# Patient Record
Sex: MALE | Race: BLACK OR AFRICAN AMERICAN | HISPANIC OR LATINO | Employment: OTHER | URBAN - METROPOLITAN AREA
[De-identification: names, ages, dates, MRNs, and addresses within clinical notes are randomized per-mention and may not be internally consistent; named-entity substitution may affect disease eponyms.]

---

## 2017-01-09 RX ORDER — ALLOPURINOL 100 MG/1
100 TABLET ORAL DAILY
Qty: 90 TABLET | Refills: 1 | Status: SHIPPED | OUTPATIENT
Start: 2017-01-09 | End: 2017-01-11

## 2017-01-09 RX ORDER — AMLODIPINE BESYLATE 5 MG/1
TABLET ORAL
Qty: 90 TABLET | Refills: 1 | Status: SHIPPED | OUTPATIENT
Start: 2017-01-09 | End: 2017-01-11

## 2017-01-10 ENCOUNTER — DOCUMENTATION ONLY (OUTPATIENT)
Dept: FAMILY MEDICINE | Facility: CLINIC | Age: 82
End: 2017-01-10

## 2017-01-11 ENCOUNTER — OFFICE VISIT (OUTPATIENT)
Dept: FAMILY MEDICINE | Facility: CLINIC | Age: 82
End: 2017-01-11
Payer: MEDICARE

## 2017-01-11 ENCOUNTER — LAB VISIT (OUTPATIENT)
Dept: LAB | Facility: HOSPITAL | Age: 82
End: 2017-01-11
Attending: FAMILY MEDICINE
Payer: MEDICARE

## 2017-01-11 VITALS
HEIGHT: 66 IN | RESPIRATION RATE: 18 BRPM | DIASTOLIC BLOOD PRESSURE: 79 MMHG | HEART RATE: 88 BPM | TEMPERATURE: 99 F | SYSTOLIC BLOOD PRESSURE: 139 MMHG | WEIGHT: 139.13 LBS | BODY MASS INDEX: 22.36 KG/M2

## 2017-01-11 DIAGNOSIS — I10 ESSENTIAL HYPERTENSION, BENIGN: ICD-10-CM

## 2017-01-11 DIAGNOSIS — I10 ESSENTIAL HYPERTENSION, BENIGN: Primary | ICD-10-CM

## 2017-01-11 DIAGNOSIS — M25.531 BILATERAL WRIST PAIN: Primary | ICD-10-CM

## 2017-01-11 DIAGNOSIS — M19.90 OSTEOARTHRITIS, UNSPECIFIED OSTEOARTHRITIS TYPE, UNSPECIFIED SITE: ICD-10-CM

## 2017-01-11 DIAGNOSIS — R73.9 HYPERGLYCEMIA: ICD-10-CM

## 2017-01-11 DIAGNOSIS — E78.5 HYPERLIPIDEMIA, UNSPECIFIED HYPERLIPIDEMIA TYPE: ICD-10-CM

## 2017-01-11 DIAGNOSIS — H26.9 CATARACT OF BOTH EYES, UNSPECIFIED CATARACT TYPE: ICD-10-CM

## 2017-01-11 DIAGNOSIS — G56.00 CARPAL TUNNEL SYNDROME, UNSPECIFIED LATERALITY: ICD-10-CM

## 2017-01-11 DIAGNOSIS — H61.21 RIGHT EAR IMPACTED CERUMEN: ICD-10-CM

## 2017-01-11 DIAGNOSIS — M25.532 BILATERAL WRIST PAIN: Primary | ICD-10-CM

## 2017-01-11 LAB
25(OH)D3+25(OH)D2 SERPL-MCNC: 23 NG/ML
ALBUMIN SERPL BCP-MCNC: 3.5 G/DL
ALP SERPL-CCNC: 88 U/L
ALT SERPL W/O P-5'-P-CCNC: 17 U/L
ANION GAP SERPL CALC-SCNC: 8 MMOL/L
AST SERPL-CCNC: 17 U/L
BASOPHILS # BLD AUTO: 0.02 K/UL
BASOPHILS NFR BLD: 0.5 %
BILIRUB SERPL-MCNC: 0.4 MG/DL
BUN SERPL-MCNC: 14 MG/DL
CALCIUM SERPL-MCNC: 9.1 MG/DL
CHLORIDE SERPL-SCNC: 110 MMOL/L
CO2 SERPL-SCNC: 21 MMOL/L
CREAT SERPL-MCNC: 1.1 MG/DL
DIFFERENTIAL METHOD: ABNORMAL
EOSINOPHIL # BLD AUTO: 1 K/UL
EOSINOPHIL NFR BLD: 24.6 %
ERYTHROCYTE [DISTWIDTH] IN BLOOD BY AUTOMATED COUNT: 15 %
EST. GFR  (AFRICAN AMERICAN): >60 ML/MIN/1.73 M^2
EST. GFR  (NON AFRICAN AMERICAN): >60 ML/MIN/1.73 M^2
GLUCOSE SERPL-MCNC: 96 MG/DL
HCT VFR BLD AUTO: 39.5 %
HGB BLD-MCNC: 12.7 G/DL
LYMPHOCYTES # BLD AUTO: 1.2 K/UL
LYMPHOCYTES NFR BLD: 29.1 %
MCH RBC QN AUTO: 30 PG
MCHC RBC AUTO-ENTMCNC: 32.2 %
MCV RBC AUTO: 93 FL
MONOCYTES # BLD AUTO: 0.3 K/UL
MONOCYTES NFR BLD: 8.1 %
NEUTROPHILS # BLD AUTO: 1.6 K/UL
NEUTROPHILS NFR BLD: 37.5 %
PLATELET # BLD AUTO: 306 K/UL
PMV BLD AUTO: 9.8 FL
POTASSIUM SERPL-SCNC: 4.2 MMOL/L
PROT SERPL-MCNC: 6.9 G/DL
RBC # BLD AUTO: 4.24 M/UL
SODIUM SERPL-SCNC: 139 MMOL/L
TSH SERPL DL<=0.005 MIU/L-ACNC: 1.28 UIU/ML
WBC # BLD AUTO: 4.19 K/UL

## 2017-01-11 PROCEDURE — 1160F RVW MEDS BY RX/DR IN RCRD: CPT | Mod: S$GLB,,, | Performed by: FAMILY MEDICINE

## 2017-01-11 PROCEDURE — 1125F AMNT PAIN NOTED PAIN PRSNT: CPT | Mod: S$GLB,,, | Performed by: FAMILY MEDICINE

## 2017-01-11 PROCEDURE — 3075F SYST BP GE 130 - 139MM HG: CPT | Mod: S$GLB,,, | Performed by: FAMILY MEDICINE

## 2017-01-11 PROCEDURE — 3078F DIAST BP <80 MM HG: CPT | Mod: S$GLB,,, | Performed by: FAMILY MEDICINE

## 2017-01-11 PROCEDURE — 85025 COMPLETE CBC W/AUTO DIFF WBC: CPT

## 2017-01-11 PROCEDURE — 1159F MED LIST DOCD IN RCRD: CPT | Mod: S$GLB,,, | Performed by: FAMILY MEDICINE

## 2017-01-11 PROCEDURE — 36415 COLL VENOUS BLD VENIPUNCTURE: CPT | Mod: PO

## 2017-01-11 PROCEDURE — 99214 OFFICE O/P EST MOD 30 MIN: CPT | Mod: 25,S$GLB,, | Performed by: FAMILY MEDICINE

## 2017-01-11 PROCEDURE — 99999 PR PBB SHADOW E&M-EST. PATIENT-LVL III: CPT | Mod: PBBFAC,,, | Performed by: FAMILY MEDICINE

## 2017-01-11 PROCEDURE — 82306 VITAMIN D 25 HYDROXY: CPT

## 2017-01-11 PROCEDURE — 1157F ADVNC CARE PLAN IN RCRD: CPT | Mod: S$GLB,,, | Performed by: FAMILY MEDICINE

## 2017-01-11 PROCEDURE — 80053 COMPREHEN METABOLIC PANEL: CPT

## 2017-01-11 PROCEDURE — 84443 ASSAY THYROID STIM HORMONE: CPT

## 2017-01-11 PROCEDURE — 83036 HEMOGLOBIN GLYCOSYLATED A1C: CPT

## 2017-01-11 NOTE — MR AVS SNAPSHOT
[facility]  [address]  [place] [address]  Phone: [phone]  Fax: [phone]                  Bashir Ramos   2017 2:00 PM   Office Visit    Descripción:  Male : 1935   Personal Médico:  Samir Rouse MD   Departamento:  [place] [facility]           Razón de la micheal     Establish Care           Diagnósticos de Esta Visita        Comentarios    Essential hypertension, benign    -  Primario     Hyperlipidemia, unspecified hyperlipidemia type         Hyperglycemia         Osteoarthritis, unspecified osteoarthritis type, unspecified site         Cataract of both eyes, unspecified cataract type         Carpal tunnel syndrome, unspecified laterality         Right ear impacted cerumen                Lista de tareas           Citas próximas        Personal Médico Departamento Tfno del dpto    2017 3:15 PM [facility] [phone]    2017 4:00 PM Aric Edwards MD [place] - Orthopedics [phone]    2017 2:30 PM Jw Goss, JERI Sanchez [facility] [phone]    2017 4:20 PM Samir Rouse MD [facility] [phone]      Metas (5 Years of Data)     Ninguna      Follow-Up and Disposition     Return in about 4 weeks (around 2017).    Follow-up and Disposition History      [facility]     [place] [facility] [place] [facility] Enfermeras - Asistencia   Enfermeras registradas de [facility] pueden ayudarle a reservar arin micheal, proveer educación para la beulah, asesoría clínica, y otros servicios de asesoramiento.   Llame para ozzie servicio gratuito a [phone].             Medicamentos           DEJAR de benigno estos medicamentos     allopurinol (ZYLOPRIM) 100 MG tablet Take 1 tablet (100 mg total) by mouth once daily.    amlodipine (NORVASC) 5 MG tablet TAKE 1 TABLET ONCE DAILY FOR BLOOD PRESSURE    meloxicam (MOBIC) 15 MG tablet Take 1 tablet (15 mg total) by mouth once daily.          "  Verifique que la siguiente lista de medicamentos es arin representación exacta de los medicamentos que está tomando actualmente. Si no hay ningunos reportados, la lista puede estar en brown. Si no es correcta, por favor póngase en contacto con lebron proveedor de atención médica. Lleve esta lista con usted en radha de emergencia.           Medicamentos Actuales     benazepril (LOTENSIN) 10 MG tablet Take 20 mg by mouth once daily.     simvastatin (ZOCOR) 40 MG tablet TAKE ONE TABLET BY MOUTH EVERY EVENING FOR CHOLESTEROL    sulindac (CLINORIL) 200 MG Tab            Información de referencia clínica           Signos vitales - más recientes  Última actualización: 1/11/2017  2:15 PM por Damaris Amaya, MA    PS Pulso Temperatura Resp Loraine Peso    139/79 (BP Location: Right arm, Patient Position: Sitting, BP Method: Automatic) 88 98.6 °F (37 °C) (Oral) 18 5' 5.5" (1.664 m) 63.1 kg (139 lb 1.8 oz)    BMI (IMC)                   22.8 kg/m2           Blood Pressure          Most Recent Value    BP  139/79      Alergias     A partir del:  1/11/2017        Pcn [Penicillins]      Vacunas     Administradas en la fecha de la visita:  1/11/2017        None      Orders Placed During Today's Visit      Órdenes normales de esta visita    Ambulatory referral to Ophthalmology     Ambulatory referral to Orthopedics     Exámenes/Procedimientos futuros Se espera el Vence    CBC auto differential  1/11/2017 3/12/2018    Comprehensive metabolic panel  1/11/2017 3/12/2018    Hemoglobin A1c  1/11/2017 3/12/2018    TSH  1/11/2017 3/12/2018    Vitamin D  1/11/2017 3/12/2018      Registrarse para [facility]     La activación de lebron cuenta [facility] es tan fácil rodrigo 1-2-3!    1) Ir a [email or web address], seleccione Registrarse Ahora, meter el código de activación y lebron fecha de nacimiento, y seleccione Próximo.    [id number]  Expires: 2/25/2017  3:13 PM      2) Crear un nombre de usuario y contraseña para usar cuando se visita [facility] en el " futuro y selecciona arin pregunta de seguridad en radha de que pierda lebron contraseña y seleccione Próximo.    3) Introduzca lebron dirección de correo electrónico y kristel asim en Registrarse!    Información Adicional  Si tiene alguna pregunta, por favor, e-mail [email or web address] o llame al [phone] para hablar con nuestro personal. Recuerde, [facility] no debe ser usada para necesidades urgentes. En radha de emergencia médica, llame al 911.                 Bashir Richard   2017 2:00 PM   Office Visit    Description:  Male : 1935   Provider:  Samir Rouse MD   Department:  [place] [facility]           Reason for Visit     Establish Care           Diagnoses this Visit        Comments    Essential hypertension, benign    -  Primary     Hyperlipidemia, unspecified hyperlipidemia type         Hyperglycemia         Osteoarthritis, unspecified osteoarthritis type, unspecified site         Cataract of both eyes, unspecified cataract type         Carpal tunnel syndrome, unspecified laterality         Right ear impacted cerumen                To Do List           Future Appointments        Provider Department Dept Phone    2017 3:15 PM LAB, [facility] - Lab [phone]    2017 4:00 PM Aric Edwards MD [place] - Orthopedics [phone]    2017 2:30 PM Jw Goss, OD [place] [facility] - Optometry [phone]    2017 4:20 PM Samir Rouse MD [facility] [phone]      Goals     None      Follow-Up and Disposition     Return in about 4 weeks (around 2017).    Follow-up and Disposition History      [facility]     [facility] Care [facility] -  Assistance  Registered nurses in the [facility] provide clinical advisement, health education, appointment booking, and other advisory services.  Call for this free service at [phone].             Medications           STOP taking these medications     allopurinol (ZYLOPRIM)  "100 MG tablet Take 1 tablet (100 mg total) by mouth once daily.    amlodipine (NORVASC) 5 MG tablet TAKE 1 TABLET ONCE DAILY FOR BLOOD PRESSURE    meloxicam (MOBIC) 15 MG tablet Take 1 tablet (15 mg total) by mouth once daily.           Verify that the below list of medications is an accurate representation of the medications you are currently taking.  If none reported, the list may be blank. If incorrect, please contact your healthcare provider. Carry this list with you in case of emergency.           Current Medications     benazepril (LOTENSIN) 10 MG tablet Take 20 mg by mouth once daily.     simvastatin (ZOCOR) 40 MG tablet TAKE ONE TABLET BY MOUTH EVERY EVENING FOR CHOLESTEROL    sulindac (CLINORIL) 200 MG Tab            Clinical Reference Information           Vital Signs - Last Recorded  Most recent update: 1/11/2017  2:15 PM by Damaris Amaya MA    BP Pulse Temp Resp Ht Wt    139/79 (BP Location: Right arm, Patient Position: Sitting, BP Method: Automatic) 88 98.6 °F (37 °C) (Oral) 18 5' 5.5" (1.664 m) 63.1 kg (139 lb 1.8 oz)    BMI                   22.8 kg/m2           Blood Pressure          Most Recent Value    BP  139/79      Allergies as of 1/11/2017     Pcn [Penicillins]      Immunizations Administered on Date of Encounter - 1/11/2017     None      Orders Placed During Today's Visit      Normal Orders This Visit    Ambulatory referral to Ophthalmology     Ambulatory referral to Orthopedics     Future Labs/Procedures Expected by Expires    CBC auto differential  1/11/2017 3/12/2018    Comprehensive metabolic panel  1/11/2017 3/12/2018    Hemoglobin A1c  1/11/2017 3/12/2018    TSH  1/11/2017 3/12/2018    Vitamin D  1/11/2017 3/12/2018      [facility] Sign-Up     Activating your [facility] account is as easy as 1-2-3!     1) Visit [email or web address], select Sign Up Now, enter this activation code and your date of birth, then select Next.  [email or web address]  Expires: 2/25/2017  3:13 PM      2) Create a " username and password to use when you visit [facility] in the future and select a security question in case you lose your password and select Next.    3) Enter your e-mail address and click Sign Up!    Additional Information  If you have questions, please e-mail [email or web address] or call [phone] to talk to our [facility] staff. Remember, [facility] is NOT to be used for urgent needs. For medical emergencies, dial 911.

## 2017-01-11 NOTE — PROGRESS NOTES
"[facility]  Progress Note    Subjective:       Patient ID: Bashir Ramos is a 81 y.o. male.    Chief Complaint: Shoulder pain    HPI81 y.o.male with current medical history of hypertension, hyperlipidemia, and osteoarthritis is here today to establish care.  Patient's blood pressure and cholesterol have been well-controlled on current medications.  Patient continues to have bilateral shoulder and knee pain.  The patient has increased pain with any weightbearing activity.  Patient experiences the most pain in his left shoulder.  Patient also has a history of carpal tunnel.  Patient has not yet received any injections on for abnormal tunnel.  Otherwise patient has been doing well and does not have any further complaints at today's visit.  Review of Systems   Constitutional: Negative for chills, fatigue and fever.   HENT: Negative for congestion, ear pain, postnasal drip, sinus pressure, sneezing and sore throat.    Eyes: Negative for pain.   Respiratory: Negative for cough, shortness of breath and wheezing.    Cardiovascular: Negative for chest pain, palpitations and leg swelling.   Gastrointestinal: Negative for abdominal distention, abdominal pain, constipation, diarrhea, nausea and vomiting.   Genitourinary: Negative for difficulty urinating.   Musculoskeletal: Positive for arthralgias and myalgias. Negative for back pain.   Skin: Negative for rash.   Neurological: Negative for dizziness, seizures, syncope, weakness and numbness.   Psychiatric/Behavioral: Negative for sleep disturbance. The patient is not nervous/anxious.        Objective:      Vitals:    01/11/17 1412   BP: 139/79   BP Location: Right arm   Patient Position: Sitting   BP Method: Automatic   Pulse: 88   Resp: 18   Temp: 98.6 °F (37 °C)   TempSrc: Oral   Weight: 63.1 kg (139 lb 1.8 oz)   Height: 5' 5.5" (1.664 m)     Body mass index is 22.8 kg/(m^2).  Physical Exam   Constitutional: He is oriented to person, place, and time. He appears " well-developed and well-nourished.   HENT:   Head: Normocephalic and atraumatic.   Cataracts bilaterally   Right ear cerumen impaction   Eyes: Conjunctivae and EOM are normal. Pupils are equal, round, and reactive to light.   Neck: Normal range of motion. Neck supple. No JVD present.   Cardiovascular: Normal rate, regular rhythm, normal heart sounds and intact distal pulses.  Exam reveals no gallop and no friction rub.    No murmur heard.  Pulmonary/Chest: Effort normal and breath sounds normal. No respiratory distress. He has no wheezes.   Abdominal: Soft. Bowel sounds are normal. There is no tenderness.   Musculoskeletal: Normal range of motion.   Neurological: He is alert and oriented to person, place, and time. No cranial nerve deficit.   Skin: Skin is warm and dry.   Psychiatric: He has a normal mood and affect. His behavior is normal. Judgment and thought content normal.   Nursing note and vitals reviewed.      Assessment:       1. Essential hypertension, benign    2. Hyperlipidemia, unspecified hyperlipidemia type    3. Hyperglycemia    4. Osteoarthritis, unspecified osteoarthritis type, unspecified site    5. Cataract of both eyes, unspecified cataract type    6. Carpal tunnel syndrome, unspecified laterality    7. Right ear impacted cerumen        Plan:       Essential hypertension, benign  -     TSH; Future; Expected date: 1/11/17  -     CBC auto differential; Future; Expected date: 1/11/17    Hyperlipidemia, unspecified hyperlipidemia type        - Well controlled continue current medications    Hyperglycemia  -     Hemoglobin A1c; Future; Expected date: 1/11/17  -     Comprehensive metabolic panel; Future; Expected date: 1/11/17    Osteoarthritis, unspecified osteoarthritis type, unspecified site  -     Vitamin D; Future; Expected date: 1/11/17  Knee Injection Procedure Note    Pre-operative Diagnosis:  knee osteoarthritis    Post-operative Diagnosis: same    Indications: Symptom relief from  osteoarthritis    Procedure Details     Verbal and written consent was obtained for the procedure. A timeout was performed. Topical anesthetic spray was applied to injection site. The joint was then prepped with betadine followed by alcohol and allowed to dry. A 22 gauge needle was inserted into the posterior aspect of the joint .  6 ml 1% lidocaine and 1 ml of kenalog 30mg/ml was then injected into the joint. The needle was removed and the area cleansed and dressed.    Complications:  None; patient tolerated the procedure well.    Cataract of both eyes, unspecified cataract type  -     Ambulatory referral to Ophthalmology    Carpal tunnel syndrome, unspecified laterality  -     Ambulatory referral to Orthopedics    Right ear cerumen impaction         - Right ear irration performed; patient tolerated well         - DEBROX OTC     Patient readiness: acceptance and barriers:none    During the course of the visit the patient was educated and counseled about the following:     Hypertension:   Dietary sodium restriction.  Regular aerobic exercise.  Check blood pressures daily and record.    Goals: Hypertension: Reduce Blood Pressure    Did patient meet goals/outcomes: Yes    The following self management tools provided: blood pressure log    Patient Instructions (the written plan) was given to the patient/family.     Time spent with patient: 45 minutes    Return in about 4 weeks (around 2/8/2017).  Samir Rouse MD  [facility]  1/11/2017 2:37 PM

## 2017-01-12 LAB
ESTIMATED AVG GLUCOSE: 123 MG/DL
HBA1C MFR BLD HPLC: 5.9 %

## 2017-01-13 ENCOUNTER — TELEPHONE (OUTPATIENT)
Dept: FAMILY MEDICINE | Facility: CLINIC | Age: 82
End: 2017-01-13

## 2017-01-13 NOTE — TELEPHONE ENCOUNTER
----- Message from Samir Rouse MD sent at 1/12/2017  1:21 PM CST -----  Please call and inform patient that his recent lab work indicates he is a prediabetic, has vitamin D deficiency, and slight anemia.  Please have patient make appointment to discuss findings and appropriate treatment plan.

## 2017-01-24 ENCOUNTER — OFFICE VISIT (OUTPATIENT)
Dept: OPTOMETRY | Facility: CLINIC | Age: 82
End: 2017-01-24
Payer: MEDICARE

## 2017-01-24 DIAGNOSIS — H52.7 REFRACTIVE ERROR: ICD-10-CM

## 2017-01-24 DIAGNOSIS — H53.8 BLURRY VISION, BILATERAL: Primary | ICD-10-CM

## 2017-01-24 DIAGNOSIS — Z96.1 PSEUDOPHAKIA: ICD-10-CM

## 2017-01-24 PROCEDURE — 92014 COMPRE OPH EXAM EST PT 1/>: CPT | Mod: S$GLB,,, | Performed by: OPTOMETRIST

## 2017-01-24 PROCEDURE — 99999 PR PBB SHADOW E&M-EST. PATIENT-LVL I: CPT | Mod: PBBFAC,,, | Performed by: OPTOMETRIST

## 2017-01-24 NOTE — PROGRESS NOTES
HPI     CC: Pt here today for yearly eye exam. Pt states distance vision is good.   Pt uses read. He states after using them 10 minutes he gets headaches.    (-) pain / (-) discomfort  (+) headaches  (-) diplopia   (-) flashes / (-) floaters         Last edited by Nick Patel on 1/24/2017  2:37 PM.     ROS     Positive for: Cardiovascular (HTN ), Eyes    Negative for: Constitutional, Gastrointestinal, Neurological, Skin,   Genitourinary, Musculoskeletal, HENT, Endocrine, Respiratory, Psychiatric,   Allergic/Imm, Heme/Lymph    Last edited by Jw Goss, OD on 1/24/2017  3:12 PM. (History)        Assessment /Plan     For exam results, see Encounter Report.    Blurry vision, bilateral    Pseudophakia    Refractive error      Good ocular health ou. S/p cataract extraction ou with good results. Pt denies refraction, has vision insurance, prefers to go to in-network provider.     RTC in 1 year for comprehensive eye exam, or sooner prn.

## 2017-01-24 NOTE — LETTER
January 24, 2017      Samir Rouse MD  [address]  [place] [address]           [place] [facility] - Opt[facility]  [address] [place] [address] Drive [address]  [facility] [address]  Phone: [phone]          Patient: Bashir Ramos   MR Number: [id number]   YOB: 1935   Date of Visit: 1/24/2017       Dear Dr. Samir Rouse:    Thank you for referring Bashir Ramos to me for evaluation. Attached you will find relevant portions of my assessment and plan of care.    If you have questions, please do not hesitate to call me. I look forward to following Bashir Ramos along with you.    Sincerely,    Jw Goss, OD    Enclosure  CC:  No Recipients    If you would like to receive this communication electronically, please contact [email or web address] or [phone] to request more information on [employer] Link access.    For providers and/or their staff who would like to refer a patient to [facility], please contact us through our one-stop-shop provider referral line, Francie Puente, at [phone].    If you feel you have received this communication in error or would no longer like to receive these types of communications, please e-mail [email or web address]

## 2017-02-06 ENCOUNTER — TELEPHONE (OUTPATIENT)
Dept: PHYSICAL MEDICINE AND REHAB | Facility: CLINIC | Age: 82
End: 2017-02-06

## 2017-02-07 ENCOUNTER — OFFICE VISIT (OUTPATIENT)
Dept: PHYSICAL MEDICINE AND REHAB | Facility: CLINIC | Age: 82
End: 2017-02-07
Payer: MEDICARE

## 2017-02-07 VITALS
WEIGHT: 141.88 LBS | HEIGHT: 66 IN | HEART RATE: 87 BPM | SYSTOLIC BLOOD PRESSURE: 119 MMHG | DIASTOLIC BLOOD PRESSURE: 81 MMHG | BODY MASS INDEX: 22.8 KG/M2

## 2017-02-07 DIAGNOSIS — G56.01 CARPAL TUNNEL SYNDROME OF RIGHT WRIST: Primary | ICD-10-CM

## 2017-02-07 DIAGNOSIS — G56.03 BILATERAL CARPAL TUNNEL SYNDROME: ICD-10-CM

## 2017-02-07 PROCEDURE — 1159F MED LIST DOCD IN RCRD: CPT | Mod: S$GLB,,, | Performed by: PHYSICAL MEDICINE & REHABILITATION

## 2017-02-07 PROCEDURE — 3079F DIAST BP 80-89 MM HG: CPT | Mod: S$GLB,,, | Performed by: PHYSICAL MEDICINE & REHABILITATION

## 2017-02-07 PROCEDURE — 99203 OFFICE O/P NEW LOW 30 MIN: CPT | Mod: S$GLB,,, | Performed by: PHYSICAL MEDICINE & REHABILITATION

## 2017-02-07 PROCEDURE — 1160F RVW MEDS BY RX/DR IN RCRD: CPT | Mod: S$GLB,,, | Performed by: PHYSICAL MEDICINE & REHABILITATION

## 2017-02-07 PROCEDURE — 3074F SYST BP LT 130 MM HG: CPT | Mod: S$GLB,,, | Performed by: PHYSICAL MEDICINE & REHABILITATION

## 2017-02-07 PROCEDURE — 99999 PR PBB SHADOW E&M-EST. PATIENT-LVL II: CPT | Mod: PBBFAC,,, | Performed by: PHYSICAL MEDICINE & REHABILITATION

## 2017-02-07 PROCEDURE — 1157F ADVNC CARE PLAN IN RCRD: CPT | Mod: S$GLB,,, | Performed by: PHYSICAL MEDICINE & REHABILITATION

## 2017-02-07 RX ORDER — GABAPENTIN 100 MG/1
200 CAPSULE ORAL NIGHTLY
Qty: 60 CAPSULE | Refills: 11 | Status: SHIPPED | OUTPATIENT
Start: 2017-02-07 | End: 2017-03-08

## 2017-02-07 NOTE — LETTER
February 7, 2017      Aric Edwards MD  [address] [facility] [address]  [facility] [id number] [place], [place] [address]Physical Med/Rehab  [address] [place] [address]  Phone: [phone]  Fax: [phone]          Patient: Bashir Ramos   MR Number: [id number]   YOB: 1935   Date of Visit: 2/7/2017       Dear Dr. Aric Edwards:    Thank you for referring Bashir Ramos to me for evaluation. Attached you will find relevant portions of my assessment and plan of care.    If you have questions, please do not hesitate to call me. I look forward to following Bashir Ramos along with you.    Sincerely,    Dory Hubbard,     Enclosure  CC:  No Recipients    If you would like to receive this communication electronically, please contact [email or web address] or [phone] to request more information on [employer] Link access.    For providers and/or their staff who would like to refer a patient to [facility], please contact us through our one-stop-shop provider referral line, [facility] Cindi, at [phone].    If you feel you have received this communication in error or would no longer like to receive these types of communications, please e-mail [email or web address]

## 2017-02-07 NOTE — PROGRESS NOTES
HPI:  Patient is a 82 y.o. year old male w. Right hand numbness. He is scared of surgery. His emg showed severe right cts.  Labs  Egfr, cr. 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 grayson)    Cataract extraction  10/29/13     left eye     Family History   Problem Relation Age of Onset    Amblyopia Neg Hx     Blindness Neg Hx     Cancer Neg Hx     Cataracts Neg Hx     Diabetes Neg Hx     Glaucoma Neg Hx     Hypertension Neg Hx     Macular degeneration Neg Hx     Retinal detachment Neg Hx     Strabismus Neg Hx     Stroke Neg Hx     Thyroid disease Neg Hx      Social History     Social History    Marital status:      Spouse name: N/A    Number of children: N/A    Years of education: N/A     Social History Main Topics    Smoking status: Former Smoker     Packs/day: 1.00     Types: Cigarettes     Quit date: 8/27/1982    Smokeless tobacco: Never Used    Alcohol use No    Drug use: No    Sexual activity: Not Asked     Other Topics Concern    None     Social History Narrative       Review of patient's allergies indicates:   Allergen Reactions    Pcn [penicillins] Rash       Current Outpatient Prescriptions:     benazepril (LOTENSIN) 10 MG tablet, Take 20 mg by mouth once daily. , Disp: , Rfl:     simvastatin (ZOCOR) 40 MG tablet, TAKE ONE TABLET BY MOUTH EVERY EVENING FOR CHOLESTEROL, Disp: 90 tablet, Rfl: 0    sulindac (CLINORIL) 200 MG Tab, , Disp: , Rfl:           Review of Systems  No nausea, vomiting, fevers, Chills , contipation, diarrhea or sweats    Physical Exam:      Vitals:    02/07/17 0914   BP: 119/81   Pulse: 87     alert and oriented  ×4 follows commands answers all questions appropriately,affect wnl  Manual muscle test 5 out of 5 sensation to light touch grossly intact  +ATROPHY THENAR EMINENCE RIGHT  NL gait FOR STATED AGE  No C/C/E  +TINNELS, +MEDIAN COMPRESSION TEST RIGHT    Assessment:  SEVERE RIGHT CTS    Plan:  TRIAL OF NEURONTIN  WEAR SPLINTS  DECLINED CARPAL TUNNEL INJECTION- HOWEVER, IT MAY NOT HELP D/T SEVERITY OF CTS    Thank you for this interesting referral

## 2017-02-07 NOTE — MR AVS SNAPSHOT
[facility]  [address] [place] [address] Dwayne JOYCE [address]  Phone: [phone]  Fax: [phone]                  Bashir Ramos   2017 9:00 AM   Office Visit    Descripción:  Male : 1935   Personal Médico:  Dory Hubbard DO   Departamento:  [facility]           Razón de la micheal     Wrist Pain           Diagnósticos de Esta Visita        Comentarios    Carpal tunnel syndrome of right wrist    -  Primario     Bilateral carpal tunnel syndrome                Lista de tareas           Citas próximas        Personal Médico Departamento Tfno del dpto    2/15/2017 2:40 PM Samir Rouse MD [facility] [phone]    2017 4:20 PM Samir Rouse MD [facility] [phone]    3/7/2017 9:00 AM Dory Hubbard [facility] [phone]      Metas (5 Years of Data)     Ninguna      Follow-Up and Disposition     Return in about 4 weeks (around 3/7/2017).    Follow-up and Disposition History      Recetas para recoger        Disp Refills Start End    gabapentin (NEURONTIN) 100 MG capsule 60 capsule 11 2017    Take 2 capsules (200 mg total) by mouth every evening. - Oral    Farmacia: [facility], [facility] [place] [facility] [address] [facility] [place]INNY No. de tlfo: #: [phone]         [facility]     [facility] Enfermeras - Asistencia   Enfermeras registradas de [place] pueden ayudarle a reservar arin micheal, proveer educación para la beulah, asesoría clínica, y otros servicios de asesoramiento.   Llame para ozzie servicio gratuito a [phone].             Medicamentos           EMPEZAR a benigno estos medicamentos NUEVOS        Refills    gabapentin (NEURONTIN) 100 MG capsule 11    Sig: Take 2 capsules (200 mg total) by mouth every evening.    Categoría: Normal    Vía: Oral           Verifique que la siguiente lista de medicamentos es arin representación exacta de  "los medicamentos que está tomando actualmente. Si no hay ningunos reportados, la lista puede estar en brown. Si no es correcta, por favor póngase en contacto con lebron proveedor de atención médica. Lleve esta lista con usted en radha de emergencia.           Medicamentos Actuales     benazepril (LOTENSIN) 10 MG tablet Take 20 mg by mouth once daily.     gabapentin (NEURONTIN) 100 MG capsule Take 2 capsules (200 mg total) by mouth every evening.    simvastatin (ZOCOR) 40 MG tablet TAKE ONE TABLET BY MOUTH EVERY EVENING FOR CHOLESTEROL    sulindac (CLINORIL) 200 MG Tab            Información de referencia clínica           Eda signos vitales kurt     PS Pulso Donnelly Peso BMI (IMC)       119/81 87 5' 5.5" (1.664 m) 64.4 kg (141 lb 13.9 oz) 23.25 kg/m2       Blood Pressure          Most Recent Value    BP  119/81      Alergias     A partir del:  2017        Pcn [Penicillins]      Vacunas     Administradas en la fecha de la visita:  2017        None      Orders Placed During Today's Visit      Órdenes normales de esta visita    HME - OTHER     HME - OTHER       Language Assistance Services     ATTENTION: Language assistance services are available, free of charge. Please call [phone].      ATENCIÓN: Si habla español, tiene a lebron disposición servicios gratuitos de asistencia lingüística. Llame al [phone].     [facility] Ý: N?u b?n nói Ti?ng Vi?t, có các d?ch v? h? tr? ngôn ng? mi?n phí dành cho b?n. G?i s? [phone].         [facility] cumple con las leyes federales aplicables de derechos civiles y no discrimina por motivos de johan, color, origen nacional, edad, discapacidad, o sexo.                 Bashir Ramos   2017 9:00 AM   Office Visit    Description:  Male : 1935   Provider:  Dory Hubbard DO   Department:  [facility]           Reason for Visit     Wrist Pain           Diagnoses this Visit        Comments    Carpal tunnel syndrome of " right wrist    -  Primary     Bilateral carpal tunnel syndrome                To Do List           Future Appointments        Provider Department Dept Phone    2[facility]2017 2:40 PM [facility] [phone]    2/21/2017 4:20 PM [facility] [phone]    3/7/2017 9:00 AM Dory Hubbard DO [facility] [phone]      Goals     None      Follow-Up and Disposition     Return in about 4 weeks (around 3/7/2017).    Follow-up and Disposition History       These Medications        Disp Refills Start End    gabapentin (NEURONTIN) 100 MG capsule 60 capsule 11 2/7/2017 2/7/2018    Take 2 capsules (200 mg total) by mouth every evening. - Oral    Pharmacy: [facility] [place] [id number]  [address] GROSS Ph #: [phone]         [facility] On Call     [facility] On Call Nurse Care Line - 24/7 Assistance  Registered nurses in the [facility] On Call Center provide clinical advisement, health education, appointment booking, and other advisory services.  Call for this free service at [phone].             Medications           START taking these NEW medications        Refills    gabapentin (NEURONTIN) 100 MG capsule 11    Sig: Take 2 capsules (200 mg total) by mouth every evening.    Class: Normal    Route: Oral           Verify that the below list of medications is an accurate representation of the medications you are currently taking.  If none reported, the list may be blank. If incorrect, please contact your healthcare provider. Carry this list with you in case of emergency.           Current Medications     benazepril (LOTENSIN) 10 MG tablet Take 20 mg by mouth once daily.     gabapentin (NEURONTIN) 100 MG capsule Take 2 capsules (200 mg total) by mouth every evening.    simvastatin (ZOCOR) 40 MG tablet TAKE ONE TABLET BY MOUTH EVERY EVENING FOR CHOLESTEROL    sulindac (CLINORIL) 200 MG Tab            Clinical  "Reference Information           Your Vitals Were     BP Pulse Height Weight BMI       119/81 87 5' 5.5" (1.664 m) 64.4 kg (141 lb 13.9 oz) 23.25 kg/m2       Blood Pressure          Most Recent Value    BP  119/81      Allergies as of 2/7/2017     Pcn [Penicillins]      Immunizations Administered on Date of Encounter - 2/7/2017     None      Orders Placed During Today's Visit      Normal Orders This Visit    HME - OTHER     HME - OTHER       Language Assistance Services     ATTENTION: Language assistance services are available, free of charge. Please call [phone].      ATENCIÓN: Si habla treva, tiene a lebron disposición servicios gratuitos de asistencia lingüística. Llame al [phone].     CHÚ Ý: N?u b?n nói Ti?ng Vi?t, có các d?ch v? h? tr? ngôn ng? mi?n phí dành cho b?n. G?i s? [phone].         [facility] complies with applicable Federal civil rights laws and does not discriminate on the basis of race, color, national origin, age, disability, or sex.          "

## 2017-02-13 ENCOUNTER — DOCUMENTATION ONLY (OUTPATIENT)
Dept: FAMILY MEDICINE | Facility: CLINIC | Age: 82
End: 2017-02-13

## 2017-02-13 NOTE — PROGRESS NOTES
Pre-Visit Chart Review  For Appointment Scheduled on 2/15/17.    Health Maintenance Due   Topic Date Due    TETANUS VACCINE  01/25/1953    Zoster Vaccine  01/25/1995

## 2017-02-15 ENCOUNTER — OFFICE VISIT (OUTPATIENT)
Dept: FAMILY MEDICINE | Facility: CLINIC | Age: 82
End: 2017-02-15
Payer: MEDICARE

## 2017-02-15 VITALS
HEIGHT: 66 IN | BODY MASS INDEX: 22 KG/M2 | TEMPERATURE: 98 F | HEART RATE: 73 BPM | DIASTOLIC BLOOD PRESSURE: 67 MMHG | SYSTOLIC BLOOD PRESSURE: 109 MMHG | RESPIRATION RATE: 18 BRPM | WEIGHT: 136.88 LBS

## 2017-02-15 DIAGNOSIS — E55.9 VITAMIN D DEFICIENCY: ICD-10-CM

## 2017-02-15 DIAGNOSIS — D50.9 IRON DEFICIENCY ANEMIA, UNSPECIFIED IRON DEFICIENCY ANEMIA TYPE: ICD-10-CM

## 2017-02-15 DIAGNOSIS — G56.00 CARPAL TUNNEL SYNDROME, UNSPECIFIED LATERALITY: Primary | ICD-10-CM

## 2017-02-15 DIAGNOSIS — G56.03 CARPAL TUNNEL SYNDROME ON BOTH SIDES: ICD-10-CM

## 2017-02-15 PROCEDURE — 1159F MED LIST DOCD IN RCRD: CPT | Mod: S$GLB,,, | Performed by: FAMILY MEDICINE

## 2017-02-15 PROCEDURE — 3078F DIAST BP <80 MM HG: CPT | Mod: S$GLB,,, | Performed by: FAMILY MEDICINE

## 2017-02-15 PROCEDURE — 99214 OFFICE O/P EST MOD 30 MIN: CPT | Mod: S$GLB,,, | Performed by: FAMILY MEDICINE

## 2017-02-15 PROCEDURE — 1160F RVW MEDS BY RX/DR IN RCRD: CPT | Mod: S$GLB,,, | Performed by: FAMILY MEDICINE

## 2017-02-15 PROCEDURE — 1126F AMNT PAIN NOTED NONE PRSNT: CPT | Mod: S$GLB,,, | Performed by: FAMILY MEDICINE

## 2017-02-15 PROCEDURE — 3074F SYST BP LT 130 MM HG: CPT | Mod: S$GLB,,, | Performed by: FAMILY MEDICINE

## 2017-02-15 PROCEDURE — 1157F ADVNC CARE PLAN IN RCRD: CPT | Mod: S$GLB,,, | Performed by: FAMILY MEDICINE

## 2017-02-15 PROCEDURE — 99999 PR PBB SHADOW E&M-EST. PATIENT-LVL III: CPT | Mod: PBBFAC,,, | Performed by: FAMILY MEDICINE

## 2017-02-15 RX ORDER — FERROUS SULFATE 325(65) MG
325 TABLET, DELAYED RELEASE (ENTERIC COATED) ORAL 2 TIMES DAILY
Qty: 60 TABLET | Refills: 0 | Status: SHIPPED | OUTPATIENT
Start: 2017-02-15 | End: 2017-10-03

## 2017-02-15 RX ORDER — DOCUSATE SODIUM 100 MG/1
100 CAPSULE, LIQUID FILLED ORAL 2 TIMES DAILY PRN
Qty: 60 CAPSULE | Refills: 0 | Status: SHIPPED | OUTPATIENT
Start: 2017-02-15 | End: 2017-10-03

## 2017-02-15 RX ORDER — ERGOCALCIFEROL 1.25 MG/1
50000 CAPSULE ORAL
Qty: 8 CAPSULE | Refills: 0 | Status: SHIPPED | OUTPATIENT
Start: 2017-02-15 | End: 2017-10-03

## 2017-02-15 NOTE — PROGRESS NOTES
[facility]  Progress Note    Subjective:       Patient ID: Bashir Ramos is a 82 y.o. male.    Chief Complaint: Abnormal lab / hand hurts    HPI82 y.o.male is here today to follow-up abnormal labs.  Patient was found to have anemia, vitamin D deficiency, and prediabetes.  Patient is currently not on any iron or vitamin supplementation.  The patient continues to complain of bilateral hand pain.  Pain is described as tingling and numbness.  The patient is a history of bilateral carpal tunnel.  Patient has been seen by physical medicine and rehabilitation.  Patient has been given gabapentin which hasn't helped.  Patient has been told in the past that he may require surgery.  Patient is requesting referral to orthopedic surgeon for evaluation.  All other medical problems well-controlled current medications.  Patient does not have any further complaints at today's visit.   Review of Systems   Constitutional: Negative for chills, fatigue and fever.   HENT: Negative for congestion, ear pain, postnasal drip, sinus pressure, sneezing and sore throat.    Eyes: Negative for pain.   Respiratory: Negative for cough, shortness of breath and wheezing.    Cardiovascular: Negative for chest pain, palpitations and leg swelling.   Gastrointestinal: Negative for abdominal distention, abdominal pain, constipation, diarrhea, nausea and vomiting.   Genitourinary: Negative for difficulty urinating.   Musculoskeletal: Positive for arthralgias. Negative for back pain and myalgias.   Skin: Negative for rash.   Neurological: Positive for numbness. Negative for dizziness, seizures, syncope and weakness.   Psychiatric/Behavioral: Negative for sleep disturbance. The patient is not nervous/anxious.        Objective:      Vitals:    02/15/17 1342   BP: 109/67   BP Location: Right arm   Patient Position: Sitting   BP Method: Automatic   Pulse: 73   Resp: 18   Temp: 97.7 °F (36.5 °C)   TempSrc: Oral   Weight: 62.1 kg (136 lb 14.5 oz)  "  Height: 5' 5.5" (1.664 m)     Body mass index is 22.44 kg/(m^2).  Physical Exam   Constitutional: He is oriented to person, place, and time. He appears well-developed and well-nourished. No distress.   HENT:   Head: Normocephalic and atraumatic.   Cardiovascular: Normal rate, regular rhythm, normal heart sounds and intact distal pulses.  Exam reveals no gallop and no friction rub.    No murmur heard.  Pulmonary/Chest: Effort normal and breath sounds normal. No respiratory distress. He has no rales.   Abdominal: Soft. He exhibits no distension and no mass. There is no rebound and no guarding. No hernia.   Musculoskeletal: Normal range of motion.   Neurological: He is alert and oriented to person, place, and time.   + Phalen sign bilateral    Skin: Skin is warm.   Psychiatric: He has a normal mood and affect. His behavior is normal. Judgment and thought content normal.   Vitals reviewed.      Assessment:       1. Carpal tunnel syndrome, unspecified laterality    2. Vitamin D deficiency    3. Iron deficiency anemia, unspecified iron deficiency anemia type    4. Carpal tunnel syndrome on both sides        Plan:       Carpal tunnel syndrome,bilateral   -     Ambulatory referral to Orthopedics    Vitamin D deficiency  -     ergocalciferol (ERGOCALCIFEROL) 50,000 unit Cap; Take 1 capsule (50,000 Units total) by mouth every 7 days.  Dispense: 8 capsule; Refill: 0    Iron deficiency anemia, unspecified iron deficiency anemia type  -     ferrous sulfate 325 (65 FE) MG EC tablet; Take 1 tablet (325 mg total) by mouth 2 (two) times daily.  Dispense: 60 tablet; Refill: 0  -     docusate sodium (COLACE) 100 MG capsule; Take 1 capsule (100 mg total) by mouth 2 (two) times daily as needed for Constipation.  Dispense: 60 capsule; Refill: 0    Return in about 3 months (around 5/15/2017).  Samir Rouse MD  [facility]  2/15/2017 2:44 PM       "

## 2017-02-23 ENCOUNTER — HOSPITAL ENCOUNTER (INPATIENT)
Facility: HOSPITAL | Age: 82
LOS: 3 days | Discharge: HOME HEALTH | DRG: 871 | End: 2017-02-26
Attending: EMERGENCY MEDICINE | Admitting: HOSPITALIST
Payer: MEDICARE

## 2017-02-23 DIAGNOSIS — J18.9 PNEUMONIA DUE TO INFECTIOUS ORGANISM: ICD-10-CM

## 2017-02-23 DIAGNOSIS — R52 PAIN: ICD-10-CM

## 2017-02-23 DIAGNOSIS — J18.9 PNEUMONIA DUE TO INFECTIOUS ORGANISM, UNSPECIFIED LATERALITY, UNSPECIFIED PART OF LUNG: Primary | ICD-10-CM

## 2017-02-23 PROBLEM — E43 SEVERE PROTEIN-CALORIE MALNUTRITION: Status: ACTIVE | Noted: 2017-02-23

## 2017-02-23 PROBLEM — A41.9 SEPSIS: Status: ACTIVE | Noted: 2017-02-23

## 2017-02-23 PROBLEM — D75.839 THROMBOCYTOSIS: Status: ACTIVE | Noted: 2017-02-23

## 2017-02-23 PROBLEM — R74.01 ELEVATED TRANSAMINASE LEVEL: Status: ACTIVE | Noted: 2017-02-23

## 2017-02-23 PROBLEM — R53.81 DEBILITY: Status: ACTIVE | Noted: 2017-02-23

## 2017-02-23 LAB
ALBUMIN SERPL BCP-MCNC: 2.8 G/DL
ALP SERPL-CCNC: 100 U/L
ALT SERPL W/O P-5'-P-CCNC: 45 U/L
AMORPH CRY URNS QL MICRO: ABNORMAL
ANION GAP SERPL CALC-SCNC: 13 MMOL/L
AST SERPL-CCNC: 53 U/L
BACTERIA #/AREA URNS HPF: ABNORMAL /HPF
BASOPHILS # BLD AUTO: 0 K/UL
BASOPHILS NFR BLD: 0 %
BILIRUB SERPL-MCNC: 1 MG/DL
BILIRUB UR QL STRIP: ABNORMAL
BNP SERPL-MCNC: 61 PG/ML
BUN SERPL-MCNC: 17 MG/DL
CALCIUM SERPL-MCNC: 10.2 MG/DL
CHLORIDE SERPL-SCNC: 105 MMOL/L
CLARITY UR: CLEAR
CO2 SERPL-SCNC: 20 MMOL/L
COLOR UR: YELLOW
CREAT SERPL-MCNC: 1.2 MG/DL
DIFFERENTIAL METHOD: ABNORMAL
EOSINOPHIL # BLD AUTO: 0 K/UL
EOSINOPHIL NFR BLD: 0.1 %
ERYTHROCYTE [DISTWIDTH] IN BLOOD BY AUTOMATED COUNT: 14.6 %
EST. GFR  (AFRICAN AMERICAN): >60 ML/MIN/1.73 M^2
EST. GFR  (NON AFRICAN AMERICAN): 56 ML/MIN/1.73 M^2
FLUAV AG SPEC QL IA: NEGATIVE
FLUBV AG SPEC QL IA: NEGATIVE
GLUCOSE SERPL-MCNC: 116 MG/DL
GLUCOSE UR QL STRIP: NEGATIVE
HCT VFR BLD AUTO: 38.8 %
HGB BLD-MCNC: 12.9 G/DL
HGB UR QL STRIP: ABNORMAL
HYALINE CASTS #/AREA URNS LPF: 0 /LPF
INR PPP: 1.1
KETONES UR QL STRIP: NEGATIVE
LACTATE SERPL-SCNC: 1 MMOL/L
LACTATE SERPL-SCNC: 1.5 MMOL/L
LACTATE SERPL-SCNC: 2.5 MMOL/L
LEUKOCYTE ESTERASE UR QL STRIP: NEGATIVE
LYMPHOCYTES # BLD AUTO: 0.6 K/UL
LYMPHOCYTES NFR BLD: 6.6 %
MCH RBC QN AUTO: 30.2 PG
MCHC RBC AUTO-ENTMCNC: 33.3 %
MCV RBC AUTO: 91 FL
MICROSCOPIC COMMENT: ABNORMAL
MONOCYTES # BLD AUTO: 0.9 K/UL
MONOCYTES NFR BLD: 10 %
NEUTROPHILS # BLD AUTO: 7.4 K/UL
NEUTROPHILS NFR BLD: 83.3 %
NITRITE UR QL STRIP: NEGATIVE
PH UR STRIP: 6 [PH] (ref 5–8)
PLATELET # BLD AUTO: 408 K/UL
PMV BLD AUTO: 7.1 FL
POTASSIUM SERPL-SCNC: 3.8 MMOL/L
PROT SERPL-MCNC: 7.6 G/DL
PROT UR QL STRIP: ABNORMAL
PROTHROMBIN TIME: 12 SEC
RBC # BLD AUTO: 4.28 M/UL
RBC #/AREA URNS HPF: 3 /HPF (ref 0–4)
SODIUM SERPL-SCNC: 138 MMOL/L
SP GR UR STRIP: >=1.03 (ref 1–1.03)
SPECIMEN SOURCE: NORMAL
TROPONIN I SERPL DL<=0.01 NG/ML-MCNC: 0.02 NG/ML
TROPONIN I SERPL DL<=0.01 NG/ML-MCNC: 0.02 NG/ML
URATE SERPL-MCNC: 4.5 MG/DL
URN SPEC COLLECT METH UR: ABNORMAL
UROBILINOGEN UR STRIP-ACNC: ABNORMAL EU/DL
WBC # BLD AUTO: 8.9 K/UL
WBC #/AREA URNS HPF: 0 /HPF (ref 0–5)

## 2017-02-23 PROCEDURE — 87400 INFLUENZA A/B EACH AG IA: CPT

## 2017-02-23 PROCEDURE — 96367 TX/PROPH/DG ADDL SEQ IV INF: CPT

## 2017-02-23 PROCEDURE — 99900035 HC TECH TIME PER 15 MIN (STAT)

## 2017-02-23 PROCEDURE — 83880 ASSAY OF NATRIURETIC PEPTIDE: CPT

## 2017-02-23 PROCEDURE — 84484 ASSAY OF TROPONIN QUANT: CPT | Mod: 91

## 2017-02-23 PROCEDURE — 36415 COLL VENOUS BLD VENIPUNCTURE: CPT

## 2017-02-23 PROCEDURE — 25000003 PHARM REV CODE 250: Performed by: EMERGENCY MEDICINE

## 2017-02-23 PROCEDURE — 85610 PROTHROMBIN TIME: CPT

## 2017-02-23 PROCEDURE — 87040 BLOOD CULTURE FOR BACTERIA: CPT

## 2017-02-23 PROCEDURE — 25000003 PHARM REV CODE 250: Performed by: HOSPITALIST

## 2017-02-23 PROCEDURE — 96366 THER/PROPH/DIAG IV INF ADDON: CPT

## 2017-02-23 PROCEDURE — 93005 ELECTROCARDIOGRAM TRACING: CPT

## 2017-02-23 PROCEDURE — 83605 ASSAY OF LACTIC ACID: CPT | Mod: 91

## 2017-02-23 PROCEDURE — 84550 ASSAY OF BLOOD/URIC ACID: CPT

## 2017-02-23 PROCEDURE — 80053 COMPREHEN METABOLIC PANEL: CPT

## 2017-02-23 PROCEDURE — 25000242 PHARM REV CODE 250 ALT 637 W/ HCPCS: Performed by: NURSE PRACTITIONER

## 2017-02-23 PROCEDURE — 63600175 PHARM REV CODE 636 W HCPCS: Performed by: HOSPITALIST

## 2017-02-23 PROCEDURE — 63600175 PHARM REV CODE 636 W HCPCS: Performed by: EMERGENCY MEDICINE

## 2017-02-23 PROCEDURE — 81000 URINALYSIS NONAUTO W/SCOPE: CPT

## 2017-02-23 PROCEDURE — 84484 ASSAY OF TROPONIN QUANT: CPT

## 2017-02-23 PROCEDURE — 96365 THER/PROPH/DIAG IV INF INIT: CPT

## 2017-02-23 PROCEDURE — 94640 AIRWAY INHALATION TREATMENT: CPT

## 2017-02-23 PROCEDURE — 96361 HYDRATE IV INFUSION ADD-ON: CPT

## 2017-02-23 PROCEDURE — 94761 N-INVAS EAR/PLS OXIMETRY MLT: CPT

## 2017-02-23 PROCEDURE — 25000003 PHARM REV CODE 250: Performed by: NURSE PRACTITIONER

## 2017-02-23 PROCEDURE — 85025 COMPLETE CBC W/AUTO DIFF WBC: CPT

## 2017-02-23 PROCEDURE — 63600175 PHARM REV CODE 636 W HCPCS: Performed by: NURSE PRACTITIONER

## 2017-02-23 PROCEDURE — 12000002 HC ACUTE/MED SURGE SEMI-PRIVATE ROOM

## 2017-02-23 PROCEDURE — 99285 EMERGENCY DEPT VISIT HI MDM: CPT | Mod: 25

## 2017-02-23 RX ORDER — ONDANSETRON 2 MG/ML
4 INJECTION INTRAMUSCULAR; INTRAVENOUS EVERY 4 HOURS PRN
Status: DISCONTINUED | OUTPATIENT
Start: 2017-02-23 | End: 2017-02-26 | Stop reason: HOSPADM

## 2017-02-23 RX ORDER — IPRATROPIUM BROMIDE AND ALBUTEROL SULFATE 2.5; .5 MG/3ML; MG/3ML
3 SOLUTION RESPIRATORY (INHALATION) EVERY 4 HOURS
Status: DISCONTINUED | OUTPATIENT
Start: 2017-02-23 | End: 2017-02-26 | Stop reason: HOSPADM

## 2017-02-23 RX ORDER — COLCHICINE 0.6 MG/1
0.6 TABLET, FILM COATED ORAL 2 TIMES DAILY
Status: DISCONTINUED | OUTPATIENT
Start: 2017-02-23 | End: 2017-02-23

## 2017-02-23 RX ORDER — KETOROLAC TROMETHAMINE 30 MG/ML
15 INJECTION, SOLUTION INTRAMUSCULAR; INTRAVENOUS EVERY 6 HOURS PRN
Status: DISCONTINUED | OUTPATIENT
Start: 2017-02-24 | End: 2017-02-26 | Stop reason: HOSPADM

## 2017-02-23 RX ORDER — DOCUSATE SODIUM 100 MG/1
100 CAPSULE, LIQUID FILLED ORAL 2 TIMES DAILY PRN
Status: DISCONTINUED | OUTPATIENT
Start: 2017-02-23 | End: 2017-02-26 | Stop reason: HOSPADM

## 2017-02-23 RX ORDER — ACETAMINOPHEN 325 MG/1
650 TABLET ORAL EVERY 4 HOURS PRN
Status: DISCONTINUED | OUTPATIENT
Start: 2017-02-23 | End: 2017-02-26 | Stop reason: HOSPADM

## 2017-02-23 RX ORDER — ENOXAPARIN SODIUM 100 MG/ML
40 INJECTION SUBCUTANEOUS EVERY 24 HOURS
Status: DISCONTINUED | OUTPATIENT
Start: 2017-02-23 | End: 2017-02-26 | Stop reason: HOSPADM

## 2017-02-23 RX ORDER — SULINDAC 200 MG/1
200 TABLET ORAL NIGHTLY PRN
Status: DISCONTINUED | OUTPATIENT
Start: 2017-02-23 | End: 2017-02-23

## 2017-02-23 RX ORDER — MEROPENEM AND SODIUM CHLORIDE 500 MG/50ML
500 INJECTION, SOLUTION INTRAVENOUS
Status: DISCONTINUED | OUTPATIENT
Start: 2017-02-23 | End: 2017-02-26

## 2017-02-23 RX ORDER — ACETAMINOPHEN 500 MG
1000 TABLET ORAL
Status: COMPLETED | OUTPATIENT
Start: 2017-02-23 | End: 2017-02-23

## 2017-02-23 RX ORDER — MORPHINE SULFATE 2 MG/ML
2 INJECTION, SOLUTION INTRAMUSCULAR; INTRAVENOUS EVERY 4 HOURS PRN
Status: CANCELLED | OUTPATIENT
Start: 2017-02-23

## 2017-02-23 RX ORDER — KETOROLAC TROMETHAMINE 30 MG/ML
15 INJECTION, SOLUTION INTRAMUSCULAR; INTRAVENOUS ONCE
Status: COMPLETED | OUTPATIENT
Start: 2017-02-23 | End: 2017-02-23

## 2017-02-23 RX ORDER — GABAPENTIN 100 MG/1
200 CAPSULE ORAL NIGHTLY
Status: DISCONTINUED | OUTPATIENT
Start: 2017-02-23 | End: 2017-02-26 | Stop reason: HOSPADM

## 2017-02-23 RX ORDER — COLCHICINE 0.6 MG/1
0.6 TABLET, FILM COATED ORAL ONCE
Status: COMPLETED | OUTPATIENT
Start: 2017-02-24 | End: 2017-02-24

## 2017-02-23 RX ORDER — PANTOPRAZOLE SODIUM 40 MG/1
40 TABLET, DELAYED RELEASE ORAL DAILY
Status: DISCONTINUED | OUTPATIENT
Start: 2017-02-23 | End: 2017-02-26 | Stop reason: HOSPADM

## 2017-02-23 RX ORDER — COLCHICINE 0.6 MG/1
1.2 TABLET, FILM COATED ORAL ONCE
Status: COMPLETED | OUTPATIENT
Start: 2017-02-23 | End: 2017-02-23

## 2017-02-23 RX ORDER — SODIUM CHLORIDE 9 MG/ML
1000 INJECTION, SOLUTION INTRAVENOUS
Status: COMPLETED | OUTPATIENT
Start: 2017-02-23 | End: 2017-02-23

## 2017-02-23 RX ORDER — SODIUM CHLORIDE 9 MG/ML
INJECTION, SOLUTION INTRAVENOUS CONTINUOUS
Status: DISCONTINUED | OUTPATIENT
Start: 2017-02-23 | End: 2017-02-26 | Stop reason: HOSPADM

## 2017-02-23 RX ORDER — FERROUS SULFATE 325(65) MG
325 TABLET, DELAYED RELEASE (ENTERIC COATED) ORAL 2 TIMES DAILY
Status: DISCONTINUED | OUTPATIENT
Start: 2017-02-23 | End: 2017-02-26 | Stop reason: HOSPADM

## 2017-02-23 RX ORDER — SIMVASTATIN 40 MG/1
40 TABLET, FILM COATED ORAL NIGHTLY
Status: DISCONTINUED | OUTPATIENT
Start: 2017-02-23 | End: 2017-02-24

## 2017-02-23 RX ADMIN — MEROPENEM 500 MG: 500 INJECTION, POWDER, FOR SOLUTION INTRAVENOUS at 02:02

## 2017-02-23 RX ADMIN — ENOXAPARIN SODIUM 40 MG: 100 INJECTION SUBCUTANEOUS at 06:02

## 2017-02-23 RX ADMIN — COLCHICINE 1.2 MG: 0.6 CAPSULE ORAL at 06:02

## 2017-02-23 RX ADMIN — GABAPENTIN 200 MG: 100 CAPSULE ORAL at 09:02

## 2017-02-23 RX ADMIN — PANTOPRAZOLE SODIUM 40 MG: 40 TABLET, DELAYED RELEASE ORAL at 06:02

## 2017-02-23 RX ADMIN — VANCOMYCIN HYDROCHLORIDE 1250 MG: 1 INJECTION, POWDER, LYOPHILIZED, FOR SOLUTION INTRAVENOUS at 03:02

## 2017-02-23 RX ADMIN — SODIUM CHLORIDE: 0.9 INJECTION, SOLUTION INTRAVENOUS at 06:02

## 2017-02-23 RX ADMIN — ACETAMINOPHEN 1000 MG: 500 TABLET ORAL at 12:02

## 2017-02-23 RX ADMIN — IPRATROPIUM BROMIDE AND ALBUTEROL SULFATE 3 ML: .5; 3 SOLUTION RESPIRATORY (INHALATION) at 08:02

## 2017-02-23 RX ADMIN — MEROPENEM AND SODIUM CHLORIDE 500 MG: 500 INJECTION, SOLUTION INTRAVENOUS at 10:02

## 2017-02-23 RX ADMIN — SIMVASTATIN 40 MG: 40 TABLET, FILM COATED ORAL at 08:02

## 2017-02-23 RX ADMIN — SODIUM CHLORIDE 1000 ML: 0.9 INJECTION, SOLUTION INTRAVENOUS at 02:02

## 2017-02-23 RX ADMIN — IPRATROPIUM BROMIDE AND ALBUTEROL SULFATE 3 ML: .5; 3 SOLUTION RESPIRATORY (INHALATION) at 11:02

## 2017-02-23 RX ADMIN — FERROUS SULFATE TAB EC 325 MG (65 MG FE EQUIVALENT) 325 MG: 325 (65 FE) TABLET DELAYED RESPONSE at 08:02

## 2017-02-23 RX ADMIN — KETOROLAC TROMETHAMINE 15 MG: 30 INJECTION, SOLUTION INTRAMUSCULAR at 06:02

## 2017-02-23 RX ADMIN — SODIUM CHLORIDE 1000 ML: 0.9 INJECTION, SOLUTION INTRAVENOUS at 12:02

## 2017-02-23 RX ADMIN — METHYLPREDNISOLONE SODIUM SUCCINATE 80 MG: 40 INJECTION, POWDER, FOR SOLUTION INTRAMUSCULAR; INTRAVENOUS at 08:02

## 2017-02-23 NOTE — ED NOTES
Patient identifiers for Bashir Ramos checked and correct.  LOC: Patient is awake, alert, and aware of environment with an appropriate affect. Patient is oriented x 3 and speaking appropriately.  APPEARANCE: Patient resting comfortably and in no acute distress. Patient is clean and well groomed, patient's clothing is properly fastened.  SKIN: The skin is warm and dry. Patient has normal skin turgor and moist mucus membrances. Skin is intact; no bruising or breakdown noted.  MUSKULOSKELETAL: Patient is moving all extremities well, no obvious deformities noted. Pulses intact.   RESPIRATORY: Airway is open and patent. Respirations are spontaneous and non-labored with normal effort and rate.  CARDIAC: Patient has a normal rate and rhythm.peripheral edema to RUE noted +2. Capillary refill < 3 seconds.  ABDOMEN: No distention noted. Bowel sounds active in all 4 quadrants. Soft and non-tender upon palpation.  NEUROLOGICAL: PERRL. Facial expression is symmetrical. Hand grasps are equal bilaterally. Normal sensation in all extremities when touched with finger.  Allergies reported:   Review of patient's allergies indicates:   Allergen Reactions    Pcn [penicillins] Rash

## 2017-02-23 NOTE — ED NOTES
Pt attempted to urinate and was unsuccessful. Nurse notified and pt will try again in a little while.

## 2017-02-23 NOTE — ASSESSMENT & PLAN NOTE
Telemetry  Pt received IVF bolus in ER  Continue IVF  Lactic acid level initially 2.5 now down to 1.5  Continue IV meropenem and vancomycin  BC x 2 pending  Add urine culture

## 2017-02-23 NOTE — IP AVS SNAPSHOT
[address] [place] [address]  Phone: [phone]           Instrucciones de Jackelin de Pacientes    Nuestro objetivo es que te prepara para el éxito. Kathy paquete incluye información sobre lebron enfermedad, medicamentos y atención médica a domicilio. [employer] le ayudará a cuidar y que no se enferman más y necesita volver al hospital.     Por favor, pregunte a lebron enfermera si tiene alguna pregunta.       Hay muchos detalles a recordar cuando se prepara para salir del hospital. [employer] es lo que necesita hacer:    1. [place] lebron medicina. Si usted tiene arin receta, revise la lista de medicamentos en las siguientes páginas. Es posible que tenga nuevos medicamentos para recoger en la farmacia y otros que tendrá que dejar de benigno. Revise las instrucciones sobre cómo y cuándo benigno artem medicamentos. Consulte con el médico o el enfermeras si no está seguro de qué hacer.    2. Ir a artem citas de seguimiento. La información específica de seguimiento aparece en las siguientes páginas. Usted puede ser contactado por arin enfermera o proveedor clínico sobre las próximas citas. Estar seguro de que tiene todos los números de teléfono para comunicarse si es necesario. Por favor, póngase en contacto con la oficina de lebron profesional médico si no puede hacer arin micheal.     3. Esté atento a señales de advertencia. El médico o la enfermera le dará señales de alarma detallados que debe observar y cuándo llamar para la ayuda. Estas instrucciones también pueden incluir información educativa acerca de lebron condición. Si usted experimenta cualquiera de las señales de advertencia para lebron beulah, llame a lebron médico.           ** Verificar la lista de medicamentos es correcta y está actualizada. Llevar esto con usted en radha de emergencia. Si artem medicamentos ruelas cambiado, por favor notifique a lebron proveedor de atención médica.             Lista de medicamentos      EMPIECE a benigno estos medicamentos        Additional  Info                      acetaminophen 325 MG tablet   También conocido rodrigo:  TYLENOL   Recargas:  0   Dosis:  650 mg    Última administración:  1,000 mg on 2/23/2017 12:26 PM   Instrucciones:  Take 2 tablets (650 mg total) by mouth every 8 (eight) hours as needed for Pain.     Begin Date    AM    Noon    PM    Bedtime       albuterol 90 mcg/actuation inhaler   Cantidad:  1 Inhaler   Recargas:  0   Dosis:  2 puff    Instrucciones:  Inhale 2 puffs into the lungs every 6 (six) hours as needed for Wheezing or Shortness of Breath. Rescue     Begin Date    AM    Noon    PM    Bedtime       ascorbic acid (vitamin C) 500 MG tablet   También conocido rodrigo:  VITAMIN C   Recargas:  0   Dosis:  500 mg    Instrucciones:  Take 1 tablet (500 mg total) by mouth every evening.     Begin Date    AM    Noon    PM    Bedtime       levoFLOXacin 750 MG tablet   También conocido rodrigo:  LEVAQUIN   Cantidad:  7 tablet   Recargas:  0   Dosis:  750 mg    Instrucciones:  Take 1 tablet (750 mg total) by mouth once daily.     Begin Date    AM    Noon    PM    Bedtime       magnesium oxide 400 mg tablet   También conocido rodrigo:  MAG-OX   Recargas:  0   Dosis:  400 mg    Última administración:  400 mg on 2/26/2017  8:35 AM   Instrucciones:  Take 1 tablet (400 mg total) by mouth 2 (two) times daily.     Begin Date    AM    Noon    PM    Bedtime       multivitamin tablet   También conocido rodrigo:  THERAGRAN   Recargas:  0   Dosis:  1 tablet    Última administración:  1 tablet on 2/26/2017 12:00 PM   Instrucciones:  Take 1 tablet by mouth once daily.     Begin Date    AM    Noon    PM    Bedtime       polyethylene glycol 17 gram Pwpk   También conocido rodrigo:  GLYCOLAX   Cantidad:  30 each   Recargas:  0   Dosis:  17 g    Última administración:  17 g on 2/26/2017  8:34 AM   Instrucciones:  Take 17 g by mouth once daily.     Begin Date    AM    Noon    PM    Bedtime         SIGA tomando estos medicamentos        Additional Info                       benazepril 10 MG tablet   También conocido rodrigo:  LOTENSIN   Recargas:  0   Dosis:  20 mg    Última administración:  10 mg on 2/26/2017  8:35 AM   Instrucciones:  Take 20 mg by mouth once daily.     Begin Date    AM    Noon    PM    Bedtime       docusate sodium 100 MG capsule   También conocido rodrigo:  COLACE   Cantidad:  60 capsule   Recargas:  0   Dosis:  100 mg    Instrucciones:  Take 1 capsule (100 mg total) by mouth 2 (two) times daily as needed for Constipation.     Begin Date    AM    Noon    PM    Bedtime       ergocalciferol 50,000 unit Cap   También conocido rodrigo:  ERGOCALCIFEROL   Cantidad:  8 capsule   Recargas:  0   Dosis:  66802 Units    Instrucciones:  Take 1 capsule (50,000 Units total) by mouth every 7 days.     Begin Date    AM    Noon    PM    Bedtime       ferrous sulfate 325 (65 FE) MG EC tablet   Cantidad:  60 tablet   Recargas:  0   Dosis:  325 mg    Última administración:  325 mg on 2/26/2017  8:35 AM   Instrucciones:  Take 1 tablet (325 mg total) by mouth 2 (two) times daily.     Begin Date    AM    Noon    PM    Bedtime       gabapentin 100 MG capsule   También conocido rodrigo:  NEURONTIN   Cantidad:  60 capsule   Recargas:  11   Dosis:  200 mg    Última administración:  200 mg on 2/25/2017  8:28 PM   Instrucciones:  Take 2 capsules (200 mg total) by mouth every evening.     Begin Date    AM    Noon    PM    Bedtime       sulindac 200 MG Tab   También conocido rodrigo:  CLINORIL   Recargas:  0      Begin Date    AM    Noon    PM    Bedtime         DEJE de benigno estos medicamentos     simvastatin 40 MG tablet   También conocido rodrigo:  ZOCOR            Dónde puede recoger artem medicamentos      Puede obtener estos medicamentos en cualquier farmacia     Traiga arin receta en papel para cada munira de estos medicamentos     albuterol 90 mcg/actuation inhaler    levoFLOXacin 750 MG tablet    polyethylene glycol 17 gram Pwpk       No necesita receta para estos medicamentos     acetaminophen 325 MG tablet     ascorbic acid (vitamin C) 500 MG tablet    magnesium oxide 400 mg tablet    multivitamin tablet                  Por favor traiga a todos las citas de seguimiento:    1. Tracie copia de las instrucciones de antonia.  2. Todos los medicamentos que está tomando ozzie momento, en eda envases originales.  3. Identificación y tarjeta de seguro.    Por favor llegue 15 minutos antes de la hora de la micheal.    Por favor llame con 24 horas de antelación si tiene que cambiar lebron micheal y / o tiempo.        Eda Citas Programadas     Mar 07, 2017  9:00 AM CST   Established Patient Visit with Dory Hubbard DO   [facility] ([facility])    [address] [place] [address]   [phone]            Mar 09, 2017  9:00 AM CST   Consult with Aric Edwards MD   [facility] - Orthopedics ([facility])    [address] [place] [address] [place] [address]   [phone]            May 17, 2017  8:40 AM CDT   Established Patient Visit with MD Megha Bernardll - Family Medicine ([facility]    [address] [place] [address]   [phone]              Información de seguimiento     Realice un seguimiento con:  Samir Rouse MD    Cuándo:  3/5/2017    Especialidad:  Family Medicine    Por qué:  Follow up for pneumonia and elevated liver enzmes    Información de contacto:    [address]  [place] [address]  [phone]        Referrals     Órdenes Futuras    Referral to Home health         Instrucciones de antonia     Órdenes Futuras    Activity as tolerated     Call MD for:  difficulty breathing or increased cough     Call MD for:  increased confusion or weakness     Call MD for:  persistent dizziness, light-headedness, or visual disturbances     Call MD for:  temperature >100.4     Call MD for:     Comentarios:    Any decline in condition    Diet general     Comentarios:    Cardiac diet    Preguntas:    Total calories:      Fat restriction, if any:      Protein  restriction, if any:      Na restriction, if any:      Fluid restriction:      Additional restrictions:        Referencias/Adjuntos de antonia     ADULT, PNEUMONIA (Qatari)        Primary Diagnosis     Lebron diagnosis primaria es:  Pneumonia Due To Infectious Organism      Información de Admisión     Tiffany garcia Departamento CSN    2/23/2017 12:02 PM [facility] [id number]      Los proveedores de cuidado     No está en el archivo      Eda signos vitales kurt     PS                   129/80           Recent Lab Values        1/11/2017                           3:28 PM           A1C 5.9           Comment for A1C at  3:28 PM on 1/11/2017:  According to ADA guidelines, hemoglobin A1C <7.0% represents  optimal control in non-pregnant diabetic patients.  Different  metrics may apply to specific populations.   Standards of Medical Care in Diabetes - 2016.  For the purpose of screening for the presence of diabetes:  <5.7%     Consistent with the absence of diabetes  5.7-6.4%  Consistent with increasing risk for diabetes   (prediabetes)  >or=6.5%  Consistent with diabetes  Currently no consensus exists for use of hemoglobin A1C  for diagnosis of diabetes for children.        Pending Labs     Order Current Status    Hepatitis panel, acute In process    Blood culture #1 Preliminary result    Blood culture #2 Preliminary result      Alergias     A partir del:  2/26/2017           Reacciones    Iodine And Iodide Containing Products     Shellfish Containing Products     Pcn [Penicillins] Rash      Directiva Anticipada     Tracie directiva anticipada es un documento que, en radha de que ya no capaz de hacer decisiones por sí mismo, le dice a lebron equipo médico qué tipo de tratamiento quiere o no quiere recibir, o que le gustaría benigno esas decisiones para usted . Si actualmente no tiene tracie directiva anticipada, [facility] le anima a crear munira. Para más información llame al: [phone]), [phone]  ([phone]), o entrando en [email or web address].        Smoking Cessation     Si desea dejar de fumar:  · Usted puede ser elegible para recibir servicios gratuitos si usted es un residente de [place] y comenzó a fumar cigarrillos antes del 1 de septiembre de 1988. Llame al Smoking Cessation Trust (SCT) a [phone] o [phone].   Llame 1-800-QUIT-NOW si usted no cumple con los criterios anteriores.            Language Assistance Services     ATTENTION: Language assistance services are available, free of charge. Please call [phone].      ATENCIÓN: Si habla español, tiene a lebron disposición servicios gratuitos de asistencia lingüística. Llame al [phone].     [facility] Ý: N?u b?n nói Ti?ng Vi?t, có các d?ch v? h? tr? ngôn ng? mi?n phí dành cho b?n. G?i s? [phone].         [facility] cumple con las leyes federales aplicables de derechos civiles y no discrimina por motivos de johan, color, origen nacional, edad, discapacidad, o sexo.                        [address] [place] [address]  Phone: [phone]           Patient Discharge Instructions     Our goal is to set you up for success. This packet includes information on your condition, medications, and your home care. It will help you to care for yourself so you don't get sicker and need to go back to the hospital.     Please ask your nurse if you have any questions.        There are many details to remember when preparing to leave the hospital. Here is what you will need to do:    1. Take your medicine. If you are prescribed medications, review your Medication List in the following pages. You may have new medications to  at the pharmacy and others that you'll need to stop taking. Review the instructions for how and when to take your medications. Talk with your doctor or nurses if you are unsure of what to do.     2. Go to your follow-up appointments. Specific follow-up  information is listed in the following pages. Your may be contacted by a transition nurse or clinical provider about future appointments. Be sure we have all of the phone numbers to reach you, if needed. Please contact your provider's office if you are unable to make an appointment.     3. Watch for warning signs. Your doctor or nurse will give you detailed warning signs to watch for and when to call for assistance. These instructions may also include educational information about your condition. If you experience any of warning signs to your health, call your doctor.           ** Verificar la lista de medicamentos es correcta y está actualizada. Llevar esto con usted en radha de emergencia. Si artem medicamentos ruelas cambiado, por favor notifique a lebron proveedor de atención médica.             Medication List      START taking these medications        Additional Info                      acetaminophen 325 MG tablet   Commonly known as:  TYLENOL   Refills:  0   Dose:  650 mg    Last time this was given:  1,000 mg on 2/23/2017 12:26 PM   Instructions:  Take 2 tablets (650 mg total) by mouth every 8 (eight) hours as needed for Pain.     Begin Date    AM    Noon    PM    Bedtime       albuterol 90 mcg/actuation inhaler   Quantity:  1 Inhaler   Refills:  0   Dose:  2 puff    Instructions:  Inhale 2 puffs into the lungs every 6 (six) hours as needed for Wheezing or Shortness of Breath. Rescue     Begin Date    AM    Noon    PM    Bedtime       ascorbic acid (vitamin C) 500 MG tablet   Commonly known as:  VITAMIN C   Refills:  0   Dose:  500 mg    Instructions:  Take 1 tablet (500 mg total) by mouth every evening.     Begin Date    AM    Noon    PM    Bedtime       levoFLOXacin 750 MG tablet   Commonly known as:  LEVAQUIN   Quantity:  7 tablet   Refills:  0   Dose:  750 mg    Instructions:  Take 1 tablet (750 mg total) by mouth once daily.     Begin Date    AM    Noon    PM    Bedtime       magnesium oxide 400 mg tablet    Commonly known as:  MAG-OX   Refills:  0   Dose:  400 mg    Last time this was given:  400 mg on 2/26/2017  8:35 AM   Instructions:  Take 1 tablet (400 mg total) by mouth 2 (two) times daily.     Begin Date    AM    Noon    PM    Bedtime       multivitamin tablet   Commonly known as:  THERAGRAN   Refills:  0   Dose:  1 tablet    Last time this was given:  1 tablet on 2/26/2017 12:00 PM   Instructions:  Take 1 tablet by mouth once daily.     Begin Date    AM    Noon    PM    Bedtime       polyethylene glycol 17 gram Pwpk   Commonly known as:  GLYCOLAX   Quantity:  30 each   Refills:  0   Dose:  17 g    Last time this was given:  17 g on 2/26/2017  8:34 AM   Instructions:  Take 17 g by mouth once daily.     Begin Date    AM    Noon    PM    Bedtime         CONTINUE taking these medications        Additional Info                      benazepril 10 MG tablet   Commonly known as:  LOTENSIN   Refills:  0   Dose:  20 mg    Last time this was given:  10 mg on 2/26/2017  8:35 AM   Instructions:  Take 20 mg by mouth once daily.     Begin Date    AM    Noon    PM    Bedtime       docusate sodium 100 MG capsule   Commonly known as:  COLACE   Quantity:  60 capsule   Refills:  0   Dose:  100 mg    Instructions:  Take 1 capsule (100 mg total) by mouth 2 (two) times daily as needed for Constipation.     Begin Date    AM    Noon    PM    Bedtime       ergocalciferol 50,000 unit Cap   Commonly known as:  ERGOCALCIFEROL   Quantity:  8 capsule   Refills:  0   Dose:  02741 Units    Instructions:  Take 1 capsule (50,000 Units total) by mouth every 7 days.     Begin Date    AM    Noon    PM    Bedtime       ferrous sulfate 325 (65 FE) MG EC tablet   Quantity:  60 tablet   Refills:  0   Dose:  325 mg    Last time this was given:  325 mg on 2/26/2017  8:35 AM   Instructions:  Take 1 tablet (325 mg total) by mouth 2 (two) times daily.     Begin Date    AM    Noon    PM    Bedtime       gabapentin 100 MG capsule   Commonly known as:   NEURONTIN   Quantity:  60 capsule   Refills:  11   Dose:  200 mg    Last time this was given:  200 mg on 2/25/2017  8:28 PM   Instructions:  Take 2 capsules (200 mg total) by mouth every evening.     Begin Date    AM    Noon    PM    Bedtime       sulindac 200 MG Tab   Commonly known as:  CLINORIL   Refills:  0      Begin Date    AM    Noon    PM    Bedtime         STOP taking these medications     simvastatin 40 MG tablet   Commonly known as:  ZOCOR            Where to Get Your Medications      You can get these medications from any pharmacy     Bring a paper prescription for each of these medications     albuterol 90 mcg/actuation inhaler    levoFLOXacin 750 MG tablet    polyethylene glycol 17 gram Pwpk       You don't need a prescription for these medications     acetaminophen 325 MG tablet    ascorbic acid (vitamin C) 500 MG tablet    magnesium oxide 400 mg tablet    multivitamin tablet                  Por favor traiga a todos las citas de seguimiento:    1. Tracie copia de las instrucciones de antonia.  2. Todos los medicamentos que está tomando ozzie momento, en artem envases originales.  3. Identificación y tarjeta de seguro.    Por favor llegue 15 minutos antes de la hora de la micheal.    Por favor llame con 24 horas de antelación si tiene que cambiar lebron micheal y / o tiempo.        Your Scheduled Appointments     Mar 07, 2017  9:00 AM CST   Established Patient Visit with Dory Hubbard DO   [place]Re[facility] ([facility])    [address] [place] [address] [place] [address]   [phone]            Mar 09, 2017  9:00 AM CST   Consult with Aric Edwards MD   [place] - Orthopedics ([facility])    [address] [place] [address] [place] [address]   [phone]            May 17, 2017  8:40 AM CDT   Established Patient Visit with Samir Rouse MD   [facility] ([address]  [facility] [address] [phone]              Follow-up  Information     Follow up with Samir Rouse MD In 1 week.    Specialty:  Family Medicine    Why:  Follow up for pneumonia and elevated liver enzmes    Contact information:    [place] [address]  [phone]        Referrals     Future Orders    Referral to Home health         Discharge Instructions     Future Orders    Activity as tolerated     Call MD for:  difficulty breathing or increased cough     Call MD for:  increased confusion or weakness     Call MD for:  persistent dizziness, light-headedness, or visual disturbances     Call MD for:  temperature >100.4     Call MD for:     Comments:    Any decline in condition    Diet general     Comments:    Cardiac diet    Questions:    Total calories:      Fat restriction, if any:      Protein restriction, if any:      Na restriction, if any:      Fluid restriction:      Additional restrictions:        Discharge References/Attachments     ADULT, PNEUMONIA (Nauruan)        Primary Diagnosis     Your primary diagnosis was:  Pneumonia Due To Infectious Organism      Admission Information     Date & Time Department CSN    2/23/2017 12:02 PM [facility] [id number]      Care Providers     Not on file      Your Vitals Were     BP                   129/80           Recent Lab Values        1/11/2017                           3:28 PM           A1C 5.9           Comment for A1C at  3:28 PM on 1/11/2017:  According to ADA guidelines, hemoglobin A1C <7.0% represents  optimal control in non-pregnant diabetic patients.  Different  metrics may apply to specific populations.   Standards of Medical Care in Diabetes - 2016.  For the purpose of screening for the presence of diabetes:  <5.7%     Consistent with the absence of diabetes  5.7-6.4%  Consistent with increasing risk for diabetes   (prediabetes)  >or=6.5%  Consistent with diabetes  Currently no consensus exists for use of hemoglobin A1C  for diagnosis of diabetes for children.        Pending  Labs     Order Current Status    Hepatitis panel, acute In process    Blood culture #1 Preliminary result    Blood culture #2 Preliminary result      Allergies as of 2/26/2017        Reactions    Iodine And Iodide Containing Products     Shellfish Containing Products     Pcn [Penicillins] Rash      Advance Directives     An advance directive is a document which, in the event you are no longer able to make decisions for yourself, tells your healthcare team what kind of treatment you do or do not want to receive, or who you would like to make those decisions for you.  If you do not currently have an advance directive, [facility] encourages you to create one.  For more information call:  [phone]), [phone] ([phone]),  or log on to [email or web address].        Smoking Cessation     If you would like to quit smoking:   You may be eligible for free services if you are a [place] resident and started smoking cigarettes before September 1, 1988.  Call the Smoking Cessation Trust ([facility]) toll free at (005) 559-8436 or (689) 973-7453.   Call 2-682-QUIT-NOW if you do not meet the above criteria.            Language Assistance Services     ATTENTION: Language assistance services are available, free of charge. Please call [phone].      ATENCIÓN: Si habla español, tiene a lebron disposición servicios gratuitos de asistencia lingüística. Llame al [phone].     CHÚ Ý: N?u b?n nói Ti?ng Vi?t, có các d?ch v? h? tr? ngôn ng? mi?n phí dành cho b?n. G?i s? [phone].         [facility] complies with applicable Federal civil rights laws and does not discriminate on the basis of race, color, national origin, age, disability, or sex.

## 2017-02-23 NOTE — SUBJECTIVE & OBJECTIVE
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 grayson)    Cataract extraction  10/29/13     left eye       Review of patient's allergies indicates:   Allergen Reactions    Pcn [penicillins] Rash       No current facility-administered medications on file prior to encounter.      Current Outpatient Prescriptions on File Prior to Encounter   Medication Sig    benazepril (LOTENSIN) 10 MG tablet Take 20 mg by mouth once daily.     docusate sodium (COLACE) 100 MG capsule Take 1 capsule (100 mg total) by mouth 2 (two) times daily as needed for Constipation.    ergocalciferol (ERGOCALCIFEROL) 50,000 unit Cap Take 1 capsule (50,000 Units total) by mouth every 7 days.    ferrous sulfate 325 (65 FE) MG EC tablet Take 1 tablet (325 mg total) by mouth 2 (two) times daily.    gabapentin (NEURONTIN) 100 MG capsule Take 2 capsules (200 mg total) by mouth every evening.    simvastatin (ZOCOR) 40 MG tablet TAKE ONE TABLET BY MOUTH EVERY EVENING FOR CHOLESTEROL    sulindac (CLINORIL) 200 MG Tab      Family History     None        Social History Main Topics    Smoking status: Former Smoker     Packs/day: 1.00     Types: Cigarettes     Quit date: 8/27/1982    Smokeless tobacco: Never Used    Alcohol use No    Drug use: No    Sexual activity: Not on file     Review of Systems   Constitutional: Positive for chills and fever. Negative for activity change.        Currently with chills   HENT: Positive for hearing loss. Negative for ear pain and facial swelling.    Eyes: Negative for pain and redness.   Respiratory: Positive for cough and shortness of breath. Negative for chest tightness, wheezing and  stridor.    Cardiovascular: Negative for leg swelling.   Gastrointestinal: Negative for abdominal pain, nausea and vomiting.   Endocrine: Negative for polydipsia and polyphagia.   Genitourinary: Negative for dysuria, flank pain and hematuria.   Musculoskeletal: Positive for joint swelling. Negative for neck pain and neck stiffness.        Right wrist pain with warmth and swelling   Skin: Negative for color change.   Allergic/Immunologic: Negative for food allergies and immunocompromised state.   Neurological: Negative for seizures, facial asymmetry and weakness.   Hematological: Does not bruise/bleed easily.   Psychiatric/Behavioral: Negative for suicidal ideas. The patient is not nervous/anxious.         Forgetful     Objective:     Vital Signs (Most Recent):  Temp: 98.4 °F (36.9 °C) (02/23/17 1729)  Pulse: 83 (02/23/17 1729)  Resp: 18 (02/23/17 1729)  BP: 124/83 (02/23/17 1729)  SpO2: 98 % (02/23/17 1729) Vital Signs (24h Range):  Temp:  [98.2 °F (36.8 °C)-102 °F (38.9 °C)] 98.4 °F (36.9 °C)  Pulse:  [] 83  Resp:  [16-18] 18  SpO2:  [96 %-100 %] 98 %  BP: (124-156)/(65-84) 124/83     Weight: 76.2 kg (168 lb)  Body mass index is 26.31 kg/(m^2).    Physical Exam   Constitutional: He appears well-developed and well-nourished. No distress.   HENT:   Head: Normocephalic and atraumatic.   Mild [place]   Eyes: Pupils are equal, round, and reactive to light. Right eye exhibits no discharge. Left eye exhibits no discharge.   Neck: Normal range of motion. Neck supple. No JVD present.   Cardiovascular: Normal rate and regular rhythm.    Pulmonary/Chest: No stridor. No respiratory distress. He has wheezes.   BBS diminished   Abdominal: Soft. Bowel sounds are normal. He exhibits no distension. There is no tenderness. There is no rebound and no guarding.   Genitourinary:   Genitourinary Comments: Not examined   Musculoskeletal: Normal range of motion. He exhibits edema and tenderness.   Right wrist with warmth, swelling, and  pain   Neurological: He is alert.   Oriented to person, place, and situation   Forgetful  Responds appropriately to simple commands   Skin: Skin is warm and dry. He is not diaphoretic.   Psychiatric: He has a normal mood and affect. His behavior is normal. Judgment and thought content normal.        Significant Labs: Reviewed    Significant Imaging: Ct chest ordered

## 2017-02-23 NOTE — ED PROVIDER NOTES
"Encounter Date: 2/23/2017    SCRIBE #1 NOTE: I, Florence Giron , am scribing for, and in the presence of,  Dr. Belcher . I have scribed the entire note.       History     Chief Complaint   Patient presents with    Generalized Body Aches     x 1 week.    Wrist Pain     R wrist pain with redness, warmth     Review of patient's allergies indicates:   Allergen Reactions    Pcn [penicillins] Rash     HPI Comments:     02/23/2017  12:10 PM     Chief Complaint: Flu like sx       The patient is a 82 y.o. male with a PMHx of HTN and gout who is presenting with the gradual onset of flu-like symptoms: fevers with a Tmax of 102.0 degrees F, chills, myalgias, congestion, sore throat and cough for 7 days. Denies dyspnea or wheezing. The pt also endorses generalized weakness to the point where "he cannot get up by himself". He also endorses increased swelling and warmth to the R wrist without hx of trauma. Movement exacerbates his pain. No relieving factors. No pertinent past surgical hx. No social hx.                   The history is provided by the patient, medical records and a relative.     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 grayson)    EYE SURGERY       Family History   Problem Relation Age of Onset    No Known Problems Mother     No Known Problems Father     Amblyopia Neg Hx     Blindness Neg Hx     Cancer Neg Hx     Cataracts Neg Hx     Diabetes Neg Hx     Glaucoma Neg Hx     Hypertension Neg Hx     Macular degeneration Neg Hx     Retinal detachment Neg Hx     Strabismus Neg Hx     Stroke Neg Hx     Thyroid disease Neg Hx      Social History "   Substance Use Topics    Smoking status: Former Smoker     Packs/day: 1.00     Types: Cigarettes     Quit date: 8/27/1982    Smokeless tobacco: Never Used    Alcohol use No     Review of Systems   Constitutional: Positive for chills and fever.   HENT: Positive for sore throat.    Eyes: Negative for visual disturbance.   Respiratory: Positive for cough. Negative for shortness of breath.    Cardiovascular: Negative for chest pain.   Gastrointestinal: Negative for nausea.   Genitourinary: Negative for dysuria.   Musculoskeletal: Positive for arthralgias (R wrist pain ), joint swelling (Swelling to the R wrist) and myalgias. Negative for back pain.   Skin: Negative for rash.        Increased warmth      Neurological: Negative for weakness.   Hematological: Does not bruise/bleed easily.   Psychiatric/Behavioral: Negative for confusion.       Physical Exam   Initial Vitals   BP Pulse Resp Temp SpO2   02/23/17 1144 02/23/17 1144 02/23/17 1144 02/23/17 1144 02/23/17 1144   125/65 107 16 102 °F (38.9 °C) 96 %     Physical Exam    Nursing note and vitals reviewed.  Constitutional: He appears well-developed and well-nourished.   HENT:   Head: Normocephalic and atraumatic.   Mouth/Throat: Oropharynx is clear and moist. No oropharyngeal exudate.   Eyes: EOM are normal. Pupils are equal, round, and reactive to light. No scleral icterus.   Neck: Normal range of motion. Neck supple. No tracheal deviation present.   Cardiovascular: Normal rate, regular rhythm, normal heart sounds and intact distal pulses. Exam reveals no gallop and no friction rub.    No murmur heard.  2+ pulses x 4     Pulmonary/Chest: No respiratory distress. He exhibits no tenderness.   Coarse breath sounds bilaterally    Abdominal: Soft. Bowel sounds are normal. He exhibits no distension and no mass. There is no tenderness. There is no rebound and no guarding.   No palpable organomegaly.    Musculoskeletal: He exhibits edema and tenderness.   Pain and  swelling to the R wrist. There is palpable warmth. Decreased ROM of the R wrist secondary to pain.    Lymphadenopathy:     He has no cervical adenopathy.   Neurological: He is alert and oriented to person, place, and time. He has normal strength. No sensory deficit.   Skin: Skin is warm and dry.   Psychiatric: He has a normal mood and affect.         ED Course   Procedures  Labs Reviewed   CBC W/ AUTO DIFFERENTIAL - Abnormal; Notable for the following:        Result Value    RBC 4.28 (*)     Hemoglobin 12.9 (*)     Hematocrit 38.8 (*)     RDW 14.6 (*)     Platelets 408 (*)     MPV 7.1 (*)     Lymph # 0.6 (*)     Gran% 83.3 (*)     Lymph% 6.6 (*)     All other components within normal limits   COMPREHENSIVE METABOLIC PANEL - Abnormal; Notable for the following:     CO2 20 (*)     Glucose 116 (*)     Albumin 2.8 (*)     AST 53 (*)     ALT 45 (*)     eGFR if non  56 (*)     All other components within normal limits   LACTIC ACID, PLASMA - Abnormal; Notable for the following:     Lactate (Lactic Acid) 2.5 (*)     All other components within normal limits   URINALYSIS - Abnormal; Notable for the following:     Specific Gravity, UA >=1.030 (*)     Protein, UA 2+ (*)     Bilirubin (UA) 1+ (*)     Occult Blood UA 1+ (*)     Urobilinogen, UA 4.0-6.0 (*)     All other components within normal limits   URINALYSIS MICROSCOPIC - Abnormal; Notable for the following:     Bacteria, UA Few (*)     All other components within normal limits   CULTURE, URINE   TROPONIN I   B-TYPE NATRIURETIC PEPTIDE   PROTIME-INR   INFLUENZA A AND B ANTIGEN   LACTIC ACID, PLASMA          X-Rays:   Independently Interpreted Readings:   Chest X-Ray: Normal heart size.  No infiltrates.  No acute abnormalities.   Other Readings:  X-ray wrist: Soft tissue swelling without osseous abnormality    Medical Decision Making:   History:   Old Medical Records: I decided to obtain old medical records.  Initial Assessment:   This is an emergent  evaluation of a 82 y.o. male presenting to the ED with fever and cough.     Differential diagnosis includes PNA, electrolyte abnormality, endocrine dysfunction,  gout and fx     Independently Interpreted Test(s):   I have ordered and independently interpreted X-rays - see prior notes.  Clinical Tests:   Lab Tests: Ordered and Reviewed  Radiological Study: Ordered and Reviewed            Scribe Attestation:   Scribe #1: I performed the above scribed service and the documentation accurately describes the services I performed. I attest to the accuracy of the note.    Attending Attestation:           Physician Attestation for Scribe:  Physician Attestation Statement for Scribe #1: I, Dr. Belcher , reviewed documentation, as scribed by Florence Giron  in my presence, and it is both accurate and complete.         Attending ED Notes:   Patient has broncho-pneumonia, admitted to IM          ED Course     Clinical Impression:   The primary encounter diagnosis was Pneumonia due to infectious organism, unspecified laterality, unspecified part of lung. A diagnosis of Pain was also pertinent to this visit.          Cesar Belcher MD  02/23/17 3823

## 2017-02-23 NOTE — H&P
"[facility] Medicine  History & Physical    Patient Name: Bashir Ramos  MRN: [id number]  Admission Date: 2/23/2017  Attending Physician: Bella Valente MD   Primary Care Provider: Samir Rouse MD         Patient information was obtained from patient and ER records.     Subjective:     Principal Problem:Pneumonia due to infectious organism    Chief Complaint:   Chief Complaint   Patient presents with    Generalized Body Aches     x 1 week.    Wrist Pain     R wrist pain with redness, warmth        HPI: The patient is a 82 y.o. male with a PMHx of HTN and gout who is presenting with the gradual onset of flu-like symptoms: fevers with a Tmax of 102.0 degrees F, chills, myalgias, congestion, sore throat and cough for 7 days. Denies dyspnea or wheezing. The pt also endorses generalized weakness to the point where "he cannot get up by himself". He also endorses increased swelling and warmth to the R wrist without hx of trauma. Movement exacerbates his pain. No relieving factors. No pertinent past surgical hx. No social hx.  Pt evaluated in ER and found to have cough and fever of 102 with  sepsis and pneumonia. Pt admitted for further evaluation and treatment.    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 grayson)    Cataract extraction  10/29/13     left eye       Review of patient's allergies indicates:   Allergen Reactions    Pcn [penicillins] Rash       No current facility-administered medications on file prior to encounter.      Current Outpatient Prescriptions on File Prior to Encounter   Medication Sig    benazepril (LOTENSIN) 10 MG tablet Take 20 mg " by mouth once daily.     docusate sodium (COLACE) 100 MG capsule Take 1 capsule (100 mg total) by mouth 2 (two) times daily as needed for Constipation.    ergocalciferol (ERGOCALCIFEROL) 50,000 unit Cap Take 1 capsule (50,000 Units total) by mouth every 7 days.    ferrous sulfate 325 (65 FE) MG EC tablet Take 1 tablet (325 mg total) by mouth 2 (two) times daily.    gabapentin (NEURONTIN) 100 MG capsule Take 2 capsules (200 mg total) by mouth every evening.    simvastatin (ZOCOR) 40 MG tablet TAKE ONE TABLET BY MOUTH EVERY EVENING FOR CHOLESTEROL    sulindac (CLINORIL) 200 MG Tab      Family History     None        Social History Main Topics    Smoking status: Former Smoker     Packs/day: 1.00     Types: Cigarettes     Quit date: 8/27/1982    Smokeless tobacco: Never Used    Alcohol use No    Drug use: No    Sexual activity: Not on file     Review of Systems   Constitutional: Positive for chills and fever. Negative for activity change.        Currently with chills   HENT: Positive for hearing loss. Negative for ear pain and facial swelling.    Eyes: Negative for pain and redness.   Respiratory: Positive for cough and shortness of breath. Negative for chest tightness, wheezing and stridor.    Cardiovascular: Negative for leg swelling.   Gastrointestinal: Negative for abdominal pain, nausea and vomiting.   Endocrine: Negative for polydipsia and polyphagia.   Genitourinary: Negative for dysuria, flank pain and hematuria.   Musculoskeletal: Positive for joint swelling. Negative for neck pain and neck stiffness.        Right wrist pain with warmth and swelling   Skin: Negative for color change.   Allergic/Immunologic: Negative for food allergies and immunocompromised state.   Neurological: Negative for seizures, facial asymmetry and weakness.   Hematological: Does not bruise/bleed easily.   Psychiatric/Behavioral: Negative for suicidal ideas. The patient is not nervous/anxious.         Forgetful     Objective:      Vital Signs (Most Recent):  Temp: 98.4 °F (36.9 °C) (02/23/17 1729)  Pulse: 83 (02/23/17 1729)  Resp: 18 (02/23/17 1729)  BP: 124/83 (02/23/17 1729)  SpO2: 98 % (02/23/17 1729) Vital Signs (24h Range):  Temp:  [98.2 °F (36.8 °C)-102 °F (38.9 °C)] 98.4 °F (36.9 °C)  Pulse:  [] 83  Resp:  [16-18] 18  SpO2:  [96 %-100 %] 98 %  BP: (124-156)/(65-84) 124/83     Weight: 76.2 kg (168 lb)  Body mass index is 26.31 kg/(m^2).    Physical Exam   Constitutional: He appears well-developed and well-nourished. No distress.   HENT:   Head: Normocephalic and atraumatic.   Mild [place]   Eyes: Pupils are equal, round, and reactive to light. Right eye exhibits no discharge. Left eye exhibits no discharge.   Neck: Normal range of motion. Neck supple. No JVD present.   Cardiovascular: Normal rate and regular rhythm.    Pulmonary/Chest: No stridor. No respiratory distress. He has wheezes.   BBS diminished   Abdominal: Soft. Bowel sounds are normal. He exhibits no distension. There is no tenderness. There is no rebound and no guarding.   Genitourinary:   Genitourinary Comments: Not examined   Musculoskeletal: Normal range of motion. He exhibits edema and tenderness.   Right wrist with warmth, swelling, and pain   Neurological: He is alert.   Oriented to person, place, and situation   Forgetful  Responds appropriately to simple commands   Skin: Skin is warm and dry. He is not diaphoretic.   Psychiatric: He has a normal mood and affect. His behavior is normal. Judgment and thought content normal.        Significant Labs: Reviewed    Significant Imaging: Ct chest ordered    Assessment/Plan:     * Pneumonia due to infectious organism  Discussed with Dr. Valente-  Continue IV meropenem and vancomycin  Add duonebs q 4  Monitor O2 sats- Supplemental O2 as needed      Essential hypertension, benign  Monitor  Hold home ACEI for now      Gout, arthritis  Right wrist with pain, swelling, and warmth  Hx gout- Check uric acid level  Add bid  colchicine      Anemia  monitor      Hyperlipidemia  Continue home statin      Thrombocytosis  Monitor  Repeat CBC in am        Debility  Fall, skin, and aspiration precautions  Consult PT/OT/ST      Severe protein-calorie malnutrition  MOnitor  Add po supplement      Elevated transaminase level  MOnitor  Repeat CMP in am  Suspect related to sepsis      Sepsis  Telemetry  Pt received IVF bolus in ER  Continue IVF  Lactic acid level initially 2.5 now down to 1.5  Continue IV meropenem and vancomycin  BC x 2 pending  Add urine culture      VTE Risk Mitigation         Ordered     enoxaparin injection 40 mg  Daily     Route:  Subcutaneous        02/23/17 1727     Medium Risk of VTE  Once      02/23/17 1727     Place sequential compression device  Until discontinued      02/23/17 1727     Place LISA hose  Until discontinued      02/23/17 1727        NADEEM Marti  Department of Hospital Medicine   [facility]    Time spent seeing patient( greater than 1/2 spent in direct contact) : 78 minutes

## 2017-02-23 NOTE — ASSESSMENT & PLAN NOTE
Discussed with Dr. Valente-  Continue IV meropenem and vancomycin  Add duonebs q 4  Monitor O2 sats- Supplemental O2 as needed

## 2017-02-24 LAB
ALBUMIN SERPL BCP-MCNC: 2.4 G/DL
ALP SERPL-CCNC: 89 U/L
ALT SERPL W/O P-5'-P-CCNC: 52 U/L
ANION GAP SERPL CALC-SCNC: 12 MMOL/L
AST SERPL-CCNC: 61 U/L
BASOPHILS # BLD AUTO: 0 K/UL
BASOPHILS NFR BLD: 0 %
BILIRUB SERPL-MCNC: 0.5 MG/DL
BUN SERPL-MCNC: 14 MG/DL
CALCIUM SERPL-MCNC: 9.4 MG/DL
CHLORIDE SERPL-SCNC: 109 MMOL/L
CO2 SERPL-SCNC: 17 MMOL/L
CREAT SERPL-MCNC: 1 MG/DL
DIFFERENTIAL METHOD: ABNORMAL
EOSINOPHIL # BLD AUTO: 0 K/UL
EOSINOPHIL NFR BLD: 0.1 %
ERYTHROCYTE [DISTWIDTH] IN BLOOD BY AUTOMATED COUNT: 14.6 %
EST. GFR  (AFRICAN AMERICAN): >60 ML/MIN/1.73 M^2
EST. GFR  (NON AFRICAN AMERICAN): >60 ML/MIN/1.73 M^2
GLUCOSE SERPL-MCNC: 164 MG/DL
HCT VFR BLD AUTO: 33.6 %
HGB BLD-MCNC: 10.7 G/DL
LYMPHOCYTES # BLD AUTO: 0.7 K/UL
LYMPHOCYTES NFR BLD: 6.5 %
MAGNESIUM SERPL-MCNC: 1.8 MG/DL
MCH RBC QN AUTO: 29.6 PG
MCHC RBC AUTO-ENTMCNC: 32 %
MCV RBC AUTO: 93 FL
MONOCYTES # BLD AUTO: 0.4 K/UL
MONOCYTES NFR BLD: 3.1 %
NEUTROPHILS # BLD AUTO: 10.4 K/UL
NEUTROPHILS NFR BLD: 90.3 %
PLATELET # BLD AUTO: 330 K/UL
PMV BLD AUTO: 7.3 FL
POCT GLUCOSE: 176 MG/DL (ref 70–110)
POCT GLUCOSE: 194 MG/DL (ref 70–110)
POTASSIUM SERPL-SCNC: 4 MMOL/L
PROT SERPL-MCNC: 7.2 G/DL
RBC # BLD AUTO: 3.63 M/UL
SODIUM SERPL-SCNC: 138 MMOL/L
TROPONIN I SERPL DL<=0.01 NG/ML-MCNC: <0.006 NG/ML
WBC # BLD AUTO: 11.5 K/UL

## 2017-02-24 PROCEDURE — 97166 OT EVAL MOD COMPLEX 45 MIN: CPT

## 2017-02-24 PROCEDURE — 87086 URINE CULTURE/COLONY COUNT: CPT

## 2017-02-24 PROCEDURE — G8998 SWALLOW D/C STATUS: HCPCS | Mod: CH

## 2017-02-24 PROCEDURE — 25000242 PHARM REV CODE 250 ALT 637 W/ HCPCS: Performed by: NURSE PRACTITIONER

## 2017-02-24 PROCEDURE — 85025 COMPLETE CBC W/AUTO DIFF WBC: CPT

## 2017-02-24 PROCEDURE — 83735 ASSAY OF MAGNESIUM: CPT

## 2017-02-24 PROCEDURE — 94640 AIRWAY INHALATION TREATMENT: CPT

## 2017-02-24 PROCEDURE — 25000003 PHARM REV CODE 250: Performed by: NURSE PRACTITIONER

## 2017-02-24 PROCEDURE — 63600175 PHARM REV CODE 636 W HCPCS: Performed by: EMERGENCY MEDICINE

## 2017-02-24 PROCEDURE — 97760 ORTHOTIC MGMT&TRAING 1ST ENC: CPT

## 2017-02-24 PROCEDURE — G8997 SWALLOW GOAL STATUS: HCPCS | Mod: CH

## 2017-02-24 PROCEDURE — 25000003 PHARM REV CODE 250: Performed by: HOSPITALIST

## 2017-02-24 PROCEDURE — 25000003 PHARM REV CODE 250: Performed by: EMERGENCY MEDICINE

## 2017-02-24 PROCEDURE — 94761 N-INVAS EAR/PLS OXIMETRY MLT: CPT

## 2017-02-24 PROCEDURE — 80053 COMPREHEN METABOLIC PANEL: CPT

## 2017-02-24 PROCEDURE — 63600175 PHARM REV CODE 636 W HCPCS: Performed by: NURSE PRACTITIONER

## 2017-02-24 PROCEDURE — 97162 PT EVAL MOD COMPLEX 30 MIN: CPT

## 2017-02-24 PROCEDURE — 29125 APPL SHORT ARM SPLINT STATIC: CPT

## 2017-02-24 PROCEDURE — 12000002 HC ACUTE/MED SURGE SEMI-PRIVATE ROOM

## 2017-02-24 PROCEDURE — 92610 EVALUATE SWALLOWING FUNCTION: CPT

## 2017-02-24 PROCEDURE — G8996 SWALLOW CURRENT STATUS: HCPCS | Mod: CH

## 2017-02-24 PROCEDURE — 36415 COLL VENOUS BLD VENIPUNCTURE: CPT

## 2017-02-24 PROCEDURE — 63600175 PHARM REV CODE 636 W HCPCS: Performed by: HOSPITALIST

## 2017-02-24 RX ORDER — GLUCAGON 1 MG
1 KIT INJECTION
Status: DISCONTINUED | OUTPATIENT
Start: 2017-02-24 | End: 2017-02-26 | Stop reason: HOSPADM

## 2017-02-24 RX ORDER — IBUPROFEN 200 MG
24 TABLET ORAL
Status: DISCONTINUED | OUTPATIENT
Start: 2017-02-24 | End: 2017-02-26 | Stop reason: HOSPADM

## 2017-02-24 RX ORDER — LANOLIN ALCOHOL/MO/W.PET/CERES
400 CREAM (GRAM) TOPICAL 2 TIMES DAILY
Status: DISCONTINUED | OUTPATIENT
Start: 2017-02-24 | End: 2017-02-26 | Stop reason: HOSPADM

## 2017-02-24 RX ORDER — IBUPROFEN 200 MG
16 TABLET ORAL
Status: DISCONTINUED | OUTPATIENT
Start: 2017-02-24 | End: 2017-02-26 | Stop reason: HOSPADM

## 2017-02-24 RX ADMIN — FERROUS SULFATE TAB EC 325 MG (65 MG FE EQUIVALENT) 325 MG: 325 (65 FE) TABLET DELAYED RESPONSE at 08:02

## 2017-02-24 RX ADMIN — IPRATROPIUM BROMIDE AND ALBUTEROL SULFATE 3 ML: .5; 3 SOLUTION RESPIRATORY (INHALATION) at 11:02

## 2017-02-24 RX ADMIN — PANTOPRAZOLE SODIUM 40 MG: 40 TABLET, DELAYED RELEASE ORAL at 09:02

## 2017-02-24 RX ADMIN — FERROUS SULFATE TAB EC 325 MG (65 MG FE EQUIVALENT) 325 MG: 325 (65 FE) TABLET DELAYED RESPONSE at 09:02

## 2017-02-24 RX ADMIN — VANCOMYCIN HYDROCHLORIDE 1250 MG: 1 INJECTION, POWDER, LYOPHILIZED, FOR SOLUTION INTRAVENOUS at 02:02

## 2017-02-24 RX ADMIN — IPRATROPIUM BROMIDE AND ALBUTEROL SULFATE 3 ML: .5; 3 SOLUTION RESPIRATORY (INHALATION) at 03:02

## 2017-02-24 RX ADMIN — GABAPENTIN 200 MG: 100 CAPSULE ORAL at 08:02

## 2017-02-24 RX ADMIN — MEROPENEM AND SODIUM CHLORIDE 500 MG: 500 INJECTION, SOLUTION INTRAVENOUS at 06:02

## 2017-02-24 RX ADMIN — Medication 400 MG: at 11:02

## 2017-02-24 RX ADMIN — IPRATROPIUM BROMIDE AND ALBUTEROL SULFATE 3 ML: .5; 3 SOLUTION RESPIRATORY (INHALATION) at 07:02

## 2017-02-24 RX ADMIN — MEROPENEM AND SODIUM CHLORIDE 500 MG: 500 INJECTION, SOLUTION INTRAVENOUS at 02:02

## 2017-02-24 RX ADMIN — SODIUM CHLORIDE: 0.9 INJECTION, SOLUTION INTRAVENOUS at 11:02

## 2017-02-24 RX ADMIN — METHYLPREDNISOLONE SODIUM SUCCINATE 80 MG: 40 INJECTION, POWDER, FOR SOLUTION INTRAMUSCULAR; INTRAVENOUS at 09:02

## 2017-02-24 RX ADMIN — KETOROLAC TROMETHAMINE 15 MG: 30 INJECTION, SOLUTION INTRAMUSCULAR at 12:02

## 2017-02-24 RX ADMIN — IPRATROPIUM BROMIDE AND ALBUTEROL SULFATE 3 ML: .5; 3 SOLUTION RESPIRATORY (INHALATION) at 04:02

## 2017-02-24 RX ADMIN — Medication 400 MG: at 08:02

## 2017-02-24 RX ADMIN — METHYLPREDNISOLONE SODIUM SUCCINATE 80 MG: 40 INJECTION, POWDER, FOR SOLUTION INTRAMUSCULAR; INTRAVENOUS at 08:02

## 2017-02-24 RX ADMIN — COLCHICINE 0.6 MG: 0.6 CAPSULE ORAL at 12:02

## 2017-02-24 RX ADMIN — MEROPENEM AND SODIUM CHLORIDE 500 MG: 500 INJECTION, SOLUTION INTRAVENOUS at 10:02

## 2017-02-24 RX ADMIN — ENOXAPARIN SODIUM 40 MG: 100 INJECTION SUBCUTANEOUS at 11:02

## 2017-02-24 NOTE — PLAN OF CARE
Problem: Patient Care Overview  Goal: Plan of Care Review  Outcome: Ongoing (interventions implemented as appropriate)  POC reviewed with pt, pt verbalized understanding. Safety maintained, bed locked and in lowest position, call light in reach, Side rails up X 2. Non skid sock on when OOB. Pt remained free of fall/trauma. Pt self reports of pain treated with IV pain medication as ordered by MD. IVF infused as ordered by MD.  Right arm elevated on pillow. Will continue to monitor.

## 2017-02-24 NOTE — PT/OT/SLP EVAL
Physical Therapy  Evaluation    Bashir Ramos   MRN: [id number]   Admitting Diagnosis: Pneumonia due to infectious organism    PT Received On: 17  PT Start Time: 935     PT Stop Time: 953    PT Total Time (min): 18 min       Billable Minutes:  Evaluation 18    Diagnosis: Pneumonia due to infectious organism      Past Medical History:   Diagnosis Date    Gout, unspecified     Hyperlipidemia     Hypertension     Nuclear sclerosis - Both Eyes 2013    Unspecified hereditary and idiopathic peripheral neuropathy       Past Surgical History:   Procedure Laterality Date    BACK SURGERY      CATARACT EXTRACTION  10/29/13    left eye    CATARACT EXTRACTION W/  INTRAOCULAR LENS IMPLANT  10/15/13    OD (dr. grayson)    EYE SURGERY         Referring physician: amy  Date referred to PT: 2017    General Precautions: Standard, fall  Orthopedic Precautions: N/A   Braces:              Patient History:  Lives With: spouse  Living Arrangements: house  Equipment Currently Used at Home: none  DME owned (not currently used):     Previous Level of Function:  Ambulation Skills: independent  Transfer Skills: independent  ADL Skills: independent  Work/Leisure Activity: independent    Subjective:  Communicated with nurse Lara prior to session.  Patient alert, interactive, pleasant  Chief Complaint: R wrist and LK pain  Patient goals: get well    Pain Ratin/10   Location - Side: Left     Location: knee  Pain Addressed: Reposition       Objective:   Patient found with: SCD     Cognitive Exam:  Oriented to: Person, Place, Time and Situation    Follows Commands/attention: Follows multistep  commands  Communication: clear/fluent  Safety awareness/insight to disability: intact    Physical Exam:  Postural examination/scapula alignment: Rounded shoulder and Head forward    Skin integrity: Dry  Edema: Moderate R wrist    Sensation:   Intact    Upper Extremity Range of Motion:  Right Upper Extremity: WFL  Left  Upper Extremity: WFL    Upper Extremity Strength:  Right Upper Extremity: WFL  Left Upper Extremity: WFL    Lower Extremity Range of Motion:  Right Lower Extremity: WFL  Left Lower Extremity: WFL    Lower Extremity Strength:  Right Lower Extremity: 4-/5  Left Lower Extremity: 3+/5     Fine motor coordination:      Gross motor coordination:     Functional Mobility:  Bed Mobility:  Rolling/Turning to Left: Minimum assistance  Supine to Sit: Minimum Assistance    Transfers:  Sit <> Stand Assistance: Minimum Assistance  Sit <> Stand Assistive Device: Rolling Walker  Bed <> Chair Technique: Stand Pivot  Bed <> Chair Assistance: Minimum Assistance  Bed <> Chair Assistive Device: Rolling Walker    Gait:   Gait Distance: 240ft  Assistance 1: Minimum assistance  Gait Assistive Device: Rolling walker  Gait Pattern: 2-point gait  Gait Deviation(s): decreased rasta, decreased velocity of limb motion, decreased step length    Stairs:      Balance:   Static Sit: GOOD-: Takes MODERATE challenges from all directions but inconsistently  Dynamic Sit: GOOD-: Maintains balance through MODERATE excursions of active trunk movement,     Static Stand: FAIR+: Takes MINIMAL challenges from all directions  Dynamic stand: FAIR: Needs CONTACT GUARD during gait    Therapeutic Activities and Exercises:  Pt ambulated to hallways with Rw and OOB to reclining chair with LE elevated  Pt encouraged ankle pumping exercises    AM-PAC 6 CLICK MOBILITY  How much help from another person does this patient currently need?   1 = Unable, Total/Dependent Assistance  2 = A lot, Maximum/Moderate Assistance  3 = A little, Minimum/Contact Guard/Supervision  4 = None, Modified [place]/Independent          AM-PAC Raw Score CMS G-Code Modifier Level of Impairment Assistance   6 % Total / Unable   7 - 9 CM 80 - 100% Maximal Assist   10 - 14 CL 60 - 80% Moderate Assist   15 - 19 CK 40 - 60% Moderate Assist   20 - 22 CJ 20 - 40% Minimal Assist   23 CI  1-20% SBA / CGA   24 CH 0% Independent/ Mod I     Patient left up in chair with all lines intact and call button in reach.    Assessment:   Bashir Ramos is a 82 y.o. male with a medical diagnosis of Pneumonia due to infectious organism and presents with R wrist/LK pains- gout flare?. Previously home with spouse and active, ambulatory and does [employer] work at the [employer] at the [employer] working 12 hr shift x 7 days and off 7 . Pt currently with antalgic gait due to LK pain. Will benefit from RW at this time.    Rehab identified problem list/impairments: Rehab identified problem list/impairments: weakness, impaired endurance, impaired functional mobilty, gait instability, impaired self care skills, pain    Rehab potential is fair.    Activity tolerance: Fair    Discharge recommendations: Discharge Facility/Level Of Care Needs: home health PT     Barriers to discharge:      Equipment recommendations: Equipment Needed After Discharge: walker, rolling     GOALS:   Physical Therapy Goals        Problem: Physical Therapy Goal    Goal Priority Disciplines Outcome Goal Variances Interventions   Physical Therapy Goal    High PT/OT, PT      Description:  Goals to be met by: 2017     Patient will increase functional independence with mobility by performin. Supine to sit with Stand-by Assistance  2. Sit to stand transfer with Stand-by Assistance  3. Bed to chair transfer with Stand-by Assistance using HHA  4. Gait  x 240x2 feet with Contact Guard Assistance using Rolling Walker.   5. Lower extremity exercise program x20 reps per handout, with assistance as needed                PLAN:    Patient to be seen 6 x/week to address the above listed problems via gait training, therapeutic activities, therapeutic exercises  Plan of Care expires: 17  Plan of Care reviewed with: patient          Bella Espinoza, PT  2017

## 2017-02-24 NOTE — PLAN OF CARE
Problem: Occupational Therapy Goal  Goal: Occupational Therapy Goal  Goals to be met by: 3/4/17     Patient will increase functional independence with ADLs by performing:    Feeding with Modified [place] and Assistive Devices as needed.  UE Dressing with Set-up Assistance and Assistive Devices as needed.  Upper extremity exercise program x10 reps per handout, with assistance as needed.  Pt to tolerate right resting hand splint with no skin irritation x 8 hrs.   Pt to demonstrate proper splint application with independence.  Outcome: Ongoing (interventions implemented as appropriate)  OT evaluation completed today. Goals & care plan established.     LUIS Red  2/24/2017

## 2017-02-24 NOTE — PROGRESS NOTES
"[facility] Medicine  Progress Note    Patient Name: Bashir Ramos  MRN: [id number]  Patient Class: IP- Inpatient   Admission Date: 2/23/2017  Length of Stay: 1 days  Attending Physician: Bella Valente MD  Primary Care Provider: Samir Rouse MD        Subjective:     Principal Problem:Pneumonia due to infectious organism    HPI:  The patient is a 82 y.o. male with a PMHx of HTN and gout who is presenting with the gradual onset of flu-like symptoms: fevers with a Tmax of 102.0 degrees F, chills, myalgias, congestion, sore throat and cough for 7 days. Denies dyspnea or wheezing. The pt also endorses generalized weakness to the point where "he cannot get up by himself". He also endorses increased swelling and warmth to the R wrist without hx of trauma. Movement exacerbates his pain. No relieving factors. No pertinent past surgical hx. No social hx.  Pt evaluated in ER and found to have cough and fever of 102 with suspected sepsis and pneumonia. Pt admitted for further evaluation and treatment.    Hospital Course:  Improved but still weak. Continue current treatment.    Interval History:  Still weak but overall feels better.    Review of Systems   Constitutional: Positive for fatigue. Negative for activity change, chills and fever.        Currently with chills   HENT: Positive for hearing loss. Negative for ear pain and facial swelling.    Eyes: Negative for pain and redness.   Respiratory: Positive for cough and shortness of breath. Negative for chest tightness, wheezing and stridor.    Cardiovascular: Negative for leg swelling.   Gastrointestinal: Negative for abdominal pain, nausea and vomiting.   Endocrine: Negative for polydipsia and polyphagia.   Genitourinary: Negative for dysuria, flank pain and hematuria.   Musculoskeletal: Positive for joint swelling. Negative for neck pain and neck stiffness.        Right wrist pain with warmth and swelling   Skin: Negative for color change. "   Allergic/Immunologic: Negative for food allergies and immunocompromised state.   Neurological: Positive for weakness. Negative for seizures and facial asymmetry.   Hematological: Does not bruise/bleed easily.   Psychiatric/Behavioral: Negative for suicidal ideas. The patient is not nervous/anxious.         Forgetful   All other systems reviewed and are negative.    Objective:     Vital Signs (Most Recent):  Temp: 98.6 °F (37 °C) (02/24/17 1152)  Pulse: 79 (02/24/17 1152)  Resp: 16 (02/24/17 1152)  BP: 128/69 (02/24/17 1152)  SpO2: 97 % (02/24/17 1152) Vital Signs (24h Range):  Temp:  [97.6 °F (36.4 °C)-98.6 °F (37 °C)] 98.6 °F (37 °C)  Pulse:  [77-96] 79  Resp:  [16-18] 16  SpO2:  [93 %-100 %] 97 %  BP: (111-156)/(63-84) 128/69     Weight: 76.2 kg (168 lb)  Body mass index is 26.31 kg/(m^2).    Intake/Output Summary (Last 24 hours) at 02/24/17 1413  Last data filed at 02/24/17 0600   Gross per 24 hour   Intake          1727.92 ml   Output              500 ml   Net          1227.92 ml      Physical Exam   Constitutional: He appears well-developed and well-nourished. No distress.   HENT:   Head: Normocephalic and atraumatic.   Mild [place]   Eyes: Pupils are equal, round, and reactive to light. Right eye exhibits no discharge. Left eye exhibits no discharge.   Neck: Normal range of motion. Neck supple. No JVD present.   Cardiovascular: Normal rate and regular rhythm.    Pulmonary/Chest: No stridor. No respiratory distress. He has no wheezes.   BBS diminished with rhonchi   Abdominal: Soft. Bowel sounds are normal. He exhibits no distension. There is no tenderness. There is no rebound and no guarding.   Genitourinary:   Genitourinary Comments: Not examined   Musculoskeletal: Normal range of motion. He exhibits edema and tenderness.   Right wrist with warmth, swelling, and pain- improved from yesterday   Neurological: He is alert.   Oriented to person, place, and situation   Forgetful  Responds appropriately to simple  commands   Skin: Skin is warm and dry. He is not diaphoretic.   Psychiatric: He has a normal mood and affect. His behavior is normal. Judgment and thought content normal.       Significant Labs: Reviewed       Assessment/Plan:      * Pneumonia due to infectious organism  Discussed with Dr. Valente-  Continue IV meropenem and vancomycin  Continue duonebs q 4  Monitor O2 sats- Supplemental O2 as needed      Essential hypertension, benign  Monitor  Hold home ACEI for now      Gout, arthritis  Right wrist with pain, swelling, and warmth- improved today  Hx gout-   Uric acid level was WNL  Continue  bid colchicine      Anemia  monitor      Hyperlipidemia  Continue home statin      Hyperglycemia  Monitor  Last HGA1C was WNL- Pt received dose IV steroid yesterday  Accuchecks with SSI  Change to DM diet      Thrombocytosis  Resolved        Debility  Fall, skin, and aspiration precautions  Consult PT/OT/ST      Severe protein-calorie malnutrition  MOnitor  Continue  po supplement      Elevated transaminase level  MOnitor  Repeat CMP in am  Suspect related to sepsis      Sepsis  As above  Telemetry  Continue IVF  Lactic acid level initially 2.5 now down to 1.5  Continue IV meropenem and vancomycin  BC x 2 no growth  Urine culture no growth so far      VTE Risk Mitigation         Ordered     enoxaparin injection 40 mg  Daily     Route:  Subcutaneous        02/23/17 1727     Medium Risk of VTE  Once      02/23/17 1727     Place sequential compression device  Until discontinued      02/23/17 1727     Place LISA hose  Until discontinued      02/23/17 1727          NADEEM Marti  Department of Hospital Medicine   [facility]    Time spent seeing patient( greater than 1/2 spent in direct contact) : 38 minutes

## 2017-02-24 NOTE — SUBJECTIVE & OBJECTIVE
Interval History:  Still weak but overall feels better.    Review of Systems   Constitutional: Positive for fatigue. Negative for activity change, chills and fever.        Currently with chills   HENT: Positive for hearing loss. Negative for ear pain and facial swelling.    Eyes: Negative for pain and redness.   Respiratory: Positive for cough and shortness of breath. Negative for chest tightness, wheezing and stridor.    Cardiovascular: Negative for leg swelling.   Gastrointestinal: Negative for abdominal pain, nausea and vomiting.   Endocrine: Negative for polydipsia and polyphagia.   Genitourinary: Negative for dysuria, flank pain and hematuria.   Musculoskeletal: Positive for joint swelling. Negative for neck pain and neck stiffness.        Right wrist pain with warmth and swelling   Skin: Negative for color change.   Allergic/Immunologic: Negative for food allergies and immunocompromised state.   Neurological: Positive for weakness. Negative for seizures and facial asymmetry.   Hematological: Does not bruise/bleed easily.   Psychiatric/Behavioral: Negative for suicidal ideas. The patient is not nervous/anxious.         Forgetful   All other systems reviewed and are negative.    Objective:     Vital Signs (Most Recent):  Temp: 98.6 °F (37 °C) (02/24/17 1152)  Pulse: 79 (02/24/17 1152)  Resp: 16 (02/24/17 1152)  BP: 128/69 (02/24/17 1152)  SpO2: 97 % (02/24/17 1152) Vital Signs (24h Range):  Temp:  [97.6 °F (36.4 °C)-98.6 °F (37 °C)] 98.6 °F (37 °C)  Pulse:  [77-96] 79  Resp:  [16-18] 16  SpO2:  [93 %-100 %] 97 %  BP: (111-156)/(63-84) 128/69     Weight: 76.2 kg (168 lb)  Body mass index is 26.31 kg/(m^2).    Intake/Output Summary (Last 24 hours) at 02/24/17 1413  Last data filed at 02/24/17 0600   Gross per 24 hour   Intake          1727.92 ml   Output              500 ml   Net          1227.92 ml      Physical Exam   Constitutional: He appears well-developed and well-nourished. No distress.   HENT:   Head:  Normocephalic and atraumatic.   Mild [place]   Eyes: Pupils are equal, round, and reactive to light. Right eye exhibits no discharge. Left eye exhibits no discharge.   Neck: Normal range of motion. Neck supple. No JVD present.   Cardiovascular: Normal rate and regular rhythm.    Pulmonary/Chest: No stridor. No respiratory distress. He has no wheezes.   BBS diminished with rhonchi   Abdominal: Soft. Bowel sounds are normal. He exhibits no distension. There is no tenderness. There is no rebound and no guarding.   Genitourinary:   Genitourinary Comments: Not examined   Musculoskeletal: Normal range of motion. He exhibits edema and tenderness.   Right wrist with warmth, swelling, and pain- improved from yesterday   Neurological: He is alert.   Oriented to person, place, and situation   Forgetful  Responds appropriately to simple commands   Skin: Skin is warm and dry. He is not diaphoretic.   Psychiatric: He has a normal mood and affect. His behavior is normal. Judgment and thought content normal.       Significant Labs: Reviewed

## 2017-02-24 NOTE — PLAN OF CARE
Problem: SLP Goal  Goal: SLP Goal  Pt seen for clinical swallowing evaluation. Oropharyngeal swallow appears to be WFL. REC Dysphagia Diet Level 2 (2/2 R wrist pain and inability to chop meats), Thin liquids. No need for f/u.      Yajaira Aburto, Student Clinician     Bouchra Nava MS, CCC/SLP  I certify that I was present in the room directing the student in service delivery and guiding them using my skilled judgment. As the co-signing therapist I have reviewed the students documentation and am responsible for the treatment, assessment, and plan.

## 2017-02-24 NOTE — PT/OT/SLP EVAL
Speech Language Pathology  Evaluation    Bashir Ramos   MRN: [id number]   Admitting Diagnosis: Pneumonia due to infectious organism    Diet recommendations: Solid Diet Level: Chopped meat (Dysphagia Diet Level 2)  Liquid Diet Level: Thin     SLP Treatment Date: 02/24/17  Speech Start Time: 1038     Speech Stop Time: 1051     Speech Total (min): 13 min       TREATMENT BILLABLE MINUTES:  Evaluation of Swallowing Function 13 min Total Time 13 min    Diagnosis: Pneumonia due to infectious organism      Past Medical History:   Diagnosis Date    Gout, unspecified     Hyperlipidemia     Hypertension     Nuclear sclerosis - Both Eyes 9/16/2013    Unspecified hereditary and idiopathic peripheral neuropathy      Past Surgical History:   Procedure Laterality Date    BACK SURGERY      CATARACT EXTRACTION  10/29/13    left eye    CATARACT EXTRACTION W/  INTRAOCULAR LENS IMPLANT  10/15/13    OD (dr. grayson)    EYE SURGERY         Has the patient been evaluated by SLP for swallowing? : Yes  Keep patient NPO?: No   General Precautions: Standard, fall          Social Hx: Patient lives with wife at home, reports he is independent with ADLs.     Prior diet: Regular, Thin    Occupational/hobbies/homemaking:  at [employer]    Subjective:  Pt alert, cooperative and oriented x 4. C/o R wrist pain 10/10.   Patient goals: not assessed    Objective:   Patient found with: telemetry, peripheral IV    Oral Musculature Evaluation  Oral Musculature: WFL  Dentition: upper and lower dentures  Mucosal Quality: adequate  Mandibular Strength and Mobility: WFL  Oral Labial Strength and Mobility: WFL  Lingual Strength and Mobility: WFL  Velar Elevation: WFL  Buccal Strength and Mobility: WFL  Volitional Cough: strong  Volitional Swallow: adequate upon palpation  Voice Prior to PO Intake: dialectal variation of speech     Bedside Swallow Eval:  Consistencies Assessed: Thin liquids tsp, cup, straw, Nectar thick liquids tsp, cup, straw,  Soft solids diced peaches and Solids cookie  Oral Phase: WFL  Pharyngeal Phase: no overt clinical signs/symptoms of pharyngeal dysphagia    Assessment:  Bashir Ramos is a 82 y.o. male with a medical diagnosis of Pneumonia due to infectious organism. Oropharyngeal swallow appears to be WFL. Pt tolerated all trials with no s/s of aspiration/penetration. REC Dysphagia Diet Level 2 (2/2 R wrist pain and inability to cut up meats), Thin liquids. No need for f/u with ST ATT.    Plan: No need for f/u.   Patient to be seen     Plan of Care expires:    Plan of Care reviewed with: patient  SLP Follow-up?: No         SLP G-Codes  Functional Assessment Tool Used: FCM  Functional Limitations: Swallowing  Swallow Current Status ():   Swallow Goal Status ():   Swallow Discharge Status ():     Bouchra Nava, MICA-SLP  02/24/2017     Yajaira Aburto, Student Clinician    Bouchra Nava, MS, CCC/SLP  I certify that I was present in the room directing the student in service delivery and guiding them using my skilled judgment. As the co-signing therapist I have reviewed the students documentation and am responsible for the treatment, assessment, and plan.

## 2017-02-24 NOTE — ASSESSMENT & PLAN NOTE
Discussed with Dr. Valente-  Continue IV meropenem and vancomycin  Continue duonebs q 4  Monitor O2 sats- Supplemental O2 as needed

## 2017-02-24 NOTE — PLAN OF CARE
02/24/17 0718   Patient Assessment/Suction   Level of Consciousness (AVPU) alert   Respiratory Effort Normal;Unlabored   All Lung Fields Breath Sounds diminished   PRE-TX-O2-ETCO2   O2 Device (Oxygen Therapy) room air   SpO2 97 %   Pulse Oximetry Type Intermittent   $ Pulse Oximetry - Multiple Charge Pulse Oximetry - Multiple   Pulse 78   Resp 16   Positioning Sitting in bed   Aerosol Therapy   $ Aerosol Therapy Charges Aerosol Treatment   Respiratory Treatment Status given   SVN/Inhaler Treatment Route mask   Position During Treatment HOB at 30-45 degrees   Patient Tolerance good   Post-Treatment   Post-treatment Heart Rate (beats/min) 73   Post-treatment Resp Rate (breaths/min) 16   All Fields Breath Sounds aeration increased

## 2017-02-24 NOTE — PHYSICIAN QUERY
"PT Name: Bashir Ramos  MR #: [id number]  Physician Query Form - Hematology Clarification    Reviewer  Ext [phone] Carlos Hollins RN CDS    This form is a permanent document in the medical record.      Query Date: February 24, 2017    By submitting this query, we are merely seeking further clarification of documentation. Please utilize your independent clinical judgment when addressing the question(s) below.    (The Medical record reflects the following:)     Indicators    Supporting Clinical Findings Location in Medical Record   X "Anemia" documented Anemia--Monitor H/P 2/23   X H & H = Hgb 12.9-38.8  Hct 38.8-33.6 Lab 2/23-24   X BP = //84 Flowsheet 2/23-2/24   X HR = HR  Flowsheet 2/23-2/24    "GI bleeding" documented      Acute bleeding (Non GI site)      Transfusion(s)     X Treatment: ferrous sulfate EC tablet 325 mg  :  Dose 325 mg  :  Oral  :  2 times daily  MAR 2/23-2/24    Other:        Provider, please specify diagnosis or diagnoses associated with above clinical findings.    [  ] Acute blood loss anemia expected post-operatively     [   ] Acute blood loss anemia    [  ] Aplastic anemia    [  ] Iron deficiency anemia     [  ] Pernicious anemia     [  ] Precipitous drop in H&H    [  ] Anemia of chronic disease ( Specify chronic disease)      [  ] CKD (specify stage) ___________________________     [  ] Neoplastic disease      [  ] Due to Chemotherapy      [  ] Other (Specify) _______________________________     [  ] Clinically Undetermined     [  ] Other Hematological Diagnosis _________________________________    [x ] Clinically Undetermined       Please document in your progress notes daily for the duration of treatment, until resolved, and include in your discharge summary.                                                                                                      "

## 2017-02-24 NOTE — PT/OT/SLP EVAL
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 grayson)    EYE SURGERY         Referring physician: DON Brush  Date referred to OT: 2/23/17    General Precautions: Standard, fall, aspiration  Orthopedic Precautions: N/A  Braces:  (OT issued pt a right resting hand splint.)          Patient History:  Living Environment  Lives With: spouse  Living Arrangements: house  Home Layout: Able to live on 1st floor  Transportation Available: car, family or friend will provide  Living Environment Comment: Pt lives with wife in a [facility] with 0 BRIANNA. Their daughter lives nearby and provides pt & wife with some meals and can help pt & wife at home.  Equipment Currently Used at Home: none (Wife has a shower chair and bedside commode and there are grab bars in the walk-in shower.)    Prior level of function:   Bed Mobility/Transfers: independent  Grooming: independent  Bathing: independent  Upper Body Dressing: independent  Lower Body Dressing: independent  Toileting: independent  Driving License: Yes  Mode of Transportation: Car  Occupation: Full time employment  Type of Occupation: Housekeeping at a [employer]  IADL Comments: Pt was fully independent with ADL/IADLs, still working 12 hour shifts  and driving.     Dominant hand: right    Subjective:  Communicated with nurse Jane prior to session.  Stated patient was cleared for OT today.  Chief Complaint: R wrist pain  Patient/Family stated goals: to get better and go home    Pain Ratin10  Location - Side: Right     Location: wrist  Pain Addressed: Nurse notified  Pain Rating Post-Intervention: 6/10    Objective:  Patient found with: telemetry, peripheral IV    Cognitive Exam:  Oriented to: Person, Place, Time and Situation  Follows Commands/attention: Follows multistep  commands  Communication: clear/fluent  Memory:  No Deficits noted  Safety awareness/insight to disability: intact  Coping skills/emotional control: Appropriate to situation    Visual/perceptual:  Intact    Physical Exam:  Postural examination/scapula alignment: Rounded shoulder  Skin integrity: red R wrist and base of hand  Edema: Moderate R wrist and base of hand    Sensation:   Pt reports diffuse tingling in both hands that is constant    Upper Extremity Range of Motion:  Right Upper Extremity: WFL  Left Upper Extremity: WFL    Upper Extremity Strength:  Right Upper Extremity: WFL except wrist due to pain  Left Upper Extremity: WFL   Strength: R weak due to pain; L WFL    Fine motor coordination:   Intact    Gross motor coordination: WFL    Functional Mobility:  Transfers:  Sit <> Stand Assistance: Contact Guard Assistance  Sit <> Stand Assistive Device: No Assistive Device    Functional Ambulation: Pt took three steps forward and back in front of bedside chair with CGA and no assistive device.    Activities of Daily Living:  Feeding Level of Assistance: Set-up Assistance (Using L hand to eat. OT provided foam cylinder for built-up handle on utensils.)  Feeding adaptive equipment: build up handle     LE Dressing Level of Assistance: Contact guard (to don/doff socks with c/o R wrist pain with task)    Balance:   Static Sit: GOOD+: Takes MAXIMAL challenges from all directions.   "  Dynamic Sit: GOOD+: Maintains balance through MAXIMAL excursions of active trunk motion  Static Stand: FAIR+: Takes MINIMAL challenges from all directions  Dynamic stand: FAIR: Needs CONTACT GUARD during gait    Therapeutic Activities and Exercises:  OT ed pt on OT role & POC as well as discharge recommendations.  OT issued pt a R restinghand splint and fitted it to pt's hand. OT ed pt & nurseJane on proper splint application and wear time (up to 8 hours at a time as long as there are no S/sx of skin irritation or increased edema.) Splint to be worn at night and on & off during the day as tolerated.  OT ed pt on doing R finger AROM exercises and on keeping R hand elevated to control edema in hand and wrist. Pt provided return demo of exercises & v/u.    AM-PAC 6 CLICK ADL  How much help from another person does this patient currently need?  1 = Unable, Total/Dependent Assistance  2 = A lot, Maximum/Moderate Assistance  3 = A little, Minimum/Contact Guard/Supervision  4 = None, Modified [place]/Independent    Putting on and taking off regular lower body clothing? : 3  Bathing (including washing, rinsing, drying)?: 3  Toileting, which includes using toilet, bedpan, or urinal? : 3  Putting on and taking off regular upper body clothing?: 3  Taking care of personal grooming such as brushing teeth?: 3  Eating meals?: 3  Total Score: 18    AM-PAC Raw Score CMS "G-Code Modifier Level of Impairment Assistance   6 % Total / Unable   7 - 9 CM 80 - 100% Maximal Assist   10 - 14 CL 60 - 80% Moderate Assist   15 - 19 CK 40 - 60% Moderate Assist   20 - 22 CJ 20 - 40% Minimal Assist   23 CI 1-20% SBA / CGA   24 CH 0% Independent/ Mod I       Patient left up in chair with all lines intact, call button in reach, nurse Jane notified and present    Assessment:  Bashir Ramos is a 82 y.o. male with a medical diagnosis of Pneumonia due to infectious organism and presents with decline in functional status. " Recommend OT treatment for R hand orthotics fitting and training & to maximize endurance, safety & independence with ADLs.    Rehab identified problem list/impairments: Rehab identified problem list/impairments: weakness, impaired endurance, pain,  R hand & wrist edema, decreased ROM, decreased right upper extremity function, impaired self care skills, impaired functional mobilty, gait instability    Rehab potential is excellent.    Activity tolerance: Fair    Discharge recommendations: Discharge Facility/Level Of Care Needs: home health PT, home health OT     Barriers to discharge: Barriers to Discharge: None    Equipment recommendations: walker, rolling     GOALS:   Occupational Therapy Goals        Problem: Occupational Therapy Goal    Goal Priority Disciplines Outcome Interventions   Occupational Therapy Goal     OT, PT/OT Ongoing (interventions implemented as appropriate)    Description:  Goals to be met by: 3/4/17     Patient will increase functional independence with ADLs by performing:    Feeding with Modified [place] and Assistive Devices as needed.  UE Dressing with Set-up Assistance and Assistive Devices as needed.  Upper extremity exercise program x10 reps per handout, with assistance as needed.  Pt to tolerate right resting hand splint with no skin irritation x 8 hrs.   Pt to demonstrate proper splint application with independence.                PLAN:  Patient to be seen 3 x/week, 5 x/week to address the above listed problems via self-care/home management, therapeutic activities, therapeutic exercises, splinting  Plan of Care expires: 03/04/17  Plan of Care reviewed with: patient         LUIS Marroquin  02/24/2017

## 2017-02-24 NOTE — ASSESSMENT & PLAN NOTE
Right wrist with pain, swelling, and warmth- improved today  Hx gout-   Uric acid level was WNL  Continue  bid colchicine

## 2017-02-24 NOTE — PLAN OF CARE
Problem: Patient Care Overview  Goal: Plan of Care Review  Outcome: Ongoing (interventions implemented as appropriate)  AAOx3 pt. Speaks Yi but can understand english. Plan of care reviewed with pt. &family. U/a sent for culture. Telemetry monitor # [id number] SR noted. NS infusing @ left forearm. Resp. tx and IV abx cont. Safety maintained. Call light in reach, bed in lower position. Voids per urinal.

## 2017-02-24 NOTE — PROGRESS NOTES
Cm completed assessment without difficulties.  Pt lives with spouse.  His wife was here last week with the Flu/Pneumonia.  Pt is self care.  PCP is Dr Rouse.  Disposition: Pt will discharge to home.  No needs identified at this time.       02/24/17 0920   Discharge Assessment   Assessment Type Discharge Planning Assessment   Assessment information obtained from? Patient   Type of Healthcare Directive Received Durable power of  for health care;Living will  (Son Bashir Ramos Jr has POA)   Prior to hospitilization cognitive status: Alert/Oriented   Prior to hospitalization functional status: Independent   Current cognitive status: Alert/Oriented   Current Functional Status: Independent   Arrived From admitted as an inpatient   Lives With spouse   Able to Return to Prior Arrangements yes   Is patient able to care for self after discharge? Yes   How many people do you have in your home that can help with your care after discharge? 1   Who are your caregiver(s) and their phone number(s)? Marjorie Ramos - [phone]   Patient's perception of discharge disposition home or selfcare   Readmission Within The Last 30 Days no previous admission in last 30 days   Patient currently being followed by outpatient case management? No   Patient currently receives home health services? No   Does the patient currently use HME? No   Patient currently receives private duty nursing? N/A   Patient currently receives any other outside agency services? No   Equipment Currently Used at Home none   Do you have any problems affording any of your prescribed medications? No   Is the patient taking medications as prescribed? yes  ([facility] in [place])   Do you have any financial concerns preventing you from receiving the healthcare you need? No  (Insurance verified as PHN)   Does the patient have transportation to healthcare appointments? Yes   Transportation Available car;family or friend will provide   On Dialysis? No    Does the patient receive services at the [facility]? No   Are there any open cases? No   Discharge Plan A Home;Home with family   Discharge Plan B Home;Home with family   Patient/Family In Agreement With Plan yes

## 2017-02-24 NOTE — ASSESSMENT & PLAN NOTE
As above  Telemetry  Continue IVF  Lactic acid level initially 2.5 now down to 1.5  Continue IV meropenem and vancomycin  BC x 2 no growth  Urine culture no growth so far

## 2017-02-24 NOTE — CONSULTS
Bashir Ramos [id number] is a 82 y.o. male who has been consulted for vancomycin dosing.    The patient has the following labs:     Date Creatinine (mg/dl)    BUN WBC Count   2/23/2017 Estimated Creatinine Clearance: 44.4 mL/min (based on Cr of 1.2). Lab Results   Component Value Date    BUN 17 02/23/2017     Lab Results   Component Value Date    WBC 8.90 02/23/2017        Current weight is 76.2 kg (168 lb)      The patient received  1250 mg on 02/23 at 1418     The patient will be started on vancomycin at a dose of 1250 mg every 24 hours.  The vancomycin trough has been ordered for 02/25 at 1400.      Patient will be followed by pharmacy for changes in renal function, toxicity, and efficacy.   Thank you for allowing us to participate in this patient's care.     Yaquelin Hunt

## 2017-02-24 NOTE — ASSESSMENT & PLAN NOTE
Monitor  Last HGA1C was WNL- Pt received dose IV steroid yesterday  Accuchecks with SSI  Change to DM diet

## 2017-02-25 PROBLEM — D75.839 THROMBOCYTOSIS: Status: RESOLVED | Noted: 2017-02-23 | Resolved: 2017-02-25

## 2017-02-25 PROBLEM — K59.00 CONSTIPATION: Status: ACTIVE | Noted: 2017-02-25

## 2017-02-25 LAB
ALBUMIN SERPL BCP-MCNC: 2 G/DL
ALP SERPL-CCNC: 80 U/L
ALT SERPL W/O P-5'-P-CCNC: 84 U/L
ANION GAP SERPL CALC-SCNC: 8 MMOL/L
AST SERPL-CCNC: 91 U/L
BACTERIA UR CULT: NORMAL
BASOPHILS # BLD AUTO: 0 K/UL
BASOPHILS NFR BLD: 0 %
BILIRUB SERPL-MCNC: 0.2 MG/DL
BUN SERPL-MCNC: 12 MG/DL
CALCIUM SERPL-MCNC: 8.9 MG/DL
CHLORIDE SERPL-SCNC: 112 MMOL/L
CO2 SERPL-SCNC: 19 MMOL/L
CREAT SERPL-MCNC: 0.8 MG/DL
DIFFERENTIAL METHOD: ABNORMAL
EOSINOPHIL # BLD AUTO: 0 K/UL
EOSINOPHIL NFR BLD: 0 %
ERYTHROCYTE [DISTWIDTH] IN BLOOD BY AUTOMATED COUNT: 15 %
EST. GFR  (AFRICAN AMERICAN): >60 ML/MIN/1.73 M^2
EST. GFR  (NON AFRICAN AMERICAN): >60 ML/MIN/1.73 M^2
GLUCOSE SERPL-MCNC: 164 MG/DL
HCT VFR BLD AUTO: 30.5 %
HGB BLD-MCNC: 10 G/DL
LYMPHOCYTES # BLD AUTO: 0.1 K/UL
LYMPHOCYTES NFR BLD: 1.2 %
MCH RBC QN AUTO: 30.3 PG
MCHC RBC AUTO-ENTMCNC: 32.9 %
MCV RBC AUTO: 92 FL
MONOCYTES # BLD AUTO: 0.2 K/UL
MONOCYTES NFR BLD: 1.4 %
NEUTROPHILS # BLD AUTO: 10.6 K/UL
NEUTROPHILS NFR BLD: 97.4 %
PLATELET # BLD AUTO: 345 K/UL
PMV BLD AUTO: 7.2 FL
POCT GLUCOSE: 178 MG/DL (ref 70–110)
POCT GLUCOSE: 188 MG/DL (ref 70–110)
POTASSIUM SERPL-SCNC: 3.6 MMOL/L
PROT SERPL-MCNC: 5.8 G/DL
RBC # BLD AUTO: 3.32 M/UL
SODIUM SERPL-SCNC: 139 MMOL/L
VANCOMYCIN TROUGH SERPL-MCNC: 8 UG/ML
WBC # BLD AUTO: 10.9 K/UL

## 2017-02-25 PROCEDURE — 25000003 PHARM REV CODE 250: Performed by: NURSE PRACTITIONER

## 2017-02-25 PROCEDURE — 80202 ASSAY OF VANCOMYCIN: CPT

## 2017-02-25 PROCEDURE — 25000003 PHARM REV CODE 250: Performed by: EMERGENCY MEDICINE

## 2017-02-25 PROCEDURE — 97116 GAIT TRAINING THERAPY: CPT

## 2017-02-25 PROCEDURE — 12000002 HC ACUTE/MED SURGE SEMI-PRIVATE ROOM

## 2017-02-25 PROCEDURE — 36415 COLL VENOUS BLD VENIPUNCTURE: CPT

## 2017-02-25 PROCEDURE — 63600175 PHARM REV CODE 636 W HCPCS: Performed by: EMERGENCY MEDICINE

## 2017-02-25 PROCEDURE — 94640 AIRWAY INHALATION TREATMENT: CPT

## 2017-02-25 PROCEDURE — 25000242 PHARM REV CODE 250 ALT 637 W/ HCPCS: Performed by: NURSE PRACTITIONER

## 2017-02-25 PROCEDURE — 94761 N-INVAS EAR/PLS OXIMETRY MLT: CPT

## 2017-02-25 PROCEDURE — 85025 COMPLETE CBC W/AUTO DIFF WBC: CPT

## 2017-02-25 PROCEDURE — 63600175 PHARM REV CODE 636 W HCPCS: Performed by: NURSE PRACTITIONER

## 2017-02-25 PROCEDURE — 80053 COMPREHEN METABOLIC PANEL: CPT

## 2017-02-25 RX ORDER — ADHESIVE BANDAGE
30 BANDAGE TOPICAL ONCE
Status: COMPLETED | OUTPATIENT
Start: 2017-02-25 | End: 2017-02-25

## 2017-02-25 RX ORDER — BENAZEPRIL HYDROCHLORIDE 10 MG/1
10 TABLET ORAL DAILY
Status: DISCONTINUED | OUTPATIENT
Start: 2017-02-25 | End: 2017-02-26 | Stop reason: HOSPADM

## 2017-02-25 RX ORDER — POLYETHYLENE GLYCOL 3350 17 G/17G
17 POWDER, FOR SOLUTION ORAL DAILY
Status: DISCONTINUED | OUTPATIENT
Start: 2017-02-25 | End: 2017-02-26 | Stop reason: HOSPADM

## 2017-02-25 RX ORDER — POTASSIUM CHLORIDE 20 MEQ/1
40 TABLET, EXTENDED RELEASE ORAL ONCE
Status: COMPLETED | OUTPATIENT
Start: 2017-02-25 | End: 2017-02-25

## 2017-02-25 RX ADMIN — IPRATROPIUM BROMIDE AND ALBUTEROL SULFATE 3 ML: .5; 3 SOLUTION RESPIRATORY (INHALATION) at 07:02

## 2017-02-25 RX ADMIN — MEROPENEM AND SODIUM CHLORIDE 500 MG: 500 INJECTION, SOLUTION INTRAVENOUS at 11:02

## 2017-02-25 RX ADMIN — MEROPENEM AND SODIUM CHLORIDE 500 MG: 500 INJECTION, SOLUTION INTRAVENOUS at 06:02

## 2017-02-25 RX ADMIN — IPRATROPIUM BROMIDE AND ALBUTEROL SULFATE 3 ML: .5; 3 SOLUTION RESPIRATORY (INHALATION) at 11:02

## 2017-02-25 RX ADMIN — FERROUS SULFATE TAB EC 325 MG (65 MG FE EQUIVALENT) 325 MG: 325 (65 FE) TABLET DELAYED RESPONSE at 08:02

## 2017-02-25 RX ADMIN — Medication 400 MG: at 08:02

## 2017-02-25 RX ADMIN — POLYETHYLENE GLYCOL (3350) 17 G: 17 POWDER, FOR SOLUTION ORAL at 11:02

## 2017-02-25 RX ADMIN — BENAZEPRIL HYDROCHLORIDE 10 MG: 10 TABLET, FILM COATED ORAL at 12:02

## 2017-02-25 RX ADMIN — GABAPENTIN 200 MG: 100 CAPSULE ORAL at 08:02

## 2017-02-25 RX ADMIN — ENOXAPARIN SODIUM 40 MG: 100 INJECTION SUBCUTANEOUS at 11:02

## 2017-02-25 RX ADMIN — IPRATROPIUM BROMIDE AND ALBUTEROL SULFATE 3 ML: .5; 3 SOLUTION RESPIRATORY (INHALATION) at 03:02

## 2017-02-25 RX ADMIN — MEROPENEM AND SODIUM CHLORIDE 500 MG: 500 INJECTION, SOLUTION INTRAVENOUS at 02:02

## 2017-02-25 RX ADMIN — POTASSIUM CHLORIDE 40 MEQ: 20 TABLET, EXTENDED RELEASE ORAL at 09:02

## 2017-02-25 RX ADMIN — IPRATROPIUM BROMIDE AND ALBUTEROL SULFATE 3 ML: .5; 3 SOLUTION RESPIRATORY (INHALATION) at 04:02

## 2017-02-25 RX ADMIN — VANCOMYCIN HYDROCHLORIDE 1250 MG: 1 INJECTION, POWDER, LYOPHILIZED, FOR SOLUTION INTRAVENOUS at 03:02

## 2017-02-25 RX ADMIN — MAGNESIUM HYDROXIDE 2400 MG: 400 SUSPENSION ORAL at 11:02

## 2017-02-25 RX ADMIN — SODIUM CHLORIDE: 0.9 INJECTION, SOLUTION INTRAVENOUS at 06:02

## 2017-02-25 RX ADMIN — PANTOPRAZOLE SODIUM 40 MG: 40 TABLET, DELAYED RELEASE ORAL at 08:02

## 2017-02-25 NOTE — ASSESSMENT & PLAN NOTE
Right wrist with pain, swelling, and warmth- Resolving  Hx gout-   Uric acid level was WNL  Continue  bid colchicine

## 2017-02-25 NOTE — PT/OT/SLP PROGRESS
Physical Therapy  Treatment    Bashir Ramos   MRN: [id number]   Admitting Diagnosis: Pneumonia due to infectious organism    PT Received On: 02/25/17  PT Start Time: 1105     PT Stop Time: 1115    PT Total Time (min): 10 min       Billable Minutes:  Gait [id number]    Treatment Type: Treatment  PT/PTA: PTA     PTA Visit Number: 1       General Precautions: Standard, fall  Orthopedic Precautions: N/A   Braces:           Subjective:  Communicated with nurse Jane prior to session.  Pt agreeable to tx.                        Objective:   Patient found with: peripheral IV, telemetry    Functional Mobility:  Bed Mobility:   Rolling/Turning Right: Supervision  Scooting/Bridging: Supervision  Supine to Sit: Supervision  Sit to Supine: Supervision    Transfers:  Sit <> Stand Assistance: Stand By Assistance  Sit <> Stand Assistive Device: Rolling Walker    Gait:   Gait Distance: 240' x 2  Assistance 1: Contact Guard Assistance  Gait Assistive Device: Rolling walker  Gait Deviation(s): decreased rasta, decreased velocity of limb motion, decreased step length, decreased stride length    Balance:   Static Sit: FAIR+: Able to take MINIMAL challenges from all directions  Dynamic Sit: FAIR+: Maintains balance through MINIMAL excursions of active trunk motion  Static Stand: FAIR+: Takes MINIMAL challenges from all directions  Dynamic stand: FAIR: Needs CONTACT GUARD during gait     Therapeutic Activities and Exercises:       AM-PAC 6 CLICK MOBILITY  How much help from another person does this patient currently need?   1 = Unable, Total/Dependent Assistance  2 = A lot, Maximum/Moderate Assistance  3 = A little, Minimum/Contact Guard/Supervision  4 = None, Modified [place]/Independent         AM-PAC Raw Score CMS G-Code Modifier Level of Impairment Assistance   6 % Total / Unable   7 - 9 CM 80 - 100% Maximal Assist   10 - 14 CL 60 - 80% Moderate Assist   15 - 19 CK 40 - 60% Moderate Assist   20 - 22 CJ 20 - 40%  Minimal Assist   23 CI 1-20% SBA / CGA   24 CH 0% Independent/ Mod I     Patient left supine with all lines intact and call button in reach.    Assessment:  Bashir Ramos is a 82 y.o. male with a medical diagnosis of Pneumonia due to infectious organism and presents with weakness.  Min SOB after ambulation.  No LOB.  Rehab identified problem list/impairments: Rehab identified problem list/impairments: weakness, impaired endurance, impaired functional mobilty, gait instability, impaired balance, decreased lower extremity function    Activity tolerance: Good    Discharge recommendations:       Barriers to discharge:      Equipment recommendations:       GOALS:   Physical Therapy Goals        Problem: Physical Therapy Goal    Goal Priority Disciplines Outcome Goal Variances Interventions   Physical Therapy Goal    High PT/OT, PT Ongoing (interventions implemented as appropriate)     Description:  Goals to be met by: 2017     Patient will increase functional independence with mobility by performin. Supine to sit with Stand-by Assistance  2. Sit to stand transfer with Stand-by Assistance  3. Bed to chair transfer with Stand-by Assistance using HHA  4. Gait  x 240x2 feet with Contact Guard Assistance using Rolling Walker.   5. Lower extremity exercise program x20 reps per handout, with assistance as needed                PLAN:    Patient to be seen 6 x/week  to address the above listed problems via gait training, therapeutic activities, therapeutic exercises  Plan of Care expires: 17  Plan of Care reviewed with: patient         Valarie DANII Guzman, PTA  2017

## 2017-02-25 NOTE — PLAN OF CARE
Problem: Patient Care Overview  Goal: Plan of Care Review  Outcome: Ongoing (interventions implemented as appropriate)  Pt vital signs stable at this time. Lt FA infusing NS @ 125 cc/hr and antibiotics as ordered without difficulty. Pt denies pain so far this shift.  Pt is transfer with standby assist. Brace to Rt wrist due to gout. Call light within pt reach, pt instructed to call staff for any needed assistance, safety maintained. Pt denies needs/concerns at this time, will continue to monitor.

## 2017-02-25 NOTE — ASSESSMENT & PLAN NOTE
Secondary to Iv steroids-  Monitor  HGA1C 1/11/17 was  5.9 - Pt received dose IV steroid yesterday  Accuchecks with SSI  Continue  DM diet  Blood sugars running 176-194

## 2017-02-25 NOTE — ASSESSMENT & PLAN NOTE
Improving- Repeat CXR in am  Continue IV meropenem and vancomycin  Continue duonebs q 4  Monitor O2 sats- Supplemental O2 as needed

## 2017-02-25 NOTE — CONSULTS
Bashir Ramos [id number] is a 82 y.o. male who has been consulted for vancomycin dosing.    The patient has the following labs:     Date Creatinine (mg/dl)    WBC Count   2/25/2017 Estimated Creatinine Clearance: 66.6 mL/min (based on Cr of 0.8). Lab Results   Component Value Date    WBC 10.90 02/25/2017        Current weight is 76.2 kg (168 lb)    Pt is receiving vancomycin 1250 mg every 24 hours.  Vancomycin trough from 2/25 at 1448 was 8 mg/dL.  Target goal is 15-20 mg/dL.   Trough was drawn late and anticipate it is  Subtherapeutic. Pharmacy will increase current dose.   The patient will be changed to a vancomycin dose of 1000 mg every 12 hours. Dose of 1250mg was given today at 1551, will use that as the first dose and draw and vanco trough at 2/26 at 1530 before the third dose.    Patient will be followed by pharmacy for changes in renal function, toxicity, and efficacy.    Thank you for allowing us to participate in this patient's care.    Dhara Short, HarshaD

## 2017-02-25 NOTE — PLAN OF CARE
Problem: Patient Care Overview  Goal: Plan of Care Review  Outcome: Revised  Pt aao x 4. Pt able to verbalize needs/pain/wants. Pt poc reviewed with pt and pt stated understanding. Pt denies sob/pain at this time. Pt vss and has ivf running as ordered. Pt given iv abt as ordered. Pt has tele monitoring and pt cbg monitored. Pt safety maintained thus far during my shift. Pt in bed with call light within reach and will continue to monitor during my shift.

## 2017-02-25 NOTE — PROGRESS NOTES
02/24/17 1911   Patient Assessment/Suction   Level of Consciousness (AVPU) alert   All Lung Fields Breath Sounds wheezes, inspiratory;diminished   PRE-TX-O2-ETCO2   O2 Device (Oxygen Therapy) room air   SpO2 98 %   Pulse 80   Resp 18   Positioning Sitting in chair   Aerosol Therapy   $ Aerosol Therapy Charges Aerosol Treatment   Respiratory Treatment Status given   SVN/Inhaler Treatment Route mask   Patient Tolerance good   Post-Treatment   Post-treatment Heart Rate (beats/min) 79   Post-treatment Resp Rate (breaths/min) 18   All Fields Breath Sounds aeration increased;clear

## 2017-02-25 NOTE — PLAN OF CARE
02/25/17 0719   Patient Assessment/Suction   Level of Consciousness (AVPU) alert   Respiratory Effort Normal;Unlabored   All Lung Fields Breath Sounds clear;diminished   Cough Frequency infrequent   Cough Type good;nonproductive   PRE-TX-O2-ETCO2   O2 Device (Oxygen Therapy) room air   SpO2 98 %   Pulse Oximetry Type Intermittent   $ Pulse Oximetry - Multiple Charge Pulse Oximetry - Multiple   Pulse 74   Resp 16   Positioning HOB elevated 30 degrees   Aerosol Therapy   $ Aerosol Therapy Charges Aerosol Treatment   Respiratory Treatment Status given   SVN/Inhaler Treatment Route mask   Position During Treatment HOB at 30-45 degrees   Patient Tolerance good   Post-Treatment   Post-treatment Heart Rate (beats/min) 80   Post-treatment Resp Rate (breaths/min) 16   All Fields Breath Sounds clear;aeration increased

## 2017-02-25 NOTE — SUBJECTIVE & OBJECTIVE
Interval History: Continues to improve. Repeat CXR in am and continue to increase activity as tolerated.    Review of Systems   Constitutional: Negative for activity change, chills and fever.            HENT: Positive for hearing loss. Negative for ear pain and facial swelling.    Eyes: Negative for pain and redness.   Respiratory: Positive for cough and shortness of breath. Negative for chest tightness, wheezing and stridor.    Cardiovascular: Negative for leg swelling.   Gastrointestinal: Negative for abdominal pain, nausea and vomiting.   Endocrine: Negative for polydipsia and polyphagia.   Genitourinary: Negative for dysuria, flank pain and hematuria.   Musculoskeletal: Positive for joint swelling. Negative for neck pain and neck stiffness.        Right wrist pain only minimal swelling now   Skin: Negative for color change.   Allergic/Immunologic: Negative for food allergies and immunocompromised state.   Neurological: Positive for weakness. Negative for seizures and facial asymmetry.   Hematological: Does not bruise/bleed easily.   Psychiatric/Behavioral: Negative for suicidal ideas. The patient is not nervous/anxious.             All other systems reviewed and are negative.    Objective:     Vital Signs (Most Recent):  Temp: 98.1 °F (36.7 °C) (02/25/17 0800)  Pulse: 78 (02/25/17 1124)  Resp: 16 (02/25/17 1124)  BP: (!) 143/78 (02/25/17 0800)  SpO2: 100 % (02/25/17 1124) Vital Signs (24h Range):  Temp:  [97.6 °F (36.4 °C)-98.6 °F (37 °C)] 98.1 °F (36.7 °C)  Pulse:  [74-94] 78  Resp:  [16-18] 16  SpO2:  [97 %-100 %] 100 %  BP: (109-143)/(60-78) 143/78     Weight: 76.2 kg (168 lb)  Body mass index is 26.31 kg/(m^2).    Intake/Output Summary (Last 24 hours) at 02/25/17 1150  Last data filed at 02/25/17 0631   Gross per 24 hour   Intake             3690 ml   Output              400 ml   Net             3290 ml      Physical Exam   Constitutional: He appears well-developed and well-nourished. No distress.   HENT:    Head: Normocephalic and atraumatic.   Mild [place]'   Eyes: Pupils are equal, round, and reactive to light. Right eye exhibits no discharge. Left eye exhibits no discharge.   Neck: Normal range of motion. Neck supple. No JVD present.   Cardiovascular: Normal rate and regular rhythm.    Pulmonary/Chest: No stridor. No respiratory distress. He has no wheezes.   BBS diminished with rhonchi   Abdominal: Soft. Bowel sounds are normal. He exhibits no distension. There is no tenderness. There is no rebound and no guarding.   Genitourinary:   Genitourinary Comments: Not examined   Musculoskeletal: Normal range of motion. He exhibits edema and tenderness.   Right wrist with minimal swelling   Neurological: He is alert.   Oriented to person, place, and situation   Forgetful  Responds appropriately to simple commands   Skin: Skin is warm and dry. He is not diaphoretic.   Psychiatric: He has a normal mood and affect. His behavior is normal. Judgment and thought content normal.       Significant Labs: Reviewed

## 2017-02-25 NOTE — PROGRESS NOTES
"[facility] Medicine  Progress Note    Patient Name: Bashir Ramos  MRN: [id number]  Patient Class: IP- Inpatient   Admission Date: 2/23/2017  Length of Stay: 2 days  Attending Physician: Bella Valente MD  Primary Care Provider: Samir Rouse MD        Subjective:     Principal Problem:Pneumonia due to infectious organism    HPI:  The patient is a 82 y.o. male with a PMHx of HTN and gout who is presenting with the gradual onset of flu-like symptoms: fevers with a Tmax of 102.0 degrees F, chills, myalgias, congestion, sore throat and cough for 7 days. Denies dyspnea or wheezing. The pt also endorses generalized weakness to the point where "he cannot get up by himself". He also endorses increased swelling and warmth to the R wrist without hx of trauma. Movement exacerbates his pain. No relieving factors. No pertinent past surgical hx. No social hx.  Pt evaluated in ER and found to have cough and fever of 102 with  sepsis and pneumonia. Pt admitted for further evaluation and treatment.    Hospital Course:  2/25/17  Continues to improve. Continue current treatment. Increase activity as tolerated.       Interval History: Continues to improve. Repeat CXR in am and continue to increase activity as tolerated.    Review of Systems   Constitutional: Negative for activity change, chills and fever.            HENT: Positive for hearing loss. Negative for ear pain and facial swelling.    Eyes: Negative for pain and redness.   Respiratory: Positive for cough and shortness of breath. Negative for chest tightness, wheezing and stridor.    Cardiovascular: Negative for leg swelling.   Gastrointestinal: Negative for abdominal pain, nausea and vomiting.   Endocrine: Negative for polydipsia and polyphagia.   Genitourinary: Negative for dysuria, flank pain and hematuria.   Musculoskeletal: Positive for joint swelling. Negative for neck pain and neck stiffness.        Right wrist pain only minimal swelling " now   Skin: Negative for color change.   Allergic/Immunologic: Negative for food allergies and immunocompromised state.   Neurological: Positive for weakness. Negative for seizures and facial asymmetry.   Hematological: Does not bruise/bleed easily.   Psychiatric/Behavioral: Negative for suicidal ideas. The patient is not nervous/anxious.             All other systems reviewed and are negative.    Objective:     Vital Signs (Most Recent):  Temp: 98.1 °F (36.7 °C) (02/25/17 0800)  Pulse: 78 (02/25/17 1124)  Resp: 16 (02/25/17 1124)  BP: (!) 143/78 (02/25/17 0800)  SpO2: 100 % (02/25/17 1124) Vital Signs (24h Range):  Temp:  [97.6 °F (36.4 °C)-98.6 °F (37 °C)] 98.1 °F (36.7 °C)  Pulse:  [74-94] 78  Resp:  [16-18] 16  SpO2:  [97 %-100 %] 100 %  BP: (109-143)/(60-78) 143/78     Weight: 76.2 kg (168 lb)  Body mass index is 26.31 kg/(m^2).    Intake/Output Summary (Last 24 hours) at 02/25/17 1150  Last data filed at 02/25/17 0631   Gross per 24 hour   Intake             3690 ml   Output              400 ml   Net             3290 ml      Physical Exam   Constitutional: He appears well-developed and well-nourished. No distress.   HENT:   Head: Normocephalic and atraumatic.   Mild [place]   Eyes: Pupils are equal, round, and reactive to light. Right eye exhibits no discharge. Left eye exhibits no discharge.   Neck: Normal range of motion. Neck supple. No JVD present.   Cardiovascular: Normal rate and regular rhythm.    Pulmonary/Chest: No stridor. No respiratory distress. He has no wheezes.   BBS diminished with rhonchi   Abdominal: Soft. Bowel sounds are normal. He exhibits no distension. There is no tenderness. There is no rebound and no guarding.   Genitourinary:   Genitourinary Comments: Not examined   Musculoskeletal: Normal range of motion. He exhibits edema and tenderness.   Right wrist with minimal swelling   Neurological: He is alert.   Oriented to person, place, and situation   Forgetful  Responds appropriately to  simple commands   Skin: Skin is warm and dry. He is not diaphoretic.   Psychiatric: He has a normal mood and affect. His behavior is normal. Judgment and thought content normal.       Significant Labs: Reviewed         Assessment/Plan:      * Pneumonia due to infectious organism  Improving- Repeat CXR in am  Continue IV meropenem and vancomycin  Continue duonebs q 4  Monitor O2 sats- Supplemental O2 as needed      Essential hypertension, benign  Monitor  Resume  home ACEI at half dose      Gout, arthritis  Right wrist with pain, swelling, and warmth- Resolving  Hx gout-   Uric acid level was WNL  Continue  bid colchicine      Anemia  monitor      Hyperlipidemia  Continue home statin      Hyperglycemia  Secondary to Iv steroids-  Monitor  HGA1C 1/11/17 was  5.9 - Pt received dose IV steroid yesterday  Accuchecks with SSI  Continue  DM diet  Blood sugars running 176-194      Debility  Fall, skin, and aspiration precautions  Continue  PT/OT/ST      Severe protein-calorie malnutrition  MOnitor  Continue  po supplement  Appetite improving      Elevated transaminase level  Monitor  Repeat CMP in am  Suspect related to sepsis      Sepsis  As above  Telemetry  Continue IVF  Lactic acid level initially 2.5 now down to 1.5  Continue IV meropenem and vancomycin  BC x 2 no growth  Urine culture no growth so far      Constipation  Add MOM and miralax      VTE Risk Mitigation         Ordered     enoxaparin injection 40 mg  Daily     Route:  Subcutaneous        02/23/17 1727     Medium Risk of VTE  Once      02/23/17 1727     Place sequential compression device  Until discontinued      02/23/17 1727     Place LISA hose  Until discontinued      02/23/17 1727          NADEEM Marti  Department of Hospital Medicine   [facility]    Time spent seeing patient( greater than 1/2 spent in direct contact) : 36 minutes

## 2017-02-25 NOTE — PLAN OF CARE
Problem: Patient Care Overview  Goal: Plan of Care Review  Outcome: Ongoing (interventions implemented as appropriate)  Pt aao x4. Pt able to verbalize needs/pain/wants. Pt poc reviewed with pt and pt stated understanding. Pt c/o pain to right hand that is controlled by prn pain meds. Pt has ivf running as ordered. Pt ambulated in  today with pt. Pt denies sob at this time. Pt safety maintained thus far this shift. Pt in chair with chair locked, call light within reach, and will continue to monitor during my shift.

## 2017-02-25 NOTE — PLAN OF CARE
Problem: Physical Therapy Goal  Goal: Physical Therapy Goal  Goals to be met by: 2017     Patient will increase functional independence with mobility by performin. Supine to sit with Stand-by Assistance  2. Sit to stand transfer with Stand-by Assistance  3. Bed to chair transfer with Stand-by Assistance using HHA  4. Gait x 240x2 feet with Contact Guard Assistance using Rolling Walker.   5. Lower extremity exercise program x20 reps per handout, with assistance as needed   Outcome: Ongoing (interventions implemented as appropriate)  Pt ambulated 480' w/rw, cga.  Increased gait distance today.

## 2017-02-26 VITALS
HEART RATE: 93 BPM | WEIGHT: 168 LBS | SYSTOLIC BLOOD PRESSURE: 129 MMHG | OXYGEN SATURATION: 98 % | RESPIRATION RATE: 19 BRPM | TEMPERATURE: 99 F | BODY MASS INDEX: 26.37 KG/M2 | DIASTOLIC BLOOD PRESSURE: 80 MMHG | HEIGHT: 67 IN

## 2017-02-26 PROBLEM — A41.9 SEPSIS: Status: RESOLVED | Noted: 2017-02-23 | Resolved: 2017-02-26

## 2017-02-26 PROBLEM — K59.00 CONSTIPATION: Status: RESOLVED | Noted: 2017-02-25 | Resolved: 2017-02-26

## 2017-02-26 LAB
ALBUMIN SERPL BCP-MCNC: 2.1 G/DL
ALP SERPL-CCNC: 82 U/L
ALT SERPL W/O P-5'-P-CCNC: 145 U/L
ANION GAP SERPL CALC-SCNC: 6 MMOL/L
AST SERPL-CCNC: 126 U/L
BASOPHILS # BLD AUTO: 0 K/UL
BASOPHILS NFR BLD: 0.1 %
BILIRUB SERPL-MCNC: 0.2 MG/DL
BUN SERPL-MCNC: 14 MG/DL
CALCIUM SERPL-MCNC: 9 MG/DL
CHLORIDE SERPL-SCNC: 110 MMOL/L
CO2 SERPL-SCNC: 21 MMOL/L
CREAT SERPL-MCNC: 0.8 MG/DL
DIFFERENTIAL METHOD: ABNORMAL
EOSINOPHIL # BLD AUTO: 0 K/UL
EOSINOPHIL NFR BLD: 0 %
ERYTHROCYTE [DISTWIDTH] IN BLOOD BY AUTOMATED COUNT: 15 %
EST. GFR  (AFRICAN AMERICAN): >60 ML/MIN/1.73 M^2
EST. GFR  (NON AFRICAN AMERICAN): >60 ML/MIN/1.73 M^2
GLUCOSE SERPL-MCNC: 133 MG/DL
HCT VFR BLD AUTO: 31.6 %
HETEROPH AB SERPL QL IA: NEGATIVE
HGB BLD-MCNC: 10.4 G/DL
LIPASE SERPL-CCNC: 32 U/L
LYMPHOCYTES # BLD AUTO: 0.4 K/UL
LYMPHOCYTES NFR BLD: 3.8 %
MCH RBC QN AUTO: 30 PG
MCHC RBC AUTO-ENTMCNC: 33 %
MCV RBC AUTO: 91 FL
MONOCYTES # BLD AUTO: 0.1 K/UL
MONOCYTES NFR BLD: 1.3 %
NEUTROPHILS # BLD AUTO: 9.8 K/UL
NEUTROPHILS NFR BLD: 94.8 %
PLATELET # BLD AUTO: 385 K/UL
PMV BLD AUTO: 7.2 FL
POCT GLUCOSE: 125 MG/DL (ref 70–110)
POCT GLUCOSE: 172 MG/DL (ref 70–110)
POTASSIUM SERPL-SCNC: 4.1 MMOL/L
PROT SERPL-MCNC: 5.6 G/DL
RBC # BLD AUTO: 3.47 M/UL
SODIUM SERPL-SCNC: 137 MMOL/L
WBC # BLD AUTO: 10.3 K/UL

## 2017-02-26 PROCEDURE — 94761 N-INVAS EAR/PLS OXIMETRY MLT: CPT

## 2017-02-26 PROCEDURE — 83690 ASSAY OF LIPASE: CPT

## 2017-02-26 PROCEDURE — 25000003 PHARM REV CODE 250: Performed by: EMERGENCY MEDICINE

## 2017-02-26 PROCEDURE — 86308 HETEROPHILE ANTIBODY SCREEN: CPT

## 2017-02-26 PROCEDURE — 63600175 PHARM REV CODE 636 W HCPCS: Performed by: NURSE PRACTITIONER

## 2017-02-26 PROCEDURE — 99900035 HC TECH TIME PER 15 MIN (STAT)

## 2017-02-26 PROCEDURE — 80074 ACUTE HEPATITIS PANEL: CPT

## 2017-02-26 PROCEDURE — 25000003 PHARM REV CODE 250: Performed by: NURSE PRACTITIONER

## 2017-02-26 PROCEDURE — 36415 COLL VENOUS BLD VENIPUNCTURE: CPT

## 2017-02-26 PROCEDURE — 25000003 PHARM REV CODE 250: Performed by: HOSPITALIST

## 2017-02-26 PROCEDURE — 80053 COMPREHEN METABOLIC PANEL: CPT

## 2017-02-26 PROCEDURE — 94640 AIRWAY INHALATION TREATMENT: CPT

## 2017-02-26 PROCEDURE — 85025 COMPLETE CBC W/AUTO DIFF WBC: CPT

## 2017-02-26 PROCEDURE — 25000242 PHARM REV CODE 250 ALT 637 W/ HCPCS: Performed by: NURSE PRACTITIONER

## 2017-02-26 PROCEDURE — 63600175 PHARM REV CODE 636 W HCPCS: Performed by: HOSPITALIST

## 2017-02-26 RX ORDER — ASCORBIC ACID 500 MG
500 TABLET ORAL NIGHTLY
Status: DISCONTINUED | OUTPATIENT
Start: 2017-02-26 | End: 2017-02-26 | Stop reason: HOSPADM

## 2017-02-26 RX ORDER — ASCORBIC ACID 500 MG
500 TABLET ORAL NIGHTLY
COMMUNITY
Start: 2017-02-26 | End: 2022-10-06

## 2017-02-26 RX ORDER — POLYETHYLENE GLYCOL 3350 17 G/17G
17 POWDER, FOR SOLUTION ORAL DAILY
Qty: 30 EACH | Refills: 0 | Status: SHIPPED | OUTPATIENT
Start: 2017-02-26 | End: 2017-03-28

## 2017-02-26 RX ORDER — LANOLIN ALCOHOL/MO/W.PET/CERES
400 CREAM (GRAM) TOPICAL 2 TIMES DAILY
Refills: 0 | COMMUNITY
Start: 2017-02-26 | End: 2017-10-03

## 2017-02-26 RX ORDER — MOXIFLOXACIN HYDROCHLORIDE 400 MG/1
400 TABLET ORAL DAILY
Status: DISCONTINUED | OUTPATIENT
Start: 2017-02-26 | End: 2017-02-26 | Stop reason: HOSPADM

## 2017-02-26 RX ORDER — LEVOFLOXACIN 750 MG/1
750 TABLET ORAL DAILY
Qty: 7 TABLET | Refills: 0 | Status: SHIPPED | OUTPATIENT
Start: 2017-02-26 | End: 2017-03-05

## 2017-02-26 RX ORDER — ACETAMINOPHEN 325 MG/1
650 TABLET ORAL EVERY 8 HOURS PRN
Refills: 0 | COMMUNITY
Start: 2017-02-26

## 2017-02-26 RX ORDER — ALBUTEROL SULFATE 90 UG/1
2 AEROSOL, METERED RESPIRATORY (INHALATION) EVERY 6 HOURS PRN
Qty: 1 INHALER | Refills: 0 | Status: SHIPPED | OUTPATIENT
Start: 2017-02-26 | End: 2017-10-03

## 2017-02-26 RX ADMIN — PANTOPRAZOLE SODIUM 40 MG: 40 TABLET, DELAYED RELEASE ORAL at 08:02

## 2017-02-26 RX ADMIN — Medication 400 MG: at 08:02

## 2017-02-26 RX ADMIN — IPRATROPIUM BROMIDE AND ALBUTEROL SULFATE 3 ML: .5; 3 SOLUTION RESPIRATORY (INHALATION) at 07:02

## 2017-02-26 RX ADMIN — MOXIFLOXACIN HYDROCHLORIDE 400 MG: 400 TABLET, FILM COATED ORAL at 12:02

## 2017-02-26 RX ADMIN — IPRATROPIUM BROMIDE AND ALBUTEROL SULFATE 3 ML: .5; 3 SOLUTION RESPIRATORY (INHALATION) at 11:02

## 2017-02-26 RX ADMIN — VANCOMYCIN HYDROCHLORIDE 1000 MG: 1 INJECTION, POWDER, LYOPHILIZED, FOR SOLUTION INTRAVENOUS at 04:02

## 2017-02-26 RX ADMIN — BENAZEPRIL HYDROCHLORIDE 10 MG: 10 TABLET, FILM COATED ORAL at 08:02

## 2017-02-26 RX ADMIN — THERA TABS 1 TABLET: TAB at 12:02

## 2017-02-26 RX ADMIN — MEROPENEM AND SODIUM CHLORIDE 500 MG: 500 INJECTION, SOLUTION INTRAVENOUS at 06:02

## 2017-02-26 RX ADMIN — FERROUS SULFATE TAB EC 325 MG (65 MG FE EQUIVALENT) 325 MG: 325 (65 FE) TABLET DELAYED RESPONSE at 08:02

## 2017-02-26 RX ADMIN — POLYETHYLENE GLYCOL (3350) 17 G: 17 POWDER, FOR SOLUTION ORAL at 08:02

## 2017-02-26 RX ADMIN — ENOXAPARIN SODIUM 40 MG: 100 INJECTION SUBCUTANEOUS at 12:02

## 2017-02-26 NOTE — PLAN OF CARE
02/26/17 1631   Final Note   Assessment Type Final Discharge Note   Discharge Disposition Home-Health   Discharge planning education complete? Yes

## 2017-02-26 NOTE — SUBJECTIVE & OBJECTIVE
Interval History:  Feeling better. Stronger. Eating well. No GI symptoms.  Liver enzmes remain slightly elevated and initially thought related sepsis but continue to slowly trend upward. Check monospot, acute liver panel, lipase. Discussed with Dr. Valnete- Check ultrasound.      Review of Systems   Constitutional: Negative for activity change, chills and fever.            HENT: Positive for hearing loss. Negative for ear pain and facial swelling.    Eyes: Negative for pain and redness.   Respiratory: Positive for cough. Negative for chest tightness, shortness of breath, wheezing and stridor.    Cardiovascular: Negative for leg swelling.   Gastrointestinal: Negative for abdominal pain, nausea and vomiting.   Endocrine: Negative for polydipsia and polyphagia.   Genitourinary: Negative for dysuria, flank pain and hematuria.   Musculoskeletal: Positive for joint swelling. Negative for neck pain and neck stiffness.        Right wrist pain only minimal swelling now   Skin: Negative for color change.   Allergic/Immunologic: Negative for food allergies and immunocompromised state.   Neurological: Positive for weakness. Negative for seizures and facial asymmetry.        Improving   Hematological: Does not bruise/bleed easily.   Psychiatric/Behavioral: Negative for suicidal ideas. The patient is not nervous/anxious.             All other systems reviewed and are negative.    Objective:     Vital Signs (Most Recent):  Temp: 98.7 °F (37.1 °C) (02/26/17 0400)  Pulse: 77 (02/26/17 0736)  Resp: 16 (02/26/17 0736)  BP: 134/82 (02/26/17 0400)  SpO2: 99 % (02/26/17 0736) Vital Signs (24h Range):  Temp:  [97.8 °F (36.6 °C)-98.7 °F (37.1 °C)] 98.7 °F (37.1 °C)  Pulse:  [73-95] 77  Resp:  [16-18] 16  SpO2:  [98 %-100 %] 99 %  BP: (134-155)/(34-84) 134/82     Weight: 76.2 kg (168 lb)  Body mass index is 26.31 kg/(m^2).    Intake/Output Summary (Last 24 hours) at 02/26/17 1037  Last data filed at 02/25/17 1800   Gross per 24 hour    Intake           1542.5 ml   Output              550 ml   Net            992.5 ml      Physical Exam   Constitutional: He appears well-developed and well-nourished. No distress.   HENT:   Head: Normocephalic and atraumatic.   Mild [place]   Eyes: Pupils are equal, round, and reactive to light. Right eye exhibits no discharge. Left eye exhibits no discharge.   Neck: Normal range of motion. Neck supple. No JVD present.   Cardiovascular: Normal rate and regular rhythm.    Pulmonary/Chest: No stridor. No respiratory distress. He has no wheezes.   BBS diminished with faint crackles   Abdominal: Soft. Bowel sounds are normal. He exhibits no distension. There is no tenderness. There is no rebound and no guarding.   Genitourinary:   Genitourinary Comments: Not examined   Musculoskeletal: Normal range of motion. He exhibits edema and tenderness.   Right wrist with minimal swelling   Neurological: He is alert.   Oriented to person, place, and situation   Forgetful  Responds appropriately to simple commands   Skin: Skin is warm and dry. He is not diaphoretic.   Psychiatric: He has a normal mood and affect. His behavior is normal. Judgment and thought content normal.       Significant Labs: Reviewed    Significant Imaging: Limited abdominal U/s ordered

## 2017-02-26 NOTE — ASSESSMENT & PLAN NOTE
Improving  Fall, skin, and aspiration precautions  Continue  PT/OT/ST  Would benefit from Home health on discharge

## 2017-02-26 NOTE — CONSULTS
Bashir Ramos [id number] is a 82 y.o. male who had been consulted for vancomycin dosing.    Vancomycin has been discontinued.  Pharmacy consult for vancomycin dosing in no longer required.      Thank you for allowing us to participate in this patient's care.     Yaquelin Hunt

## 2017-02-26 NOTE — ASSESSMENT & PLAN NOTE
Stable- Much improved  IV dislodged- change abx to po quinolone  Continue duonebs q 4  Monitor O2 sats- Supplemental O2 as needed

## 2017-02-26 NOTE — ASSESSMENT & PLAN NOTE
Hx gout-   Right wrist with pain, swelling, and warmth- Almost Resolved now  Continue  bid colchicine

## 2017-02-26 NOTE — DISCHARGE SUMMARY
"[facility] Medicine  Discharge Summary      Patient Name: Bashir Ramos  MRN: [id number]  Admission Date: 2/23/2017  Hospital Length of Stay: 3 days  Discharge Date and Time:  02/26/2017 2:42 PM  Attending Physician: Bella Valente MD   Discharging Provider: NADEEM Marti  Primary Care Provider: Samir Rouse MD      HPI:   The patient is a 82 y.o. male with a PMHx of HTN and gout who is presenting with the gradual onset of flu-like symptoms: fevers with a Tmax of 102.0 degrees F, chills, myalgias, congestion, sore throat and cough for 7 days. Denies dyspnea or wheezing. The pt also endorses generalized weakness to the point where "he cannot get up by himself". He also endorses increased swelling and warmth to the R wrist without hx of trauma. Movement exacerbates his pain. No relieving factors. No pertinent past surgical hx. No social hx.  Pt evaluated in ER and found to have cough and fever of 102 with  sepsis and pneumonia. Pt admitted for further evaluation and treatment.    * No surgery found *      Indwelling Lines/Drains at time of discharge:   Lines/Drains/Airways          No matching active lines, drains, or airways        Hospital Course:   Pt was monitored closely during his stay. He received IVF and IV meropenem and vancomycin for his sepsis and pneumonia. Pt also received IV steroids and po colchicine for right wrist gout. He received PT/OT for his debility. Pt was noted to have some mild elevation of his liver enzmes although he displayed no GI symptoms what so ever. His statin was placed on hold.  Monospot, lipase and abdominal ultrasound were done and remained WNL. Pt discussed with Dr. Valente and felt elevated liver enzmes were related to pt's recent sepsis. Pt was noted to have overall improvement in his condition and discharged to home with home health to follow for PT/OT and further trending of his liver enzmes. At time of discharge, blood cultures and urine " culture showed no growth and acute hepatitis panel still pending.               Consults:   Consults         Status Ordering Provider     Inpatient consult to Social Work/Case Management  Once     Provider:  (Not yet assigned)    Acknowledged HEIKE CLINTON     Pharmacy to dose Vancomycin consult  Once     Provider:  (Not yet assigned)    Completed HEIKE CLINTON          Significant Diagnostic Studies:     Abdominal Ultrasound Limited-    The visualized pancreas is unremarkable. Portions of the distal body and tail are obscured by overlying bowel gas.    The hepatic echogenicity is normal. No focal hepatic mass or intrahepatic biliary dilatation is seen. Central portal venous flow is antegrade. The common duct measures 8 mm in diameter in the lane hepatis. This is upper limits of normal for age. The distal duct is obscured by overlying bowel gas artifact. The gallbladder is normally distended. No stones or sludge noted. Sonographic Miller sign is negative.    The right kidney measures approximately 10.5 cm in length. There are anechoic cysts measuring up to approximately 3.1 cm, similar to previous CT findings. No hydronephrosis, stone or solid mass is identified.    The visualized upper abdominal IVC is unremarkable. The visualized upper abdominal aorta is normal in caliber.           Labs:   CMP   Recent Labs  Lab 02/25/17 0528 02/26/17 0518    137   K 3.6 4.1   * 110   CO2 19* 21*   * 133*   BUN 12 14   CREATININE 0.8 0.8   CALCIUM 8.9 9.0   PROT 5.8* 5.6*   ALBUMIN 2.0* 2.1*   BILITOT 0.2 0.2   ALKPHOS 80 82   AST 91* 126*   ALT 84* 145*   ANIONGAP 8 6*   ESTGFRAFRICA >60 >60   EGFRNONAA >60 >60    and CBC   Recent Labs  Lab 02/25/17 0528 02/26/17  0518   WBC 10.90 10.30   HGB 10.0* 10.4*   HCT 30.5* 31.6*    385*       Pending Diagnostic Studies:     Procedure Component Value Units Date/Time    Hepatitis panel, acute [[id number]] Collected:  02/26/17 1016    Order Status:   Sent Lab Status:  In process Updated:  02/26/17 1016    Specimen:  Blood from Blood         Final Active Diagnoses:    Diagnosis Date Noted POA    PRINCIPAL PROBLEM:  Pneumonia due to infectious organism [J18.9] 02/23/2017 Yes    Debility [R53.81] 02/23/2017 Yes    Severe protein-calorie malnutrition [E43] 02/23/2017 Yes    Elevated transaminase level [R74.0] 02/23/2017 Yes    Hyperglycemia [R73.9] 01/11/2017 Yes    Hyperlipidemia [E78.5] 12/30/2015 Yes    Anemia [D64.9] 04/08/2015 Yes    Essential hypertension, benign [I10] 08/27/2012 Yes    Gout, arthritis [M10.9] 08/27/2012 Yes      Problems Resolved During this Admission:    Diagnosis Date Noted Date Resolved POA    Constipation [K59.00] 02/25/2017 02/26/2017 Yes    Thrombocytosis [D47.3] 02/23/2017 02/25/2017 Yes    Sepsis [A41.9] 02/23/2017 02/26/2017 Yes      No new Assessment & Plan notes have been filed under this hospital service since the last note was generated.  Service: Hospital Medicine      Discharged Condition: stable    Disposition: Home-Health Care [facility]    Follow Up:  Follow-up Information     Follow up with Samir Rouse MD In 1 week.    Specialty:  Family Medicine    Why:  Follow up for pneumonia and elevated liver enzmes    Contact information:    [address] [place] [address]  [phone]          Patient Instructions:     Referral to Home health   Referral Priority: Routine Referral Type: Home Health Care   Referral Reason: Specialty Services Required    Requested Specialty: Home Health Services    Number of Visits Requested: 1      Diet general   Order Comments: Cardiac diet     Activity as tolerated     Call MD for:  temperature >100.4     Call MD for:  persistent dizziness, light-headedness, or visual disturbances     Call MD for:  increased confusion or weakness     Call MD for:  difficulty breathing or increased cough     Call MD for:   Order Comments: Any decline in condition       Medications:  Reconciled Home  Medications:   Current Discharge Medication List      START taking these medications    Details   acetaminophen (TYLENOL) 325 MG tablet Take 2 tablets (650 mg total) by mouth every 8 (eight) hours as needed for Pain.  Refills: 0      albuterol 90 mcg/actuation inhaler Inhale 2 puffs into the lungs every 6 (six) hours as needed for Wheezing or Shortness of Breath. Rescue  Qty: 1 Inhaler, Refills: 0      ascorbic acid, vitamin C, (VITAMIN C) 500 MG tablet Take 1 tablet (500 mg total) by mouth every evening.      levoFLOXacin (LEVAQUIN) 750 MG tablet Take 1 tablet (750 mg total) by mouth once daily.  Qty: 7 tablet, Refills: 0      magnesium oxide (MAG-OX) 400 mg tablet Take 1 tablet (400 mg total) by mouth 2 (two) times daily.  Refills: 0      multivitamin (THERAGRAN) tablet Take 1 tablet by mouth once daily.      polyethylene glycol (GLYCOLAX) 17 gram PwPk Take 17 g by mouth once daily.  Qty: 30 each, Refills: 0         CONTINUE these medications which have NOT CHANGED    Details   benazepril (LOTENSIN) 10 MG tablet Take 20 mg by mouth once daily.       docusate sodium (COLACE) 100 MG capsule Take 1 capsule (100 mg total) by mouth 2 (two) times daily as needed for Constipation.  Qty: 60 capsule, Refills: 0    Associated Diagnoses: Iron deficiency anemia, unspecified iron deficiency anemia type      ergocalciferol (ERGOCALCIFEROL) 50,000 unit Cap Take 1 capsule (50,000 Units total) by mouth every 7 days.  Qty: 8 capsule, Refills: 0    Associated Diagnoses: Vitamin D deficiency      ferrous sulfate 325 (65 FE) MG EC tablet Take 1 tablet (325 mg total) by mouth 2 (two) times daily.  Qty: 60 tablet, Refills: 0    Associated Diagnoses: Iron deficiency anemia, unspecified iron deficiency anemia type      gabapentin (NEURONTIN) 100 MG capsule Take 2 capsules (200 mg total) by mouth every evening.  Qty: 60 capsule, Refills: 11      sulindac (CLINORIL) 200 MG Tab          STOP taking these medications       simvastatin (ZOCOR)  40 MG tablet Comments:   Reason for Stopping:             Time spent on the discharge of patient: 58 minutes    NADEEM Marti  Department of Hospital Medicine  [facility]

## 2017-02-26 NOTE — PROGRESS NOTES
[facility] contacted [facility] for new HH referral.  [facility] faxed face sheet, orders, H&P, PT MAURICE magaña to [phone].    16:50  [facility] contacted [facility] is HH provider.

## 2017-02-26 NOTE — PROGRESS NOTES
02/25/17 1923   Patient Assessment/Suction   Level of Consciousness (AVPU) alert   All Lung Fields Breath Sounds clear   PRE-TX-O2-ETCO2   O2 Device (Oxygen Therapy) room air   SpO2 98 %   Pulse 86   Resp 16   Positioning Sitting in bed   Aerosol Therapy   $ Aerosol Therapy Charges Aerosol Treatment   Respiratory Treatment Status given   SVN/Inhaler Treatment Route mask   Position During Treatment HOB at 30-45 degrees   Patient Tolerance good   Post-Treatment   Post-treatment Heart Rate (beats/min) 85   Post-treatment Resp Rate (breaths/min) 16   All Fields Breath Sounds unchanged   pt is not wanting to take these throughout the night bc he doesn't feel like he needs them. Pt is clear RA sats 98% RR 16-18 pt resting comfortably. Will suggest to oncall maybe changing the frequency for pt preferences.

## 2017-02-26 NOTE — PLAN OF CARE
02/26/17 0736   Patient Assessment/Suction   Level of Consciousness (AVPU) alert   Respiratory Effort Normal;Unlabored   All Lung Fields Breath Sounds clear   Cough Frequency infrequent   Cough Type good;nonproductive   PRE-TX-O2-ETCO2   O2 Device (Oxygen Therapy) room air   SpO2 99 %   Pulse Oximetry Type Intermittent   $ Pulse Oximetry - Multiple Charge Pulse Oximetry - Multiple   Pulse 77   Resp 16   Positioning Sitting in bed   Aerosol Therapy   $ Aerosol Therapy Charges Aerosol Treatment   Respiratory Treatment Status given   SVN/Inhaler Treatment Route mask   Position During Treatment HOB at 30-45 degrees   Patient Tolerance good   Post-Treatment   Post-treatment Heart Rate (beats/min) 82   Post-treatment Resp Rate (breaths/min) 16   All Fields Breath Sounds clear

## 2017-02-26 NOTE — PROGRESS NOTES
"[facility] Medicine  Progress Note    Patient Name: Bashir Ramos  MRN: [id number]  Patient Class: IP- Inpatient   Admission Date: 2/23/2017  Length of Stay: 3 days  Attending Physician: Bella Valente MD  Primary Care Provider: Samir Rouse MD        Subjective:     Principal Problem:Pneumonia due to infectious organism    HPI:  The patient is a 82 y.o. male with a PMHx of HTN and gout who is presenting with the gradual onset of flu-like symptoms: fevers with a Tmax of 102.0 degrees F, chills, myalgias, congestion, sore throat and cough for 7 days. Denies dyspnea or wheezing. The pt also endorses generalized weakness to the point where "he cannot get up by himself". He also endorses increased swelling and warmth to the R wrist without hx of trauma. Movement exacerbates his pain. No relieving factors. No pertinent past surgical hx. No social hx.  Pt evaluated in ER and found to have cough and fever of 102 with  sepsis and pneumonia. Pt admitted for further evaluation and treatment.    Hospital Course:  2/25/17  Continues to improve. Continue current treatment. Increase activity as tolerated.   2/26/17  Feeling better. Stronger. Eating well. No GI symptoms.  Liver enzmes remain slightly elevated and initially thought related sepsis but continue to slowly trend upward. Check monospot, acute liver panel, lipase. Discussed with Dr. Valente- Check ultrasound.      Interval History:  Feeling better. Stronger. Eating well. No GI symptoms.  Liver enzmes remain slightly elevated and initially thought related sepsis but continue to slowly trend upward. Check monospot, acute liver panel, lipase. Discussed with Dr. Valente- Inocencio ultrasound.      Review of Systems   Constitutional: Negative for activity change, chills and fever.            HENT: Positive for hearing loss. Negative for ear pain and facial swelling.    Eyes: Negative for pain and redness.   Respiratory: Positive for cough. Negative " for chest tightness, shortness of breath, wheezing and stridor.    Cardiovascular: Negative for leg swelling.   Gastrointestinal: Negative for abdominal pain, nausea and vomiting.   Endocrine: Negative for polydipsia and polyphagia.   Genitourinary: Negative for dysuria, flank pain and hematuria.   Musculoskeletal: Positive for joint swelling. Negative for neck pain and neck stiffness.        Right wrist pain only minimal swelling now   Skin: Negative for color change.   Allergic/Immunologic: Negative for food allergies and immunocompromised state.   Neurological: Positive for weakness. Negative for seizures and facial asymmetry.        Improving   Hematological: Does not bruise/bleed easily.   Psychiatric/Behavioral: Negative for suicidal ideas. The patient is not nervous/anxious.             All other systems reviewed and are negative.    Objective:     Vital Signs (Most Recent):  Temp: 98.7 °F (37.1 °C) (02/26/17 0400)  Pulse: 77 (02/26/17 0736)  Resp: 16 (02/26/17 0736)  BP: 134/82 (02/26/17 0400)  SpO2: 99 % (02/26/17 0736) Vital Signs (24h Range):  Temp:  [97.8 °F (36.6 °C)-98.7 °F (37.1 °C)] 98.7 °F (37.1 °C)  Pulse:  [73-95] 77  Resp:  [16-18] 16  SpO2:  [98 %-100 %] 99 %  BP: (134-155)/(34-84) 134/82     Weight: 76.2 kg (168 lb)  Body mass index is 26.31 kg/(m^2).    Intake/Output Summary (Last 24 hours) at 02/26/17 1037  Last data filed at 02/25/17 1800   Gross per 24 hour   Intake           1542.5 ml   Output              550 ml   Net            992.5 ml      Physical Exam   Constitutional: He appears well-developed and well-nourished. No distress.   HENT:   Head: Normocephalic and atraumatic.   Mild [place]   Eyes: Pupils are equal, round, and reactive to light. Right eye exhibits no discharge. Left eye exhibits no discharge.   Neck: Normal range of motion. Neck supple. No JVD present.   Cardiovascular: Normal rate and regular rhythm.    Pulmonary/Chest: No stridor. No respiratory distress. He has no  wheezes.   BBS diminished with faint crackles   Abdominal: Soft. Bowel sounds are normal. He exhibits no distension. There is no tenderness. There is no rebound and no guarding.   Genitourinary:   Genitourinary Comments: Not examined   Musculoskeletal: Normal range of motion. He exhibits edema and tenderness.   Right wrist with minimal swelling   Neurological: He is alert.   Oriented to person, place, and situation   Forgetful  Responds appropriately to simple commands   Skin: Skin is warm and dry. He is not diaphoretic.   Psychiatric: He has a normal mood and affect. His behavior is normal. Judgment and thought content normal.       Significant Labs: Reviewed    Significant Imaging: Limited abdominal U/s ordered    Assessment/Plan:      * Pneumonia due to infectious organism  Stable- Much improved  IV dislodged- change abx to po quinolone  Continue duonebs q 4  Monitor O2 sats- Supplemental O2 as needed      Essential hypertension, benign  Monitor  Continue  home ACEI at half dose      Gout, arthritis  Hx gout-   Right wrist with pain, swelling, and warmth- Almost Resolved now  Continue  bid colchicine      Anemia  Monitor- Stable  Add MVI and pm vitamin C  Continue home ferrous sulfate      Hyperlipidemia  Home statin on hold due to elevated liver enzmes      Hyperglycemia  Secondary to Iv steroids-  Monitor  HGA1C 1/11/17 was  5.9 - Pt received dose IV steroid yesterday  Accuchecks with SSI  Continue  DM diet  Blood sugars running 172-188      Debility  Improving  Fall, skin, and aspiration precautions  Continue  PT/OT/ST  Would benefit from Home health on discharge      Severe protein-calorie malnutrition  Monitor  Continue  po supplement  Appetite improving      Elevated transaminase level  Monitor  Initially suspected  related to sepsis but continue to slowly trending upward-  Check acute Hepatitis panel, monospot, lipase level  Discussed with Dr. Valente- Check ultrasound         Sepsis  Resolving  BC x 2 no  growth  Urine culture no growth so far      Constipation  Continue  miralax      VTE Risk Mitigation         Ordered     enoxaparin injection 40 mg  Daily     Route:  Subcutaneous        02/23/17 1727     Medium Risk of VTE  Once      02/23/17 1727     Place sequential compression device  Until discontinued      02/23/17 1727     Place LISA hose  Until discontinued      02/23/17 1727          NADEEM Marti  Department of Hospital Medicine   [facility]    Time spent seeing patient( greater than 1/2 spent in direct contact) : 36 minutes

## 2017-02-26 NOTE — PLAN OF CARE
Problem: Patient Care Overview  Goal: Plan of Care Review  Outcome: Ongoing (interventions implemented as appropriate)  Pt had an uneventful night free from fall or injury.  Pt slept well with no c/o pain.  VSS no skin breakdown noted.  o2 sats maintained on ra and resp tx given as ordered.  IV abx and IVF given throughout shift through patent PIV.  Pt is tolerating PO and voiding freely per urinal without difficulty.  Pt is to have CXT this a.m. Call bell placed within reach and bedside rails raised 2.  Nurse rounded hourly to ensure pt safety.

## 2017-02-26 NOTE — ASSESSMENT & PLAN NOTE
Secondary to Iv steroids-  Monitor  HGA1C 1/11/17 was  5.9 - Pt received dose IV steroid yesterday  Accuchecks with SSI  Continue  DM diet  Blood sugars running 172-188

## 2017-02-26 NOTE — NURSING
Pt discharge instructions given to pt and pt son. Both stated understanding. piv removed and vss. Pt denies sob/pain at this time. F/u appts encouraged.  Paper rx sent home with pt. Pt discharge instructions sent home with pt. Pt refused w/c and walked off floor with son.

## 2017-02-26 NOTE — ASSESSMENT & PLAN NOTE
Monitor  Initially suspected  related to sepsis but continue to slowly trending upward-  Check acute Hepatitis panel, monospot, lipase level  Discussed with Dr. Valente- Check ultrasound

## 2017-02-27 ENCOUNTER — TELEPHONE (OUTPATIENT)
Dept: FAMILY MEDICINE | Facility: CLINIC | Age: 82
End: 2017-02-27

## 2017-02-27 ENCOUNTER — TELEPHONE (OUTPATIENT)
Dept: ORTHOPEDICS | Facility: CLINIC | Age: 82
End: 2017-02-27

## 2017-02-27 LAB
HAV IGM SERPL QL IA: NEGATIVE
HBV CORE IGM SERPL QL IA: NEGATIVE
HBV SURFACE AG SERPL QL IA: NEGATIVE
HCV AB SERPL QL IA: POSITIVE

## 2017-02-27 NOTE — TELEPHONE ENCOUNTER
----- Message from Tim Magana sent at 2/27/2017  3:59 PM CST -----  Contact: Son - Bashir Ramos JR  Son states that the patient's carpal tunnel is getting worst and it's swollen.  Would like a sooner appointment thank 03/09/17.  Please call  [phone]

## 2017-02-27 NOTE — TELEPHONE ENCOUNTER
----- Message from Tim Magana sent at 2/27/2017  4:04 PM CST -----  Contact: Son- Bashir Mooney JR  States patient was in the hospital for pneumonia from Thursday 02/23 and was released yesterday 02/26/17.  Would like a hospital follow up appointment earlier than 04/07/17.  Please call [phone].  Thank you

## 2017-02-28 LAB
BACTERIA BLD CULT: NORMAL
BACTERIA BLD CULT: NORMAL

## 2017-03-01 ENCOUNTER — PATIENT OUTREACH (OUTPATIENT)
Dept: ADMINISTRATIVE | Facility: CLINIC | Age: 82
End: 2017-03-01
Payer: MEDICARE

## 2017-03-01 ENCOUNTER — TELEPHONE (OUTPATIENT)
Dept: PHYSICAL MEDICINE AND REHAB | Facility: CLINIC | Age: 82
End: 2017-03-01

## 2017-03-01 NOTE — TELEPHONE ENCOUNTER
Patient's son came in office to see if we could see his father sooner,Scheduled hi for tomorrow.  he confirmed the appt for tomorrow.

## 2017-03-01 NOTE — PATIENT INSTRUCTIONS
Discharge Instructions for Pneumonia  You have been diagnosed with pneumonia, a serious lung infection. Most cases of pneumonia are caused by bacteria. Pneumonia most often occurs in older adults, young children, and people with chronic health problems.  Home Care  Take your medication exactly as directed. Dont skip doses. Continue taking your antibiotics as directed until they are all gone--even if you start to feel better. This will prevent the pneumonia from coming back.  Drink at least 8 glasses of water daily, unless directed otherwise. This helps to loosen and thin secretions so that you can cough them up.  Use a cool-mist humidifier in your bedroom. Be sure to clean the humidifier daily.  Coughing up mucus is normal. Dont use medications to suppress your cough unless your cough is dry, painful, or interferes with your sleep. You may use an expectorant if ordered by your doctor.  Warm compresses or a moist heating pad on the lowest setting can be used to relieve chest discomfort. Use several times a day for 15-20 minutes at a time. (To prevent injuring your skin, be sure the temperature of the compress or heating pad is warm, not hot.)  Get plenty of rest until your fever, shortness of breath, and chest pain go away.  Plan to get a flu shot every year.  Ask your doctor about a pneumonia vaccination.  Follow-Up  Make a follow-up appointment as directed by our staff.    When to Seek Medical Attention  Call [phone] right away if you have any of the following:  Chest pain  Trouble breathing  Blue lips or fingernails  Otherwise, call your doctor if you have any of the following:  Fever above 100.4°F  Yellow, green, bloody, or smelly sputum  More than normal mucus production  Vomiting   © 1562-6193 [employer], [address] [place] [address], [place] [address]. All rights reserved. This information is not intended as a substitute for professional medical care. Always follow your healthcare professional's instructions.

## 2017-03-02 ENCOUNTER — OFFICE VISIT (OUTPATIENT)
Dept: PHYSICAL MEDICINE AND REHAB | Facility: CLINIC | Age: 82
End: 2017-03-02
Payer: MEDICARE

## 2017-03-02 VITALS
SYSTOLIC BLOOD PRESSURE: 107 MMHG | HEIGHT: 67 IN | BODY MASS INDEX: 20.97 KG/M2 | WEIGHT: 133.63 LBS | DIASTOLIC BLOOD PRESSURE: 69 MMHG | HEART RATE: 103 BPM

## 2017-03-02 DIAGNOSIS — G56.01 CARPAL TUNNEL SYNDROME OF RIGHT WRIST: Primary | ICD-10-CM

## 2017-03-02 PROCEDURE — 3074F SYST BP LT 130 MM HG: CPT | Mod: S$GLB,,, | Performed by: PHYSICAL MEDICINE & REHABILITATION

## 2017-03-02 PROCEDURE — 1157F ADVNC CARE PLAN IN RCRD: CPT | Mod: S$GLB,,, | Performed by: PHYSICAL MEDICINE & REHABILITATION

## 2017-03-02 PROCEDURE — 3078F DIAST BP <80 MM HG: CPT | Mod: S$GLB,,, | Performed by: PHYSICAL MEDICINE & REHABILITATION

## 2017-03-02 PROCEDURE — 1159F MED LIST DOCD IN RCRD: CPT | Mod: S$GLB,,, | Performed by: PHYSICAL MEDICINE & REHABILITATION

## 2017-03-02 PROCEDURE — 99213 OFFICE O/P EST LOW 20 MIN: CPT | Mod: S$GLB,,, | Performed by: PHYSICAL MEDICINE & REHABILITATION

## 2017-03-02 PROCEDURE — 99999 PR PBB SHADOW E&M-EST. PATIENT-LVL II: CPT | Mod: PBBFAC,,, | Performed by: PHYSICAL MEDICINE & REHABILITATION

## 2017-03-02 RX ORDER — PREDNISONE 5 MG/1
5 TABLET ORAL DAILY
Qty: 10 TABLET | Refills: 0 | Status: SHIPPED | OUTPATIENT
Start: 2017-03-02 | End: 2017-03-12

## 2017-03-02 RX ORDER — GABAPENTIN 100 MG/1
100 CAPSULE ORAL 3 TIMES DAILY
Qty: 90 CAPSULE | Refills: 11 | Status: SHIPPED | OUTPATIENT
Start: 2017-03-02 | End: 2017-03-08

## 2017-03-02 RX ORDER — DICLOFENAC SODIUM 10 MG/G
2 GEL TOPICAL 3 TIMES DAILY
Qty: 2 TUBE | Refills: 3 | Status: SHIPPED | OUTPATIENT
Start: 2017-03-02 | End: 2017-10-03

## 2017-03-02 NOTE — PROGRESS NOTES
HPI:  Patient is a 82 y.o. year old male w. Right hand pain d/t severe cts.  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 grayson)    EYE SURGERY       Family History   Problem Relation Age of Onset    No Known Problems Mother     No Known Problems Father     Amblyopia Neg Hx     Blindness Neg Hx     Cancer Neg Hx     Cataracts Neg Hx     Diabetes Neg Hx     Glaucoma Neg Hx     Hypertension Neg Hx     Macular degeneration Neg Hx     Retinal detachment Neg Hx     Strabismus Neg Hx     Stroke Neg Hx     Thyroid disease Neg Hx      Social History     Social History    Marital status:      Spouse name: N/A    Number of children: N/A    Years of education: N/A     Social History Main Topics    Smoking status: Former Smoker     Packs/day: 1.00     Types: Cigarettes     Quit date: 8/27/1982    Smokeless tobacco: Never Used    Alcohol use No    Drug use: No    Sexual activity: Not on file     Other Topics Concern    Not on file     Social History Narrative       Review of patient's allergies indicates:   Allergen Reactions    Iodine and iodide containing products     Shellfish containing products     Pcn [penicillins] Rash       Current Outpatient Prescriptions:     acetaminophen (TYLENOL) 325 MG tablet, Take 2 tablets (650 mg total) by mouth every 8 (eight) hours as needed for Pain., Disp: , Rfl: 0    albuterol 90 mcg/actuation inhaler, Inhale 2 puffs into the lungs every 6 (six) hours as needed for Wheezing or Shortness of Breath. Rescue, Disp: 1 Inhaler, Rfl: 0    ascorbic acid, vitamin C, (VITAMIN C) 500 MG  tablet, Take 1 tablet (500 mg total) by mouth every evening., Disp: , Rfl:     benazepril (LOTENSIN) 10 MG tablet, Take 20 mg by mouth once daily. , Disp: , Rfl:     docusate sodium (COLACE) 100 MG capsule, Take 1 capsule (100 mg total) by mouth 2 (two) times daily as needed for Constipation., Disp: 60 capsule, Rfl: 0    ergocalciferol (ERGOCALCIFEROL) 50,000 unit Cap, Take 1 capsule (50,000 Units total) by mouth every 7 days., Disp: 8 capsule, Rfl: 0    ferrous sulfate 325 (65 FE) MG EC tablet, Take 1 tablet (325 mg total) by mouth 2 (two) times daily., Disp: 60 tablet, Rfl: 0    gabapentin (NEURONTIN) 100 MG capsule, Take 2 capsules (200 mg total) by mouth every evening., Disp: 60 capsule, Rfl: 11    levoFLOXacin (LEVAQUIN) 750 MG tablet, Take 1 tablet (750 mg total) by mouth once daily., Disp: 7 tablet, Rfl: 0    magnesium oxide (MAG-OX) 400 mg tablet, Take 1 tablet (400 mg total) by mouth 2 (two) times daily., Disp: , Rfl: 0    multivitamin (THERAGRAN) tablet, Take 1 tablet by mouth once daily., Disp: , Rfl:     polyethylene glycol (GLYCOLAX) 17 gram PwPk, Take 17 g by mouth once daily., Disp: 30 each, Rfl: 0    sulindac (CLINORIL) 200 MG Tab, , Disp: , Rfl:         Review of Systems  No nausea, vomiting, fevers, Chills , contipation, diarrhea or sweats    Physical Exam:      Vitals:    03/02/17 1111   BP: 107/69   Pulse: 103     +edema of hand  Arthritic deformities  alert and oriented ×4 follows commands answers all questions appropriately,affect wnl  Manual muscle test 5 out of 5 sensation to light touch grossly intact  Nl gait  No C/C/E    Assessment:  Severe right cts    Plan:  carpalt tunnel splint  voltaren gel +low dose prednisone for now  neurontin nightly 200mg  Refer to ortho , french

## 2017-03-02 NOTE — MR AVS SNAPSHOT
[facility]  [address] [place] [address] [place]ne JOYCE [address]  Phone: [phone]  Fax: [phone]                  Bashir Ramos   3/2/2017 11:00 AM   Office Visit    Descripción:  Male : 1935   Personal Médico:  Dorylakhwinder Umana Stevie, DO   Departamento:  [facility]           Razón de la micheal     Follow-up     Hand Pain           Diagnósticos de Esta Visita        Comentarios    Carpal tunnel syndrome of right wrist    -  Primario            Lista de tareas           Citas próximas        Personal Médico Departamento Tfno del dpto    3/8/2017 9:40 AM ANDREW Jacobs [facility] [phone]    3/9/2017 9:00 AM Aric Edwards MD [facility] [phone]    3/23/2017 8:30 AM Anh An MD [facility] [phone]    2017 8:40 AM Samir Rouse MD [facility] [phone]      Metas (5 Years of Data)     Ninguna      Follow-Up and Disposition     Return in about 4 weeks (around 3/30/2017).    Follow-up and Disposition History      Recetas para recoger        Disp Refills Start End    diclofenac sodium 1 % Gel 2 Tube 3 3/2/2017     Apply 2 g topically 3 (three) times daily. - Topical    [facility] [place] [facility] [address] [facility] No. de tlfo: #: [phone]       predniSONE (DELTASONE) 5 MG tablet 10 tablet 0 3/2/2017 3/12/2017    Take 1 tablet (5 mg total) by mouth once daily. - Oral    [facility]: [facility] [employer] [employer] - [facility] [place], [place] - [address] [facility] No. de tlfo: #: [phone]       gabapentin (NEURONTIN) 100 MG capsule 90 capsule 11 3/2/2017 3/2/2018    Take 1 capsule (100 mg total) by mouth 3 (three) times daily. - Oral    [facility]: [facility] - [facility] [place] [facility] [address]  [facility] No. de tlfo: #: [phone]         [facility]     [facility] de Enfermeras - Asistencia   Enfermeras registradas de  [facility] pueden ayudarle a reservar arin micheal, proveer educación para la beulah, asesoría clínica, y otros servicios de asesoramiento.   Llame para ozzie servicio gratuito a [phone].             Medicamentos           EMPEZAR a benigno estos medicamentos NUEVOS        Refills    diclofenac sodium 1 % Gel 3    Sig: Apply 2 g topically 3 (three) times daily.    Categoría: Normal    Vía: Topical    predniSONE (DELTASONE) 5 MG tablet 0    Sig: Take 1 tablet (5 mg total) by mouth once daily.    Categoría: Normal    Vía: Oral    gabapentin (NEURONTIN) 100 MG capsule 11    Sig: Take 1 capsule (100 mg total) by mouth 3 (three) times daily.    Categoría: Normal    Vía: Oral           Verifique que la siguiente lista de medicamentos es arin representación exacta de los medicamentos que está tomando actualmente. Si no hay ningunos reportados, la lista puede estar en brown. Si no es correcta, por favor póngase en contacto con lebron proveedor de atención médica. Lleve esta lista con usted en radha de emergencia.           Medicamentos Actuales     acetaminophen (TYLENOL) 325 MG tablet Take 2 tablets (650 mg total) by mouth every 8 (eight) hours as needed for Pain.    albuterol 90 mcg/actuation inhaler Inhale 2 puffs into the lungs every 6 (six) hours as needed for Wheezing or Shortness of Breath. Rescue    ascorbic acid, vitamin C, (VITAMIN C) 500 MG tablet Take 1 tablet (500 mg total) by mouth every evening.    benazepril (LOTENSIN) 10 MG tablet Take 20 mg by mouth once daily.     diclofenac sodium 1 % Gel Apply 2 g topically 3 (three) times daily.    docusate sodium (COLACE) 100 MG capsule Take 1 capsule (100 mg total) by mouth 2 (two) times daily as needed for Constipation.    ergocalciferol (ERGOCALCIFEROL) 50,000 unit Cap Take 1 capsule (50,000 Units total) by mouth every 7 days.    ferrous sulfate 325 (65 FE) MG EC tablet Take 1 tablet (325 mg total) by mouth 2 (two) times daily.    gabapentin (NEURONTIN) 100 MG capsule Take  2 capsules (200 mg total) by mouth every evening.    gabapentin (NEURONTIN) 100 MG capsule Take 1 capsule (100 mg total) by mouth 3 (three) times daily.    levoFLOXacin (LEVAQUIN) 750 MG tablet Take 1 tablet (750 mg total) by mouth once daily.    magnesium oxide (MAG-OX) 400 mg tablet Take 1 tablet (400 mg total) by mouth 2 (two) times daily.    multivitamin (THERAGRAN) tablet Take 1 tablet by mouth once daily.    polyethylene glycol (GLYCOLAX) 17 gram PwPk Take 17 g by mouth once daily.    predniSONE (DELTASONE) 5 MG tablet Take 1 tablet (5 mg total) by mouth once daily.    sulindac (CLINORIL) 200 MG Tab            Información de referencia clínica           Eda signos vitales kurt     PS                   107/69           Blood Pressure          Most Recent Value    BP  107/69      Alergias     A partir del:  3/2/2017        Iodine And Iodide Containing Products    Shellfish Containing Products    Pcn [Penicillins]      Vacunas     Administradas en la fecha de la visita:  3/2/2017        None      Orders Placed During Today's Visit      Órdenes normales de esta visita    Ambulatory Referral to Orthopedics       Language Assistance Services     ATTENTION: Language assistance services are available, free of charge. Please call [phone].      ATENCIÓN: Si habla español, tiene a lebron disposición servicios gratuitos de asistencia lingüística. Llame al [phone].     CHÚ Ý: N?u b?n nói Ti?ng Vi?t, có các d?ch v? h? tr? ngôn ng? mi?n phí dành cho b?n. G?i s? [phone].         [facility] cumple con las leyes federales aplicables de derechos civiles y no discrimina por motivos de johan, color, origen nacional, edad, discapacidad, o sexo.                 Bashir Ramos   3/2/2017 11:00 AM   Office Visit    Description:  Male : 1935   Provider:  Dory Hubbard DO   [facility]           Reason for Visit     Follow-up     Hand Pain            Diagnoses this Visit        Comments    Carpal tunnel syndrome of right wrist    -  Primary            To Do List           Future Appointments        Provider Department Dept Phone    3/8/2017 9:40 AM ANDREW Jacobs [facility] [phone]    3/9/2017 9:00 AM Aric Edwards MD [place] [facility] [phone]    3/23/2017 8:30 AM Anh An MD [facility] [phone]    5/17/2017 8:40 AM Samir Rouse MD [facility] [phone]      Goals     None      Follow-Up and Disposition     Return in about 4 weeks (around 3/30/2017).    Follow-up and Disposition History       These Medications        Disp Refills Start End    diclofenac sodium 1 % Gel 2 Tube 3 3/2/2017     Apply 2 g topically 3 (three) times daily. - Topical    Pharmacy: [place] [id number] [facility] Ph #: [phone]       predniSONE (DELTASONE) 5 MG tablet 10 tablet 0 3/2/2017 3/12/2017    Take 1 tablet (5 mg total) by mouth once daily. - Oral    Pharmacy: [facility] [id number] [facility] Ph #: [phone]       gabapentin (NEURONTIN) 100 MG capsule 90 capsule 11 3/2/2017 3/2/2018    Take 1 capsule (100 mg total) by mouth 3 (three) times daily. - Oral    Pharmacy: [facility] [id number] [facility] Ph #: [phone]         [facility]     [facility] On Call Nurse Care Line - 24/7 Assistance  Registered nurses in the [facility] provide clinical advisement, health education, appointment booking, and other advisory services.  Call for this free service at [phone].             Medications           START taking these NEW medications        Refills    diclofenac sodium 1 % Gel 3    Sig: Apply 2 g topically 3 (three) times daily.    Class: Normal    Route: Topical    predniSONE (DELTASONE) 5 MG tablet 0    Sig: Take 1 tablet (5 mg total) by mouth once daily.     Class: Normal    Route: Oral    gabapentin (NEURONTIN) 100 MG capsule 11    Sig: Take 1 capsule (100 mg total) by mouth 3 (three) times daily.    Class: Normal    Route: Oral           Verify that the below list of medications is an accurate representation of the medications you are currently taking.  If none reported, the list may be blank. If incorrect, please contact your healthcare provider. Carry this list with you in case of emergency.           Current Medications     acetaminophen (TYLENOL) 325 MG tablet Take 2 tablets (650 mg total) by mouth every 8 (eight) hours as needed for Pain.    albuterol 90 mcg/actuation inhaler Inhale 2 puffs into the lungs every 6 (six) hours as needed for Wheezing or Shortness of Breath. Rescue    ascorbic acid, vitamin C, (VITAMIN C) 500 MG tablet Take 1 tablet (500 mg total) by mouth every evening.    benazepril (LOTENSIN) 10 MG tablet Take 20 mg by mouth once daily.     diclofenac sodium 1 % Gel Apply 2 g topically 3 (three) times daily.    docusate sodium (COLACE) 100 MG capsule Take 1 capsule (100 mg total) by mouth 2 (two) times daily as needed for Constipation.    ergocalciferol (ERGOCALCIFEROL) 50,000 unit Cap Take 1 capsule (50,000 Units total) by mouth every 7 days.    ferrous sulfate 325 (65 FE) MG EC tablet Take 1 tablet (325 mg total) by mouth 2 (two) times daily.    gabapentin (NEURONTIN) 100 MG capsule Take 2 capsules (200 mg total) by mouth every evening.    gabapentin (NEURONTIN) 100 MG capsule Take 1 capsule (100 mg total) by mouth 3 (three) times daily.    levoFLOXacin (LEVAQUIN) 750 MG tablet Take 1 tablet (750 mg total) by mouth once daily.    magnesium oxide (MAG-OX) 400 mg tablet Take 1 tablet (400 mg total) by mouth 2 (two) times daily.    multivitamin (THERAGRAN) tablet Take 1 tablet by mouth once daily.    polyethylene glycol (GLYCOLAX) 17 gram PwPk Take 17 g by mouth once daily.    predniSONE (DELTASONE) 5 MG tablet Take 1 tablet (5 mg total) by mouth  once daily.    sulindac (CLINORIL) 200 MG Tab            Clinical Reference Information           Your Vitals Were     BP                   107/69           Blood Pressure          Most Recent Value    BP  107/69      Allergies as of 3/2/2017     Iodine And Iodide Containing Products    Shellfish Containing Products    Pcn [Penicillins]      Immunizations Administered on Date of Encounter - 3/2/2017     None      Orders Placed During Today's Visit      Normal Orders This Visit    Ambulatory Referral to Orthopedics       Language Assistance Services     ATTENTION: Language assistance services are available, free of charge. Please call [phone].      ATENCIÓN: Si augustine garciaayana, tiene a lebron disposición servicios gratuitos de asistencia lingüística. Llame al [phone].     CHÚ Ý: N?u b?n nói Ti?ng Vi?t, có các d?ch v? h? tr? ngôn ng? mi?n phí dành cho b?n. G?i s? [phone].         [facility] complies with applicable Federal civil rights laws and does not discriminate on the basis of race, color, national origin, age, disability, or sex.

## 2017-03-06 ENCOUNTER — DOCUMENTATION ONLY (OUTPATIENT)
Dept: FAMILY MEDICINE | Facility: CLINIC | Age: 82
End: 2017-03-06

## 2017-03-06 ENCOUNTER — TELEPHONE (OUTPATIENT)
Dept: FAMILY MEDICINE | Facility: CLINIC | Age: 82
End: 2017-03-06

## 2017-03-06 NOTE — PROGRESS NOTES
Pre-Visit Chart Review  For Appointment Scheduled on 3/8/17    Health Maintenance Due   Topic Date Due    TETANUS VACCINE  01/25/1953    Zoster Vaccine  01/25/1995

## 2017-03-06 NOTE — TELEPHONE ENCOUNTER
----- Message from Yaneth Acosta sent at 3/6/2017  1:57 PM CST -----  Refill on Rx Magnesium, Rx Levofloxacin, Rx Proair, Rx Amlodipine and Rx Benazepril.  Please send into [facility].  Any questions call [facility] at [phone]

## 2017-03-06 NOTE — TELEPHONE ENCOUNTER
Medications are historically added or written at hospital; 3/8/17 [facility]OSP FU [facility]ENNY Tee. Suggestion to call son about these and he can speak with PA at this appointment.

## 2017-03-08 ENCOUNTER — LAB VISIT (OUTPATIENT)
Dept: LAB | Facility: HOSPITAL | Age: 82
End: 2017-03-08
Attending: NURSE PRACTITIONER
Payer: MEDICARE

## 2017-03-08 ENCOUNTER — OFFICE VISIT (OUTPATIENT)
Dept: FAMILY MEDICINE | Facility: CLINIC | Age: 82
End: 2017-03-08
Payer: MEDICARE

## 2017-03-08 VITALS
OXYGEN SATURATION: 98 % | SYSTOLIC BLOOD PRESSURE: 116 MMHG | HEIGHT: 67 IN | TEMPERATURE: 98 F | DIASTOLIC BLOOD PRESSURE: 66 MMHG | BODY MASS INDEX: 21.48 KG/M2 | WEIGHT: 136.88 LBS | HEART RATE: 100 BPM

## 2017-03-08 DIAGNOSIS — R76.8 POSITIVE HEPATITIS C ANTIBODY TEST: ICD-10-CM

## 2017-03-08 DIAGNOSIS — J18.9 PNEUMONIA OF BOTH LUNGS DUE TO INFECTIOUS ORGANISM, UNSPECIFIED PART OF LUNG: ICD-10-CM

## 2017-03-08 DIAGNOSIS — Z09 HOSPITAL DISCHARGE FOLLOW-UP: Primary | ICD-10-CM

## 2017-03-08 DIAGNOSIS — H61.21 RIGHT EAR IMPACTED CERUMEN: ICD-10-CM

## 2017-03-08 DIAGNOSIS — G56.03 CARPAL TUNNEL SYNDROME ON BOTH SIDES: ICD-10-CM

## 2017-03-08 DIAGNOSIS — I10 ESSENTIAL HYPERTENSION, BENIGN: ICD-10-CM

## 2017-03-08 PROCEDURE — 99999 PR PBB SHADOW E&M-EST. PATIENT-LVL IV: CPT | Mod: PBBFAC,,, | Performed by: NURSE PRACTITIONER

## 2017-03-08 PROCEDURE — 36415 COLL VENOUS BLD VENIPUNCTURE: CPT | Mod: PO

## 2017-03-08 PROCEDURE — 87522 HEPATITIS C REVRS TRNSCRPJ: CPT

## 2017-03-08 NOTE — MR AVS SNAPSHOT
[facility]  [address]  [place] [address]  Phone: [phone]  Fax: [phone]                  Bashir Ramos   3/8/2017 9:40 AM   Office Visit    Descripción:  Male : 1935   Personal Médico:  ANDREW Jacobs   Departamento:  [facility]           Razón de la micheal     Transitional Care           Diagnósticos de Esta Visita        Comentarios    Hospital discharge follow-up    -  Primario     Positive hepatitis C antibody test         Pneumonia of both lungs due to infectious organism, unspecified part of lung         Right ear impacted cerumen         Carpal tunnel syndrome on both sides         Essential hypertension, benign                Lista de tareas           Citas próximas        Personal Médico Departamento Tfno del dpto    3/8/2017 1:15 PM [facility] [phone]    3/22/2017 9:00 AM ANDREW Jacobs [facility] [phone]    3/23/2017 8:30 AM Anh An MD [facility] [phone]    2017 8:40 AM Samir Rouse MD [facility] [phone]      Metas (5 Years of Data)     Ninguna      Follow-Up and Disposition     Return in about 2 weeks (around 3/22/2017), or if symptoms worsen or fail to improve, for labs now.      [facility]     [facility] de Enfermeras - Asistencia   Enfermeras registradas de [facility] pueden ayudarle a reservar arin micheal, proveer educación para la beulah, asesoría clínica, y otros servicios de asesoramiento.   Llame para ozzie servicio gratuito a [phone].             Medicamentos           DEJAR de benigno estos medicamentos     gabapentin (NEURONTIN) 100 MG capsule Take 2 capsules (200 mg total) by mouth every evening.    gabapentin (NEURONTIN) 100 MG capsule Take 1 capsule (100 mg total) by mouth 3 (three) times daily.           Verifique que la siguiente lista de medicamentos es arin representación exacta de  "los medicamentos que está tomando actualmente. Si no hay ningunos reportados, la lista puede estar en brown. Si no es correcta, por favor póngase en contacto con lebron proveedor de atención médica. Lleve esta lista con usted en radha de emergencia.           Medicamentos Actuales     acetaminophen (TYLENOL) 325 MG tablet Take 2 tablets (650 mg total) by mouth every 8 (eight) hours as needed for Pain.    albuterol 90 mcg/actuation inhaler Inhale 2 puffs into the lungs every 6 (six) hours as needed for Wheezing or Shortness of Breath. Rescue    ascorbic acid, vitamin C, (VITAMIN C) 500 MG tablet Take 1 tablet (500 mg total) by mouth every evening.    benazepril (LOTENSIN) 10 MG tablet Take 20 mg by mouth once daily.     diclofenac sodium 1 % Gel Apply 2 g topically 3 (three) times daily.    docusate sodium (COLACE) 100 MG capsule Take 1 capsule (100 mg total) by mouth 2 (two) times daily as needed for Constipation.    ergocalciferol (ERGOCALCIFEROL) 50,000 unit Cap Take 1 capsule (50,000 Units total) by mouth every 7 days.    ferrous sulfate 325 (65 FE) MG EC tablet Take 1 tablet (325 mg total) by mouth 2 (two) times daily.    magnesium oxide (MAG-OX) 400 mg tablet Take 1 tablet (400 mg total) by mouth 2 (two) times daily.    multivitamin (THERAGRAN) tablet Take 1 tablet by mouth once daily.    polyethylene glycol (GLYCOLAX) 17 gram PwPk Take 17 g by mouth once daily.    predniSONE (DELTASONE) 5 MG tablet Take 1 tablet (5 mg total) by mouth once daily.    sulindac (CLINORIL) 200 MG Tab            Información de referencia clínica           Eda signos vitales kurt     PS Pulso Temperatura [place] Peso SpO2    116/66 (BP Location: Right arm, Patient Position: Sitting, BP Method: Automatic) 100 98.4 °F (36.9 °C) (Oral) 5' 7" (1.702 m) 62.1 kg (136 lb 14.5 oz) 98%    BMI (IMC)                   21.44 kg/m2           Blood Pressure          Most Recent Value    BP  116/66      Alergias     A partir del:  3/8/2017        Iodine " And Iodide Containing Products    Shellfish Containing Products    Pcn [Penicillins]      Vacunas     Administradas en la fecha de la visita:  3/8/2017        None      Orders Placed During Today's Visit      Órdenes normales de esta visita    Ear wax removal     Exámenes/Procedimientos futuros Se espera el Vence    HEPATITIS C RNA, QUANTITATIVE, PCR  3/8/2017 5/7/2018      Instrucciones      Discharge Instructions: Eating a Low-Salt Diet  Your health care provider has prescribed a low-salt diet for you. Most people with heart problems need to eat less salt, which is full of sodium. Too much sodium is linked to high blood pressure, which is linked to a greater risk of heart disease, stroke, blindness, and kidney problems.  Home care    Learn ways to cut back on salt (sodium):  · Eat less frozen, canned, dried, packaged, and fast foods. These often contain high amounts of sodium.  · Season foods with herbs instead of salt when you cook.  · Season with flavorings such as pepper, lemon, garlic, and onion.  · Dont add salt to your food at the table.  · Sprinkle salt-free herbal blends on meats and vegetables.  Learn to read food labels carefully:  · Look for the total amount of sodium per serving.  · Look for foods labeled low sodium, reduced sodium, or no added salt.  · Beware. Salt goes by many names. Cut down on foods with these words (all forms of salt) listed as ingredients:  ¨ Salt  ¨ Sodium  ¨ Soy sauce  ¨ Baking soda  ¨ Baking powder  ¨ MSG  ¨ Monosodium  ¨ Na (the chemical symbol for sodium)  Other ideas:  · Use more fresh food. Buy more fruits and vegetables.  · Select lean meats, fish, and poultry.  · Find a cookbook with low-salt recipes. Youll find ideas for tasty meals that are healthy for your heart.  ·   When eating out, ask questions about the menu. Tell the  you're on a low-salt diet.  ¨ If you order fish, chicken, beef, or pork, ask the  to have it broiled, baked, poached, or grilled  without salt, butter, or breading.  ¨ Choose plain steamed rice, boiled noodles, and baked or boiled potatoes. Top potatoes with chives and a little sour cream instead of butter.  · Avoid antacids that are high in salt. Check the label before you buy.  Follow-up  Make a follow-up appointment with a nutritionist as directed by our staff.  Date Last Reviewed: 2015-2016 [employer]. [address] [place] [address], [place] [address]. All rights reserved. This information is not intended as a substitute for professional medical care. Always follow your healthcare professional's instructions.             Language Assistance Services     ATTENTION: Language assistance services are available, free of charge. Please call [phone].      ATENCIÓN: Si habla treva, tiene a lebron disposición servicios gratuitos de asistencia lingüística. Llame al [phone].     CHÚ Ý: N?u b?n nói Ti?ng Vi?t, có các d?ch v? h? tr? ngôn ng? mi?n phí dành cho b?n. G?i s? [phone].         [facility] cumple con las leyes federales aplicables de derechos civiles y no discrimina por motivos de johan, color, origen nacional, edad, discapacidad, o sexo.                 Bashirsea Ramos   3/8/2017 9:40 AM   Office Visit    Description:  Male : 1935   Provider:  NADEEM Jacobs-C   Department:  [facility]           Reason for Visit     Transitional Care           Diagnoses this Visit        Comments    Hospital discharge follow-up    -  Primary     Positive hepatitis C antibody test         Pneumonia of both lungs due to infectious organism, unspecified part of lung         Right ear impacted cerumen         Carpal tunnel syndrome on both sides         Essential hypertension, benign                To Do List           Future Appointments        Provider Department Dept Phone    3/8/2017 1:15 PM [facility] [phone]    3/22/2017 9:00 AM Yakelin Nicole,  FNP-C [facility] [phone]    3/23/2017 8:30 AM Anh An MD [facility] [phone]    5/17/2017 8:40 AM Samir Rouse MD [facility] [phone]      Goals     None      Follow-Up and Disposition     Return in about 2 weeks (around 3/22/2017), or if symptoms worsen or fail to improve, for labs now.      [facility] On Call     [facility] On Call Nurse Care Line - 24/7 Assistance  Registered nurses in the [facility] On Call Center provide clinical advisement, health education, appointment booking, and other advisory services.  Call for this free service at [phone].             Medications           STOP taking these medications     gabapentin (NEURONTIN) 100 MG capsule Take 2 capsules (200 mg total) by mouth every evening.    gabapentin (NEURONTIN) 100 MG capsule Take 1 capsule (100 mg total) by mouth 3 (three) times daily.           Verify that the below list of medications is an accurate representation of the medications you are currently taking.  If none reported, the list may be blank. If incorrect, please contact your healthcare provider. Carry this list with you in case of emergency.           Current Medications     acetaminophen (TYLENOL) 325 MG tablet Take 2 tablets (650 mg total) by mouth every 8 (eight) hours as needed for Pain.    albuterol 90 mcg/actuation inhaler Inhale 2 puffs into the lungs every 6 (six) hours as needed for Wheezing or Shortness of Breath. Rescue    ascorbic acid, vitamin C, (VITAMIN C) 500 MG tablet Take 1 tablet (500 mg total) by mouth every evening.    benazepril (LOTENSIN) 10 MG tablet Take 20 mg by mouth once daily.     diclofenac sodium 1 % Gel Apply 2 g topically 3 (three) times daily.    docusate sodium (COLACE) 100 MG capsule Take 1 capsule (100 mg total) by mouth 2 (two) times daily as needed for Constipation.    ergocalciferol (ERGOCALCIFEROL) 50,000 unit Cap Take 1 capsule (50,000 Units total) by mouth every 7 days.  "   ferrous sulfate 325 (65 FE) MG EC tablet Take 1 tablet (325 mg total) by mouth 2 (two) times daily.    magnesium oxide (MAG-OX) 400 mg tablet Take 1 tablet (400 mg total) by mouth 2 (two) times daily.    multivitamin (THERAGRAN) tablet Take 1 tablet by mouth once daily.    polyethylene glycol (GLYCOLAX) 17 gram PwPk Take 17 g by mouth once daily.    predniSONE (DELTASONE) 5 MG tablet Take 1 tablet (5 mg total) by mouth once daily.    sulindac (CLINORIL) 200 MG Tab            Clinical Reference Information           Your Vitals Were     BP Pulse Temp Height Weight SpO2    116/66 (BP Location: Right arm, Patient Position: Sitting, BP Method: Automatic) 100 98.4 °F (36.9 °C) (Oral) 5' 7" (1.702 m) 62.1 kg (136 lb 14.5 oz) 98%    BMI                   21.44 kg/m2           Blood Pressure          Most Recent Value    BP  116/66      Allergies as of 3/8/2017     Iodine And Iodide Containing Products    Shellfish Containing Products    Pcn [Penicillins]      Immunizations Administered on Date of Encounter - 3/8/2017     None      Orders Placed During Today's Visit      Normal Orders This Visit    Ear wax removal     Future Labs/Procedures Expected by Expires    HEPATITIS C RNA, QUANTITATIVE, PCR  3/8/2017 5/7/2018      Instructions      Discharge Instructions: Eating a Low-Salt Diet  Your health care provider has prescribed a low-salt diet for you. Most people with heart problems need to eat less salt, which is full of sodium. Too much sodium is linked to high blood pressure, which is linked to a greater risk of heart disease, stroke, blindness, and kidney problems.  Home care    Learn ways to cut back on salt (sodium):  · Eat less frozen, canned, dried, packaged, and fast foods. These often contain high amounts of sodium.  · Season foods with herbs instead of salt when you cook.  · Season with flavorings such as pepper, lemon, garlic, and onion.  · Dont add salt to your food at the table.  · Sprinkle salt-free herbal " blends on meats and vegetables.  Learn to read food labels carefully:  · Look for the total amount of sodium per serving.  · Look for foods labeled low sodium, reduced sodium, or no added salt.  · Beware. Salt goes by many names. Cut down on foods with these words (all forms of salt) listed as ingredients:  ¨ Salt  ¨ Sodium  ¨ Soy sauce  ¨ Baking soda  ¨ Baking powder  ¨ MSG  ¨ Monosodium  ¨ Na (the chemical symbol for sodium)  Other ideas:  · Use more fresh food. Buy more fruits and vegetables.  · Select lean meats, fish, and poultry.  · Find a cookbook with low-salt recipes. Youll find ideas for tasty meals that are healthy for your heart.  ·   When eating out, ask questions about the menu. Tell the  you're on a low-salt diet.  ¨ If you order fish, chicken, beef, or pork, ask the  to have it broiled, baked, poached, or grilled without salt, butter, or breading.  ¨ Choose plain steamed rice, boiled noodles, and baked or boiled potatoes. Top potatoes with chives and a little sour cream instead of butter.  · Avoid antacids that are high in salt. Check the label before you buy.  Follow-up  Make a follow-up appointment with a nutritionist as directed by our staff.  Date Last Reviewed: 6/20/2015  © 2770-0998 [employer]. [address] [place] [address]. All rights reserved. This information is not intended as a substitute for professional medical care. Always follow your healthcare professional's instructions.             Language Assistance Services     ATTENTION: Language assistance services are available, free of charge. Please call [phone].      ATENCIÓN: Si habla español, tiene a lebron disposición servicios gratuitos de asistencia lingüística. Llame al [phone].     DORENE Ý: N?u b?n nói Ti?ng Vi?t, có các d?ch v? h? tr? ngôn ng? mi?n phí dành cho b?n. G?i s? [phone].         [facility] complies with applicable Federal civil rights laws and does not  discriminate on the basis of race, color, national origin, age, disability, or sex.

## 2017-03-08 NOTE — PATIENT INSTRUCTIONS
Discharge Instructions: Eating a Low-Salt Diet  Your health care provider has prescribed a low-salt diet for you. Most people with heart problems need to eat less salt, which is full of sodium. Too much sodium is linked to high blood pressure, which is linked to a greater risk of heart disease, stroke, blindness, and kidney problems.  Home care    Learn ways to cut back on salt (sodium):  · Eat less frozen, canned, dried, packaged, and fast foods. These often contain high amounts of sodium.  · Season foods with herbs instead of salt when you cook.  · Season with flavorings such as pepper, lemon, garlic, and onion.  · Dont add salt to your food at the table.  · Sprinkle salt-free herbal blends on meats and vegetables.  Learn to read food labels carefully:  · Look for the total amount of sodium per serving.  · Look for foods labeled low sodium, reduced sodium, or no added salt.  · Beware. Salt goes by many names. Cut down on foods with these words (all forms of salt) listed as ingredients:  ¨ Salt  ¨ Sodium  ¨ Soy sauce  ¨ Baking soda  ¨ Baking powder  ¨ MSG  ¨ Monosodium  ¨ Na (the chemical symbol for sodium)  Other ideas:  · Use more fresh food. Buy more fruits and vegetables.  · Select lean meats, fish, and poultry.  · Find a cookbook with low-salt recipes. Youll find ideas for tasty meals that are healthy for your heart.  ·   When eating out, ask questions about the menu. Tell the  you're on a low-salt diet.  ¨ If you order fish, chicken, beef, or pork, ask the  to have it broiled, baked, poached, or grilled without salt, butter, or breading.  ¨ Choose plain steamed rice, boiled noodles, and baked or boiled potatoes. Top potatoes with chives and a little sour cream instead of butter.  · Avoid antacids that are high in salt. Check the label before you buy.  Follow-up  Make a follow-up appointment with a nutritionist as directed by our staff.  Date Last Reviewed: 6/20/2015  © 7367-1243 [employer]. [address] [place] [address], [place] [address]. All rights reserved. This information is not intended as a substitute for professional medical care. Always follow your healthcare professional's instructions.

## 2017-03-08 NOTE — PROGRESS NOTES
Subjective:       Patient ID: Bashir Ramos is a 82 y.o. male.    Chief Complaint: Transitional Care (pneumonia)    HPI Comments: Mr. Ramos presents to the clinic today for transitional care for hospitalization from 2/23-2/26 at [facility] for bilateral pneumonia and sepsis.  He is accompanied today by his son, Bashir.  Symptoms included fever, myalgias, weakness.  Urine culture and blood cultures were negative.  Pneumonia was seen on CT scan.  He improved with intravenous antibiotics.  Liver enzymes were elevated during hospitalization and hepatitis C antibody is positive.  He needs PCR testing.  He also has right wrist pain and numbness/tingling to fingers of both hands (R>L).  He states he is scheduled to have carpal tunnel release of right hand on 3/23 with Dr. Nicole.  He also complains of right ear feeling blocked.  Denies hearing loss.  He has a history of cerumen impaction.  He states he had zoster and Tetanus vaccines at [facility].  Transitional Care Note    Family and/or Caretaker present at visit?  Yes.  Diagnostic tests reviewed/disposition: I have reviewed all completed as well as pending diagnostic tests at the time of discharge.  Disease/illness education: hepatitis testing, pneumonia  Home health/community services discussion/referrals: Patient has home health established at [facility].   Establishment or re-establishment of referral orders for community resources: No other necessary community resources.   Discussion with other health care providers: No discussion with other health care providers necessary.         Review of Systems   Constitutional: Negative for chills and fever.   HENT: Negative for congestion, ear pain and sinus pressure.    Eyes: Negative for visual disturbance.   Respiratory: Negative for cough, shortness of breath and wheezing.    Cardiovascular: Negative for chest pain, palpitations and leg swelling.   Gastrointestinal: Negative for abdominal pain,  constipation and diarrhea.   Musculoskeletal: Positive for arthralgias (right wrist). Negative for back pain.   Skin: Negative for rash and wound.   Neurological: Positive for numbness (bilateral hands, R>L). Negative for dizziness and light-headedness.       Objective:      Physical Exam   Constitutional: He is oriented to person, place, and time. He appears well-developed and well-nourished. No distress.   HENT:   Head: Normocephalic and atraumatic.   Right Ear: External ear normal.   Left Ear: External ear normal.   Mouth/Throat: Oropharynx is clear and moist. No oropharyngeal exudate.   Eyes: Pupils are equal, round, and reactive to light. Right eye exhibits no discharge. Left eye exhibits no discharge.   Neck: Neck supple. No thyromegaly present.   Cardiovascular: Normal rate and regular rhythm.  Exam reveals no gallop and no friction rub.    No murmur heard.  Pulmonary/Chest: Effort normal and breath sounds normal. No respiratory distress. He has no wheezes. He has no rales.   Abdominal: Soft. Normal appearance and bowel sounds are normal. He exhibits no distension. There is no hepatosplenomegaly. There is no tenderness.   Lymphadenopathy:     He has no cervical adenopathy.   Neurological: He is alert and oriented to person, place, and time. Coordination normal.   Skin: Skin is warm and dry.   Psychiatric: He has a normal mood and affect. His behavior is normal. Thought content normal.   Vitals reviewed.          Current Outpatient Prescriptions:     acetaminophen (TYLENOL) 325 MG tablet, Take 2 tablets (650 mg total) by mouth every 8 (eight) hours as needed for Pain., Disp: , Rfl: 0    albuterol 90 mcg/actuation inhaler, Inhale 2 puffs into the lungs every 6 (six) hours as needed for Wheezing or Shortness of Breath. Rescue, Disp: 1 Inhaler, Rfl: 0    ascorbic acid, vitamin C, (VITAMIN C) 500 MG tablet, Take 1 tablet (500 mg total) by mouth every evening., Disp: , Rfl:     benazepril (LOTENSIN) 10 MG  tablet, Take 20 mg by mouth once daily. , Disp: , Rfl:     diclofenac sodium 1 % Gel, Apply 2 g topically 3 (three) times daily., Disp: 2 Tube, Rfl: 3    docusate sodium (COLACE) 100 MG capsule, Take 1 capsule (100 mg total) by mouth 2 (two) times daily as needed for Constipation., Disp: 60 capsule, Rfl: 0    ergocalciferol (ERGOCALCIFEROL) 50,000 unit Cap, Take 1 capsule (50,000 Units total) by mouth every 7 days., Disp: 8 capsule, Rfl: 0    ferrous sulfate 325 (65 FE) MG EC tablet, Take 1 tablet (325 mg total) by mouth 2 (two) times daily., Disp: 60 tablet, Rfl: 0    magnesium oxide (MAG-OX) 400 mg tablet, Take 1 tablet (400 mg total) by mouth 2 (two) times daily., Disp: , Rfl: 0    multivitamin (THERAGRAN) tablet, Take 1 tablet by mouth once daily., Disp: , Rfl:     polyethylene glycol (GLYCOLAX) 17 gram PwPk, Take 17 g by mouth once daily., Disp: 30 each, Rfl: 0    predniSONE (DELTASONE) 5 MG tablet, Take 1 tablet (5 mg total) by mouth once daily., Disp: 10 tablet, Rfl: 0    sulindac (CLINORIL) 200 MG Tab, , Disp: , Rfl:   Assessment:       1. Hospital discharge follow-up    2. Positive hepatitis C antibody test    3. Pneumonia of both lungs due to infectious organism, unspecified part of lung    4. Right ear impacted cerumen    5. Carpal tunnel syndrome on both sides        Plan:      Hospital discharge follow-up  Doing well post discharge.   Positive hepatitis C antibody test  Will need hepatology referral if positive.    -     HEPATITIS C RNA, QUANTITATIVE, PCR; Future; Expected date: 3/8/17    Pneumonia of both lungs due to infectious organism, unspecified part of lung  Resolved.      Right ear impacted cerumen  Ear wax removal.    Carpal tunnel syndrome on both sides  Follow up Dr. Nicole.    Essential hypertension, benign  Stable on current medication.  Patient readiness: acceptance and barriers:none    During the course of the visit the patient was educated and counseled about the following:      Hypertension:   Medication: no change.    Goals: Hypertension: Reduce Blood Pressure    Did patient meet goals/outcomes: Yes    The following self management tools provided: declined    Patient Instructions (the written plan) was given to the patient/family.     Time spent with patient: 30 minutes

## 2017-03-11 LAB
HCV LOG: <1.08 LOG (10) IU/ML
HCV RNA QUANT PCR: <12 IU/ML
HCV, QUALITATIVE: NOT DETECTED IU/ML

## 2017-03-17 ENCOUNTER — DOCUMENTATION ONLY (OUTPATIENT)
Dept: FAMILY MEDICINE | Facility: CLINIC | Age: 82
End: 2017-03-17

## 2017-03-17 NOTE — PROGRESS NOTES
Pre-Visit Chart Review  For Appointment Scheduled on 3/22/17    Health Maintenance Due   Topic Date Due    TETANUS VACCINE  01/25/1953    Zoster Vaccine  01/25/1995

## 2017-03-22 ENCOUNTER — OFFICE VISIT (OUTPATIENT)
Dept: FAMILY MEDICINE | Facility: CLINIC | Age: 82
End: 2017-03-22
Payer: MEDICARE

## 2017-03-22 ENCOUNTER — LAB VISIT (OUTPATIENT)
Dept: LAB | Facility: HOSPITAL | Age: 82
End: 2017-03-22
Attending: NURSE PRACTITIONER
Payer: MEDICARE

## 2017-03-22 VITALS
WEIGHT: 141.31 LBS | SYSTOLIC BLOOD PRESSURE: 133 MMHG | HEART RATE: 94 BPM | BODY MASS INDEX: 22.18 KG/M2 | TEMPERATURE: 98 F | DIASTOLIC BLOOD PRESSURE: 79 MMHG | HEIGHT: 67 IN

## 2017-03-22 DIAGNOSIS — R74.8 ELEVATED LIVER ENZYMES: ICD-10-CM

## 2017-03-22 DIAGNOSIS — H61.21 RIGHT EAR IMPACTED CERUMEN: Primary | ICD-10-CM

## 2017-03-22 DIAGNOSIS — I10 ESSENTIAL HYPERTENSION, BENIGN: ICD-10-CM

## 2017-03-22 DIAGNOSIS — R63.6 UNDERWEIGHT: ICD-10-CM

## 2017-03-22 PROBLEM — J18.9 PNEUMONIA DUE TO INFECTIOUS ORGANISM: Status: RESOLVED | Noted: 2017-02-23 | Resolved: 2017-03-22

## 2017-03-22 LAB
ALBUMIN SERPL BCP-MCNC: 3.1 G/DL
ALP SERPL-CCNC: 89 U/L
ALT SERPL W/O P-5'-P-CCNC: 11 U/L
ANION GAP SERPL CALC-SCNC: 11 MMOL/L
AST SERPL-CCNC: 16 U/L
BILIRUB SERPL-MCNC: 0.4 MG/DL
BUN SERPL-MCNC: 15 MG/DL
CALCIUM SERPL-MCNC: 9.1 MG/DL
CHLORIDE SERPL-SCNC: 108 MMOL/L
CO2 SERPL-SCNC: 21 MMOL/L
CREAT SERPL-MCNC: 1 MG/DL
EST. GFR  (AFRICAN AMERICAN): >60 ML/MIN/1.73 M^2
EST. GFR  (NON AFRICAN AMERICAN): >60 ML/MIN/1.73 M^2
GGT SERPL-CCNC: 52 U/L
GLUCOSE SERPL-MCNC: 106 MG/DL
POTASSIUM SERPL-SCNC: 3.9 MMOL/L
PROT SERPL-MCNC: 6.6 G/DL
SODIUM SERPL-SCNC: 140 MMOL/L

## 2017-03-22 PROCEDURE — 82977 ASSAY OF GGT: CPT

## 2017-03-22 PROCEDURE — 3078F DIAST BP <80 MM HG: CPT | Mod: S$GLB,,, | Performed by: NURSE PRACTITIONER

## 2017-03-22 PROCEDURE — 1159F MED LIST DOCD IN RCRD: CPT | Mod: S$GLB,,, | Performed by: NURSE PRACTITIONER

## 2017-03-22 PROCEDURE — 1157F ADVNC CARE PLAN IN RCRD: CPT | Mod: S$GLB,,, | Performed by: NURSE PRACTITIONER

## 2017-03-22 PROCEDURE — 3075F SYST BP GE 130 - 139MM HG: CPT | Mod: S$GLB,,, | Performed by: NURSE PRACTITIONER

## 2017-03-22 PROCEDURE — 80053 COMPREHEN METABOLIC PANEL: CPT

## 2017-03-22 PROCEDURE — 99999 PR PBB SHADOW E&M-EST. PATIENT-LVL IV: CPT | Mod: PBBFAC,,, | Performed by: NURSE PRACTITIONER

## 2017-03-22 PROCEDURE — 99213 OFFICE O/P EST LOW 20 MIN: CPT | Mod: S$GLB,,, | Performed by: NURSE PRACTITIONER

## 2017-03-22 PROCEDURE — 36415 COLL VENOUS BLD VENIPUNCTURE: CPT | Mod: PO

## 2017-03-22 PROCEDURE — 1125F AMNT PAIN NOTED PAIN PRSNT: CPT | Mod: S$GLB,,, | Performed by: NURSE PRACTITIONER

## 2017-03-22 PROCEDURE — 1160F RVW MEDS BY RX/DR IN RCRD: CPT | Mod: S$GLB,,, | Performed by: NURSE PRACTITIONER

## 2017-03-22 NOTE — PROGRESS NOTES
Subjective:       Patient ID: Bashir Ramos is a 82 y.o. male.    Chief Complaint: Follow-up    HPI Comments: Patient presents to the clinic today for two week follow up for right ear cerumen impaction.  He has been using Debrox drops to the ear.  He has hearing loss.  He is underweight and states he does not eat as much as he used to eat ever since he got dentures.  He has not tried Ensure or Boost.  He recently had elevated liver enzymes noted while hospitalized.  He had positive hepatitis C antibody and negative PCR.  He has had abdominal ultrasound which did not show any liver abnormality.      Review of Systems   Constitutional: Negative for chills and fever.   HENT: Positive for hearing loss. Negative for congestion, ear pain and sinus pressure.    Respiratory: Negative for shortness of breath.    Cardiovascular: Negative for chest pain.   Gastrointestinal: Negative for abdominal pain, constipation and diarrhea.       Objective:      Physical Exam   Constitutional: He is oriented to person, place, and time. He appears well-developed and well-nourished. No distress.   HENT:   Head: Normocephalic and atraumatic.   Right Ear: External ear normal.   Left Ear: Tympanic membrane and external ear normal.   Right ear impacted with light yellow cerumen.   Eyes: Conjunctivae and EOM are normal.   Cardiovascular: Normal rate and regular rhythm.    Pulmonary/Chest: Effort normal.   Musculoskeletal: Normal range of motion.   Neurological: He is alert and oriented to person, place, and time.   Skin: Skin is warm and dry.   Psychiatric: He has a normal mood and affect. His behavior is normal.   Vitals reviewed.          Current Outpatient Prescriptions:     acetaminophen (TYLENOL) 325 MG tablet, Take 2 tablets (650 mg total) by mouth every 8 (eight) hours as needed for Pain., Disp: , Rfl: 0    albuterol 90 mcg/actuation inhaler, Inhale 2 puffs into the lungs every 6 (six) hours as needed for Wheezing or Shortness of  Breath. Rescue, Disp: 1 Inhaler, Rfl: 0    ascorbic acid, vitamin C, (VITAMIN C) 500 MG tablet, Take 1 tablet (500 mg total) by mouth every evening., Disp: , Rfl:     benazepril (LOTENSIN) 10 MG tablet, Take 20 mg by mouth once daily. , Disp: , Rfl:     diclofenac sodium 1 % Gel, Apply 2 g topically 3 (three) times daily., Disp: 2 Tube, Rfl: 3    docusate sodium (COLACE) 100 MG capsule, Take 1 capsule (100 mg total) by mouth 2 (two) times daily as needed for Constipation., Disp: 60 capsule, Rfl: 0    ergocalciferol (ERGOCALCIFEROL) 50,000 unit Cap, Take 1 capsule (50,000 Units total) by mouth every 7 days., Disp: 8 capsule, Rfl: 0    ferrous sulfate 325 (65 FE) MG EC tablet, Take 1 tablet (325 mg total) by mouth 2 (two) times daily., Disp: 60 tablet, Rfl: 0    magnesium oxide (MAG-OX) 400 mg tablet, Take 1 tablet (400 mg total) by mouth 2 (two) times daily., Disp: , Rfl: 0    multivitamin (THERAGRAN) tablet, Take 1 tablet by mouth once daily., Disp: , Rfl:     polyethylene glycol (GLYCOLAX) 17 gram PwPk, Take 17 g by mouth once daily., Disp: 30 each, Rfl: 0    sulindac (CLINORIL) 200 MG Tab, , Disp: , Rfl:   Assessment:       1. Right ear impacted cerumen    2. Elevated liver enzymes    3. Underweight    4. Essential hypertension, benign    5. Body mass index (BMI) 22.0-22.9, adult        Plan:      Right ear impacted cerumen  Unable to remove with gentle flushing.  -     Ear wax removal  -     Ambulatory referral to ENT    Elevated liver enzymes  -     Comprehensive metabolic panel; Future; Expected date: 3/22/17  -     Gamma GT; Future; Expected date: 3/22/17    Underweight  Ensure twice daily.    Essential hypertension, benign  Stable on current medication.    Body mass index (BMI) 22.0-22.9, adult    Patient readiness: acceptance and barriers:readiness    During the course of the visit the patient was educated and counseled about the following:     Underweight:  Nutritional supplements    Goals:  Underweight: Increase calorie intake and BMI      Did patient meet goals/outcomes: No    The following self management tools provided: declined    Patient Instructions (the written plan) was given to the patient/family.     Time spent with patient: 15 minutes

## 2017-03-22 NOTE — MR AVS SNAPSHOT
[facility]  [address]  [place] [address]  Phone: [phone]  Fax: [phone]                  Bashir Ramos   3/22/2017 9:00 AM   Office Visit    Descripción:  Male : 1935   Personal Médico:  ANDREW Jacobs   Departamento:  [facility]           Razón de la micheal     Follow-up           Diagnósticos de Esta Visita        Comentarios    Right ear impacted cerumen    -  Primario     Elevated liver enzymes                Lista de tareas           Citas próximas        Personal Médico Departamento Tfno del dpto    3/23/2017 8:30 AM Anh An MD [facility] [phone]    2017 8:40 AM MD [place] [facility] [phone]      Metas (5 Years of Data)     Ninguna      Follow-Up and Disposition     Return if symptoms worsen or fail to improve, for labs now.      [facility] en [facility]lamada     [facility] Línea de Enfermeras - Asistencia   Enfermeras registradas de [facility] pueden ayudarle a reservar arin micheal, proveer educación para la beulah, asesoría clínica, y otros servicios de asesoramiento.   Llame para ozzie servicio gratuito a [phone].             Medicamentos                Verifique que la siguiente lista de medicamentos es arin representación exacta de los medicamentos que está tomando actualmente. Si no hay ningunos reportados, la lista puede estar en brown. Si no es correcta, por favor póngase en contacto con lebron proveedor de atención médica. Lleve esta lista con usted en radha de emergencia.           Medicamentos Actuales     acetaminophen (TYLENOL) 325 MG tablet Take 2 tablets (650 mg total) by mouth every 8 (eight) hours as needed for Pain.    albuterol 90 mcg/actuation inhaler Inhale 2 puffs into the lungs every 6 (six) hours as needed for Wheezing or Shortness of Breath. Rescue    ascorbic acid, vitamin C, (VITAMIN C) 500 MG tablet Take 1 tablet (500 mg total) by mouth every evening.     "benazepril (LOTENSIN) 10 MG tablet Take 20 mg by mouth once daily.     diclofenac sodium 1 % Gel Apply 2 g topically 3 (three) times daily.    docusate sodium (COLACE) 100 MG capsule Take 1 capsule (100 mg total) by mouth 2 (two) times daily as needed for Constipation.    ergocalciferol (ERGOCALCIFEROL) 50,000 unit Cap Take 1 capsule (50,000 Units total) by mouth every 7 days.    ferrous sulfate 325 (65 FE) MG EC tablet Take 1 tablet (325 mg total) by mouth 2 (two) times daily.    magnesium oxide (MAG-OX) 400 mg tablet Take 1 tablet (400 mg total) by mouth 2 (two) times daily.    multivitamin (THERAGRAN) tablet Take 1 tablet by mouth once daily.    polyethylene glycol (GLYCOLAX) 17 gram PwPk Take 17 g by mouth once daily.    sulindac (CLINORIL) 200 MG Tab            Información de referencia clínica           Eda signos vitales kurt     PS Pulso Temperatura [place] Pes[place] BMI (IMC)    133/79 (BP Location: Right arm, Patient Position: Sitting, BP Method: Automatic) 94 98.3 °F (36.8 °C) (Oral) 5' 7" (1.702 m) 64.1 kg (141 lb 5 oz) 22.13 kg/m2      Blood Pressure          Most Recent Value    BP  133/79      Alergias     A partir del:  3/22/2017        Iodine And Iodide Containing Products    Shellfish Containing Products    Pcn [Penicillins]      Vacunas     Administradas en la fecha de la visita:  3/22/2017        None      Orders Placed During Today's Visit      Órdenes normales de esta visita    Ambulatory referral to ENT     Ear wax removal     Exámenes/Procedimientos futuros Se espera el Vence    Comprehensive metabolic panel  3/22/2017 5/21/2018    Gamma GT  3/22/2017 5/21/2018      Instrucciones    Boost or Ensure twice per day       Language Assistance Services     ATTENTION: Language assistance services are available, free of charge. Please call [phone].      ATENCIÓN: Si habla español, tiene a lebron disposición servicios gratuitos de asistencia lingüística. Llame al [phone].     CHÚ Ý: N?u b?n nói " Ti?ng Vi?t, có các d?ch v? h? tr? ngôn ng? mi?n phí dành cho b?n. G?i s? [phone].         [facility] cumple con las leyes federales aplicables de derechos civiles y no discrimina por motivos de johan, color, origen nacional, edad, discapacidad, o sexo.                 Bashir Ramos   3/22/2017 9:00 AM   Office Visit    Description:  Male : 1935   Provider:  ROM JacobsC   Department:  [facility]           Reason for Visit     Follow-up           Diagnoses this Visit        Comments    Right ear impacted cerumen    -  Primary     Elevated liver enzymes                To Do List           Future Appointments        Provider Department Dept Phone    3/23/2017 8:30 AM Anh An MD [facility] [phone]    2017 8:40 AM Samir Rouse MD [facility] [phone]      Goals     None      Follow-Up and Disposition     Return if symptoms worsen or fail to improve, for labs now.      [facility] On [facility]     [facility] On Call Nurse Care Line -  Assistance  Registered nurses in the [facility] On Call Center provide clinical advisement, health education, appointment booking, and other advisory services.  Call for this free service at [phone].             Medications                Verify that the below list of medications is an accurate representation of the medications you are currently taking.  If none reported, the list may be blank. If incorrect, please contact your healthcare provider. Carry this list with you in case of emergency.           Current Medications     acetaminophen (TYLENOL) 325 MG tablet Take 2 tablets (650 mg total) by mouth every 8 (eight) hours as needed for Pain.    albuterol 90 mcg/actuation inhaler Inhale 2 puffs into the lungs every 6 (six) hours as needed for Wheezing or Shortness of Breath. Rescue    ascorbic acid, vitamin C, (VITAMIN C) 500 MG tablet Take 1 tablet (500 mg total) by mouth every  "evening.    benazepril (LOTENSIN) 10 MG tablet Take 20 mg by mouth once daily.     diclofenac sodium 1 % Gel Apply 2 g topically 3 (three) times daily.    docusate sodium (COLACE) 100 MG capsule Take 1 capsule (100 mg total) by mouth 2 (two) times daily as needed for Constipation.    ergocalciferol (ERGOCALCIFEROL) 50,000 unit Cap Take 1 capsule (50,000 Units total) by mouth every 7 days.    ferrous sulfate 325 (65 FE) MG EC tablet Take 1 tablet (325 mg total) by mouth 2 (two) times daily.    magnesium oxide (MAG-OX) 400 mg tablet Take 1 tablet (400 mg total) by mouth 2 (two) times daily.    multivitamin (THERAGRAN) tablet Take 1 tablet by mouth once daily.    polyethylene glycol (GLYCOLAX) 17 gram PwPk Take 17 g by mouth once daily.    sulindac (CLINORIL) 200 MG Tab            Clinical Reference Information           Your Vitals Were     BP Pulse Temp Height Weight BMI    133/79 (BP Location: Right arm, Patient Position: Sitting, BP Method: Automatic) 94 98.3 °F (36.8 °C) (Oral) 5' 7" (1.702 m) 64.1 kg (141 lb 5 oz) 22.13 kg/m2      Blood Pressure          Most Recent Value    BP  133/79      Allergies as of 3/22/2017     Iodine And Iodide Containing Products    Shellfish Containing Products    Pcn [Penicillins]      Immunizations Administered on Date of Encounter - 3/22/2017     None      Orders Placed During Today's Visit      Normal Orders This Visit    Ambulatory referral to ENT     Ear wax removal     Future Labs/Procedures Expected by Expires    Comprehensive metabolic panel  3/22/2017 5/21/2018    Gamma GT  3/22/2017 5/21/2018      Instructions    Boost or Ensure twice per day       Language Assistance Services     ATTENTION: Language assistance services are available, free of charge. Please call [phone].      ATENCIÓN: Si augustine tilley, tiene a lebron disposición servicios gratuitos de asistencia lingüística. Llame al [phone].     CHÚ Ý: N?u b?n nói Ti?ng Vi?t, có các d?ch v? h? tr? ngôn ng? mi?n " phí dành cho b?n. G?i s? [phone].         [facility] complies with applicable Federal civil rights laws and does not discriminate on the basis of race, color, national origin, age, disability, or sex.

## 2017-03-23 ENCOUNTER — OFFICE VISIT (OUTPATIENT)
Dept: ORTHOPEDICS | Facility: CLINIC | Age: 82
End: 2017-03-23
Payer: MEDICARE

## 2017-03-23 VITALS
HEIGHT: 67 IN | DIASTOLIC BLOOD PRESSURE: 75 MMHG | WEIGHT: 141 LBS | HEART RATE: 102 BPM | BODY MASS INDEX: 22.13 KG/M2 | SYSTOLIC BLOOD PRESSURE: 123 MMHG

## 2017-03-23 DIAGNOSIS — G56.01 RIGHT CARPAL TUNNEL SYNDROME: Primary | ICD-10-CM

## 2017-03-23 DIAGNOSIS — G56.01 CARPAL TUNNEL SYNDROME, RIGHT: Primary | ICD-10-CM

## 2017-03-23 DIAGNOSIS — G56.02 LEFT CARPAL TUNNEL SYNDROME: ICD-10-CM

## 2017-03-23 PROCEDURE — 99204 OFFICE O/P NEW MOD 45 MIN: CPT | Mod: S$GLB,,, | Performed by: ORTHOPAEDIC SURGERY

## 2017-03-23 PROCEDURE — 1157F ADVNC CARE PLAN IN RCRD: CPT | Mod: S$GLB,,, | Performed by: ORTHOPAEDIC SURGERY

## 2017-03-23 PROCEDURE — 99999 PR PBB SHADOW E&M-EST. PATIENT-LVL III: CPT | Mod: PBBFAC,,, | Performed by: ORTHOPAEDIC SURGERY

## 2017-03-23 PROCEDURE — 1125F AMNT PAIN NOTED PAIN PRSNT: CPT | Mod: S$GLB,,, | Performed by: ORTHOPAEDIC SURGERY

## 2017-03-23 PROCEDURE — 3074F SYST BP LT 130 MM HG: CPT | Mod: S$GLB,,, | Performed by: ORTHOPAEDIC SURGERY

## 2017-03-23 PROCEDURE — 3078F DIAST BP <80 MM HG: CPT | Mod: S$GLB,,, | Performed by: ORTHOPAEDIC SURGERY

## 2017-03-23 PROCEDURE — 1159F MED LIST DOCD IN RCRD: CPT | Mod: S$GLB,,, | Performed by: ORTHOPAEDIC SURGERY

## 2017-03-23 NOTE — LETTER
March 24, 2017      Samir Rouse MD  [address]  [facility] [address]           [facility]  [address]  [facility] [address]  Phone: [phone]          Patient: Bashir Ramos   MR Number: [id number]   YOB: 1935   Date of Visit: 3/23/2017       Dear Dr. Samir Rouse:    Thank you for referring Bashir Ramos to me for evaluation. Attached you will find relevant portions of my assessment and plan of care.    If you have questions, please do not hesitate to call me. I look forward to following Bashir Ramos along with you.    Sincerely,    Harini Pereira PA-C    Enclosure  CC:  No Recipients    If you would like to receive this communication electronically, please contact [email or web address] or [phone] to request more information on [employer] access.    For providers and/or their staff who would like to refer a patient to [facility], please contact us through our one-stop-shop provider referral line, [facility], at [phone].    If you feel you have received this communication in error or would no longer like to receive these types of communications, please e-mail [email or web address]

## 2017-03-23 NOTE — PROGRESS NOTES
I have personally taken the history and examined the patient. I agree with the Hand Surgery PA's note. The plan will be surgery for R CTR. Pt understands that his constant numbness and weakness may not be resolved after surgery.  I have explained the risks, benefits, and alternatives of the procedure to the patient in great detail. The patient voices understanding and all questions have been answered. The patient agrees with to proceed as planned. Consents were performed in clinic.

## 2017-03-24 NOTE — PROGRESS NOTES
This office note has been dictated.   Dictation Confirmation Code: [id number]  Arabella Pereira PA-C  03/24/2017  3:52 PM  Supervising Physician:  MD Bashir Rahman was seen today for pain.    Diagnoses and all orders for this visit:    Right carpal tunnel syndrome    Left carpal tunnel syndrome    -Discussed surgical intervention and conservative treatment with patient in detail. All risks, benefits discussed.     - Patient would like to proceed with Right CTR      Consents were signed.     Pre, christina, and post operative procedure and expectations were discussed.  Questions were answered. The patient has been educated and is ready to proceed with surgery.  Approximately 30 minutes was spent discussing surgical outcomes, plans, procedures, pre, christina, and post operative expectations and care.  The patient will contact us if the have any questions, concerns, and changes in their medical condition prior to surgery.

## 2017-03-27 ENCOUNTER — TELEPHONE (OUTPATIENT)
Dept: ORTHOPEDICS | Facility: CLINIC | Age: 82
End: 2017-03-27

## 2017-03-27 PROCEDURE — 99495 TRANSJ CARE MGMT MOD F2F 14D: CPT | Mod: S$GLB,,, | Performed by: NURSE PRACTITIONER

## 2017-03-27 NOTE — H&P
This is a preoperative H and P as well as an office visit.    CHIEF COMPLAINT:  Severe bilateral hand pain.    HISTORY OF PRESENT ILLNESS:  Mr. Ramos is a very pleasant right-hand dominant   82-year-old male presenting today for evaluation of his bilateral hand numbness   and weakness.  He currently works at [employer] in [employer].    He feels that he is having difficulty gripping and grasping, constant numbness   and weakness in his hand.  He reports he continues to have night waking, he   feels he can hardly use his hands because of the pain.  He denies nausea,   vomiting, fever, or chills.  He denies ever having surgery in the past.    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 grayson)    EYE SURGERY         Social History     Social History    Marital status:      Spouse name: N/A    Number of children: N/A    Years of education: N/A     Occupational History    Not on file.     Social History Main Topics    Smoking status: Former Smoker     Packs/day: 1.00     Types: Cigarettes     Quit date: 8/27/1982    Smokeless tobacco: Never Used    Alcohol use No    Drug use: No    Sexual activity: Not on file     Other Topics Concern    Not on file     Social History Narrative       Current Outpatient Prescriptions on File Prior to Visit   Medication Sig Dispense Refill    acetaminophen (TYLENOL) 325 MG tablet Take 2 tablets (650 mg total) by mouth every 8 (eight) hours as needed for Pain.  0    albuterol 90 mcg/actuation inhaler Inhale 2 puffs into the lungs every 6 (six) hours as needed for Wheezing or Shortness of Breath.  "Rescue 1 Inhaler 0    ascorbic acid, vitamin C, (VITAMIN C) 500 MG tablet Take 1 tablet (500 mg total) by mouth every evening.      benazepril (LOTENSIN) 10 MG tablet Take 20 mg by mouth once daily.       diclofenac sodium 1 % Gel Apply 2 g topically 3 (three) times daily. 2 Tube 3    docusate sodium (COLACE) 100 MG capsule Take 1 capsule (100 mg total) by mouth 2 (two) times daily as needed for Constipation. 60 capsule 0    ergocalciferol (ERGOCALCIFEROL) 50,000 unit Cap Take 1 capsule (50,000 Units total) by mouth every 7 days. 8 capsule 0    ferrous sulfate 325 (65 FE) MG EC tablet Take 1 tablet (325 mg total) by mouth 2 (two) times daily. 60 tablet 0    magnesium oxide (MAG-OX) 400 mg tablet Take 1 tablet (400 mg total) by mouth 2 (two) times daily.  0    multivitamin (THERAGRAN) tablet Take 1 tablet by mouth once daily.      polyethylene glycol (GLYCOLAX) 17 gram PwPk Take 17 g by mouth once daily. 30 each 0    sulindac (CLINORIL) 200 MG Tab        No current facility-administered medications on file prior to visit.        Review of patient's allergies indicates:   Allergen Reactions    Iodine and iodide containing products     Shellfish containing products     Pcn [penicillins] Rash       Review of Systems:  Constitutional: no fever or chills  ENT: no nasal congestion or sore throat  Respiratory: no cough or shortness of breath  Cardiovascular: no chest pain or palpitations  Gastrointestinal: no nausea or vomiting  Genitourinary: no hematuria or dysuria  Integument/Breast: no rash or pruritis  Hematologic/Lymphatic: no easy bruising or lymphadenopathy  Musculoskeletal: see HPI  Neurological: no seizures or tremors  Behavioral/Psych: no auditory or visual hallucinations      PHYSICAL EXAM    Vitals:    03/23/17 0836 03/23/17 0838   BP: 138/77 123/75   Pulse: 105 102   Weight: 64 kg (141 lb)    Height: 5' 7" (1.702 m)    PainSc:   9   9   PainLoc: Wrist        GENERAL:  Well developed, well " nourished, no acute distress.  CARDIOVASCULAR:  Regular rate and rhythm.  LUNGS:  Respirations equal and unlabored.  BEHAVIORAL AND PSYCH:  Mood and affect appropriate.  NEUROLOGIC:  No tremor.  HEENT:  Normocephalic, atraumatic.  MUSCULOSKELETAL:  Upper extremity exam:  He has limited range of motion of the   thumb.  He has significant weakness in the thenar, severe muscle atrophy   appreciated in the thenar.  Positive Tinel's, positive Durkan's.  Negative   Tinel's at the elbow.  Sensation diminished in the median nerve distribution   bilaterally compared to the ulnar and radial nerve distribution.    EMG study shows severe bilateral carpal tunnel, right worse than left.    PLAN:  Discussed with Mr. Ramos that at this time injections will likely not   help his pain for very long as he does have severe carpal tunnel.  We discussed   doing a carpal tunnel release, he wishes to proceed with this.  He does   understand he will have to take at least two weeks off work until sutures are   removed.  He will follow up with me for suture removal.  He does understand no   lifting for at least two weeks.  He also understands that numbness and weakness   may not resolve secondary to the longstanding compression.  We are hoping that   carpal tunnel release will help with this pain:    SUPERVISING PHYSICIAN:  Anh An M.D.    DICTATED BY:  Harini Pereira PA-C.      SIMON/ALISON  dd: 03/24/2017 16:31:30 (CDT)  td: 03/25/2017 06:39:39 (CDT)  Doc ID   #[id number]  Job ID #[id number]    CC:

## 2017-03-29 DIAGNOSIS — G56.01 CARPAL TUNNEL SYNDROME ON RIGHT: Primary | ICD-10-CM

## 2017-03-30 ENCOUNTER — TELEPHONE (OUTPATIENT)
Dept: ORTHOPEDICS | Facility: CLINIC | Age: 82
End: 2017-03-30

## 2017-03-30 NOTE — TELEPHONE ENCOUNTER
Bashir Ramos notified of arrival time 0530 for surgery on  3/31/17 with Dr. TASHA An. At [facility]. Post Op appointment made, slip in mail.  Called patient's wife with the assistance of the international department for translation.

## 2017-03-31 ENCOUNTER — ANESTHESIA EVENT (OUTPATIENT)
Dept: SURGERY | Facility: HOSPITAL | Age: 82
End: 2017-03-31
Payer: MEDICARE

## 2017-03-31 ENCOUNTER — ANESTHESIA (OUTPATIENT)
Dept: SURGERY | Facility: HOSPITAL | Age: 82
End: 2017-03-31
Payer: MEDICARE

## 2017-03-31 ENCOUNTER — HOSPITAL ENCOUNTER (OUTPATIENT)
Facility: HOSPITAL | Age: 82
Discharge: HOME | End: 2017-03-31
Attending: ORTHOPAEDIC SURGERY | Admitting: ORTHOPAEDIC SURGERY
Payer: MEDICARE

## 2017-03-31 VITALS
HEART RATE: 75 BPM | TEMPERATURE: 98 F | DIASTOLIC BLOOD PRESSURE: 81 MMHG | WEIGHT: 140 LBS | RESPIRATION RATE: 16 BRPM | BODY MASS INDEX: 22.5 KG/M2 | OXYGEN SATURATION: 100 % | HEIGHT: 66 IN | SYSTOLIC BLOOD PRESSURE: 120 MMHG

## 2017-03-31 DIAGNOSIS — G56.01 RIGHT CARPAL TUNNEL SYNDROME: ICD-10-CM

## 2017-03-31 PROCEDURE — 64721 CARPAL TUNNEL SURGERY: CPT | Mod: RT,,, | Performed by: ORTHOPAEDIC SURGERY

## 2017-03-31 PROCEDURE — 25000003 PHARM REV CODE 250: Performed by: PHYSICIAN ASSISTANT

## 2017-03-31 PROCEDURE — D9220A PRA ANESTHESIA: Mod: CRNA,,, | Performed by: NURSE ANESTHETIST, CERTIFIED REGISTERED

## 2017-03-31 PROCEDURE — D9220A PRA ANESTHESIA: Mod: ANES,,, | Performed by: ANESTHESIOLOGY

## 2017-03-31 PROCEDURE — 37000008 HC ANESTHESIA 1ST 15 MINUTES: Performed by: ORTHOPAEDIC SURGERY

## 2017-03-31 PROCEDURE — 27201423 OPTIME MED/SURG SUP & DEVICES STERILE SUPPLY: Performed by: ORTHOPAEDIC SURGERY

## 2017-03-31 PROCEDURE — 71000015 HC POSTOP RECOV 1ST HR: Performed by: ORTHOPAEDIC SURGERY

## 2017-03-31 PROCEDURE — 37000009 HC ANESTHESIA EA ADD 15 MINS: Performed by: ORTHOPAEDIC SURGERY

## 2017-03-31 PROCEDURE — 63600175 PHARM REV CODE 636 W HCPCS: Performed by: NURSE ANESTHETIST, CERTIFIED REGISTERED

## 2017-03-31 PROCEDURE — 36000706: Performed by: ORTHOPAEDIC SURGERY

## 2017-03-31 PROCEDURE — 71000033 HC RECOVERY, INTIAL HOUR: Performed by: ORTHOPAEDIC SURGERY

## 2017-03-31 PROCEDURE — 25000003 PHARM REV CODE 250: Performed by: NURSE ANESTHETIST, CERTIFIED REGISTERED

## 2017-03-31 PROCEDURE — 36000707: Performed by: ORTHOPAEDIC SURGERY

## 2017-03-31 PROCEDURE — 25000003 PHARM REV CODE 250: Performed by: ORTHOPAEDIC SURGERY

## 2017-03-31 RX ORDER — PROPOFOL 10 MG/ML
VIAL (ML) INTRAVENOUS
Status: DISCONTINUED | OUTPATIENT
Start: 2017-03-31 | End: 2017-03-31

## 2017-03-31 RX ORDER — BACITRACIN 500 [USP'U]/G
OINTMENT TOPICAL
Status: DISCONTINUED
Start: 2017-03-31 | End: 2017-03-31 | Stop reason: HOSPADM

## 2017-03-31 RX ORDER — LIDOCAINE HCL/PF 100 MG/5ML
SYRINGE (ML) INTRAVENOUS
Status: DISCONTINUED | OUTPATIENT
Start: 2017-03-31 | End: 2017-03-31

## 2017-03-31 RX ORDER — CLINDAMYCIN PHOSPHATE 900 MG/50ML
900 INJECTION, SOLUTION INTRAVENOUS
Status: COMPLETED | OUTPATIENT
Start: 2017-03-31 | End: 2017-03-31

## 2017-03-31 RX ORDER — BACITRACIN ZINC 500 UNIT/G
OINTMENT (GRAM) TOPICAL
Status: DISCONTINUED | OUTPATIENT
Start: 2017-03-31 | End: 2017-03-31 | Stop reason: HOSPADM

## 2017-03-31 RX ORDER — FENTANYL CITRATE 50 UG/ML
INJECTION, SOLUTION INTRAMUSCULAR; INTRAVENOUS
Status: DISCONTINUED | OUTPATIENT
Start: 2017-03-31 | End: 2017-03-31

## 2017-03-31 RX ORDER — SODIUM CHLORIDE 9 MG/ML
INJECTION, SOLUTION INTRAVENOUS CONTINUOUS PRN
Status: DISCONTINUED | OUTPATIENT
Start: 2017-03-31 | End: 2017-03-31

## 2017-03-31 RX ORDER — PROPOFOL 10 MG/ML
VIAL (ML) INTRAVENOUS CONTINUOUS PRN
Status: DISCONTINUED | OUTPATIENT
Start: 2017-03-31 | End: 2017-03-31

## 2017-03-31 RX ORDER — LIDOCAINE HYDROCHLORIDE 10 MG/ML
INJECTION INFILTRATION; PERINEURAL
Status: DISCONTINUED
Start: 2017-03-31 | End: 2017-03-31 | Stop reason: HOSPADM

## 2017-03-31 RX ORDER — BUPIVACAINE HYDROCHLORIDE 2.5 MG/ML
INJECTION, SOLUTION EPIDURAL; INFILTRATION; INTRACAUDAL
Status: DISCONTINUED | OUTPATIENT
Start: 2017-03-31 | End: 2017-03-31 | Stop reason: HOSPADM

## 2017-03-31 RX ORDER — HYDROCODONE BITARTRATE AND ACETAMINOPHEN 5; 325 MG/1; MG/1
1 TABLET ORAL EVERY 6 HOURS PRN
Qty: 61 TABLET | Refills: 0 | Status: SHIPPED | OUTPATIENT
Start: 2017-03-31 | End: 2017-10-03

## 2017-03-31 RX ORDER — BUPIVACAINE HYDROCHLORIDE 2.5 MG/ML
INJECTION, SOLUTION EPIDURAL; INFILTRATION; INTRACAUDAL
Status: DISCONTINUED
Start: 2017-03-31 | End: 2017-03-31 | Stop reason: HOSPADM

## 2017-03-31 RX ORDER — LIDOCAINE HYDROCHLORIDE 10 MG/ML
INJECTION INFILTRATION; PERINEURAL
Status: DISCONTINUED | OUTPATIENT
Start: 2017-03-31 | End: 2017-03-31 | Stop reason: HOSPADM

## 2017-03-31 RX ORDER — ONDANSETRON 4 MG/1
4 TABLET, ORALLY DISINTEGRATING ORAL EVERY 8 HOURS PRN
Qty: 20 TABLET | Refills: 0 | Status: SHIPPED | OUTPATIENT
Start: 2017-03-31 | End: 2017-10-03

## 2017-03-31 RX ADMIN — CLINDAMYCIN PHOSPHATE 900 MG: 18 INJECTION, SOLUTION INTRAVENOUS at 06:03

## 2017-03-31 RX ADMIN — PROPOFOL 20 MG: 10 INJECTION, EMULSION INTRAVENOUS at 07:03

## 2017-03-31 RX ADMIN — PROPOFOL 40 MCG/KG/MIN: 10 INJECTION, EMULSION INTRAVENOUS at 07:03

## 2017-03-31 RX ADMIN — FENTANYL CITRATE 25 MCG: 50 INJECTION, SOLUTION INTRAMUSCULAR; INTRAVENOUS at 07:03

## 2017-03-31 RX ADMIN — LIDOCAINE HYDROCHLORIDE 40 MG: 20 INJECTION, SOLUTION INTRAVENOUS at 07:03

## 2017-03-31 RX ADMIN — SODIUM CHLORIDE: 0.9 INJECTION, SOLUTION INTRAVENOUS at 06:03

## 2017-03-31 NOTE — BRIEF OP NOTE
[facility]  Short Stay  Discharge Summary    Admit Date: 3/31/2017    Discharge Date and Time:  03/31/2017 7:35 AM      Discharge Attending Physician: Anh An MD     Hospital Course (synopsis of major diagnoses, care, treatment, and services provided during the course of the hospital stay): surgery    Final Diagnoses:    Principal Problem: <principal problem not specified>   Secondary Diagnoses:   Active Hospital Problems    Diagnosis  POA    Right carpal tunnel syndrome [G56.01]  Yes      Resolved Hospital Problems    Diagnosis Date Resolved POA   No resolved problems to display.       Discharged Condition: good    Disposition: Home or Self Care    Follow up/Patient Instructions:    Medications:  Reconciled Home Medications:   Current Discharge Medication List      START taking these medications    Details   hydrocodone-acetaminophen 5-325mg (NORCO) 5-325 mg per tablet Take 1 tablet by mouth every 6 (six) hours as needed for Pain.  Qty: 61 tablet, Refills: 0      ondansetron (ZOFRAN-ODT) 4 MG TbDL Take 1 tablet (4 mg total) by mouth every 8 (eight) hours as needed.  Qty: 20 tablet, Refills: 0         CONTINUE these medications which have NOT CHANGED    Details   benazepril (LOTENSIN) 10 MG tablet Take 20 mg by mouth once daily.       docusate sodium (COLACE) 100 MG capsule Take 1 capsule (100 mg total) by mouth 2 (two) times daily as needed for Constipation.  Qty: 60 capsule, Refills: 0    Associated Diagnoses: Iron deficiency anemia, unspecified iron deficiency anemia type      ergocalciferol (ERGOCALCIFEROL) 50,000 unit Cap Take 1 capsule (50,000 Units total) by mouth every 7 days.  Qty: 8 capsule, Refills: 0    Associated Diagnoses: Vitamin D deficiency      ferrous sulfate 325 (65 FE) MG EC tablet Take 1 tablet (325 mg total) by mouth 2 (two) times daily.  Qty: 60 tablet, Refills: 0    Associated Diagnoses: Iron deficiency anemia, unspecified iron deficiency anemia type       magnesium oxide (MAG-OX) 400 mg tablet Take 1 tablet (400 mg total) by mouth 2 (two) times daily.  Refills: 0      multivitamin (THERAGRAN) tablet Take 1 tablet by mouth once daily.      sulindac (CLINORIL) 200 MG Tab       acetaminophen (TYLENOL) 325 MG tablet Take 2 tablets (650 mg total) by mouth every 8 (eight) hours as needed for Pain.  Refills: 0      albuterol 90 mcg/actuation inhaler Inhale 2 puffs into the lungs every 6 (six) hours as needed for Wheezing or Shortness of Breath. Rescue  Qty: 1 Inhaler, Refills: 0      ascorbic acid, vitamin C, (VITAMIN C) 500 MG tablet Take 1 tablet (500 mg total) by mouth every evening.      diclofenac sodium 1 % Gel Apply 2 g topically 3 (three) times daily.  Qty: 2 Tube, Refills: 3             Discharge Procedure Orders  Diet general     Call MD for:  temperature >100.4     Call MD for:  persistent nausea and vomiting     Call MD for:  severe uncontrolled pain     Call MD for:  difficulty breathing, headache or visual disturbances     Call MD for:  redness, tenderness, or signs of infection (pain, swelling, redness, odor or green/yellow discharge around incision site)     Call MD for:  hives     Call MD for:  persistent dizziness or light-headedness     Call MD for:  extreme fatigue     Keep surgical extremity elevated     Leave dressing on - Keep it clean, dry, and intact until clinic visit

## 2017-03-31 NOTE — H&P (VIEW-ONLY)
This is a preoperative H and P as well as an office visit.    CHIEF COMPLAINT:  Severe bilateral hand pain.    HISTORY OF PRESENT ILLNESS:  Mr. Ramos is a very pleasant right-hand dominant   82-year-old male presenting today for evaluation of his bilateral hand numbness   and weakness.  He currently works at [employer] in [place].    He feels that he is having difficulty gripping and grasping, constant numbness   and weakness in his hand.  He reports he continues to have night waking, he   feels he can hardly use his hands because of the pain.  He denies nausea,   vomiting, fever, or chills.  He denies ever having surgery in the past.    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 grayson)    EYE SURGERY         Social History     Social History    Marital status:      Spouse name: N/A    Number of children: N/A    Years of education: N/A     Occupational History    Not on file.     Social History Main Topics    Smoking status: Former Smoker     Packs/day: 1.00     Types: Cigarettes     Quit date: 8/27/1982    Smokeless tobacco: Never Used    Alcohol use No    Drug use: No    Sexual activity: Not on file     Other Topics Concern    Not on file     Social History Narrative       Current Outpatient Prescriptions on File Prior to Visit   Medication Sig Dispense Refill    acetaminophen (TYLENOL) 325 MG tablet Take 2 tablets (650 mg total) by mouth every 8 (eight) hours as needed for Pain.  0    albuterol 90 mcg/actuation inhaler Inhale 2 puffs into the lungs every 6 (six) hours as needed for Wheezing or Shortness of Breath.  "Rescue 1 Inhaler 0    ascorbic acid, vitamin C, (VITAMIN C) 500 MG tablet Take 1 tablet (500 mg total) by mouth every evening.      benazepril (LOTENSIN) 10 MG tablet Take 20 mg by mouth once daily.       diclofenac sodium 1 % Gel Apply 2 g topically 3 (three) times daily. 2 Tube 3    docusate sodium (COLACE) 100 MG capsule Take 1 capsule (100 mg total) by mouth 2 (two) times daily as needed for Constipation. 60 capsule 0    ergocalciferol (ERGOCALCIFEROL) 50,000 unit Cap Take 1 capsule (50,000 Units total) by mouth every 7 days. 8 capsule 0    ferrous sulfate 325 (65 FE) MG EC tablet Take 1 tablet (325 mg total) by mouth 2 (two) times daily. 60 tablet 0    magnesium oxide (MAG-OX) 400 mg tablet Take 1 tablet (400 mg total) by mouth 2 (two) times daily.  0    multivitamin (THERAGRAN) tablet Take 1 tablet by mouth once daily.      polyethylene glycol (GLYCOLAX) 17 gram PwPk Take 17 g by mouth once daily. 30 each 0    sulindac (CLINORIL) 200 MG Tab        No current facility-administered medications on file prior to visit.        Review of patient's allergies indicates:   Allergen Reactions    Iodine and iodide containing products     Shellfish containing products     Pcn [penicillins] Rash       Review of Systems:  Constitutional: no fever or chills  ENT: no nasal congestion or sore throat  Respiratory: no cough or shortness of breath  Cardiovascular: no chest pain or palpitations  Gastrointestinal: no nausea or vomiting  Genitourinary: no hematuria or dysuria  Integument/Breast: no rash or pruritis  Hematologic/Lymphatic: no easy bruising or lymphadenopathy  Musculoskeletal: see HPI  Neurological: no seizures or tremors  Behavioral/Psych: no auditory or visual hallucinations      PHYSICAL EXAM    Vitals:    03/23/17 0836 03/23/17 0838   BP: 138/77 123/75   Pulse: 105 102   Weight: 64 kg (141 lb)    Height: 5' 7" (1.702 m)    PainSc:   9   9   PainLoc: Wrist        GENERAL:  Well developed, well " nourished, no acute distress.  CARDIOVASCULAR:  Regular rate and rhythm.  LUNGS:  Respirations equal and unlabored.  BEHAVIORAL AND PSYCH:  Mood and affect appropriate.  NEUROLOGIC:  No tremor.  HEENT:  Normocephalic, atraumatic.  MUSCULOSKELETAL:  Upper extremity exam:  He has limited range of motion of the   thumb.  He has significant weakness in the thenar, severe muscle atrophy   appreciated in the thenar.  Positive Tinel's, positive Durkan's.  Negative   Tinel's at the elbow.  Sensation diminished in the median nerve distribution   bilaterally compared to the ulnar and radial nerve distribution.    EMG study shows severe bilateral carpal tunnel, right worse than left.    PLAN:  Discussed with Mr. Ramos that at this time injections will likely not   help his pain for very long as he does have severe carpal tunnel.  We discussed   doing a carpal tunnel release, he wishes to proceed with this.  He does   understand he will have to take at least two weeks off work until sutures are   removed.  He will follow up with me for suture removal.  He does understand no   lifting for at least two weeks.  He also understands that numbness and weakness   may not resolve secondary to the longstanding compression.  We are hoping that   carpal tunnel release will help with this pain:    SUPERVISING PHYSICIAN:  Anh An M.D.    DICTATED BY:  Harini Pereira PA-C.      SIMON/ALISON  dd: 03/24/2017 16:31:30 (CDT)  td: 03/25/2017 06:39:39 (CDT)  Doc ID   #[id number]  Job ID #[id number]    CC:

## 2017-03-31 NOTE — INTERVAL H&P NOTE
The patient has been examined and the H&P has been reviewed:    I concur with the findings and no changes have occurred since H&P was written.    Anesthesia/Surgery risks, benefits and alternative options discussed and understood by patient/family.          Active Hospital Problems    Diagnosis  POA    Right carpal tunnel syndrome [G56.01]  Yes      Resolved Hospital Problems    Diagnosis Date Resolved POA   No resolved problems to display.

## 2017-03-31 NOTE — TRANSFER OF CARE
"Anesthesia Transfer of Care Note    Patient: Bashir Ramos    Procedure(s) Performed: Procedure(s) (LRB):  RELEASE-CARPAL TUNNEL/ Right (Right)    Patient location: PACU    Anesthesia Type: general    Transport from OR: Transported from OR on room air with adequate spontaneous ventilation    Post pain: adequate analgesia    Post assessment: no apparent anesthetic complications    Post vital signs: stable    Level of consciousness: awake and alert    Nausea/Vomiting: no nausea/vomiting    Complications: none          Last vitals:   Visit Vitals    /75 (BP Location: Left arm, Patient Position: Lying, BP Method: Automatic)    Pulse 87    Temp 36.6 °C (97.8 °F) (Oral)    Resp 18    Ht 5' 6" (1.676 m)    Wt 63.5 kg (140 lb)    SpO2 100%    BMI 22.6 kg/m2     "

## 2017-03-31 NOTE — OP NOTE
DATE OF PROCEDURE: 3/31/17  SERVICE: Orthopedics.   ATTENDING SURGEON: Anh An M.D.   ASSISTANT SURGEON: none  PREOPERATIVE DIAGNOSIS: right carpal tunnel syndrome.   POSTOPERATIVE DIAGNOSIS: right carpal tunnel syndrome.   PROCEDURE: right carpal tunnel release.   ANESTHESIA: Local placed by surgeon and MAC.   FLUIDS: Lactated Ringers.   BLOOD LOSS: None. No blood was given.   TOURNIQUET TIME: 9 minutes.   PACKS AND DRAINS: None.   IMPLANTS: None.   SPECIMENS: None.   COMPLICATIONS: None.   INDICATIONS FOR PROCEDURE: Mr. Ruiz is a 82-year-old male who had numbness   and tingling into her right hand. He has failed conservative treatment, EMG   was positive for carpal tunnel syndrome. Therefore, operative intervention was   deemed necessary. Risks and benefits were explained to the patient in clinic   since performed in clinic. Pt does have thenar wasting, constant symptoms at this point.   PROCEDURE IN DETAIL: After correct site was marked with the patient's   participation in the holding area, the patient was brought to the Operating   Room, placed in supine position, underwent MAC anesthesia. A well-padded   nonsterile tourniquet was placed on the right arm. A time-out was called for   correct site, procedure and patient to be indicated. Under sterile conditions,   an injection of lidocaine 1% was injected into the carpal tunnel space. The arm   was prepped and draped in normal sterile fashion. The incision was marked out   using Garcia cardinal lines. The arm was exsanguinated using Esmarch.   Tourniquet was insufflated to 250 mmHg and that is where it remained for a total   of 9 minutes. The incision was made down to the palmar fascia. The palmar   fascia was sharply incised. The transverse ligament was identified. It was   very thick in nature. It was sharply incised. Median nerve was identified. A   Mosquito hemostat was passed from the undersurface of the transverse ligament   proximally and  distally to free it from the median nerve. Metzenbaum scissors   were utilized to cut the transverse ligament proximally and distally. Once that   was completely released, a Mosquito hemostat was passed again to confirm a   complete release.The nerve was edematous and hyperemic.  Once that was performed, the area was irrigated with copious   amounts of normal saline. Nylon closed the skin. Sterile dressing was applied.   Tourniquet was deflated. Brisk capillary refill ensued. The patient was   transported to the Recovery Room in stable condition.   POSTOPERATIVE PLAN FOR THIS PATIENT: She is to keep her dressing clean, dry and   intact. We will see her back in 2 weeks for stitch removal.

## 2017-03-31 NOTE — IP AVS SNAPSHOT
[facility]  [address] [place] [address]  Phone: [phone]           Instrucciones de Jackelin de Pacientes  Nuestra meta es prepararlo para el éxito. Kathy paquete incluye información sobre lebron condición, medicamentos e instrucciones para cuidados en el hogar. [place] le ayudará a cuidarse y prevenir la necesidad de volver al hospital.     Por favor, hable con lebron enfermero o enfermera si tiene alguna pregunta..     Hay muchos detalles a recordar cuando se prepara para salir del hospital. [place] es lo que necesita hacer:    1. [place] lebron medicina. Si usted tiene arin receta, revise la lista de medicamentos en las siguientes páginas. Es posible que tenga nuevos medicamentos para recoger en la farmacia y otros que tendrá que dejar de benigno. Revise las instrucciones sobre cómo y cuándo benigno artem medicamentos. Consulte con el médico o el enfermeras si no está seguro de qué hacer.    2. Ir a artem citas de seguimiento. La información específica de seguimiento aparece en las siguientes páginas. Usted puede ser contactado por arin enfermera o proveedor clínico sobre las próximas citas. Estar seguro de que tiene todos los números de teléfono para comunicarse si es necesario. Por favor, póngase en contacto con la oficina de lebron profesional médico si no puede hacer arin micheal.     3. Esté atento a señales de advertencia. El médico o la enfermera le dará señales de alarma detallados que debe observar y cuándo llamar para la ayuda. Estas instrucciones también pueden incluir información educativa acerca de lebron condición. Si usted experimenta cualquiera de las señales de advertencia para lebron beulah, llame a lebron médico.             [facility]    Si lebron médico no le ha indicado a lo contrário, por favor   contactar a [facility], nuestra línea de cuidado de enfermeras está disponible para asistirle 24/7.    [phone] (servicio gratuito)    Enfermeras registradas de [facility] pueden ayudarle a  reservar arin micheal, proveer educación para la beulah, asesoría clínica, y otros servicios de asesoramiento.                  ** Verificar la lista de medicamentos es correcta y está actualizada. Llevar esto con usted en radha de emergencia. Si artem medicamentos ruelas cambiado, por favor notifique a lebron proveedor de atención médica.             Lista de medicamentos      EMPIECE a benigno estos medicamentos        Additional Info                      hydrocodone-acetaminophen 5-325mg 5-325 mg per tablet   También conocido rodrigo:  NORCO   Cantidad:  61 tablet   Recargas:  0   Dosis:  1 tablet    Instrucciones:  Take 1 tablet by mouth every 6 (six) hours as needed for Pain.     Begin Date    AM    Noon    PM    Bedtime       ondansetron 4 MG Tbdl   También conocido rodrigo:  ZOFRAN-ODT   Cantidad:  20 tablet   Recargas:  0   Dosis:  4 mg    Instrucciones:  Take 1 tablet (4 mg total) by mouth every 8 (eight) hours as needed.     Begin Date    AM    Noon    PM    Bedtime         SIGA tomando estos medicamentos        Additional Info                      acetaminophen 325 MG tablet   También conocido rodrigo:  TYLENOL   Recargas:  0   Dosis:  650 mg    Instrucciones:  Take 2 tablets (650 mg total) by mouth every 8 (eight) hours as needed for Pain.     Begin Date    AM    Noon    PM    Bedtime       albuterol 90 mcg/actuation inhaler   Cantidad:  1 Inhaler   Recargas:  0   Dosis:  2 puff    Instrucciones:  Inhale 2 puffs into the lungs every 6 (six) hours as needed for Wheezing or Shortness of Breath. Rescue     Begin Date    AM    Noon    PM    Bedtime       ascorbic acid (vitamin C) 500 MG tablet   También conocido rodrigo:  VITAMIN C   Recargas:  0   Dosis:  500 mg    Instrucciones:  Take 1 tablet (500 mg total) by mouth every evening.     Begin Date    AM    Noon    PM    Bedtime       benazepril 10 MG tablet   También conocido rodrigo:  LOTENSIN   Recargas:  0   Dosis:  20 mg    Instrucciones:  Take 20 mg by mouth once daily.     Begin  Date    AM    Noon    PM    Bedtime       diclofenac sodium 1 % Gel   Cantidad:  2 Tube   Recargas:  3   Dosis:  2 g    Instrucciones:  Apply 2 g topically 3 (three) times daily.     Begin Date    AM    Noon    PM    Bedtime       docusate sodium 100 MG capsule   También conocido rodrigo:  COLACE   Cantidad:  60 capsule   Recargas:  0   Dosis:  100 mg    Instrucciones:  Take 1 capsule (100 mg total) by mouth 2 (two) times daily as needed for Constipation.     Begin Date    AM    Noon    PM    Bedtime       ergocalciferol 50,000 unit Cap   También conocido rodrigo:  ERGOCALCIFEROL   Cantidad:  8 capsule   Recargas:  0   Dosis:  48492 Units    Instrucciones:  Take 1 capsule (50,000 Units total) by mouth every 7 days.     Begin Date    AM    Noon    PM    Bedtime       ferrous sulfate 325 (65 FE) MG EC tablet   Cantidad:  60 tablet   Recargas:  0   Dosis:  325 mg    Instrucciones:  Take 1 tablet (325 mg total) by mouth 2 (two) times daily.     Begin Date    AM    Noon    PM    Bedtime       magnesium oxide 400 mg tablet   También conocido rodrigo:  MAG-OX   Recargas:  0   Dosis:  400 mg    Instrucciones:  Take 1 tablet (400 mg total) by mouth 2 (two) times daily.     Begin Date    AM    Noon    PM    Bedtime       multivitamin tablet   También conocido rodrigo:  THERAGRAN   Recargas:  0   Dosis:  1 tablet    Instrucciones:  Take 1 tablet by mouth once daily.     Begin Date    AM    Noon    PM    Bedtime       sulindac 200 MG Tab   También conocido rodrigo:  CLINORIL   Recargas:  0      Begin Date    AM    Noon    PM    Bedtime            Dónde puede recoger artem medicamentos      Puede obtener estos medicamentos en cualquier farmacia     Traiga arin receta en papel para cada munira de estos medicamentos     hydrocodone-acetaminophen 5-325mg 5-325 mg per tablet    ondansetron 4 MG Tbdl                  Por favor traiga a todos las citas de seguimiento:    1. Arin copia de las instrucciones de antonia.  2. Todos los medicamentos que está  tomando ozzie momento, en eda envases originales.  3. Identificación y tarjeta de seguro.    Por favor llegue 15 minutos antes de la hora de la micheal.    Por favor llame con 24 horas de antelación si tiene que cambiar lebron micheal y / o tiempo.        Eda Citas Programadas     Apr 12, 2017  1:45 PM CDT   Post OP with Harini Pereira PA-C   [facility] ([facility])    [address], [address]  [place] [address]   [phone]            May 17, 2017  8:40 AM CDT   Established Patient Visit with MD [place] [facility] ([facility])    [address]  [place] [address] [phone]                Instrucciones de antonia     [place]rdenes [place]    Call MD for:  difficulty breathing, headache or visual disturbances     Call MD for:  extreme fatigue     Call MD for:  hives     Call MD for:  persistent dizziness or light-headedness     Call MD for:  persistent nausea and vomiting     Call MD for:  redness, tenderness, or signs of infection (pain, swelling, redness, odor or green/yellow discharge around incision site)     Call MD for:  severe uncontrolled pain     Call MD for:  temperature >100.4     Diet general     Preguntas:    Total calories:      Fat restriction, if any:      Protein restriction, if any:      Na restriction, if any:      Fluid restriction:      Additional restrictions:      Leave dressing on - Keep it clean, dry, and intact until clinic visit         Información de Admisión     Fecha y hora Proveedor Departamento [facility]    3/31/2017  5:28 AM Anh An MD [facility] [id number]      Los proveedores de cuidado     Personal Médico Rol [facility] Teléfono principal de la oficina    Anh An MD Attending Provider Hand Surgery [phone]    Anh An MD Surgeon  Hand Surgery [phone]      Eda signos vitales kurt     PS Pulso Temperatura Resp [place]    94/63 (BP Location: Left arm, Patient  "Position: Lying, BP Method: Automatic) 76 97.9 °F (36.6 °C) (Temporal) 17 5' 6" (1.676 m) 63.5 kg (140 lb)    SpO2 BMI (IM[facility])                100% 22.6 kg/m2          Recent Lab Values        1/11/2017                           3:28 PM           A1C 5.9           Comment for A1C at  3:28 PM on 1/11/2017:  According to ADA guidelines, hemoglobin A1C <7.0% represents  optimal control in non-pregnant diabetic patients.  Different  metrics may apply to specific populations.   Standards of Medical Care in Diabetes - 2016.  For the purpose of screening for the presence of diabetes:  <5.7%     Consistent with the absence of diabetes  5.7-6.4%  Consistent with increasing risk for diabetes   (prediabetes)  >or=6.5%  Consistent with diabetes  Currently no consensus exists for use of hemoglobin A1C  for diagnosis of diabetes for children.        Alergias     A partir del:  3/31/2017           Reacciones    Iodine And Iodide Containing Products     Shellfish Containing Products     Pcn [Penicillins] Rash      Directiva Anticipada     Tracie directiva anticipada es un documento que, en radha de que ya no capaz de hacer decisiones por sí mismo, le dice a lebron equipo médico qué tipo de tratamiento quiere o no quiere recibir, o que le gustaría benigno esas decisiones para usted . Si actualmente no tiene tracie directiva anticipada, [facility] le anima a crear munira. Para más información llame al: [phone] ([phone]), [phone] ([phone]), o entrando en [email or web address].        Smoking Cessation     Si desea dejar de fumar:  · Usted puede ser elegible para recibir servicios gratuitos si usted es un residente de [place] y comenzó a fumar cigarrillos antes del 1 de septiembre de 1988. Llame al Smoking Cessation Trust (SCT) a [phone] o [phone].   Llame 1-800-QUIT-NOW si usted no cumple con los criterios anteriores.            Language Assistance Services     ATTENTION: Language assistance services are available, " free of charge. Please call [phone].      ATENCIÓN: Si habla español, tiene a lebron disposición servicios gratuitos de asistencia lingüística. Llame al [phone].     [facility] Ý: N?u b?n nói Ti?ng Vi?t, có các d?ch v? h? tr? ngôn ng? mi?n phí dành cho b?n. G?i s? [phone].         [facility] cumple con las leyes federales aplicables de derechos civiles y no discrimina por motivos de johan, color, origen nacional, edad, discapacidad, o sexo.                        [address] [address]  Phone: [phone]           Patient Discharge Instructions   Our goal is to set you up for success. This packet includes information on your condition, medications, and your home care.  It will help you care for yourself to prevent having to return to the hospital.     Please ask your nurse if you have any questions.      There are many details to remember when preparing to leave the hospital. Here is what you will need to do:    1. Take your medicine. If you are prescribed medications, review your Medication List on the following pages. You may have new medications to  at the pharmacy and others that you'll need to stop taking. Review the instructions for how and when to take your medications. Talk with your doctor or nurses if you are unsure of what to do.     2. Go to your follow-up appointments. Specific follow-up information is listed in the following pages. Your may be contacted by a nurse or clinical provider about future appointments. Be sure we have all of the phone numbers to reach you. Please contact your provider's office if you are unable to make an appointment.     3. Watch for warning signs. Your doctor or nurse will give you detailed warning signs to watch for and when to call for assistance. These instructions may also include educational information about your condition. If you experience any of warning signs to your health,  call your doctor.           [facility]  Unless otherwise directed by your provider, please   contact [facility], our nurse care line   that is available for 24/7 assistance.     [phone] (toll-free)     Registered nurses in the [facility]   provide: appointment scheduling, clinical advisement, health education, and other advisory services.              ** Verificar la lista de medicamentos es correcta y está actualizada. Llevar esto con usted en radha de emergencia. Si artem medicamentos ruelas cambiado, por favor notifique a lebron proveedor de atención médica.             Medication List      START taking these medications        Additional Info                      hydrocodone-acetaminophen 5-325mg 5-325 mg per tablet   Commonly known as:  NORCO   Quantity:  61 tablet   Refills:  0   Dose:  1 tablet    Instructions:  Take 1 tablet by mouth every 6 (six) hours as needed for Pain.     Begin Date    AM    Noon    PM    Bedtime       ondansetron 4 MG Tbdl   Commonly known as:  ZOFRAN-ODT   Quantity:  20 tablet   Refills:  0   Dose:  4 mg    Instructions:  Take 1 tablet (4 mg total) by mouth every 8 (eight) hours as needed.     Begin Date    AM    Noon    PM    Bedtime         CONTINUE taking these medications        Additional Info                      acetaminophen 325 MG tablet   Commonly known as:  TYLENOL   Refills:  0   Dose:  650 mg    Instructions:  Take 2 tablets (650 mg total) by mouth every 8 (eight) hours as needed for Pain.     Begin Date    AM    Noon    PM    Bedtime       albuterol 90 mcg/actuation inhaler   Quantity:  1 Inhaler   Refills:  0   Dose:  2 puff    Instructions:  Inhale 2 puffs into the lungs every 6 (six) hours as needed for Wheezing or Shortness of Breath. Rescue     Begin Date    AM    Noon    PM    Bedtime       ascorbic acid (vitamin C) 500 MG tablet   Commonly known as:  VITAMIN C   Refills:  0   Dose:  500 mg    Instructions:  Take 1 tablet (500 mg total) by mouth  every evening.     Begin Date    AM    Noon    PM    Bedtime       benazepril 10 MG tablet   Commonly known as:  LOTENSIN   Refills:  0   Dose:  20 mg    Instructions:  Take 20 mg by mouth once daily.     Begin Date    AM    Noon    PM    Bedtime       diclofenac sodium 1 % Gel   Quantity:  2 Tube   Refills:  3   Dose:  2 g    Instructions:  Apply 2 g topically 3 (three) times daily.     Begin Date    AM    Noon    PM    Bedtime       docusate sodium 100 MG capsule   Commonly known as:  COLACE   Quantity:  60 capsule   Refills:  0   Dose:  100 mg    Instructions:  Take 1 capsule (100 mg total) by mouth 2 (two) times daily as needed for Constipation.     Begin Date    AM    Noon    PM    Bedtime       ergocalciferol 50,000 unit Cap   Commonly known as:  ERGOCALCIFEROL   Quantity:  8 capsule   Refills:  0   Dose:  13191 Units    Instructions:  Take 1 capsule (50,000 Units total) by mouth every 7 days.     Begin Date    AM    Noon    PM    Bedtime       ferrous sulfate 325 (65 FE) MG EC tablet   Quantity:  60 tablet   Refills:  0   Dose:  325 mg    Instructions:  Take 1 tablet (325 mg total) by mouth 2 (two) times daily.     Begin Date    AM    Noon    PM    Bedtime       magnesium oxide 400 mg tablet   Commonly known as:  MAG-OX   Refills:  0   Dose:  400 mg    Instructions:  Take 1 tablet (400 mg total) by mouth 2 (two) times daily.     Begin Date    AM    Noon    PM    Bedtime       multivitamin tablet   Commonly known as:  THERAGRAN   Refills:  0   Dose:  1 tablet    Instructions:  Take 1 tablet by mouth once daily.     Begin Date    AM    Noon    PM    Bedtime       sulindac 200 MG Tab   Commonly known as:  CLINORIL   Refills:  0      Begin Date    AM    Noon    PM    Bedtime            Where to Get Your Medications      You can get these medications from any pharmacy     Bring a paper prescription for each of these medications     hydrocodone-acetaminophen 5-325mg 5-325 mg per tablet    ondansetron 4 MG Tbdl                   Por favor traiga a todos las citas de seguimiento:    1. Tracie copia de las instrucciones de antonia.  2. Todos los medicamentos que está tomando ozzie momento, en artem envases originales.  3. Identificación y tarjeta de seguro.    Por favor llegue 15 minutos antes de la hora de la micheal.    Por favor llame con 24 horas de antelación si tiene que cambiar lebron micheal y / o tiempo.        Your Scheduled Appointments     Apr 12, 2017  1:45 PM CDT   Post OP with Harini Pereira PA-C   [facility] ([facility])    [address] [place] [address], [address]  [place] [id number] [phone]            May 17, 2017  8:40 AM CDT   Established Patient Visit with MD [place] [facility] ([facility])    [address] [place] [address] [address]  [place] [id number] [phone]                Discharge Instructions     Future Orders    Call MD for:  difficulty breathing, headache or visual disturbances     Call MD for:  extreme fatigue     Call MD for:  hives     Call MD for:  persistent dizziness or light-headedness     Call MD for:  persistent nausea and vomiting     Call MD for:  redness, tenderness, or signs of infection (pain, swelling, redness, odor or green/yellow discharge around incision site)     Call MD for:  severe uncontrolled pain     Call MD for:  temperature >100.4     Diet general     Questions:    Total calories:      Fat restriction, if any:      Protein restriction, if any:      Na restriction, if any:      Fluid restriction:      Additional restrictions:      Leave dressing on - Keep it clean, dry, and intact until clinic visit         Admission Information     Date & Time Provider Department CSN    3/31/2017  5:28 AM Anh An MD [facility] [id number]      Care Providers     Provider Role Specialty Primary office phone    Anh An MD Attending Provider Hand Surgery [phone]    Anh An MD Surgeon  Hand Surgery [phone]  "     Your Vitals Were     BP Pulse Temp Resp Height Weight    94/63 (BP Location: Left arm, Patient Position: Lying, BP Method: Automatic) 76 97.9 °F (36.6 °C) (Temporal) 17 5' 6" (1.676 m) 63.5 kg (140 lb)    SpO2 BMI                100% 22.6 kg/m2          Recent Lab Values        1/11/2017                           3:28 PM           A1C 5.9           Comment for A1C at  3:28 PM on 1/11/2017:  According to ADA guidelines, hemoglobin A1C <7.0% represents  optimal control in non-pregnant diabetic patients.  Different  metrics may apply to specific populations.   Standards of Medical Care in Diabetes - 2016.  For the purpose of screening for the presence of diabetes:  <5.7%     Consistent with the absence of diabetes  5.7-6.4%  Consistent with increasing risk for diabetes   (prediabetes)  >or=6.5%  Consistent with diabetes  Currently no consensus exists for use of hemoglobin A1C  for diagnosis of diabetes for children.        Allergies as of 3/31/2017        Reactions    Iodine And Iodide Containing Products     Shellfish Containing Products     Pcn [Penicillins] Rash      Advance Directives     An advance directive is a document which, in the event you are no longer able to make decisions for yourself, tells your healthcare team what kind of treatment you do or do not want to receive, or who you would like to make those decisions for you.  If you do not currently have an advance directive, [facility] encourages you to create one.  For more information call:  [phone] ([phone]), [phone] ([phone]),  or log on to [email or web address].        Smoking Cessation     If you would like to quit smoking:   You may be eligible for free services if you are a [place] resident and started smoking cigarettes before September 1, 1988.  Call the Smoking Cessation Trust (SCT) toll free at (117) 645-0051 or (786) 195-2589.   Call 3-800-QUIT-NOW if you do not meet the above criteria.   Contact us via email: " [email or web address]   View our website for more information: [email or web address]        Language Assistance Services     ATTENTION: Language assistance services are available, free of charge. Please call [phone].      ATENCIÓN: Si habla treva, tiene a lebron disposición servicios gratuitos de asistencia lingüística. Llame al [phone].     CHÚ Ý: N?u b?n nói Ti?ng Vi?t, có các d?ch v? h? tr? ngôn ng? mi?n phí dành cho b?n. G?i s? [phone].         [facility] complies with applicable Federal civil rights laws and does not discriminate on the basis of race, color, national origin, age, disability, or sex.

## 2017-03-31 NOTE — ANESTHESIA RELEASE NOTE
"Anesthesia Release from PACU Note    Patient: Bashir Ramos    Procedure(s) Performed: Procedure(s) (LRB):  RELEASE-CARPAL TUNNEL/ Right (Right)    Anesthesia type: general    Post pain: Adequate analgesia    Post assessment: no apparent anesthetic complications and tolerated procedure well    Last Vitals:   Visit Vitals    BP 97/60    Pulse 80    Temp 36.6 °C (97.9 °F) (Temporal)    Resp 16    Ht 5' 6" (1.676 m)    Wt 63.5 kg (140 lb)    SpO2 100%    BMI 22.6 kg/m2       Post vital signs: stable    Level of consciousness: awake and alert     Nausea/Vomiting: no nausea/no vomiting    Complications: none    Airway Patency: patent    Respiratory: unassisted, spontaneous ventilation, room air    Cardiovascular: stable and blood pressure at baseline    Hydration: euvolemic  "

## 2017-04-07 ENCOUNTER — OFFICE VISIT (OUTPATIENT)
Dept: ORTHOPEDICS | Facility: CLINIC | Age: 82
End: 2017-04-07
Payer: MEDICARE

## 2017-04-07 ENCOUNTER — TELEPHONE (OUTPATIENT)
Dept: ORTHOPEDICS | Facility: CLINIC | Age: 82
End: 2017-04-07

## 2017-04-07 VITALS
DIASTOLIC BLOOD PRESSURE: 75 MMHG | RESPIRATION RATE: 20 BRPM | HEIGHT: 66 IN | SYSTOLIC BLOOD PRESSURE: 130 MMHG | WEIGHT: 140 LBS | HEART RATE: 103 BPM | BODY MASS INDEX: 22.5 KG/M2

## 2017-04-07 DIAGNOSIS — Z47.89 UNSPECIFIED ORTHOPEDIC AFTERCARE: Primary | ICD-10-CM

## 2017-04-07 PROCEDURE — 99999 PR PBB SHADOW E&M-EST. PATIENT-LVL III: CPT | Mod: PBBFAC,,, | Performed by: PHYSICIAN ASSISTANT

## 2017-04-07 PROCEDURE — 99024 POSTOP FOLLOW-UP VISIT: CPT | Mod: S$GLB,,, | Performed by: PHYSICIAN ASSISTANT

## 2017-04-07 NOTE — PROGRESS NOTES
This office note has been dictated.   Dictation Confirmation Code: [id number]  Arabella Pereira PA-C  04/07/2017  2:30 PM  Supervising Physician:  MD Bashir Rahman was seen today for post-op evaluation.    Diagnoses and all orders for this visit:    Unspecified orthopedic aftercare

## 2017-04-07 NOTE — TELEPHONE ENCOUNTER
----- Message from Maryan Damon sent at 4/7/2017 11:14 AM CDT -----  Contact: pt  _  1st Request  _  2nd Request  _  3rd Request        Who: pt    Why: pt is in pain. Right hand swelling. The pt wants to come in to see the doctor. Please call the wife Libra. [phone]. The pt wants to come in asap. Nothing available.     What Number to Call Back:[phone]    When to Expect a call back: (Before the end of the day)   -- if the call is after 12:00, the call back will be tomorrow.

## 2017-04-08 NOTE — PROGRESS NOTES
SUBJECTIVE:  Mr. Ramos is one week status post carpal tunnel release.  He and   his wife are very concerned about his hand.  He has severe swelling.  He is   also having significant amount of pain.  He reports that he would like to be   seen today.  He was given a same day appointment with myself.  He denies nausea,   vomiting, fever or chills.  He reports that his hand is very painful, but the   numbness is going away.    PHYSICAL EXAMINATION:  VITAL SIGNS:  Per EPIC.  UPPER EXTREMITY EXAM:  The wound is clean, dry and intact.  No evidence of   infection, drainage.  He does have severe hand edema.  He is unable to make a   full composite fist.  Dressing was completely removed.    ASSESSMENT:  Severe hand edema, status post carpal tunnel release.    PLAN:  I discussed with Ms. Ramos, we will remove the dressing.  His wound   was dressed with bacitracin, Adaptic gauze, cast padding and Ace wrap to help   alleviate some of the swelling in his hand.  He was advised to massage his hand   to help with swelling.  Keep it elevated above his heart. At this time, he has   been wearing a sling around his neck, I do feel like that may have contributed   to some of the edema.  He will follow up with me next week for suture removal.    He will contact us with questions, concerns or problems.  The patient did report   that after the dressing was removed, he had significant amount of relief.    SUPERVISING PHYSICIAN:  Anh An M.D.    DICTATED BY:  AMOS Florez  dd: 04/07/2017 14:32:31 (CDT)  td: 04/08/2017 11:05:14 (CDT)  Doc ID   #[id number]  Job ID #[id number]    CC:

## 2017-04-11 ENCOUNTER — TELEPHONE (OUTPATIENT)
Dept: ORTHOPEDICS | Facility: CLINIC | Age: 82
End: 2017-04-11

## 2017-04-12 ENCOUNTER — TELEPHONE (OUTPATIENT)
Dept: ORTHOPEDICS | Facility: CLINIC | Age: 82
End: 2017-04-12

## 2017-04-12 ENCOUNTER — OFFICE VISIT (OUTPATIENT)
Dept: ORTHOPEDICS | Facility: CLINIC | Age: 82
End: 2017-04-12
Payer: MEDICARE

## 2017-04-12 DIAGNOSIS — M25.441 SWELLING OF HAND JOINT, RIGHT: Primary | ICD-10-CM

## 2017-04-12 PROCEDURE — 99999 PR PBB SHADOW E&M-EST. PATIENT-LVL I: CPT | Mod: PBBFAC,,, | Performed by: PHYSICIAN ASSISTANT

## 2017-04-12 PROCEDURE — 99024 POSTOP FOLLOW-UP VISIT: CPT | Mod: S$GLB,,, | Performed by: PHYSICIAN ASSISTANT

## 2017-04-12 PROCEDURE — 97760 ORTHOTIC MGMT&TRAING 1ST ENC: CPT | Mod: GP,S$GLB,, | Performed by: PHYSICIAN ASSISTANT

## 2017-04-12 NOTE — MR AVS SNAPSHOT
[facility]  [address], [address]  [place] [address]  Phone: [phone]                  Bashir Ramos   2017 1:45 PM   Office Visit    Descripción:  Male : 1935   Personal Médico:  Harini Pereira PA-C   Departamento:  [facility]           Diagnósticos de Esta Visita        Comentarios    Swelling of hand joint, right    -  Primario            Lista de tareas           Citas próximas        Personal Médico Departamento Tfno del dpto    2017 1:45 PM Harini Pereira PA-C [facility] [phone]    5/3/2017 9:30 AM Harini Pereira PA-C [facility] [phone]    2017 8:40 AM Samir Rouse MD [facility] [phone]      Metas (5 Years of Data)     Ninguna      [facility]     [facility] L[facility] de Enfermeras - Asistencia   [facility]fermeras [facility]radas de [facility] pueden ayudarle a reservar arin micheal, proveer educación para la beulah, asesoría clínica, y otros servicios de asesoramiento.   Llame para ozzie servicio gratuito a [phone].             Medicamentos                Verifique que la siguiente lista de medicamentos es arin representación exacta de los medicamentos que está tomando actualmente. Si no hay ningunos reportados, la lista puede estar en brown. Si no es correcta, por favor póngase en contacto con lebron proveedor de atención médica. Lleve esta lista con usted en radha de emergencia.           Medicamentos Actuales     acetaminophen (TYLENOL) 325 MG tablet Take 2 tablets (650 mg total) by mouth every 8 (eight) hours as needed for Pain.    albuterol 90 mcg/actuation inhaler Inhale 2 puffs into the lungs every 6 (six) hours as needed for Wheezing or Shortness of Breath. Rescue    ascorbic acid, vitamin C, (VITAMIN C) 500 MG tablet Take 1 tablet (500 mg total) by mouth every evening.    benazepril (LOTENSIN) 10 MG tablet Take 20 mg by mouth once daily.     diclofenac sodium 1  % Gel Apply 2 g topically 3 (three) times daily.    docusate sodium (COLACE) 100 MG capsule Take 1 capsule (100 mg total) by mouth 2 (two) times daily as needed for Constipation.    ergocalciferol (ERGOCALCIFEROL) 50,000 unit Cap Take 1 capsule (50,000 Units total) by mouth every 7 days.    ferrous sulfate 325 (65 FE) MG EC tablet Take 1 tablet (325 mg total) by mouth 2 (two) times daily.    hydrocodone-acetaminophen 5-325mg (NORCO) 5-325 mg per tablet Take 1 tablet by mouth every 6 (six) hours as needed for Pain.    magnesium oxide (MAG-OX) 400 mg tablet Take 1 tablet (400 mg total) by mouth 2 (two) times daily.    multivitamin (THERAGRAN) tablet Take 1 tablet by mouth once daily.    ondansetron (ZOFRAN-ODT) 4 MG TbDL Take 1 tablet (4 mg total) by mouth every 8 (eight) hours as needed.    sulindac (CLINORIL) 200 MG Tab            Información de referencia clínica           Alergias     A partir del:  2017        Iodine And Iodide Containing Products    Shellfish Containing Products    Pcn [Penicillins]      Vacunas     Administradas en la fecha de la visita:  2017        None      Orders Placed During Today's Visit      Órdenes normales de esta visita    Ambulatory Referral to Physical/Occupational Therapy       Language Assistance Services     ATTENTION: Language assistance services are available, free of charge. Please call [phone].      ATENCIÓN: Si habla español, tiene a lebron disposición servicios gratuitos de asistencia lingüística. Llame al [phone].     [facility] Ý: N?u b?n nói Ti?ng Vi?t, có các d?ch v? h? tr? ngôn ng? mi?n phí dành cho b?n. G?i s? [phone].         [facility] cumple con las leyes federales aplicables de derechos civiles y no discrimina por motivos de johan, color, origen nacional, edad, discapacidad, o sexo.                 Bashir Ramos   2017 1:45 PM   Office Visit    Description:  Male : 1935   Provider:  Harini Pereira PA-C    Department:  [facility]           Diagnoses this Visit        Comments    Swelling of hand joint, right    -  Primary            To Do List           Future Appointments        Provider Department Dept Phone    4/12/2017 1:45 PM Harini Pereira PA-C [facility] [phone]    5/3/2017 9:30 AM Harini Pereira PA-C [facility] [phone]    5/17/2017 8:40 AM Samir Rouse MD [facility] [phone]      Goals     None      [facility]     [facility] On Call Nurse Care Line - 24/7 Assistance  Unless otherwise directed by your provider, please contact [facility], our nurse care line that is available for 24/7 assistance.     Registered nurses in the [facility] On Call Center provide: appointment scheduling, clinical advisement, health education, and other advisory services.  Call: [phone] (toll free)               Medications                Verify that the below list of medications is an accurate representation of the medications you are currently taking.  If none reported, the list may be blank. If incorrect, please contact your healthcare provider. Carry this list with you in case of emergency.           Current Medications     acetaminophen (TYLENOL) 325 MG tablet Take 2 tablets (650 mg total) by mouth every 8 (eight) hours as needed for Pain.    albuterol 90 mcg/actuation inhaler Inhale 2 puffs into the lungs every 6 (six) hours as needed for Wheezing or Shortness of Breath. Rescue    ascorbic acid, vitamin C, (VITAMIN C) 500 MG tablet Take 1 tablet (500 mg total) by mouth every evening.    benazepril (LOTENSIN) 10 MG tablet Take 20 mg by mouth once daily.     diclofenac sodium 1 % Gel Apply 2 g topically 3 (three) times daily.    docusate sodium (COLACE) 100 MG capsule Take 1 capsule (100 mg total) by mouth 2 (two) times daily as needed for Constipation.    ergocalciferol (ERGOCALCIFEROL) 50,000 unit Cap Take 1 capsule (50,000 Units total) by  mouth every 7 days.    ferrous sulfate 325 (65 FE) MG EC tablet Take 1 tablet (325 mg total) by mouth 2 (two) times daily.    hydrocodone-acetaminophen 5-325mg (NORCO) 5-325 mg per tablet Take 1 tablet by mouth every 6 (six) hours as needed for Pain.    magnesium oxide (MAG-OX) 400 mg tablet Take 1 tablet (400 mg total) by mouth 2 (two) times daily.    multivitamin (THERAGRAN) tablet Take 1 tablet by mouth once daily.    ondansetron (ZOFRAN-ODT) 4 MG TbDL Take 1 tablet (4 mg total) by mouth every 8 (eight) hours as needed.    sulindac (CLINORIL) 200 MG Tab            Clinical Reference Information           Allergies as of 4/12/2017     Iodine And Iodide Containing Products    Shellfish Containing Products    Pcn [Penicillins]      Immunizations Administered on Date of Encounter - 4/12/2017     None      Orders Placed During Today's Visit      Normal Orders This Visit    Ambulatory Referral to Physical/Occupational Therapy       Language Assistance Services     ATTENTION: Language assistance services are available, free of charge. Please call [phone].      ATENCIÓN: Si habla treva, tiene a lebron disposición servicios gratuitos de asistencia lingüística. Llame al [phone].     DORENE Ý: N?u b?n nói Ti?ng Vi?t, có các d?ch v? h? tr? ngôn ng? mi?n phí dành cho b?n. G?i s? [phone].         [facility] complies with applicable Federal civil rights laws and does not discriminate on the basis of race, color, national origin, age, disability, or sex.

## 2017-04-12 NOTE — DISCHARGE SUMMARY
[facility]  Discharge Summary      Admit Date: 3/31/2017    Discharge Date and Time: 3/31/2017  8:25 AM    Attending Physician: No att. providers found     Reason for Admission: CTS    Procedures Performed: Procedure(s) (LRB):  RELEASE-CARPAL TUNNEL/ Right (Right)    Hospital Course:  The patient arrived to the [facility] on the second floor of [facility] for proper pre-operative management.  Upon completion of pre-operative preparation, the patient was taken back to the operative theatre.  A CTR was performed without complication and the patient was transported to the post anesthesia care unit in stable condition.     Estrada: nil    Drain: nil    Pain Management:     - [facility]: yes    After appropriate recovery from the anaesthetic agents used during the surgery the patient was discharged home without complications      Consults:     Significant Diagnostic Studies:     Final Diagnoses:    Principal Problem: <principal problem not specified>   Secondary Diagnoses:   Active Hospital Problems    Diagnosis  POA    Right carpal tunnel syndrome [G56.01]  Yes      Resolved Hospital Problems    Diagnosis Date Resolved POA   No resolved problems to display.       Discharged Condition: good    Disposition: Home or Self Care    Follow Up/Patient Instructions:     Medications:  Reconciled Home Medications:   Discharge Medication List as of 3/31/2017  7:39 AM      START taking these medications    Details   hydrocodone-acetaminophen 5-325mg (NORCO) 5-325 mg per tablet Take 1 tablet by mouth every 6 (six) hours as needed for Pain., Starting 3/31/2017, Until Discontinued, Print      ondansetron (ZOFRAN-ODT) 4 MG TbDL Take 1 tablet (4 mg total) by mouth every 8 (eight) hours as needed., Starting 3/31/2017, Until Discontinued, Print         CONTINUE these medications which have NOT CHANGED    Details   benazepril (LOTENSIN) 10 MG tablet Take 20 mg by mouth once daily. , Starting  3/28/2016, Until Discontinued, Historical Med      docusate sodium (COLACE) 100 MG capsule Take 1 capsule (100 mg total) by mouth 2 (two) times daily as needed for Constipation., Starting 2/15/2017, Until Discontinued, Normal      ergocalciferol (ERGOCALCIFEROL) 50,000 unit Cap Take 1 capsule (50,000 Units total) by mouth every 7 days., Starting 2/15/2017, Until Discontinued, Normal      ferrous sulfate 325 (65 FE) MG EC tablet Take 1 tablet (325 mg total) by mouth 2 (two) times daily., Starting 2/15/2017, Until Discontinued, Normal      magnesium oxide (MAG-OX) 400 mg tablet Take 1 tablet (400 mg total) by mouth 2 (two) times daily., Starting 2/26/2017, Until Discontinued, OTC      multivitamin (THERAGRAN) tablet Take 1 tablet by mouth once daily., Starting 2/26/2017, Until Discontinued, OTC      sulindac (CLINORIL) 200 MG Tab Starting 10/4/2016, Until Discontinued, Historical Med      acetaminophen (TYLENOL) 325 MG tablet Take 2 tablets (650 mg total) by mouth every 8 (eight) hours as needed for Pain., Starting 2/26/2017, Until Discontinued, OTC      albuterol 90 mcg/actuation inhaler Inhale 2 puffs into the lungs every 6 (six) hours as needed for Wheezing or Shortness of Breath. Rescue, Starting 2/26/2017, Until Discontinued, Print      ascorbic acid, vitamin C, (VITAMIN C) 500 MG tablet Take 1 tablet (500 mg total) by mouth every evening., Starting 2/26/2017, Until Discontinued, OTC      diclofenac sodium 1 % Gel Apply 2 g topically 3 (three) times daily., Starting 3/2/2017, Until Discontinued, Normal             Discharge Procedure Orders  Diet general     Call MD for:  temperature >100.4     Call MD for:  persistent nausea and vomiting     Call MD for:  severe uncontrolled pain     Call MD for:  difficulty breathing, headache or visual disturbances     Call MD for:  redness, tenderness, or signs of infection (pain, swelling, redness, odor or green/yellow discharge around incision site)     Call MD for:  hives      Call MD for:  persistent dizziness or light-headedness     Call MD for:  extreme fatigue     Keep surgical extremity elevated     Leave dressing on - Keep it clean, dry, and intact until clinic visit

## 2017-04-12 NOTE — PROGRESS NOTES
Mr. Ramos is here today for a post-operative visit.he is 12 days status post right CTR by Dr. An on 3/31/17. he reports that he is having pain and swelling.  Pain is 3/10.  he is not taking pain medication. he denies fever, chills, and sweats since the time of the surgery.     Physical exam:    There were no vitals filed for this visit.    Post op dressing taken down.  Incision is clean, dry and intact.  There is no erythema or exudate.  There is no sign of any infection. he is NVI. Sutures removed without difficulty.      Severe hand edema, limited ROM    Assessment: 12 days status post right CTR    Plan:  Diagnoses and all orders for this visit:    Swelling of hand joint, right  -     Ambulatory Referral to Physical/Occupational Therapy        -PO instruction reviewed and provided to patient  -start therapy ASAP  -pt given order for [facility] because [place]  -f/u 2-3 weeks  -gel sleeve We performed a custom orthotic/brace fitting, adjusting and training with the patient. The patient demonstrated understanding and proper care. This was performed for 15 minutes.

## 2017-04-25 ENCOUNTER — DOCUMENTATION ONLY (OUTPATIENT)
Dept: FAMILY MEDICINE | Facility: CLINIC | Age: 82
End: 2017-04-25

## 2017-04-25 NOTE — PROGRESS NOTES
Pre-Visit Chart Review  For Appointment Scheduled on 04/26/2017    Health Maintenance Due   Topic Date Due    TETANUS VACCINE  01[birth year]25/1953    Zoster Vaccine  01/25/1995

## 2017-04-26 ENCOUNTER — OFFICE VISIT (OUTPATIENT)
Dept: FAMILY MEDICINE | Facility: CLINIC | Age: 82
End: 2017-04-26
Payer: MEDICARE

## 2017-04-26 VITALS
BODY MASS INDEX: 22.18 KG/M2 | HEART RATE: 87 BPM | SYSTOLIC BLOOD PRESSURE: 133 MMHG | HEIGHT: 66 IN | TEMPERATURE: 98 F | DIASTOLIC BLOOD PRESSURE: 89 MMHG | WEIGHT: 138 LBS

## 2017-04-26 DIAGNOSIS — R74.01 ELEVATED TRANSAMINASE LEVEL: Primary | ICD-10-CM

## 2017-04-26 DIAGNOSIS — I10 ESSENTIAL HYPERTENSION, BENIGN: ICD-10-CM

## 2017-04-26 PROCEDURE — 1126F AMNT PAIN NOTED NONE PRSNT: CPT | Mod: S$GLB,,, | Performed by: PHYSICIAN ASSISTANT

## 2017-04-26 PROCEDURE — 1160F RVW MEDS BY RX/DR IN RCRD: CPT | Mod: S$GLB,,, | Performed by: PHYSICIAN ASSISTANT

## 2017-04-26 PROCEDURE — 3079F DIAST BP 80-89 MM HG: CPT | Mod: S$GLB,,, | Performed by: PHYSICIAN ASSISTANT

## 2017-04-26 PROCEDURE — 99999 PR PBB SHADOW E&M-EST. PATIENT-LVL III: CPT | Mod: PBBFAC,,, | Performed by: PHYSICIAN ASSISTANT

## 2017-04-26 PROCEDURE — 3075F SYST BP GE 130 - 139MM HG: CPT | Mod: S$GLB,,, | Performed by: PHYSICIAN ASSISTANT

## 2017-04-26 PROCEDURE — 99213 OFFICE O/P EST LOW 20 MIN: CPT | Mod: S$GLB,,, | Performed by: PHYSICIAN ASSISTANT

## 2017-04-26 PROCEDURE — 1159F MED LIST DOCD IN RCRD: CPT | Mod: S$GLB,,, | Performed by: PHYSICIAN ASSISTANT

## 2017-04-26 RX ORDER — GABAPENTIN 100 MG/1
CAPSULE ORAL
COMMUNITY
Start: 2017-04-11 | End: 2017-10-03 | Stop reason: ALTCHOICE

## 2017-04-26 RX ORDER — ALLOPURINOL 100 MG/1
TABLET ORAL
COMMUNITY
Start: 2017-04-17 | End: 2018-12-13 | Stop reason: SDUPTHER

## 2017-04-26 RX ORDER — AMLODIPINE BESYLATE 5 MG/1
TABLET ORAL
COMMUNITY
Start: 2017-04-11 | End: 2017-06-16

## 2017-04-26 NOTE — MR AVS SNAPSHOT
[facility]  [address]  [place] [address]  Phone: [phone]  Fax: [phone]                  Bashir Ramos   2017 12:20 PM   Office Visit    Descripción:  Male : 1935   Personal Médico:  JENNY Jarrell   Departamento:  [facility]           Razón de la micheal     Results           Diagnósticos de Esta Visita        Comentarios    Elevated transaminase level    -  Primario     Body mass index (BMI) 22.0-22.9, adult         Essential hypertension, benign                Lista de tareas           Citas próximas        Personal Médico Departamento Tfno del dpto    5/3/2017 9:30 AM Harini Pereira PA-C [facility] [phone]    10/3/2017 10:40 AM MD [place] [facility] [phone]      Metas (5 Years of Data)     Ninguna      Follow-Up and Disposition     Return in about 6 months (around 10/26/2017).      [facility]     [facility] Enfermeras - Asistencia   Enfermeras registradas de [facility] pueden ayudarle a reservar arin micheal, proveer educación para la beulah, asesoría clínica, y otros servicios de asesoramiento.   Llame para ozzie servicio gratuito a [phone].             Medicamentos                Verifique que la siguiente lista de medicamentos es arin representación exacta de los medicamentos que está tomando actualmente. Si no hay ningunos reportados, la lista puede estar en brown. Si no es correcta, por favor póngase en contacto con lebron proveedor de atención médica. Lleve esta lista con usted en radha de emergencia.           Medicamentos Actuales     acetaminophen (TYLENOL) 325 MG tablet Take 2 tablets (650 mg total) by mouth every 8 (eight) hours as needed for Pain.    albuterol 90 mcg/actuation inhaler Inhale 2 puffs into the lungs every 6 (six) hours as needed for Wheezing or Shortness of Breath. Rescue    allopurinol (ZYLOPRIM) 100 MG tablet     amlodipine (NORVASC) 5 MG  "tablet     ascorbic acid, vitamin C, (VITAMIN C) 500 MG tablet Take 1 tablet (500 mg total) by mouth every evening.    benazepril (LOTENSIN) 10 MG tablet Take 20 mg by mouth once daily.     diclofenac sodium 1 % Gel Apply 2 g topically 3 (three) times daily.    docusate sodium (COLACE) 100 MG capsule Take 1 capsule (100 mg total) by mouth 2 (two) times daily as needed for Constipation.    ergocalciferol (ERGOCALCIFEROL) 50,000 unit Cap Take 1 capsule (50,000 Units total) by mouth every 7 days.    ferrous sulfate 325 (65 FE) MG EC tablet Take 1 tablet (325 mg total) by mouth 2 (two) times daily.    gabapentin (NEURONTIN) 100 MG capsule     hydrocodone-acetaminophen 5-325mg (NORCO) 5-325 mg per tablet Take 1 tablet by mouth every 6 (six) hours as needed for Pain.    magnesium oxide (MAG-OX) 400 mg tablet Take 1 tablet (400 mg total) by mouth 2 (two) times daily.    multivitamin (THERAGRAN) tablet Take 1 tablet by mouth once daily.    ondansetron (ZOFRAN-ODT) 4 MG TbDL Take 1 tablet (4 mg total) by mouth every 8 (eight) hours as needed.    sulindac (CLINORIL) 200 MG Tab            Información de referencia clínica           Eda signos vitales kurt     PS Pulso Temperatura [place] BMI (IMC)    133/89 (BP Location: Left arm, Patient Position: Sitting, BP Method: Automatic) 87 98.2 °F (36.8 °C) (Oral) 5' 6" (1.676 m) 62.6 kg (138 lb 0.1 oz) 22.28 kg/m2      Blood Pressure          Most Recent Value    BP  133/89      Alergias     A partir del:  4/26/2017        Iodine And Iodide Containing Products    Shellfish Containing Products    Pcn [Penicillins]      Vacunas     Administradas en la fecha de la visita:  4/26/2017        None      Language Assistance Services     ATTENTION: Language assistance services are available, free of charge. Please call [phone].      ATENCIÓN: Si habla español, tiene a lebron disposición servicios gratuitos de asistencia lingüística. Llame al [phone].     DORENE Ý: N?u b?n nói Ti?ng " Vi?t, có các d?ch v? h? tr? ngôn ng? mi?n phí dành cho b?n. G?i s? [phone].         [facility] cumple con las leyes federales aplicables de derechos civiles y no discrimina por motivos de johan, color, origen nacional, edad, discapacidad, o sexo.                 Bashir Ramos   2017 12:20 PM   Office Visit    Description:  Male : 1935   Provider:  JENNY Jarrell   Department:  [facility]           Reason for Visit     Results           Diagnoses this Visit        Comments    Elevated transaminase level    -  Primary     Body mass index (BMI) 22.0-22.9, adult         Essential hypertension, benign                To Do List           Future Appointments        Provider Department Dept Phone    5/3/2017 9:30 AM Harini Pereira PA-C [facility] [phone]    10/3/2017 10:40 AM Samir Rouse MD [facility] [phone]      Goals     None      Follow-Up and Disposition     Return in about 6 months (around 10/26/2017).      [facility]     [facility] On Call Nurse Care Line -  Assistance  Unless otherwise directed by your provider, please contact [facility] On-Call, our nurse care line that is available for  assistance.     Registered nurses in the [facility] On Call Center provide: appointment scheduling, clinical advisement, health education, and other advisory services.  Call: [phone] (toll free)               Medications                Verify that the below list of medications is an accurate representation of the medications you are currently taking.  If none reported, the list may be blank. If incorrect, please contact your healthcare provider. Carry this list with you in case of emergency.           Current Medications     acetaminophen (TYLENOL) 325 MG tablet Take 2 tablets (650 mg total) by mouth every 8 (eight) hours as needed for Pain.    albuterol 90 mcg/actuation inhaler Inhale 2 puffs into the lungs every 6  "(six) hours as needed for Wheezing or Shortness of Breath. Rescue    allopurinol (ZYLOPRIM) 100 MG tablet     amlodipine (NORVASC) 5 MG tablet     ascorbic acid, vitamin C, (VITAMIN C) 500 MG tablet Take 1 tablet (500 mg total) by mouth every evening.    benazepril (LOTENSIN) 10 MG tablet Take 20 mg by mouth once daily.     diclofenac sodium 1 % Gel Apply 2 g topically 3 (three) times daily.    docusate sodium (COLACE) 100 MG capsule Take 1 capsule (100 mg total) by mouth 2 (two) times daily as needed for Constipation.    ergocalciferol (ERGOCALCIFEROL) 50,000 unit Cap Take 1 capsule (50,000 Units total) by mouth every 7 days.    ferrous sulfate 325 (65 FE) MG EC tablet Take 1 tablet (325 mg total) by mouth 2 (two) times daily.    gabapentin (NEURONTIN) 100 MG capsule     hydrocodone-acetaminophen 5-325mg (NORCO) 5-325 mg per tablet Take 1 tablet by mouth every 6 (six) hours as needed for Pain.    magnesium oxide (MAG-OX) 400 mg tablet Take 1 tablet (400 mg total) by mouth 2 (two) times daily.    multivitamin (THERAGRAN) tablet Take 1 tablet by mouth once daily.    ondansetron (ZOFRAN-ODT) 4 MG TbDL Take 1 tablet (4 mg total) by mouth every 8 (eight) hours as needed.    sulindac (CLINORIL) 200 MG Tab            Clinical Reference Information           Your Vitals Were     BP Pulse Temp Height Weight BMI    133/89 (BP Location: Left arm, Patient Position: Sitting, BP Method: Automatic) 87 98.2 °F (36.8 °C) (Oral) 5' 6" (1.676 m) 62.6 kg (138 lb 0.1 oz) 22.28 kg/m2      Blood Pressure          Most Recent Value    BP  133/89      Allergies as of 4/26/2017     Iodine And Iodide Containing Products    Shellfish Containing Products    Pcn [Penicillins]      Immunizations Administered on Date of Encounter - 4/26/2017     None      Language Assistance Services     ATTENTION: Language assistance services are available, free of charge. Please call [phone].      ATENCIÓN: Si osei horn a lebron disposición " servicios gratuitos de asistencia lingüística. Tia heller [phone].     DORENE Ý: N?u b?n nói Ti?ng Vi?t, có các d?ch v? h? tr? ngôn ng? mi?n phí dành cho b?n. G?i s? [phone].         [facility] complies with applicable Federal civil rights laws and does not discriminate on the basis of race, color, national origin, age, disability, or sex.

## 2017-04-26 NOTE — PROGRESS NOTES
Subjective:       Patient ID: Bashir Ramos is a 82 y.o. male.    Chief Complaint: Results (labs 03/22)    HPI Comments: Patient presents for follow up of lab. His liver enzymes were elevated During hospitalization for pneumonia 1 month ago.  Repeat lab showed normal LFTs.  He has well controlled hypertension.  He recently had surgery For right CTS and is healing well.  He has no complaints.     Review of Systems   Constitutional: Negative for appetite change, fatigue and unexpected weight change.   Respiratory: Negative for shortness of breath.    Cardiovascular: Negative for chest pain, palpitations and leg swelling.   Gastrointestinal: Negative for abdominal distention, abdominal pain, diarrhea, nausea and vomiting.       Objective:      Physical Exam   Constitutional: He appears well-developed and well-nourished. He is cooperative. No distress.   Eyes: Conjunctivae and EOM are normal. No scleral icterus.   Neck: Carotid bruit is not present.   Cardiovascular: Normal rate, regular rhythm and normal heart sounds.    Pulmonary/Chest: Effort normal and breath sounds normal.   Abdominal: Soft. Bowel sounds are normal. There is no tenderness.   Musculoskeletal:        Right lower leg: He exhibits no edema.        Left lower leg: He exhibits no edema.   Neurological: He is alert.   Vitals reviewed.      Assessment:       1. Elevated transaminase level, resolved     2. Body mass index (BMI) 22.0-22.9, adult    3. Essential hypertension, benign        Plan:       Bashir was seen today for results.    Diagnoses and all orders for this visit:    Elevated transaminase level, resolved     Body mass index (BMI) 22.0-22.9, adult    Essential hypertension, benign    Patient readiness: acceptance and barriers:none    During the course of the visit the patient was educated and counseled about the following:     Hypertension:   Medication: no change.  Underweight:  Nutritional supplements    Goals: Hypertension: Reduce Blood  Pressure and Underweight: Increase calorie intake and BMI      Did patient meet goals/outcomes: Yes    The following self management tools provided: declined    Patient Instructions (the written plan) was given to the patient/family.     Time spent with patient: 15 minutes

## 2017-05-01 ENCOUNTER — TELEPHONE (OUTPATIENT)
Dept: ORTHOPEDICS | Facility: CLINIC | Age: 82
End: 2017-05-01

## 2017-05-03 ENCOUNTER — OFFICE VISIT (OUTPATIENT)
Dept: ORTHOPEDICS | Facility: CLINIC | Age: 82
End: 2017-05-03
Payer: MEDICARE

## 2017-05-03 ENCOUNTER — TELEPHONE (OUTPATIENT)
Dept: ORTHOPEDICS | Facility: CLINIC | Age: 82
End: 2017-05-03

## 2017-05-03 VITALS
BODY MASS INDEX: 22.18 KG/M2 | DIASTOLIC BLOOD PRESSURE: 97 MMHG | RESPIRATION RATE: 18 BRPM | SYSTOLIC BLOOD PRESSURE: 166 MMHG | HEART RATE: 103 BPM | WEIGHT: 138 LBS | HEIGHT: 66 IN

## 2017-05-03 DIAGNOSIS — Z47.89 UNSPECIFIED ORTHOPEDIC AFTERCARE: Primary | ICD-10-CM

## 2017-05-03 PROCEDURE — 99999 PR PBB SHADOW E&M-EST. PATIENT-LVL III: CPT | Mod: PBBFAC,,, | Performed by: PHYSICIAN ASSISTANT

## 2017-05-03 PROCEDURE — 99024 POSTOP FOLLOW-UP VISIT: CPT | Mod: S$GLB,,, | Performed by: PHYSICIAN ASSISTANT

## 2017-05-04 NOTE — PROGRESS NOTES
"Mr. Ramos is here today for a post-operative visit.he is 6 weeks status post right CTR by Dr. An on 3/31/17. he reports that he is hdoing much better. Mild numbness in fingers and pain and swelling have improved. He has been doing therapy but has a copay that is difficult to pay 2x per week.  Pain is 0/10.  he is not taking pain medication. he denies fever, chills, and sweats since the time of the surgery.     Physical exam:    Vitals:    05/03/17 0918   BP: (!) 166/97   Pulse: 103   Resp: 18   Weight: 62.6 kg (138 lb)   Height: 5' 6" (1.676 m)   PainSc: 0-No pain   PainLoc: Hand        Scar is clean, dry and intact.  There is no erythema or exudate.  There is no sign of any infection. he is NVI.     Edema greatly improved  Full ROM  Mild volar wrist edema    Assessment: 6 weeks status post right CTR    Plan:  Bashir was seen today for post-op evaluation.    Diagnoses and all orders for this visit:    Unspecified orthopedic aftercare        -finish therapy   -return to work when ready - due to age and edema, may wait until 12 week PO if needed  -f/u PRN  -scar massage  - present, but not used as pt felt comfortable speaking english           "

## 2017-06-16 ENCOUNTER — HOSPITAL ENCOUNTER (OUTPATIENT)
Dept: RADIOLOGY | Facility: CLINIC | Age: 82
Discharge: HOME | End: 2017-06-16
Attending: FAMILY MEDICINE
Payer: MEDICARE

## 2017-06-16 ENCOUNTER — OFFICE VISIT (OUTPATIENT)
Dept: FAMILY MEDICINE | Facility: CLINIC | Age: 82
End: 2017-06-16
Payer: MEDICARE

## 2017-06-16 ENCOUNTER — TELEPHONE (OUTPATIENT)
Dept: FAMILY MEDICINE | Facility: CLINIC | Age: 82
End: 2017-06-16

## 2017-06-16 VITALS
BODY MASS INDEX: 23.77 KG/M2 | WEIGHT: 147.94 LBS | SYSTOLIC BLOOD PRESSURE: 138 MMHG | HEIGHT: 66 IN | DIASTOLIC BLOOD PRESSURE: 78 MMHG

## 2017-06-16 DIAGNOSIS — R60.0 EDEMA OF LEFT LOWER EXTREMITY: Primary | ICD-10-CM

## 2017-06-16 DIAGNOSIS — R60.0 EDEMA OF LEFT LOWER EXTREMITY: ICD-10-CM

## 2017-06-16 PROBLEM — E43 SEVERE PROTEIN-CALORIE MALNUTRITION: Status: RESOLVED | Noted: 2017-02-23 | Resolved: 2017-06-16

## 2017-06-16 PROCEDURE — 99214 OFFICE O/P EST MOD 30 MIN: CPT | Mod: S$GLB,,, | Performed by: FAMILY MEDICINE

## 2017-06-16 PROCEDURE — 1159F MED LIST DOCD IN RCRD: CPT | Mod: S$GLB,,, | Performed by: FAMILY MEDICINE

## 2017-06-16 PROCEDURE — 93970 EXTREMITY STUDY: CPT | Mod: 26,,, | Performed by: RADIOLOGY

## 2017-06-16 PROCEDURE — 99999 PR PBB SHADOW E&M-EST. PATIENT-LVL II: CPT | Mod: PBBFAC,,, | Performed by: FAMILY MEDICINE

## 2017-06-16 PROCEDURE — 1125F AMNT PAIN NOTED PAIN PRSNT: CPT | Mod: S$GLB,,, | Performed by: FAMILY MEDICINE

## 2017-06-16 PROCEDURE — 93970 EXTREMITY STUDY: CPT | Mod: TC,PO

## 2017-06-16 NOTE — TELEPHONE ENCOUNTER
I left the patient a voicemail message indicating that his ultrasound results do not suggest evidence of deep vein thrombosis.  The patient has been advised that he will need to contact clinic in order to address further questions.    Portions of this note were created using Dragon voice recognition software. There may be voice recognition errors found in the text, and attempts were made to correct these errors prior to signature    Franc Santillan MD    Family Medicine  6/16/2017

## 2017-06-16 NOTE — PROGRESS NOTES
Subjective:       Patient ID: Bashir Ramos is a 82 y.o. male.    Chief Complaint: Foot Swelling (right)    HPI     Left leg swelling  Pt reports that he has had swelling since 1 week ago.   No injury.   He has not had swelling to the right leg.   No pain associated.   He has occasional swelling to the left leg. (roughly every month).   No redness.   No changes to skin surface/infection.   No SOB CP.    Review of Systems   Constitutional: Negative for chills and fever.   Respiratory: Negative for chest tightness and shortness of breath.    Cardiovascular: Positive for leg swelling. Negative for chest pain.   Gastrointestinal: Negative for abdominal pain, constipation, diarrhea and vomiting.   Genitourinary: Negative for decreased urine volume, dysuria and hematuria.   Musculoskeletal: Negative for arthralgias.   Neurological: Negative for weakness and light-headedness.       Objective:      Physical Exam   Constitutional: He appears well-developed and well-nourished. No distress.   HENT:   Head: Normocephalic and atraumatic.   Mouth/Throat: Oropharynx is clear and moist. No oropharyngeal exudate.   Eyes: EOM are normal. Pupils are equal, round, and reactive to light.   Neck: Normal range of motion. Neck supple. No thyromegaly present.   Cardiovascular: Normal rate, regular rhythm, normal heart sounds and intact distal pulses.    Pulmonary/Chest: Effort normal and breath sounds normal. No respiratory distress. He has no wheezes.   Abdominal: Soft. Bowel sounds are normal. He exhibits no distension and no mass. There is no tenderness.   Musculoskeletal:   2+ edema extending to shin on right. Normal left LE. Normal DP pulses bilaterally. Dianne's negative.    Lymphadenopathy:     He has no cervical adenopathy.   Neurological: He is alert.   Skin: Skin is warm. No rash noted. No erythema.   Psychiatric: He has a normal mood and affect. His behavior is normal.   Vitals reviewed.      Assessment:       1. Edema of left  lower extremity        Plan:       1. Edema of left lower extremity  D/c Norvasc for concerns of lower extremity edema.   Perform U/s as for LE asymmetry on exam.   - Basic metabolic panel; Future  - CBC auto differential; Future  - US Lower Extremity Veins Bilateral; Future    Portions of this note were created using Dragon voice recognition software. There may be voice recognition errors found in the text, and attempts were made to correct these errors prior to signature    Franc Santillan MD    Family Medicine  6/16/2017

## 2017-08-22 ENCOUNTER — HOSPITAL ENCOUNTER (OUTPATIENT)
Dept: RADIOLOGY | Facility: HOSPITAL | Age: 82
Discharge: HOME | End: 2017-08-22
Attending: PHYSICAL MEDICINE & REHABILITATION
Payer: MEDICARE

## 2017-08-22 ENCOUNTER — OFFICE VISIT (OUTPATIENT)
Dept: PHYSICAL MEDICINE AND REHAB | Facility: CLINIC | Age: 82
End: 2017-08-22
Payer: MEDICARE

## 2017-08-22 VITALS
WEIGHT: 144.63 LBS | SYSTOLIC BLOOD PRESSURE: 156 MMHG | BODY MASS INDEX: 23.24 KG/M2 | HEART RATE: 92 BPM | HEIGHT: 66 IN | DIASTOLIC BLOOD PRESSURE: 89 MMHG

## 2017-08-22 DIAGNOSIS — M25.512 CHRONIC LEFT SHOULDER PAIN: ICD-10-CM

## 2017-08-22 DIAGNOSIS — G89.29 CHRONIC LEFT SHOULDER PAIN: ICD-10-CM

## 2017-08-22 DIAGNOSIS — M25.512 CHRONIC LEFT SHOULDER PAIN: Primary | ICD-10-CM

## 2017-08-22 DIAGNOSIS — G89.29 CHRONIC LEFT SHOULDER PAIN: Primary | ICD-10-CM

## 2017-08-22 PROCEDURE — 3008F BODY MASS INDEX DOCD: CPT | Mod: S$GLB,,, | Performed by: PHYSICAL MEDICINE & REHABILITATION

## 2017-08-22 PROCEDURE — 99999 PR PBB SHADOW E&M-EST. PATIENT-LVL III: CPT | Mod: PBBFAC,,, | Performed by: PHYSICAL MEDICINE & REHABILITATION

## 2017-08-22 PROCEDURE — 99214 OFFICE O/P EST MOD 30 MIN: CPT | Mod: 25,S$GLB,, | Performed by: PHYSICAL MEDICINE & REHABILITATION

## 2017-08-22 PROCEDURE — 73030 X-RAY EXAM OF SHOULDER: CPT | Mod: 26,LT,, | Performed by: RADIOLOGY

## 2017-08-22 PROCEDURE — 64418 NJX AA&/STRD SPRSCAP NRV: CPT | Mod: LT,S$GLB,, | Performed by: PHYSICAL MEDICINE & REHABILITATION

## 2017-08-22 PROCEDURE — 1159F MED LIST DOCD IN RCRD: CPT | Mod: S$GLB,,, | Performed by: PHYSICAL MEDICINE & REHABILITATION

## 2017-08-22 PROCEDURE — 3077F SYST BP >= 140 MM HG: CPT | Mod: S$GLB,,, | Performed by: PHYSICAL MEDICINE & REHABILITATION

## 2017-08-22 PROCEDURE — 1125F AMNT PAIN NOTED PAIN PRSNT: CPT | Mod: S$GLB,,, | Performed by: PHYSICAL MEDICINE & REHABILITATION

## 2017-08-22 PROCEDURE — 3079F DIAST BP 80-89 MM HG: CPT | Mod: S$GLB,,, | Performed by: PHYSICAL MEDICINE & REHABILITATION

## 2017-08-22 PROCEDURE — 76942 ECHO GUIDE FOR BIOPSY: CPT | Mod: 26,S$GLB,, | Performed by: PHYSICAL MEDICINE & REHABILITATION

## 2017-08-22 PROCEDURE — 73030 X-RAY EXAM OF SHOULDER: CPT | Mod: TC,LT

## 2017-08-22 RX ORDER — LIDOCAINE HYDROCHLORIDE 10 MG/ML
5 INJECTION INFILTRATION; PERINEURAL ONCE
Status: COMPLETED | OUTPATIENT
Start: 2017-08-22 | End: 2017-08-22

## 2017-08-22 RX ADMIN — LIDOCAINE HYDROCHLORIDE 5 ML: 10 INJECTION INFILTRATION; PERINEURAL at 02:08

## 2017-08-22 NOTE — PROGRESS NOTES
HPI:  Patient is a 82 y.o. year old male w. Left shoulder pain. It has gotten worse over the last couple of weeks. No trauma. Cannot raise arm.    Imaging    Shoulder complete two views right.    Findings: Three views.  There is significant degenerative change at the acromioclavicular joint with abnormal superior subluxation of the humeral head which nearly comes in contact with the acromion consistent with underlying labral pathology.  There is   no fracture or dislocation.  The visualized portion of the right lung is clear.   Impression      As above.         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 grayson)    EYE SURGERY       Family History   Problem Relation Age of Onset    No Known Problems Mother     No Known Problems Father     Amblyopia Neg Hx     Blindness Neg Hx     Cancer Neg Hx     Cataracts Neg Hx     Diabetes Neg Hx     Glaucoma Neg Hx     Hypertension Neg Hx     Macular degeneration Neg Hx     Retinal detachment Neg Hx     Strabismus Neg Hx     Stroke Neg Hx     Thyroid disease Neg Hx      Social History     Social History    Marital status:      Spouse name: N/A    Number of children: N/A    Years of education: N/A     Social History Main Topics    Smoking status: Former Smoker     Packs/day: 1.00     Types: Cigarettes     Quit date: 8/27/1982    Smokeless tobacco: Never Used    Alcohol use No    Drug use: No    Sexual activity: Not on file     Other Topics Concern    Not on file     Social History Narrative    No narrative on file       Review of patient's allergies indicates:   Allergen Reactions    Iodine  and iodide containing products     Shellfish containing products     Pcn [penicillins] Rash       Current Outpatient Prescriptions:     acetaminophen (TYLENOL) 325 MG tablet, Take 2 tablets (650 mg total) by mouth every 8 (eight) hours as needed for Pain., Disp: , Rfl: 0    albuterol 90 mcg/actuation inhaler, Inhale 2 puffs into the lungs every 6 (six) hours as needed for Wheezing or Shortness of Breath. Rescue, Disp: 1 Inhaler, Rfl: 0    allopurinol (ZYLOPRIM) 100 MG tablet, , Disp: , Rfl:     ascorbic acid, vitamin C, (VITAMIN C) 500 MG tablet, Take 1 tablet (500 mg total) by mouth every evening., Disp: , Rfl:     benazepril (LOTENSIN) 10 MG tablet, Take 20 mg by mouth once daily. , Disp: , Rfl:     diclofenac sodium 1 % Gel, Apply 2 g topically 3 (three) times daily., Disp: 2 Tube, Rfl: 3    docusate sodium (COLACE) 100 MG capsule, Take 1 capsule (100 mg total) by mouth 2 (two) times daily as needed for Constipation., Disp: 60 capsule, Rfl: 0    ergocalciferol (ERGOCALCIFEROL) 50,000 unit Cap, Take 1 capsule (50,000 Units total) by mouth every 7 days., Disp: 8 capsule, Rfl: 0    ferrous sulfate 325 (65 FE) MG EC tablet, Take 1 tablet (325 mg total) by mouth 2 (two) times daily., Disp: 60 tablet, Rfl: 0    gabapentin (NEURONTIN) 100 MG capsule, , Disp: , Rfl:     hydrocodone-acetaminophen 5-325mg (NORCO) 5-325 mg per tablet, Take 1 tablet by mouth every 6 (six) hours as needed for Pain., Disp: 61 tablet, Rfl: 0    magnesium oxide (MAG-OX) 400 mg tablet, Take 1 tablet (400 mg total) by mouth 2 (two) times daily., Disp: , Rfl: 0    multivitamin (THERAGRAN) tablet, Take 1 tablet by mouth once daily., Disp: , Rfl:     ondansetron (ZOFRAN-ODT) 4 MG TbDL, Take 1 tablet (4 mg total) by mouth every 8 (eight) hours as needed., Disp: 20 tablet, Rfl: 0    sulindac (CLINORIL) 200 MG Tab, , Disp: , Rfl:       Review of Systems  No nausea, vomiting, fevers, Chills , contipation, diarrhea or sweats      Physical  "Exam:      Vitals:    08/22/17 0818   BP: (!) 156/89   Pulse: 92     alert and oriented ×4 follows commands answers all questions appropriately,affect wnl  Manual muscle test 5 out of 5 sensation to light touch grossly intact  Dec rom in all direction of left shoulder  Nl gait  -No C/C/E      Assessment:  Shoulder oa  Probable labral tear    Plan:  Therapeutic/diagnostic injection of suprascpular nerve today left  Shoulder xray      pROCEDURE NOTE: risk and benefit of left suprascapular NERVE BLOCK AND HYDRODISEECTION injection reviewed with. patient. Verbal consent was obtained. Injection was performed after sterile prep w. Betadine. MEDIAL TO LATERAL approach used. PT. WAS IN A SIDE LAYING POSITION. INJECTION WAS PERFORMED ALONG  EDGE OF SPINOGLENOID NOTCH. 21g 2" needle used hydrodissecting along SPINOGLENOID NOTCH.     Ultrasound guidance was utilized for needle visualization. A TOTAL OF 5ML OF LIDOCAINE 1% AND 5ML OF STERILE NORMAL SALINE WAS UTILIZED. No complications. iMMEDIATE IMPROVEMENT OF SHOULDER PAIN POST    Ultrasound interpretation was performed prior to the procedure to identify the target and any adjacent neurovascular structures.  Subsequently, interpretation was performed during real- time needle guidance confirming placement. Post- intervention interpretation was also performed confirming appropriate injectate flow and hemostasis.  Images indicating needle placement have been saved in [employer].          "

## 2017-08-23 RX ORDER — AMLODIPINE BESYLATE 5 MG/1
TABLET ORAL
Qty: 90 TABLET | Refills: 3 | Status: SHIPPED | OUTPATIENT
Start: 2017-08-23 | End: 2018-12-13 | Stop reason: SDUPTHER

## 2017-08-24 ENCOUNTER — TELEPHONE (OUTPATIENT)
Dept: FAMILY MEDICINE | Facility: CLINIC | Age: 82
End: 2017-08-24

## 2017-08-24 NOTE — TELEPHONE ENCOUNTER
----- Message from Juliann Steward sent at 8/24/2017  1:16 PM CDT -----  Contact: son, Bashir Ramos  Patient needs a refill on Amlodipine 5 mg called into [facility] pharmacy at [phone].  Please call patient at [phone] if you have any questions. Thanks!     [facility] [employer], [facility] - [place], [place] - [address] [facility]  [address] [facility] [address]  Phone: [phone] Fax: [phone]

## 2017-08-24 NOTE — TELEPHONE ENCOUNTER
Talked with son, Bashir; asks for explanation on why patient can't refill his medication. Told him, Amlodipine 5mg was asked for and pharmacy filled 7/1/17 #90 so it's too soon. Not covered by insurance until 10/2017, but can cash pay if patient does not have any. He will go to parents house to talk with them and call if any help needed from clinic.

## 2017-08-24 NOTE — TELEPHONE ENCOUNTER
----- Message from Narda Connell sent at 8/24/2017  3:00 PM CDT -----  Contact: SonJoann  Pt Butch Camejo would like for you to give him a call cause the pt gave you the wrong Blood Pressure medication,Son would like a call back at [phone]

## 2017-08-24 NOTE — TELEPHONE ENCOUNTER
Talked with Marjorie; patient is out of [facility].  sent 8/23/17 to pharmacy. She says son went to pickup and they said they didn't receive. Called pharmacy, too soon to refill. Last 7/1/17 for 90tablets. Marjorie says she has that bottle, but empty. She will ask son to help with this. Did explain that insurance will not cover until 10/2017, but if need to patient can olson pay.  will call back if assistance needed.

## 2017-09-11 ENCOUNTER — TELEPHONE (OUTPATIENT)
Dept: PHYSICAL MEDICINE AND REHAB | Facility: CLINIC | Age: 82
End: 2017-09-11

## 2017-09-20 ENCOUNTER — OFFICE VISIT (OUTPATIENT)
Dept: PHYSICAL MEDICINE AND REHAB | Facility: CLINIC | Age: 82
End: 2017-09-20
Payer: MEDICARE

## 2017-09-20 VITALS
HEART RATE: 78 BPM | HEIGHT: 66 IN | SYSTOLIC BLOOD PRESSURE: 153 MMHG | WEIGHT: 144.63 LBS | BODY MASS INDEX: 23.24 KG/M2 | DIASTOLIC BLOOD PRESSURE: 90 MMHG

## 2017-09-20 DIAGNOSIS — M19.049 HAND ARTHRITIS: Primary | ICD-10-CM

## 2017-09-20 PROCEDURE — 3077F SYST BP >= 140 MM HG: CPT | Mod: S$GLB,,, | Performed by: PHYSICAL MEDICINE & REHABILITATION

## 2017-09-20 PROCEDURE — 1159F MED LIST DOCD IN RCRD: CPT | Mod: S$GLB,,, | Performed by: PHYSICAL MEDICINE & REHABILITATION

## 2017-09-20 PROCEDURE — 3008F BODY MASS INDEX DOCD: CPT | Mod: S$GLB,,, | Performed by: PHYSICAL MEDICINE & REHABILITATION

## 2017-09-20 PROCEDURE — 3080F DIAST BP >= 90 MM HG: CPT | Mod: S$GLB,,, | Performed by: PHYSICAL MEDICINE & REHABILITATION

## 2017-09-20 PROCEDURE — 1125F AMNT PAIN NOTED PAIN PRSNT: CPT | Mod: S$GLB,,, | Performed by: PHYSICAL MEDICINE & REHABILITATION

## 2017-09-20 PROCEDURE — 99214 OFFICE O/P EST MOD 30 MIN: CPT | Mod: S$GLB,,, | Performed by: PHYSICAL MEDICINE & REHABILITATION

## 2017-09-20 PROCEDURE — 99999 PR PBB SHADOW E&M-EST. PATIENT-LVL III: CPT | Mod: PBBFAC,,, | Performed by: PHYSICAL MEDICINE & REHABILITATION

## 2017-09-20 RX ORDER — DICLOFENAC SODIUM 10 MG/G
4 GEL TOPICAL DAILY
Qty: 2 TUBE | Refills: 3 | Status: SHIPPED | OUTPATIENT
Start: 2017-09-20 | End: 2017-10-03

## 2017-09-20 RX ORDER — DULOXETIN HYDROCHLORIDE 20 MG/1
20 CAPSULE, DELAYED RELEASE ORAL DAILY
Qty: 30 CAPSULE | Refills: 11 | Status: SHIPPED | OUTPATIENT
Start: 2017-09-20 | End: 2019-06-13 | Stop reason: SDUPTHER

## 2017-09-20 NOTE — PROGRESS NOTES
HPI:  Patient is a 82 y.o. year old male w. Left shoulder pain and hand pain. He is /p carpal tunnel release. Hand is still hurting him more at the base of his thumb.  The suprascapular nerve block did  Not help at all.    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 grayson)    EYE SURGERY       Family History   Problem Relation Age of Onset    No Known Problems Mother     No Known Problems Father     Amblyopia Neg Hx     Blindness Neg Hx     Cancer Neg Hx     Cataracts Neg Hx     Diabetes Neg Hx     Glaucoma Neg Hx     Hypertension Neg Hx     Macular degeneration Neg Hx     Retinal detachment Neg Hx     Strabismus Neg Hx     Stroke Neg Hx     Thyroid disease Neg Hx      Social History     Social History    Marital status:      Spouse name: N/A    Number of children: N/A    Years of education: N/A     Social History Main Topics    Smoking status: Former Smoker     Packs/day: 1.00     Types: Cigarettes     Quit date: 8/27/1982    Smokeless tobacco: Never Used    Alcohol use No    Drug use: No    Sexual activity: Not on file     Other Topics Concern    Not on file     Social History Narrative    No narrative on file       Review of patient's allergies indicates:   Allergen Reactions    Iodine and iodide containing products     Shellfish containing products     Pcn [penicillins] Rash       Current Outpatient Prescriptions:     acetaminophen (TYLENOL) 325 MG tablet, Take 2 tablets (650 mg total) by mouth every 8 (eight) hours as needed for Pain., Disp: , Rfl: 0    albuterol 90 mcg/actuation inhaler,  Inhale 2 puffs into the lungs every 6 (six) hours as needed for Wheezing or Shortness of Breath. Rescue, Disp: 1 Inhaler, Rfl: 0    allopurinol (ZYLOPRIM) 100 MG tablet, , Disp: , Rfl:     amlodipine (NORVASC) 5 MG tablet, TAKE ONE TABLET BY MOUTH EVERY DAY FOR BLOOD PRESSURE, Disp: 90 tablet, Rfl: 3    ascorbic acid, vitamin C, (VITAMIN C) 500 MG tablet, Take 1 tablet (500 mg total) by mouth every evening., Disp: , Rfl:     benazepril (LOTENSIN) 10 MG tablet, Take 20 mg by mouth once daily. , Disp: , Rfl:     diclofenac sodium 1 % Gel, Apply 2 g topically 3 (three) times daily., Disp: 2 Tube, Rfl: 3    docusate sodium (COLACE) 100 MG capsule, Take 1 capsule (100 mg total) by mouth 2 (two) times daily as needed for Constipation., Disp: 60 capsule, Rfl: 0    ergocalciferol (ERGOCALCIFEROL) 50,000 unit Cap, Take 1 capsule (50,000 Units total) by mouth every 7 days., Disp: 8 capsule, Rfl: 0    ferrous sulfate 325 (65 FE) MG EC tablet, Take 1 tablet (325 mg total) by mouth 2 (two) times daily., Disp: 60 tablet, Rfl: 0    gabapentin (NEURONTIN) 100 MG capsule, , Disp: , Rfl:     hydrocodone-acetaminophen 5-325mg (NORCO) 5-325 mg per tablet, Take 1 tablet by mouth every 6 (six) hours as needed for Pain., Disp: 61 tablet, Rfl: 0    magnesium oxide (MAG-OX) 400 mg tablet, Take 1 tablet (400 mg total) by mouth 2 (two) times daily., Disp: , Rfl: 0    multivitamin (THERAGRAN) tablet, Take 1 tablet by mouth once daily., Disp: , Rfl:     ondansetron (ZOFRAN-ODT) 4 MG TbDL, Take 1 tablet (4 mg total) by mouth every 8 (eight) hours as needed., Disp: 20 tablet, Rfl: 0    sulindac (CLINORIL) 200 MG Tab, , Disp: , Rfl:         Review of Systems  No nausea, vomiting, fevers, Chills , contipation, diarrhea or sweats      Physical Exam:      Vitals:    09/20/17 1404   BP: (!) 153/90   Pulse: 78     alert and oriented ×4 follows commands answers all questions appropriately,affect wnl  Manual muscle test 5 out of 5   +cmc  provocation right thumb  Dec rom of left shoulder  Nl gait  No C/C/E      Assessment:  Shoulder djd  rtc tear   Hand oa-has appt w. Ortho coming up  Plan:  Will not order mri b/c pt is declining surgery  Trial of cymbalta  voltaren gel trial

## 2017-10-02 ENCOUNTER — DOCUMENTATION ONLY (OUTPATIENT)
Dept: FAMILY MEDICINE | Facility: CLINIC | Age: 82
End: 2017-10-02

## 2017-10-02 NOTE — PROGRESS NOTES
Pre-Visit Chart Review  For Appointment Scheduled on 10/3/17.    Health Maintenance Due   Topic Date Due    TETANUS VACCINE  01/25/1953    Zoster Vaccine  01/25/1995    Influenza Vaccine  08/01/2017

## 2017-10-03 ENCOUNTER — OFFICE VISIT (OUTPATIENT)
Dept: FAMILY MEDICINE | Facility: CLINIC | Age: 82
End: 2017-10-03
Payer: MEDICARE

## 2017-10-03 ENCOUNTER — LAB VISIT (OUTPATIENT)
Dept: LAB | Facility: HOSPITAL | Age: 82
End: 2017-10-03
Attending: FAMILY MEDICINE
Payer: MEDICARE

## 2017-10-03 VITALS
TEMPERATURE: 98 F | RESPIRATION RATE: 20 BRPM | DIASTOLIC BLOOD PRESSURE: 76 MMHG | SYSTOLIC BLOOD PRESSURE: 137 MMHG | HEART RATE: 89 BPM | BODY MASS INDEX: 23.35 KG/M2 | HEIGHT: 66 IN | WEIGHT: 145.31 LBS

## 2017-10-03 DIAGNOSIS — Z29.9 PREVENTIVE MEASURE: ICD-10-CM

## 2017-10-03 DIAGNOSIS — M94.9 DISORDER OF CARTILAGE: ICD-10-CM

## 2017-10-03 DIAGNOSIS — R73.03 PREDIABETES: ICD-10-CM

## 2017-10-03 DIAGNOSIS — Z12.5 ENCOUNTER FOR PROSTATE CANCER SCREENING: ICD-10-CM

## 2017-10-03 DIAGNOSIS — H61.21 HEARING LOSS OF RIGHT EAR DUE TO CERUMEN IMPACTION: ICD-10-CM

## 2017-10-03 DIAGNOSIS — Z11.4 ENCOUNTER FOR SCREENING FOR HIV: ICD-10-CM

## 2017-10-03 DIAGNOSIS — I10 ESSENTIAL HYPERTENSION, BENIGN: Primary | ICD-10-CM

## 2017-10-03 DIAGNOSIS — E78.5 HYPERLIPIDEMIA, UNSPECIFIED HYPERLIPIDEMIA TYPE: ICD-10-CM

## 2017-10-03 DIAGNOSIS — R73.09 ABNORMAL GLUCOSE: ICD-10-CM

## 2017-10-03 LAB
25(OH)D3+25(OH)D2 SERPL-MCNC: 20 NG/ML
ALBUMIN SERPL BCP-MCNC: 3.5 G/DL
ALP SERPL-CCNC: 100 U/L
ALT SERPL W/O P-5'-P-CCNC: 19 U/L
ANION GAP SERPL CALC-SCNC: 9 MMOL/L
AST SERPL-CCNC: 20 U/L
BILIRUB SERPL-MCNC: 0.5 MG/DL
BUN SERPL-MCNC: 15 MG/DL
CALCIUM SERPL-MCNC: 9.7 MG/DL
CHLORIDE SERPL-SCNC: 107 MMOL/L
CHOLEST SERPL-MCNC: 228 MG/DL
CHOLEST/HDLC SERPL: 3.8 {RATIO}
CO2 SERPL-SCNC: 24 MMOL/L
COMPLEXED PSA SERPL-MCNC: 0.54 NG/ML
CREAT SERPL-MCNC: 1 MG/DL
EST. GFR  (AFRICAN AMERICAN): >60 ML/MIN/1.73 M^2
EST. GFR  (NON AFRICAN AMERICAN): >60 ML/MIN/1.73 M^2
ESTIMATED AVG GLUCOSE: 114 MG/DL
GLUCOSE SERPL-MCNC: 70 MG/DL
HBA1C MFR BLD HPLC: 5.6 %
HDLC SERPL-MCNC: 60 MG/DL
HDLC SERPL: 26.3 %
LDLC SERPL CALC-MCNC: 149 MG/DL
NONHDLC SERPL-MCNC: 168 MG/DL
POTASSIUM SERPL-SCNC: 4 MMOL/L
PROT SERPL-MCNC: 7.2 G/DL
SODIUM SERPL-SCNC: 140 MMOL/L
TRIGL SERPL-MCNC: 95 MG/DL

## 2017-10-03 PROCEDURE — 82306 VITAMIN D 25 HYDROXY: CPT

## 2017-10-03 PROCEDURE — G0008 ADMIN INFLUENZA VIRUS VAC: HCPCS | Mod: S$GLB,,, | Performed by: FAMILY MEDICINE

## 2017-10-03 PROCEDURE — 36415 COLL VENOUS BLD VENIPUNCTURE: CPT | Mod: PO

## 2017-10-03 PROCEDURE — 80061 LIPID PANEL: CPT

## 2017-10-03 PROCEDURE — 83036 HEMOGLOBIN GLYCOSYLATED A1C: CPT

## 2017-10-03 PROCEDURE — 86703 HIV-1/HIV-2 1 RESULT ANTBDY: CPT

## 2017-10-03 PROCEDURE — 99214 OFFICE O/P EST MOD 30 MIN: CPT | Mod: 25,S$GLB,, | Performed by: FAMILY MEDICINE

## 2017-10-03 PROCEDURE — 80053 COMPREHEN METABOLIC PANEL: CPT

## 2017-10-03 PROCEDURE — 84153 ASSAY OF PSA TOTAL: CPT

## 2017-10-03 PROCEDURE — 86592 SYPHILIS TEST NON-TREP QUAL: CPT

## 2017-10-03 PROCEDURE — 99999 PR PBB SHADOW E&M-EST. PATIENT-LVL IV: CPT | Mod: PBBFAC,,, | Performed by: FAMILY MEDICINE

## 2017-10-03 PROCEDURE — 90662 IIV NO PRSV INCREASED AG IM: CPT | Mod: S$GLB,,, | Performed by: FAMILY MEDICINE

## 2017-10-03 NOTE — PROGRESS NOTES
"[facility]  Progress Note    Subjective:       Patient ID: Bashir Ramos is a 82 y.o. male.    Chief Complaint: HTN follow up     HPI82 y.o.male is here today for six-month follow-up visit.  Patient is complaining of right ear wax buildup.  Patient had excess was buildup at last appointment.  Patient has been using Debrox over-the-counter.  Patient still seems to have some hearing loss secondary to buildup of wax.  Patient has not yet been evaluated by ENT.  Patient is due for annual labs, flu shot, and fit kit today.  No further complaints at today's visit.  Review of Systems   Constitutional: Negative for chills, fatigue and fever.   HENT: Negative for congestion, ear pain, postnasal drip, sinus pressure, sneezing and sore throat.    Eyes: Negative for pain.   Respiratory: Negative for cough, shortness of breath and wheezing.    Cardiovascular: Negative for chest pain, palpitations and leg swelling.   Gastrointestinal: Negative for abdominal distention, abdominal pain, constipation, diarrhea, nausea and vomiting.   Genitourinary: Negative for difficulty urinating.   Musculoskeletal: Negative for back pain and myalgias.   Skin: Negative for rash.   Neurological: Negative for dizziness, seizures, syncope, weakness and numbness.   Psychiatric/Behavioral: Negative for sleep disturbance. The patient is not nervous/anxious.        Objective:      Vitals:    10/03/17 1056   BP: 137/76   BP Location: Right arm   Patient Position: Sitting   BP Method: Medium (Automatic)   Pulse: 89   Resp: 20   Temp: 98.2 °F (36.8 °C)   TempSrc: Oral   Weight: 65.9 kg (145 lb 4.5 oz)   Height: 5' 6" (1.676 m)     Body mass index is 23.45 kg/m².  Physical Exam   Constitutional: He is oriented to person, place, and time. He appears well-developed and well-nourished. No distress.   HENT:   Head: Normocephalic and atraumatic.   Right ear cerumen impaction   Cardiovascular: Normal rate, regular rhythm, normal heart sounds and intact " distal pulses.  Exam reveals no gallop and no friction rub.    No murmur heard.  Pulmonary/Chest: Effort normal and breath sounds normal. No respiratory distress. He has no rales.   Abdominal: Soft. He exhibits no distension and no mass. There is no rebound and no guarding. No hernia.   Musculoskeletal: Normal range of motion.   Neurological: He is alert and oriented to person, place, and time.   Skin: Skin is warm.   Psychiatric: He has a normal mood and affect. His behavior is normal. Judgment and thought content normal.   Vitals reviewed.      Assessment:       1. Essential hypertension, benign    2. Prediabetes    3. Hyperlipidemia, unspecified hyperlipidemia type    4. Encounter for screening for HIV    5. Preventive measure    6. Disorder of cartilage    7. Encounter for prostate cancer screening    8. Hearing loss of right ear due to cerumen impaction        Plan:       Essential hypertension, benign        - Well controlled continue current medications    Prediabetes  -     Hemoglobin A1c; Future; Expected date: 10/03/2017    Hyperlipidemia, unspecified hyperlipidemia type  -     Lipid panel; Future; Expected date: 10/03/2017    Encounter for screening for HIV  -     HIV-1 and HIV-2 antibodies; Future; Expected date: 10/03/2017    Preventive measure  -     RPR; Future; Expected date: 10/03/2017    Disorder of cartilage  -     Vitamin D; Future; Expected date: 10/03/2017    Encounter for prostate cancer screening  -     PSA, Screening; Future; Expected date: 10/03/2017    Hearing loss of right ear due to cerumen impaction  -     Ambulatory referral to ENT  -     Ambulatory referral to Audiology      Return in about 3 months (around 1/3/2018).  Samir Rouse MD  [facility]  10/3/2017 1:20 PM

## 2017-10-04 LAB
HIV 1+2 AB+HIV1 P24 AG SERPL QL IA: NEGATIVE
RPR SER QL: NORMAL

## 2017-10-16 DIAGNOSIS — Z12.11 SCREENING FOR COLON CANCER: Primary | ICD-10-CM

## 2017-10-18 ENCOUNTER — OFFICE VISIT (OUTPATIENT)
Dept: OTOLARYNGOLOGY | Facility: CLINIC | Age: 82
End: 2017-10-18
Payer: MEDICARE

## 2017-10-18 VITALS
BODY MASS INDEX: 22.82 KG/M2 | WEIGHT: 142 LBS | HEIGHT: 66 IN | HEART RATE: 96 BPM | DIASTOLIC BLOOD PRESSURE: 90 MMHG | SYSTOLIC BLOOD PRESSURE: 133 MMHG

## 2017-10-18 DIAGNOSIS — H61.21 IMPACTED CERUMEN OF RIGHT EAR: Primary | ICD-10-CM

## 2017-10-18 DIAGNOSIS — H61.21 HEARING LOSS DUE TO CERUMEN IMPACTION, RIGHT: ICD-10-CM

## 2017-10-18 PROCEDURE — 99203 OFFICE O/P NEW LOW 30 MIN: CPT | Mod: 25,S$GLB,, | Performed by: NURSE PRACTITIONER

## 2017-10-18 PROCEDURE — 99999 PR PBB SHADOW E&M-EST. PATIENT-LVL III: CPT | Mod: PBBFAC,,, | Performed by: NURSE PRACTITIONER

## 2017-10-18 PROCEDURE — 69210 REMOVE IMPACTED EAR WAX UNI: CPT | Mod: RT,S$GLB,, | Performed by: NURSE PRACTITIONER

## 2017-10-18 NOTE — LETTER
October 18, 2017      Samir Rouse MD  [address] [facility] [place] [address]           [facility]  [address] [facility] [address] [place] [address]  Phone: [phone]  Fax: [phone]          Patient: Bashir Ramos   MR Number: [id number]   YOB: 1935   Date of Visit: 10/18/2017       Dear Dr. Samir Rouse:    Thank you for referring Bashir Ramos to me for evaluation. Attached you will find relevant portions of my assessment and plan of care.    If you have questions, please do not hesitate to call me. I look forward to following Bashir Ramos along with you.    Sincerely,    Klaudia Day, NP    Enclosure  CC:  No Recipients    If you would like to receive this communication electronically, please contact [email or web address] or [phone] to request more information on [facility] access.    For providers and/or their staff who would like to refer a patient to [facility], please contact us through our one-stop-shop provider referral line, [facility] , at [phone].    If you feel you have received this communication in error or would no longer like to receive these types of communications, please e-mail [email or web address]

## 2017-10-18 NOTE — PROGRESS NOTES
Subjective:       Patient ID: Bashir Ramos is a 82 y.o. male.    Chief Complaint: No chief complaint on file.    HPI   Patient is referred by Dr. Rouse for consultation for cerumen impaction affecting hearing AD only. Debrox did not help relieve aural blockage. He denies otalgia or otorrhea. He denies other ENT symptoms or concerns at this time.     Review of Systems   Constitutional: Negative.    HENT: Negative.         Right ear blockage muffling hearing   Eyes: Negative.    Respiratory: Negative.    Cardiovascular: Negative.    Gastrointestinal: Negative.    Musculoskeletal: Negative.    Skin: Negative.    Neurological: Negative.    Hematological: Negative.    Psychiatric/Behavioral: Negative.        Objective:      Physical Exam   Constitutional: He is oriented to person, place, and time. Vital signs are normal. He appears well-developed and well-nourished. He is cooperative. He does not appear ill. No distress.   HENT:   Head: Normocephalic and atraumatic.   Right Ear: Hearing, tympanic membrane, external ear and ear canal normal. Tympanic membrane is not erythematous. No middle ear effusion.   Left Ear: Hearing, tympanic membrane, external ear and ear canal normal. Tympanic membrane is not erythematous.  No middle ear effusion.   Nose: Nose normal. No mucosal edema or rhinorrhea.   Mouth/Throat: Uvula is midline, oropharynx is clear and moist and mucous membranes are normal. Mucous membranes are not pale, not dry and not cyanotic. No oral lesions. No oropharyngeal exudate, posterior oropharyngeal edema or posterior oropharyngeal erythema.     SEPARATE PROCEDURE IN OFFICE:   Procedure: Removal of impacted cerumen, bilateral   Pre Procedure Diagnosis: Cerumen Impaction   Post Procedure Diagnosis: Cerumen Impaction   Verbal informed consent in regards to risk of trauma to ear canal, ear drum or hearing, discomfort during procedure and/or inability to remove cerumen impaction in one session or unforeseen events  or complications.   No anesthesia.     Procedure in detail:   Ear canal visualized bilateral with appropriate size ear speculum utilizing Operating Head Binocular Otomicroscope   Utilizing the following: Ring curet, delicate alligator forceps AD. The impacted cerumen of the ear canals was removed atraumatically. The TM and EAC were then inspected and found to be clear of wax. See description of TMs/EACs in PE above.   Complications: No   Condition: Improved/Good     Eyes: Conjunctivae, EOM and lids are normal. Pupils are equal, round, and reactive to light. Right eye exhibits no discharge. Left eye exhibits no discharge. No scleral icterus.   Neck: Trachea normal and normal range of motion. Neck supple. No tracheal deviation present. No thyroid mass and no thyromegaly present.   Cardiovascular: Normal rate.    Pulmonary/Chest: Effort normal. No stridor. No respiratory distress. He has no wheezes.   Musculoskeletal: Normal range of motion.   Lymphadenopathy:     He has no cervical adenopathy.   Neurological: He is alert and oriented to person, place, and time. He has normal strength. Coordination and gait normal.   Skin: Skin is warm, dry and intact. No lesion and no rash noted. He is not diaphoretic. No cyanosis. No pallor.   Psychiatric: He has a normal mood and affect. His speech is normal and behavior is normal. Judgment and thought content normal. Cognition and memory are normal.   Nursing note and vitals reviewed.      Assessment:     Cerumen impaction removed AD      Plan:     Return to clinic as needed for further ENT symptoms or concerns

## 2017-10-20 ENCOUNTER — LAB VISIT (OUTPATIENT)
Dept: LAB | Facility: HOSPITAL | Age: 82
End: 2017-10-20
Attending: FAMILY MEDICINE
Payer: MEDICARE

## 2017-10-20 DIAGNOSIS — Z12.11 SCREENING FOR COLON CANCER: ICD-10-CM

## 2017-10-20 LAB — HEMOCCULT STL QL IA: NEGATIVE

## 2017-10-20 PROCEDURE — 82274 ASSAY TEST FOR BLOOD FECAL: CPT

## 2018-03-26 ENCOUNTER — TELEPHONE (OUTPATIENT)
Dept: FAMILY MEDICINE | Facility: CLINIC | Age: 83
End: 2018-03-26

## 2018-04-16 ENCOUNTER — OFFICE VISIT (OUTPATIENT)
Dept: FAMILY MEDICINE | Facility: CLINIC | Age: 83
End: 2018-04-16
Payer: MEDICARE

## 2018-04-16 ENCOUNTER — LAB VISIT (OUTPATIENT)
Dept: LAB | Facility: HOSPITAL | Age: 83
End: 2018-04-16
Attending: FAMILY MEDICINE
Payer: MEDICARE

## 2018-04-16 VITALS
TEMPERATURE: 97 F | HEIGHT: 66 IN | SYSTOLIC BLOOD PRESSURE: 124 MMHG | BODY MASS INDEX: 22.43 KG/M2 | WEIGHT: 139.56 LBS | HEART RATE: 89 BPM | DIASTOLIC BLOOD PRESSURE: 77 MMHG

## 2018-04-16 DIAGNOSIS — E78.5 HYPERLIPIDEMIA, UNSPECIFIED HYPERLIPIDEMIA TYPE: ICD-10-CM

## 2018-04-16 DIAGNOSIS — I10 ESSENTIAL HYPERTENSION, BENIGN: Primary | ICD-10-CM

## 2018-04-16 DIAGNOSIS — E55.9 VITAMIN D DEFICIENCY: ICD-10-CM

## 2018-04-16 DIAGNOSIS — M54.2 NECK PAIN: ICD-10-CM

## 2018-04-16 DIAGNOSIS — I10 ESSENTIAL HYPERTENSION, BENIGN: ICD-10-CM

## 2018-04-16 LAB
25(OH)D3+25(OH)D2 SERPL-MCNC: 71 NG/ML
BASOPHILS # BLD AUTO: 0.03 K/UL
BASOPHILS NFR BLD: 0.8 %
CHOLEST SERPL-MCNC: 214 MG/DL
CHOLEST/HDLC SERPL: 3.6 {RATIO}
DIFFERENTIAL METHOD: ABNORMAL
EOSINOPHIL # BLD AUTO: 0.7 K/UL
EOSINOPHIL NFR BLD: 17.7 %
ERYTHROCYTE [DISTWIDTH] IN BLOOD BY AUTOMATED COUNT: 14.2 %
HCT VFR BLD AUTO: 41.8 %
HDLC SERPL-MCNC: 60 MG/DL
HDLC SERPL: 28 %
HGB BLD-MCNC: 13.2 G/DL
IMM GRANULOCYTES # BLD AUTO: 0.01 K/UL
IMM GRANULOCYTES NFR BLD AUTO: 0.3 %
LDLC SERPL CALC-MCNC: 132.6 MG/DL
LYMPHOCYTES # BLD AUTO: 0.8 K/UL
LYMPHOCYTES NFR BLD: 21.5 %
MCH RBC QN AUTO: 30.6 PG
MCHC RBC AUTO-ENTMCNC: 31.6 G/DL
MCV RBC AUTO: 97 FL
MONOCYTES # BLD AUTO: 0.3 K/UL
MONOCYTES NFR BLD: 7.1 %
NEUTROPHILS # BLD AUTO: 1.9 K/UL
NEUTROPHILS NFR BLD: 52.6 %
NONHDLC SERPL-MCNC: 154 MG/DL
NRBC BLD-RTO: 0 /100 WBC
PLATELET # BLD AUTO: 240 K/UL
PMV BLD AUTO: 9.7 FL
RBC # BLD AUTO: 4.32 M/UL
TRIGL SERPL-MCNC: 107 MG/DL
WBC # BLD AUTO: 3.67 K/UL

## 2018-04-16 PROCEDURE — 85025 COMPLETE CBC W/AUTO DIFF WBC: CPT

## 2018-04-16 PROCEDURE — 99214 OFFICE O/P EST MOD 30 MIN: CPT | Mod: S$GLB,,, | Performed by: FAMILY MEDICINE

## 2018-04-16 PROCEDURE — 80061 LIPID PANEL: CPT

## 2018-04-16 PROCEDURE — 36415 COLL VENOUS BLD VENIPUNCTURE: CPT | Mod: PO

## 2018-04-16 PROCEDURE — 99999 PR PBB SHADOW E&M-EST. PATIENT-LVL III: CPT | Mod: PBBFAC,,, | Performed by: FAMILY MEDICINE

## 2018-04-16 PROCEDURE — 3074F SYST BP LT 130 MM HG: CPT | Mod: CPTII,S$GLB,, | Performed by: FAMILY MEDICINE

## 2018-04-16 PROCEDURE — 3078F DIAST BP <80 MM HG: CPT | Mod: CPTII,S$GLB,, | Performed by: FAMILY MEDICINE

## 2018-04-16 PROCEDURE — 82306 VITAMIN D 25 HYDROXY: CPT

## 2018-04-16 NOTE — PROGRESS NOTES
"[facility]  Progress Note    Subjective:       Patient ID: Bashir Ramos Sr. is a 83 y.o. male.    Chief Complaint: neck pain     HPI83 y.o.male is here today complaining of neck pain.  Patient describes the pain starts at the back of his head and radiates down into his right shoulder.  Patient has been taking Cymbalta which has not been effective in controlling his symptoms of pain.  Patient has participated in physical therapy last year.  Patient is due for repeat labs today.  No further complaints at today's visit.  Review of Systems   Constitutional: Negative for chills, fatigue and fever.   HENT: Negative for congestion, ear pain, postnasal drip, sinus pressure, sneezing and sore throat.    Eyes: Negative for pain.   Respiratory: Negative for cough, shortness of breath and wheezing.    Cardiovascular: Negative for chest pain, palpitations and leg swelling.   Gastrointestinal: Negative for abdominal distention, abdominal pain, constipation, diarrhea, nausea and vomiting.   Genitourinary: Negative for difficulty urinating.   Musculoskeletal: Positive for neck pain. Negative for back pain and myalgias.   Skin: Negative for rash.   Neurological: Negative for dizziness, seizures, syncope, weakness and numbness.   Psychiatric/Behavioral: Negative for sleep disturbance. The patient is not nervous/anxious.        Objective:      Vitals:    04/16/18 1049   BP: 124/77   BP Location: Right arm   Patient Position: Sitting   BP Method: Medium (Automatic)   Pulse: 89   Temp: 97.4 °F (36.3 °C)   TempSrc: Oral   Weight: 63.3 kg (139 lb 8.8 oz)   Height: 5' 6" (1.676 m)     Body mass index is 22.52 kg/m².  Physical Exam   Constitutional: He is oriented to person, place, and time. He appears well-developed and well-nourished. No distress.   HENT:   Head: Normocephalic and atraumatic.   Cardiovascular: Normal rate, regular rhythm, normal heart sounds and intact distal pulses.  Exam reveals no gallop and no friction " rub.    No murmur heard.  Pulmonary/Chest: Effort normal and breath sounds normal. No respiratory distress. He has no rales.   Abdominal: Soft. He exhibits no distension and no mass. There is no rebound and no guarding. No hernia.   Musculoskeletal: Normal range of motion.   Neurological: He is alert and oriented to person, place, and time.   Skin: Skin is warm.   Psychiatric: He has a normal mood and affect. His behavior is normal. Judgment and thought content normal.   Vitals reviewed.      Assessment:       1. Essential hypertension, benign    2. Hyperlipidemia, unspecified hyperlipidemia type    3. Vitamin D deficiency    4. Neck pain        Plan:       Essential hypertension, benign  -     CBC auto differential; Future; Expected date: 04/16/2018    Hyperlipidemia, unspecified hyperlipidemia type  -     Lipid panel; Future; Expected date: 04/16/2018    Vitamin D deficiency  -     Vitamin D; Future; Expected date: 04/16/2018    Neck pain  -     Amb[facility]      Follow-up in about 3 months (around 7/16/2018).  Samir Rouse MD  [facility]  4/16/2018 1:50 PM

## 2018-08-01 ENCOUNTER — DOCUMENTATION ONLY (OUTPATIENT)
Dept: FAMILY MEDICINE | Facility: CLINIC | Age: 83
End: 2018-08-01

## 2018-08-01 NOTE — PROGRESS NOTES
Pre-Visit Chart Review  For Appointment Scheduled on 08/07/2018    Health Maintenance Due   Topic Date Due    TETANUS VACCINE  01/25/1953    Zoster Vaccine  01/25/1995    Influenza Vaccine  08/01/2018

## 2018-08-07 ENCOUNTER — OFFICE VISIT (OUTPATIENT)
Dept: FAMILY MEDICINE | Facility: CLINIC | Age: 83
End: 2018-08-07
Payer: MEDICARE

## 2018-08-07 VITALS
HEIGHT: 66 IN | HEART RATE: 79 BPM | DIASTOLIC BLOOD PRESSURE: 80 MMHG | SYSTOLIC BLOOD PRESSURE: 142 MMHG | TEMPERATURE: 99 F | BODY MASS INDEX: 22.82 KG/M2 | WEIGHT: 142 LBS

## 2018-08-07 DIAGNOSIS — E55.9 VITAMIN D INSUFFICIENCY: ICD-10-CM

## 2018-08-07 DIAGNOSIS — I10 ESSENTIAL HYPERTENSION: Primary | ICD-10-CM

## 2018-08-07 PROBLEM — R73.03 PREDIABETES: Status: RESOLVED | Noted: 2017-10-03 | Resolved: 2018-08-07

## 2018-08-07 PROBLEM — R73.9 HYPERGLYCEMIA: Status: RESOLVED | Noted: 2017-01-11 | Resolved: 2018-08-07

## 2018-08-07 PROCEDURE — 99999 PR PBB SHADOW E&M-EST. PATIENT-LVL III: CPT | Mod: PBBFAC,,, | Performed by: PHYSICIAN ASSISTANT

## 2018-08-07 PROCEDURE — 3077F SYST BP >= 140 MM HG: CPT | Mod: CPTII,S$GLB,, | Performed by: PHYSICIAN ASSISTANT

## 2018-08-07 PROCEDURE — 3079F DIAST BP 80-89 MM HG: CPT | Mod: CPTII,S$GLB,, | Performed by: PHYSICIAN ASSISTANT

## 2018-08-07 PROCEDURE — 99213 OFFICE O/P EST LOW 20 MIN: CPT | Mod: S$GLB,,, | Performed by: PHYSICIAN ASSISTANT

## 2018-08-07 RX ORDER — BENAZEPRIL HYDROCHLORIDE 20 MG/1
20 TABLET ORAL DAILY
Qty: 90 TABLET | Refills: 3 | Status: SHIPPED | OUTPATIENT
Start: 2018-08-07 | End: 2020-01-08 | Stop reason: SDUPTHER

## 2018-08-07 NOTE — PROGRESS NOTES
Subjective:       Patient ID: Bashir Ramos Sr. is a 83 y.o. male.    Chief Complaint: Follow-up    Patient with hypertension and vitamin-D insufficiency presents for routine follow-up.  He has been out of his benazepril for approximately 2 weeks.  His blood pressure is slightly elevated above goal today.  He does not monitor blood pressure at home.  He has no acute complaints today.    Patients patient medical/surgical, social and family histories have been reviewed     Review of Systems   Constitutional: Negative for activity change, appetite change, fatigue and unexpected weight change.   Respiratory: Negative for cough, chest tightness and shortness of breath.    Cardiovascular: Negative for chest pain, palpitations and leg swelling.   Gastrointestinal: Negative for abdominal pain, anal bleeding, blood in stool, constipation, diarrhea and nausea.   Endocrine: Negative for polydipsia and polyuria.   Genitourinary: Negative for difficulty urinating, frequency, hematuria and urgency.   Musculoskeletal: Negative for arthralgias.   Skin: Negative for rash.   Neurological: Negative for dizziness and headaches.   Psychiatric/Behavioral: Negative for dysphoric mood. The patient is not nervous/anxious.        Objective:      Physical Exam   Constitutional: He appears well-developed and well-nourished. He is cooperative. No distress.   Eyes: Conjunctivae and EOM are normal. No scleral icterus.   Neck: Carotid bruit is not present.   Cardiovascular: Normal rate, regular rhythm and normal heart sounds.    Pulmonary/Chest: Effort normal and breath sounds normal.   Abdominal: Soft. Bowel sounds are normal. There is no tenderness.   Musculoskeletal:        Right lower leg: He exhibits no edema.        Left lower leg: He exhibits no edema.   Neurological: He is alert.   Vitals reviewed.      Assessment:       1. Essential hypertension    2. Body mass index (BMI) 22.0-22.9, adult    3. Vitamin D insufficiency        Plan:      Bashir was seen today for follow-up.    Diagnoses and all orders for this visit:    Essential hypertension     benazepril (LOTENSIN) 20 MG tablet; Take 1 tablet (20 mg total) by mouth once daily.  Continue amlodipine    Body mass index (BMI) 22.0-22.9, adult  Weight is essentially stable for the past 2 years continue as is  Vitamin D insufficiency  Over-the-counter vitamin-D supplementation 2000 units daily  Other orders  -  Patient readiness: acceptance and barriers:none    During the course of the visit the patient was educated and counseled about the following:     Hypertension:   Medication: no change.  Underweight:  Follow up and re-weight in: 3  months and as needed.     Goals: Hypertension: Reduce Blood Pressure and Underweight: Increase calorie intake and BMI      Did patient meet goals/outcomes: No    The following self management tools provided: declined    Patient Instructions (the written plan) was given to the patient/family.     Time spent with patient: 15 minutes    Barriers to medications present (no )    Adverse reactions to current medications (no)    Over the counter medications reviewed (Yes)

## 2018-11-06 ENCOUNTER — DOCUMENTATION ONLY (OUTPATIENT)
Dept: FAMILY MEDICINE | Facility: CLINIC | Age: 83
End: 2018-11-06

## 2018-11-06 NOTE — PROGRESS NOTES
Pre-Visit Chart Review  For Appointment Scheduled on 11/7/18    Health Maintenance Due   Topic Date Due    TETANUS VACCINE  01/25/1953    Influenza Vaccine  08/01/2018

## 2018-11-15 ENCOUNTER — TELEPHONE (OUTPATIENT)
Dept: FAMILY MEDICINE | Facility: CLINIC | Age: 83
End: 2018-11-15

## 2018-11-15 NOTE — TELEPHONE ENCOUNTER
----- Message from Loretta Perez sent at 11/15/2018  4:14 PM CST -----  Type:  Sooner Apoointment Request    Caller is requesting a sooner appointment.  Caller declined first available appointment listed below.  Caller will not accept being placed on the waitlist and is requesting a message be sent to doctor.    Name of Caller:Elmer Camejo   When is the first available appointment?  5/14/19  Symptoms:  3 mo follow up  Best Call Back Number:  [phone]    Additional Information:  Patient missed appt and would like to reschedule sooner than available

## 2018-12-12 ENCOUNTER — DOCUMENTATION ONLY (OUTPATIENT)
Dept: FAMILY MEDICINE | Facility: CLINIC | Age: 83
End: 2018-12-12

## 2018-12-13 ENCOUNTER — LAB VISIT (OUTPATIENT)
Dept: LAB | Facility: HOSPITAL | Age: 83
End: 2018-12-13
Attending: PHYSICIAN ASSISTANT
Payer: MEDICARE

## 2018-12-13 ENCOUNTER — OFFICE VISIT (OUTPATIENT)
Dept: FAMILY MEDICINE | Facility: CLINIC | Age: 83
End: 2018-12-13
Payer: MEDICARE

## 2018-12-13 VITALS
HEART RATE: 82 BPM | BODY MASS INDEX: 23.06 KG/M2 | DIASTOLIC BLOOD PRESSURE: 76 MMHG | TEMPERATURE: 98 F | SYSTOLIC BLOOD PRESSURE: 134 MMHG | HEIGHT: 66 IN | WEIGHT: 143.5 LBS

## 2018-12-13 DIAGNOSIS — I10 ESSENTIAL HYPERTENSION: Primary | ICD-10-CM

## 2018-12-13 DIAGNOSIS — M10.9 GOUT, ARTHRITIS: ICD-10-CM

## 2018-12-13 DIAGNOSIS — R60.9 DEPENDENT EDEMA: ICD-10-CM

## 2018-12-13 DIAGNOSIS — I10 ESSENTIAL HYPERTENSION: ICD-10-CM

## 2018-12-13 LAB
ALBUMIN SERPL BCP-MCNC: 3.8 G/DL
ALP SERPL-CCNC: 88 U/L
ALT SERPL W/O P-5'-P-CCNC: 21 U/L
ANION GAP SERPL CALC-SCNC: 7 MMOL/L
AST SERPL-CCNC: 23 U/L
BILIRUB SERPL-MCNC: 0.5 MG/DL
BUN SERPL-MCNC: 13 MG/DL
CALCIUM SERPL-MCNC: 9.8 MG/DL
CHLORIDE SERPL-SCNC: 107 MMOL/L
CHOLEST SERPL-MCNC: 259 MG/DL
CHOLEST/HDLC SERPL: 3.3 {RATIO}
CO2 SERPL-SCNC: 27 MMOL/L
CREAT SERPL-MCNC: 1.2 MG/DL
EST. GFR  (AFRICAN AMERICAN): >60 ML/MIN/1.73 M^2
EST. GFR  (NON AFRICAN AMERICAN): 55.6 ML/MIN/1.73 M^2
GLUCOSE SERPL-MCNC: 77 MG/DL
HDLC SERPL-MCNC: 78 MG/DL
HDLC SERPL: 30.1 %
LDLC SERPL CALC-MCNC: 163.4 MG/DL
NONHDLC SERPL-MCNC: 181 MG/DL
POTASSIUM SERPL-SCNC: 4 MMOL/L
PROT SERPL-MCNC: 7.8 G/DL
SODIUM SERPL-SCNC: 141 MMOL/L
TRIGL SERPL-MCNC: 88 MG/DL
TSH SERPL DL<=0.005 MIU/L-ACNC: 1.91 UIU/ML
URATE SERPL-MCNC: 5.9 MG/DL

## 2018-12-13 PROCEDURE — 90662 IIV NO PRSV INCREASED AG IM: CPT | Mod: S$GLB,,, | Performed by: PHYSICIAN ASSISTANT

## 2018-12-13 PROCEDURE — 80061 LIPID PANEL: CPT

## 2018-12-13 PROCEDURE — 3075F SYST BP GE 130 - 139MM HG: CPT | Mod: CPTII,S$GLB,, | Performed by: PHYSICIAN ASSISTANT

## 2018-12-13 PROCEDURE — G0008 ADMIN INFLUENZA VIRUS VAC: HCPCS | Mod: S$GLB,,, | Performed by: PHYSICIAN ASSISTANT

## 2018-12-13 PROCEDURE — 99999 PR PBB SHADOW E&M-EST. PATIENT-LVL III: CPT | Mod: PBBFAC,,, | Performed by: PHYSICIAN ASSISTANT

## 2018-12-13 PROCEDURE — 80053 COMPREHEN METABOLIC PANEL: CPT

## 2018-12-13 PROCEDURE — 36415 COLL VENOUS BLD VENIPUNCTURE: CPT | Mod: PO

## 2018-12-13 PROCEDURE — 1101F PT FALLS ASSESS-DOCD LE1/YR: CPT | Mod: CPTII,S$GLB,, | Performed by: PHYSICIAN ASSISTANT

## 2018-12-13 PROCEDURE — 84550 ASSAY OF BLOOD/URIC ACID: CPT

## 2018-12-13 PROCEDURE — 99213 OFFICE O/P EST LOW 20 MIN: CPT | Mod: S$GLB,,, | Performed by: PHYSICIAN ASSISTANT

## 2018-12-13 PROCEDURE — 3078F DIAST BP <80 MM HG: CPT | Mod: CPTII,S$GLB,, | Performed by: PHYSICIAN ASSISTANT

## 2018-12-13 PROCEDURE — 84443 ASSAY THYROID STIM HORMONE: CPT

## 2018-12-13 RX ORDER — ALLOPURINOL 100 MG/1
100 TABLET ORAL DAILY
Qty: 90 TABLET | Refills: 3 | Status: SHIPPED | OUTPATIENT
Start: 2018-12-13 | End: 2020-12-30 | Stop reason: SDUPTHER

## 2018-12-13 RX ORDER — AMLODIPINE BESYLATE 5 MG/1
5 TABLET ORAL DAILY
Qty: 90 TABLET | Refills: 3 | Status: SHIPPED | OUTPATIENT
Start: 2018-12-13 | End: 2020-12-30 | Stop reason: SDUPTHER

## 2018-12-13 NOTE — PROGRESS NOTES
Subjective:       Patient ID: Bashir Ramos Sr. is a 83 y.o. male.    Chief Complaint: Follow-up (HTN)    Patient with controlled hypertension, chronic gout on allopurinol, history of vitamin D insufficiency status post prescription therapy presents for routine follow-up.  His blood pressure is at goal.  He states that he has intermittent swelling of the feet.  Swelling improves with elevation.  He denies pain.  The swelling is in both feet.  He denies swelling into the legs.  He denies shortness of breath, cough, and orthopnea.  Patients patient medical/surgical, social and family histories have been reviewed         Review of Systems   Constitutional: Negative for activity change, appetite change, fatigue and unexpected weight change.   Respiratory: Negative for cough, chest tightness and shortness of breath.    Cardiovascular: Negative for chest pain, palpitations and leg swelling.   Gastrointestinal: Negative for abdominal pain, anal bleeding, blood in stool, constipation, diarrhea and nausea.   Endocrine: Negative for polydipsia and polyuria.   Genitourinary: Negative for difficulty urinating, frequency, hematuria and urgency.   Musculoskeletal: Positive for joint swelling. Negative for arthralgias and gait problem.   Skin: Negative for rash.   Neurological: Negative for dizziness and headaches.   Psychiatric/Behavioral: Negative for dysphoric mood. The patient is not nervous/anxious.        Objective:      Physical Exam   Constitutional: He appears well-developed and well-nourished. He is cooperative. No distress.   Eyes: Conjunctivae and EOM are normal. No scleral icterus.   Neck: Carotid bruit is not present.   Cardiovascular: Normal rate, regular rhythm and normal heart sounds.   Pulmonary/Chest: Effort normal and breath sounds normal.   Abdominal: Soft. Bowel sounds are normal. There is no tenderness.   Musculoskeletal:        Right lower leg: He exhibits no edema.        Left lower leg: He exhibits no  edema.   Neurological: He is alert.   Vitals reviewed.      Assessment:       1. Essential hypertension    2. Gout, arthritis    3. BMI 23.0-23.9, adult    4. Dependent edema        Plan:       Bashir was seen today for follow-up.    Diagnoses and all orders for this visit:    Essential hypertension  -     Comprehensive metabolic panel; Future  -     Lipid panel; Future  -     amLODIPine (NORVASC) 5 MG tablet; Take 1 tablet (5 mg total) by mouth once daily.  Continue benazepril    Gout, arthritis  -     Uric acid; Future      allopurinol (ZYLOPRIM) 100 MG tablet; Take 1 tablet (100 mg total) by mouth once daily.    BMI 23.0-23.9, adult    Dependent edema  -     TSH; Future  Elevation p.r.n.  ? Amlodipine contributing   Reduce sodium     Other orders  -

## 2018-12-14 DIAGNOSIS — E78.5 HYPERLIPIDEMIA, UNSPECIFIED HYPERLIPIDEMIA TYPE: Primary | ICD-10-CM

## 2018-12-14 RX ORDER — SIMVASTATIN 40 MG/1
40 TABLET, FILM COATED ORAL NIGHTLY
Qty: 90 TABLET | Refills: 3 | Status: SHIPPED | OUTPATIENT
Start: 2018-12-14 | End: 2020-01-08 | Stop reason: SDUPTHER

## 2019-03-16 ENCOUNTER — LAB VISIT (OUTPATIENT)
Dept: LAB | Facility: HOSPITAL | Age: 84
End: 2019-03-16
Attending: PHYSICIAN ASSISTANT
Payer: MEDICARE

## 2019-03-16 DIAGNOSIS — E78.5 HYPERLIPIDEMIA, UNSPECIFIED HYPERLIPIDEMIA TYPE: ICD-10-CM

## 2019-03-16 LAB
ALBUMIN SERPL BCP-MCNC: 3.7 G/DL
ALP SERPL-CCNC: 93 U/L
ALT SERPL W/O P-5'-P-CCNC: 22 U/L
ANION GAP SERPL CALC-SCNC: 8 MMOL/L
AST SERPL-CCNC: 20 U/L
BILIRUB SERPL-MCNC: 0.5 MG/DL
BUN SERPL-MCNC: 18 MG/DL
CALCIUM SERPL-MCNC: 9.8 MG/DL
CHLORIDE SERPL-SCNC: 108 MMOL/L
CO2 SERPL-SCNC: 24 MMOL/L
CREAT SERPL-MCNC: 1.2 MG/DL
EST. GFR  (AFRICAN AMERICAN): >60 ML/MIN/1.73 M^2
EST. GFR  (NON AFRICAN AMERICAN): 55.2 ML/MIN/1.73 M^2
GLUCOSE SERPL-MCNC: 102 MG/DL
POTASSIUM SERPL-SCNC: 4 MMOL/L
PROT SERPL-MCNC: 7.2 G/DL
SODIUM SERPL-SCNC: 140 MMOL/L

## 2019-03-16 PROCEDURE — 80053 COMPREHEN METABOLIC PANEL: CPT

## 2019-03-16 PROCEDURE — 36415 COLL VENOUS BLD VENIPUNCTURE: CPT | Mod: PO

## 2019-03-18 ENCOUNTER — HOSPITAL ENCOUNTER (EMERGENCY)
Facility: HOSPITAL | Age: 84
Discharge: HOME | End: 2019-03-18
Attending: EMERGENCY MEDICINE
Payer: MEDICARE

## 2019-03-18 VITALS
DIASTOLIC BLOOD PRESSURE: 87 MMHG | SYSTOLIC BLOOD PRESSURE: 150 MMHG | BODY MASS INDEX: 23.06 KG/M2 | HEART RATE: 98 BPM | RESPIRATION RATE: 14 BRPM | OXYGEN SATURATION: 99 % | WEIGHT: 143.5 LBS | TEMPERATURE: 98 F | HEIGHT: 66 IN

## 2019-03-18 DIAGNOSIS — M79.673 FOOT PAIN: ICD-10-CM

## 2019-03-18 DIAGNOSIS — M19.072 PRIMARY OSTEOARTHRITIS OF LEFT ANKLE: Primary | ICD-10-CM

## 2019-03-18 PROCEDURE — 99283 EMERGENCY DEPT VISIT LOW MDM: CPT

## 2019-03-18 PROCEDURE — 25000003 PHARM REV CODE 250: Performed by: EMERGENCY MEDICINE

## 2019-03-18 RX ORDER — TRAMADOL HYDROCHLORIDE 50 MG/1
50 TABLET ORAL EVERY 6 HOURS PRN
Qty: 12 TABLET | Refills: 0 | Status: SHIPPED | OUTPATIENT
Start: 2019-03-18 | End: 2021-09-30 | Stop reason: ALTCHOICE

## 2019-03-18 RX ORDER — NAPROXEN 250 MG/1
500 TABLET ORAL
Status: COMPLETED | OUTPATIENT
Start: 2019-03-18 | End: 2019-03-18

## 2019-03-18 RX ADMIN — NAPROXEN 500 MG: 250 TABLET ORAL at 11:03

## 2019-03-18 NOTE — ED PROVIDER NOTES
Encounter Date: 3/18/2019    SCRIBE #1 NOTE: Jaquelin GARZA, karen scribing for, and in the presence of, Felix Doyle MD.       History     Chief Complaint   Patient presents with    Foot Pain     L foot pain x 3 days. No known injury.       Time seen by provider: 11:16 AM on 03/18/2019    Bashir Ramos Sr. is a 84 y.o. male with HTN and gout who presents to the ED with an onset of constant left foot pain. He states his pain began 4 days ago after returning from a walk. The patient denies prior similar symptoms, injury/trauma, or any other symptoms at this time. No orthopedic SHx noted. Penicillin and Iodine/Iodide drug allergies noted.      The history is provided by the patient.     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 grayson)    EYE SURGERY      INSERTION, IOL PROSTHESIS Left 10/29/2013    [facility]formed [facility]    INSERTION, IOL PROSTHESIS Right 10/15/2013    [facility]    PHACOEMULSIFICATION, CATARACT Left 10/29/2013    [facility]    PHACOEMULSIFICATION, CATARACT Right 10/15/2013    [facility]    RELEASE-CARPAL TUNNEL/ Right Right 3/31/2017    Per[facility] by Anh An [facility] at [facility]     Family History   Problem Relation Age of  Onset    No Known Problems Mother     No Known Problems Father     Amblyopia Neg Hx     Blindness Neg Hx     Cancer Neg Hx     Cataracts Neg Hx     Diabetes Neg Hx     Glaucoma Neg Hx     Hypertension Neg Hx     Macular degeneration Neg Hx     Retinal detachment Neg Hx     Strabismus Neg Hx     Stroke Neg Hx     Thyroid disease Neg Hx      Social History     Tobacco Use    Smoking status: Former Smoker     Packs/day: 1.00     Types: Cigarettes     Last attempt to quit: 1982     Years since quittin.5    Smokeless tobacco: Never Used   Substance Use Topics    Alcohol use: No    Drug use: No     Review of Systems   Constitutional: Negative for activity change, appetite change, chills, fatigue and fever.   Eyes: Negative for visual disturbance.   Respiratory: Negative for apnea and shortness of breath.    Cardiovascular: Negative for chest pain and palpitations.   Gastrointestinal: Negative for abdominal distention and abdominal pain.   Genitourinary: Negative for difficulty urinating.   Musculoskeletal: Positive for arthralgias (left foot). Negative for joint swelling.   Skin: Negative for pallor and rash.   Neurological: Negative for headaches.   Hematological: Does not bruise/bleed easily.   Psychiatric/Behavioral: Negative for agitation.     Physical Exam     Initial Vitals [19 1110]   BP Pulse Resp Temp SpO2   (!) 150/87 98 14 97.6 °F (36.4 °C) 99 %      MAP       --         Physical Exam    Nursing note and vitals reviewed.  Constitutional: He appears well-developed and well-nourished.   HENT:   Head: Normocephalic and atraumatic.   Eyes: Conjunctivae are normal.   Neck: Normal range of motion. Neck supple.   Cardiovascular: Normal rate, regular rhythm and normal heart sounds. Exam reveals no gallop and no friction rub.    No murmur heard.  Pulmonary/Chest: Breath sounds normal. No respiratory distress. He has no wheezes. He has no rhonchi. He has no rales.   Musculoskeletal:  Normal range of motion.        Left ankle: Tenderness.   Pain over the left lateral ankle mortis. No effusion, erythema, or calor.    Neurological: He is alert and oriented to person, place, and time.   Skin: Skin is warm and dry. No erythema.   Psychiatric: He has a normal mood and affect.       ED Course   Procedures  Labs Reviewed - No data to display     Imaging Results          X-Ray Foot Complete Left (In process)                  Medical Decision Making:   History:   Old Medical Records: I decided to obtain old medical records.  Clinical Tests:   Radiological Study: Reviewed and Ordered  ED Management:  84-year-old male presents with left lateral ankle pain.  He has no joint effusion with no night sweats or fever with infectious arthritis unlikely.  X-rays independently interpreted by me demonstrate osteopenia with degenerative changes but no fracture or subluxation.  The symptoms are most consistent with osteo arthritis.       APC / Resident Notes:   I, Dr. Felix Doyle III, personally performed the services described in this documentation. All medical record entries made by the scribe were at my direction and in my presence.  I have reviewed the chart and agree that the record reflects my personal performance and is accurate and complete       Scribe Attestation:   Scribe #1: I performed the above scribed service and the documentation accurately describes the services I performed. I attest to the accuracy of the note.               Clinical Impression:       ICD-10-CM ICD-9-CM   1. Primary osteoarthritis of left ankle M19.072 715.17   2. Foot pain M79.673 729.5         Disposition:   Disposition: Discharged  Condition: Stable                        Felix Doyle III, MD  03/18/19 [id number]

## 2019-05-30 ENCOUNTER — PATIENT OUTREACH (OUTPATIENT)
Dept: ADMINISTRATIVE | Facility: HOSPITAL | Age: 84
End: 2019-05-30

## 2019-06-12 ENCOUNTER — DOCUMENTATION ONLY (OUTPATIENT)
Dept: FAMILY MEDICINE | Facility: CLINIC | Age: 84
End: 2019-06-12

## 2019-06-12 NOTE — PROGRESS NOTES
Pre-Visit Chart Review  For Appointment Scheduled on 6/13/19     Health Maintenance Due   Topic Date Due    TETANUS VACCINE  01/25/1953

## 2019-06-13 ENCOUNTER — OFFICE VISIT (OUTPATIENT)
Dept: FAMILY MEDICINE | Facility: CLINIC | Age: 84
End: 2019-06-13
Payer: MEDICARE

## 2019-06-13 VITALS
DIASTOLIC BLOOD PRESSURE: 68 MMHG | OXYGEN SATURATION: 98 % | WEIGHT: 141.56 LBS | SYSTOLIC BLOOD PRESSURE: 120 MMHG | TEMPERATURE: 98 F | HEART RATE: 80 BPM | BODY MASS INDEX: 22.75 KG/M2 | HEIGHT: 66 IN

## 2019-06-13 DIAGNOSIS — M10.9 GOUT, ARTHRITIS: ICD-10-CM

## 2019-06-13 DIAGNOSIS — I10 ESSENTIAL HYPERTENSION: Primary | ICD-10-CM

## 2019-06-13 DIAGNOSIS — E78.5 HYPERLIPIDEMIA, UNSPECIFIED HYPERLIPIDEMIA TYPE: ICD-10-CM

## 2019-06-13 PROCEDURE — 99214 OFFICE O/P EST MOD 30 MIN: CPT | Mod: S$GLB,,, | Performed by: FAMILY MEDICINE

## 2019-06-13 PROCEDURE — 3078F DIAST BP <80 MM HG: CPT | Mod: CPTII,S$GLB,, | Performed by: FAMILY MEDICINE

## 2019-06-13 PROCEDURE — 99499 UNLISTED E&M SERVICE: CPT | Mod: S$GLB,,, | Performed by: FAMILY MEDICINE

## 2019-06-13 PROCEDURE — 3074F PR MOST RECENT SYSTOLIC BLOOD PRESSURE < 130 MM HG: ICD-10-PCS | Mod: CPTII,S$GLB,, | Performed by: FAMILY MEDICINE

## 2019-06-13 PROCEDURE — 1101F PT FALLS ASSESS-DOCD LE1/YR: CPT | Mod: CPTII,S$GLB,, | Performed by: FAMILY MEDICINE

## 2019-06-13 PROCEDURE — 99499 RISK ADDL DX/OHS AUDIT: ICD-10-PCS | Mod: S$GLB,,, | Performed by: FAMILY MEDICINE

## 2019-06-13 PROCEDURE — 99214 PR OFFICE/OUTPT VISIT, EST, LEVL IV, 30-39 MIN: ICD-10-PCS | Mod: S$GLB,,, | Performed by: FAMILY MEDICINE

## 2019-06-13 PROCEDURE — 1101F PR PT FALLS ASSESS DOC 0-1 FALLS W/OUT INJ PAST YR: ICD-10-PCS | Mod: CPTII,S$GLB,, | Performed by: FAMILY MEDICINE

## 2019-06-13 PROCEDURE — 99999 PR PBB SHADOW E&M-EST. PATIENT-LVL IV: ICD-10-PCS | Mod: PBBFAC,,, | Performed by: FAMILY MEDICINE

## 2019-06-13 PROCEDURE — 3074F SYST BP LT 130 MM HG: CPT | Mod: CPTII,S$GLB,, | Performed by: FAMILY MEDICINE

## 2019-06-13 PROCEDURE — 3078F PR MOST RECENT DIASTOLIC BLOOD PRESSURE < 80 MM HG: ICD-10-PCS | Mod: CPTII,S$GLB,, | Performed by: FAMILY MEDICINE

## 2019-06-13 PROCEDURE — 99999 PR PBB SHADOW E&M-EST. PATIENT-LVL IV: CPT | Mod: PBBFAC,,, | Performed by: FAMILY MEDICINE

## 2019-06-13 RX ORDER — DULOXETIN HYDROCHLORIDE 20 MG/1
20 CAPSULE, DELAYED RELEASE ORAL DAILY
Qty: 90 CAPSULE | Refills: 3 | Status: SHIPPED | OUTPATIENT
Start: 2019-06-13 | End: 2022-10-06

## 2019-06-13 NOTE — PROGRESS NOTES
Subjective:   Patient ID: Bashir Ramos Sr. is a 84 y.o. male     Chief Complaint:Follow-up (6 months)      Patient presents for establish care visit.  Patient with chronic gout which is currently managed with allopurinol.  Patient hyperlipidemia which currently managed with statin.  Patient with hypertension currently managed with oral medication.    Review of Systems   Respiratory: Negative for shortness of breath.    Cardiovascular: Negative for chest pain.   Gastrointestinal: Negative for abdominal pain.   Genitourinary: Negative for dysuria.     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  Physical Exam   Neck: Neck supple. No tracheal deviation present.   Cardiovascular: Normal rate, regular rhythm and normal heart sounds.   Pulmonary/Chest: Effort normal. No respiratory distress.   Musculoskeletal: Normal range of motion. He exhibits no edema.     Assessment:     1. Essential hypertension    2. Gout, arthritis    3. Hyperlipidemia, unspecified hyperlipidemia type      Plan:   Essential hypertension  -     Comprehensive metabolic panel; Future; Expected date: 09/13/2019  Review of blood work from December 2018 shows no signs end organ damage such as kidney disease indicating controlled BP has reduced patients risk of hypertensive comorbidity  Gout, arthritis  -     Uric acid; Future; Expected date: 06/13/2019  Patient with gout currently well controlled on allopurinol.  Reviewed blood work from December 2018 shows uric acid level of 5.9.  Hyperlipidemia, unspecified hyperlipidemia type  -     Lipid panel; Future; Expected date: 06/13/2019  Patient hyperlipidemia  currently stable on statin.  Reviewed blood work from December 2018 shows an LDL cholesterol of 163 which is not at goal.  Goal for patient be less than 130.  With patient's risk factors and age risk stratification will continue to monitor and consider increasing intensity of statin only in the future if more persistently elevated.  Other orders  -     DULoxetine (CYMBALTA) 20 MG capsule; Take 1 capsule (20 mg total) by mouth once daily.  Dispense: 90 capsule; Refill: 3      Time spent with patient: 30 minutes and over half of that time was spent on counseling an coordination of care.    Juan Casey MD  06/13/2019    Portions of this note have been dictated with ADEN Cuevas.

## 2019-12-10 ENCOUNTER — LAB VISIT (OUTPATIENT)
Dept: LAB | Facility: HOSPITAL | Age: 84
End: 2019-12-10
Attending: FAMILY MEDICINE
Payer: MEDICARE

## 2019-12-10 DIAGNOSIS — M10.9 GOUT, ARTHRITIS: ICD-10-CM

## 2019-12-10 DIAGNOSIS — I10 ESSENTIAL HYPERTENSION: ICD-10-CM

## 2019-12-10 DIAGNOSIS — E78.5 HYPERLIPIDEMIA, UNSPECIFIED HYPERLIPIDEMIA TYPE: ICD-10-CM

## 2019-12-10 LAB
ALBUMIN SERPL BCP-MCNC: 3.6 G/DL (ref 3.5–5.2)
ALP SERPL-CCNC: 80 U/L (ref 55–135)
ALT SERPL W/O P-5'-P-CCNC: 12 U/L (ref 10–44)
ANION GAP SERPL CALC-SCNC: 6 MMOL/L (ref 8–16)
AST SERPL-CCNC: 17 U/L (ref 10–40)
BILIRUB SERPL-MCNC: 0.5 MG/DL (ref 0.1–1)
BUN SERPL-MCNC: 16 MG/DL (ref 8–23)
CALCIUM SERPL-MCNC: 9.4 MG/DL (ref 8.7–10.5)
CHLORIDE SERPL-SCNC: 109 MMOL/L (ref 95–110)
CHOLEST SERPL-MCNC: 237 MG/DL (ref 120–199)
CHOLEST/HDLC SERPL: 4.1 {RATIO} (ref 2–5)
CO2 SERPL-SCNC: 26 MMOL/L (ref 23–29)
CREAT SERPL-MCNC: 1.2 MG/DL (ref 0.5–1.4)
EST. GFR  (AFRICAN AMERICAN): >60 ML/MIN/1.73 M^2
EST. GFR  (NON AFRICAN AMERICAN): 55.2 ML/MIN/1.73 M^2
GLUCOSE SERPL-MCNC: 83 MG/DL (ref 70–110)
HDLC SERPL-MCNC: 58 MG/DL (ref 40–75)
HDLC SERPL: 24.5 % (ref 20–50)
LDLC SERPL CALC-MCNC: 158 MG/DL (ref 63–159)
NONHDLC SERPL-MCNC: 179 MG/DL
POTASSIUM SERPL-SCNC: 4.2 MMOL/L (ref 3.5–5.1)
PROT SERPL-MCNC: 6.8 G/DL (ref 6–8.4)
SODIUM SERPL-SCNC: 141 MMOL/L (ref 136–145)
TRIGL SERPL-MCNC: 105 MG/DL (ref 30–150)
URATE SERPL-MCNC: 4.6 MG/DL (ref 3.4–7)

## 2019-12-10 PROCEDURE — 80061 LIPID PANEL: CPT

## 2019-12-10 PROCEDURE — 80053 COMPREHEN METABOLIC PANEL: CPT

## 2019-12-10 PROCEDURE — 36415 COLL VENOUS BLD VENIPUNCTURE: CPT | Mod: PO

## 2019-12-10 PROCEDURE — 84550 ASSAY OF BLOOD/URIC ACID: CPT

## 2020-01-02 ENCOUNTER — DOCUMENTATION ONLY (OUTPATIENT)
Dept: FAMILY MEDICINE | Facility: CLINIC | Age: 85
End: 2020-01-02

## 2020-01-02 NOTE — PROGRESS NOTES
Pre-Visit Chart Review  For Appointment Scheduled on 1/8/2020    Health Maintenance Due   Topic Date Due    TETANUS VACCINE  01/25/1953

## 2020-01-08 ENCOUNTER — OFFICE VISIT (OUTPATIENT)
Dept: FAMILY MEDICINE | Facility: CLINIC | Age: 85
End: 2020-01-08
Payer: MEDICARE

## 2020-01-08 VITALS
SYSTOLIC BLOOD PRESSURE: 116 MMHG | OXYGEN SATURATION: 97 % | HEART RATE: 82 BPM | TEMPERATURE: 98 F | DIASTOLIC BLOOD PRESSURE: 62 MMHG | WEIGHT: 139.56 LBS | HEIGHT: 66 IN | BODY MASS INDEX: 22.43 KG/M2

## 2020-01-08 DIAGNOSIS — G56.03 BILATERAL CARPAL TUNNEL SYNDROME: ICD-10-CM

## 2020-01-08 DIAGNOSIS — I10 ESSENTIAL HYPERTENSION: Primary | ICD-10-CM

## 2020-01-08 DIAGNOSIS — E78.5 HYPERLIPIDEMIA, UNSPECIFIED HYPERLIPIDEMIA TYPE: ICD-10-CM

## 2020-01-08 PROCEDURE — 99999 PR PBB SHADOW E&M-EST. PATIENT-LVL III: CPT | Mod: PBBFAC,,, | Performed by: FAMILY MEDICINE

## 2020-01-08 PROCEDURE — 90662 IIV NO PRSV INCREASED AG IM: CPT | Mod: S$GLB,,, | Performed by: FAMILY MEDICINE

## 2020-01-08 PROCEDURE — 1159F PR MEDICATION LIST DOCUMENTED IN MEDICAL RECORD: ICD-10-PCS | Mod: S$GLB,,, | Performed by: FAMILY MEDICINE

## 2020-01-08 PROCEDURE — 99999 PR PBB SHADOW E&M-EST. PATIENT-LVL III: ICD-10-PCS | Mod: PBBFAC,,, | Performed by: FAMILY MEDICINE

## 2020-01-08 PROCEDURE — 90662 FLU VACCINE - HIGH DOSE (65+) PRESERVATIVE FREE IM: ICD-10-PCS | Mod: S$GLB,,, | Performed by: FAMILY MEDICINE

## 2020-01-08 PROCEDURE — 3074F PR MOST RECENT SYSTOLIC BLOOD PRESSURE < 130 MM HG: ICD-10-PCS | Mod: CPTII,S$GLB,, | Performed by: FAMILY MEDICINE

## 2020-01-08 PROCEDURE — 1159F MED LIST DOCD IN RCRD: CPT | Mod: S$GLB,,, | Performed by: FAMILY MEDICINE

## 2020-01-08 PROCEDURE — 3074F SYST BP LT 130 MM HG: CPT | Mod: CPTII,S$GLB,, | Performed by: FAMILY MEDICINE

## 2020-01-08 PROCEDURE — G0008 ADMIN INFLUENZA VIRUS VAC: HCPCS | Mod: S$GLB,,, | Performed by: FAMILY MEDICINE

## 2020-01-08 PROCEDURE — G0008 FLU VACCINE - HIGH DOSE (65+) PRESERVATIVE FREE IM: ICD-10-PCS | Mod: S$GLB,,, | Performed by: FAMILY MEDICINE

## 2020-01-08 PROCEDURE — 1126F AMNT PAIN NOTED NONE PRSNT: CPT | Mod: S$GLB,,, | Performed by: FAMILY MEDICINE

## 2020-01-08 PROCEDURE — 1101F PR PT FALLS ASSESS DOC 0-1 FALLS W/OUT INJ PAST YR: ICD-10-PCS | Mod: CPTII,S$GLB,, | Performed by: FAMILY MEDICINE

## 2020-01-08 PROCEDURE — 3078F PR MOST RECENT DIASTOLIC BLOOD PRESSURE < 80 MM HG: ICD-10-PCS | Mod: CPTII,S$GLB,, | Performed by: FAMILY MEDICINE

## 2020-01-08 PROCEDURE — 1101F PT FALLS ASSESS-DOCD LE1/YR: CPT | Mod: CPTII,S$GLB,, | Performed by: FAMILY MEDICINE

## 2020-01-08 PROCEDURE — 3078F DIAST BP <80 MM HG: CPT | Mod: CPTII,S$GLB,, | Performed by: FAMILY MEDICINE

## 2020-01-08 PROCEDURE — 99214 PR OFFICE/OUTPT VISIT, EST, LEVL IV, 30-39 MIN: ICD-10-PCS | Mod: 25,S$GLB,, | Performed by: FAMILY MEDICINE

## 2020-01-08 PROCEDURE — 99214 OFFICE O/P EST MOD 30 MIN: CPT | Mod: 25,S$GLB,, | Performed by: FAMILY MEDICINE

## 2020-01-08 PROCEDURE — 1126F PR PAIN SEVERITY QUANTIFIED, NO PAIN PRESENT: ICD-10-PCS | Mod: S$GLB,,, | Performed by: FAMILY MEDICINE

## 2020-01-08 RX ORDER — BENAZEPRIL HYDROCHLORIDE 20 MG/1
20 TABLET ORAL DAILY
Qty: 90 TABLET | Refills: 3 | Status: SHIPPED | OUTPATIENT
Start: 2020-01-08 | End: 2021-02-08

## 2020-01-08 RX ORDER — GABAPENTIN 100 MG/1
100 CAPSULE ORAL NIGHTLY
Qty: 90 CAPSULE | Refills: 3 | Status: SHIPPED | OUTPATIENT
Start: 2020-01-08 | End: 2022-10-06

## 2020-01-08 RX ORDER — SIMVASTATIN 40 MG/1
40 TABLET, FILM COATED ORAL NIGHTLY
Qty: 90 TABLET | Refills: 3 | Status: SHIPPED | OUTPATIENT
Start: 2020-01-08 | End: 2021-11-05

## 2020-01-08 NOTE — PROGRESS NOTES
Identified patient's name and . Administered high dose flu vaccine, IM. Patient tolerated well, aseptic technique maintained. Pain scale 0/10 with injection. Instructed patient to wait in the clinic for 15 minutes after the injection was given.

## 2020-01-08 NOTE — PROGRESS NOTES
Subjective:   Patient ID: Bashir Ramos Sr. is a 84 y.o. male     Chief Complaint:Follow-up (6 months)      Patient with complaint of numbness and tingling in his fingers.  Patient states he has had this before both hands and had carpal tunnel surgery in the right wrist and some improvement.  Now it is worse in the left wrist.  Patient is amenable to starting gabapentin.  Patient states the pain keeps him up at night at times.  Otherwise patient overall doing well.    Review of Systems   Respiratory: Negative for shortness of breath.    Cardiovascular: Negative for chest pain.   Gastrointestinal: Negative for abdominal pain.   Genitourinary: Negative for dysuria.     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  Physical Exam   Neck: Neck supple. No tracheal deviation present.   Cardiovascular: Normal rate, regular rhythm and normal heart sounds.   Pulmonary/Chest: Effort normal. No respiratory distress.   Musculoskeletal: Normal range of motion. He exhibits no edema.     Assessment:     1. Essential hypertension    2. Hyperlipidemia, unspecified hyperlipidemia type    3. Bilateral carpal tunnel syndrome      Plan:   Essential hypertension  -     benazepril (LOTENSIN) 20 MG tablet; Take 1 tablet (20 mg total) by mouth once daily.  Dispense: 90 tablet; Refill: 3    Hyperlipidemia, unspecified hyperlipidemia type  -     simvastatin (ZOCOR) 40 MG tablet; Take 1 tablet (40 mg total) by mouth every evening.  Dispense: 90 tablet; Refill: 3    Bilateral carpal tunnel syndrome  -     gabapentin (NEURONTIN) 100 MG capsule; Take 1 capsule (100 mg total) by mouth every evening.   Dispense: 90 capsule; Refill: 3  Starting patient on low-dose.  Counseled patient on the risks associated medication.  Patient understands the risk.  Also advised the patient if feels like the medications working though still having symptoms can increase the dose as he is on a very low-dose.    Other orders  -     Influenza - High Dose (65+) (PF) (IM)      Time spent with patient: 15 minutes and over half of that time was spent on counseling an coordination of care.    Juan Casey MD  01/08/2020    Portions of this note have been dictated with ADEN Monterroso

## 2020-06-24 NOTE — PROGRESS NOTES
Dalton Hdz M.D.,[facility]  Jr Denise D.O., F.A.C.O.I., Miranda Randolph M.D. Braden Gabriel M.D., Meghan Colón M.D. Alcides Fountain D.O. Daily Pulmonary Progress Note    Patient:  Doug Vieira 68 y.o. male MRN: [id number]     Date of Service: 6/24/2020      Synopsis     We are following patient for acute hypoxic and hypercapnic respiratory failure and COPD exacerbation    \"CC\"  : Cough     Code status: Full code      Subjective      Patient was seen and examined. He is awake alert oriented x3. He is saturating 92 to 94% on 4 L of oxygen. Was compliant with his BiPAP last night. Has no labored breathing today no wheezing , non productive cough today . Overall feeling better. Review of Systems:  Constitutional: Denies fever, weight loss, night sweats, and fatigue  Skin: Denies pigmentation, dark lesions, and rashes   HEENT: Denies hearing loss, tinnitus, ear drainage, epistaxis, sore throat, and hoarseness. Cardiovascular: Denies palpitations, chest pain, and chest pressure. Respiratory: Denies cough, dyspnea at rest, hemoptysis, apnea, and choking.   Gastrointestinal: Denies nausea, vomiting, poor appetite, diarrhea, heartburn or reflux  Genitourinary: Denies dysuria, frequency, urgency or hematuria  Musculoskeletal: Denies myalgias, muscle weakness, and bone pain    24-hour events:    None   Objective   Vitals: BP (!) 156/108   Pulse 103   Temp 97.1 °F (36.2 °C) (Temporal)   Resp 19   Ht 6' (1.829 m)   Wt 273 lb 14.4 oz (124.2 kg)   SpO2 99%   BMI 37.15 kg/m²     I/O:    Intake/Output Summary (Last 24 hours) at 6/24/2020 0921  Last data filed at 6/24/2020 0159  Gross per 24 hour   Intake 130 ml   Output 500 ml   Net -370 ml       Vent Information  Skin Assessment: Clean, dry, & intact  Vt Ordered: 650 mL  FiO2 : 50 %  SpO2: 99 %  SpO2/FiO2 ratio: 192  I Time/ I Time %: 0.75 s       IPAP: 20 cmH20  CPAP/EPAP: 6 cmH2O     CURRENT MEDS :  Scheduled Meds:   Pre-Visit Chart Review  For Appointment Scheduled on 12/13/2018    Health Maintenance Due   Topic Date Due    TETANUS VACCINE  01/25/1953    Influenza Vaccine  08/01/2018                      mary. 3. Patient admits to wearing oxygen at nighttime he has had sleep studies in the past has not been able to tolerate Pap machine in the past.  Patient follows with a pulmonologist at the [facility]  4. lasix 40 mg IV BID   5. Strict TAVARES's  -355cc L in the last 24 hours  6. PT/OT  7. GI and DVT prophylaxis  8. Follow cxr   9. Solumedrol taper to presnisone today   10. Patient did  wear bipap overnight I ordered nasal mask that was not used last night but pt did tolerate a full face mask ,discussed with pt our recommendation that he follow up with his [facility] team regarding non compliance due to ill fitting mask, will need ambulating pulse oximetry prior to d/c           Electronically signed by VALERIA Appiah on 6/24/2020 at 9:21 AM    Addendum:     Patient awake and alert sitting up in his bleed. His major complaint today is his constipation, hemorrhoids and rectal bleeding. Talked to his nurse, ongoing problem for the past few days. Will consult general surgery Dr.Lillianne BROCK [place] patient well known to the. His metabolic alkalosis improved with decreasing lasix to 40 bid. Can switch him back to his home bumex in a.m. Will switch him to oral prednisone today. I personally saw, examined, and cared for the patient. Labs, medications, radiographs reviewed. I agree with history exam and plans detailed in NP note.   Katy Hernandez MD

## 2020-07-14 ENCOUNTER — DOCUMENTATION ONLY (OUTPATIENT)
Dept: FAMILY MEDICINE | Facility: CLINIC | Age: 85
End: 2020-07-14

## 2020-07-14 NOTE — PROGRESS NOTES
Pre-Visit Chart Review  For Appointment Scheduled on 7/15/2020    Health Maintenance Due   Topic Date Due    TETANUS VACCINE  01/25/1953    Shingles Vaccine (2 of 3) 03/08/2017

## 2020-12-30 ENCOUNTER — OFFICE VISIT (OUTPATIENT)
Dept: FAMILY MEDICINE | Facility: CLINIC | Age: 85
End: 2020-12-30
Payer: MEDICARE

## 2020-12-30 ENCOUNTER — TELEPHONE (OUTPATIENT)
Dept: FAMILY MEDICINE | Facility: CLINIC | Age: 85
End: 2020-12-30

## 2020-12-30 VITALS
WEIGHT: 149.69 LBS | TEMPERATURE: 98 F | OXYGEN SATURATION: 98 % | HEIGHT: 66 IN | RESPIRATION RATE: 16 BRPM | SYSTOLIC BLOOD PRESSURE: 122 MMHG | HEART RATE: 74 BPM | BODY MASS INDEX: 24.06 KG/M2 | DIASTOLIC BLOOD PRESSURE: 80 MMHG

## 2020-12-30 DIAGNOSIS — M25.569 KNEE PAIN, UNSPECIFIED CHRONICITY, UNSPECIFIED LATERALITY: Primary | ICD-10-CM

## 2020-12-30 DIAGNOSIS — H61.21 IMPACTED CERUMEN OF RIGHT EAR: ICD-10-CM

## 2020-12-30 DIAGNOSIS — R79.9 ABNORMAL FINDING OF BLOOD CHEMISTRY, UNSPECIFIED: ICD-10-CM

## 2020-12-30 PROCEDURE — 1126F AMNT PAIN NOTED NONE PRSNT: CPT | Mod: S$GLB,,, | Performed by: FAMILY MEDICINE

## 2020-12-30 PROCEDURE — 99999 PR PBB SHADOW E&M-EST. PATIENT-LVL V: ICD-10-PCS | Mod: PBBFAC,,, | Performed by: FAMILY MEDICINE

## 2020-12-30 PROCEDURE — 3079F PR MOST RECENT DIASTOLIC BLOOD PRESSURE 80-89 MM HG: ICD-10-PCS | Mod: CPTII,S$GLB,, | Performed by: FAMILY MEDICINE

## 2020-12-30 PROCEDURE — 3074F SYST BP LT 130 MM HG: CPT | Mod: CPTII,S$GLB,, | Performed by: FAMILY MEDICINE

## 2020-12-30 PROCEDURE — 99214 PR OFFICE/OUTPT VISIT, EST, LEVL IV, 30-39 MIN: ICD-10-PCS | Mod: S$GLB,,, | Performed by: FAMILY MEDICINE

## 2020-12-30 PROCEDURE — 99999 PR PBB SHADOW E&M-EST. PATIENT-LVL V: CPT | Mod: PBBFAC,,, | Performed by: FAMILY MEDICINE

## 2020-12-30 PROCEDURE — 3074F PR MOST RECENT SYSTOLIC BLOOD PRESSURE < 130 MM HG: ICD-10-PCS | Mod: CPTII,S$GLB,, | Performed by: FAMILY MEDICINE

## 2020-12-30 PROCEDURE — 1101F PT FALLS ASSESS-DOCD LE1/YR: CPT | Mod: CPTII,S$GLB,, | Performed by: FAMILY MEDICINE

## 2020-12-30 PROCEDURE — 3079F DIAST BP 80-89 MM HG: CPT | Mod: CPTII,S$GLB,, | Performed by: FAMILY MEDICINE

## 2020-12-30 PROCEDURE — 99214 OFFICE O/P EST MOD 30 MIN: CPT | Mod: S$GLB,,, | Performed by: FAMILY MEDICINE

## 2020-12-30 PROCEDURE — 1126F PR PAIN SEVERITY QUANTIFIED, NO PAIN PRESENT: ICD-10-PCS | Mod: S$GLB,,, | Performed by: FAMILY MEDICINE

## 2020-12-30 PROCEDURE — 3288F PR FALLS RISK ASSESSMENT DOCUMENTED: ICD-10-PCS | Mod: CPTII,S$GLB,, | Performed by: FAMILY MEDICINE

## 2020-12-30 PROCEDURE — 1101F PR PT FALLS ASSESS DOC 0-1 FALLS W/OUT INJ PAST YR: ICD-10-PCS | Mod: CPTII,S$GLB,, | Performed by: FAMILY MEDICINE

## 2020-12-30 PROCEDURE — 3288F FALL RISK ASSESSMENT DOCD: CPT | Mod: CPTII,S$GLB,, | Performed by: FAMILY MEDICINE

## 2020-12-30 RX ORDER — ALLOPURINOL 100 MG/1
100 TABLET ORAL DAILY
Qty: 90 TABLET | Refills: 3 | Status: SHIPPED | OUTPATIENT
Start: 2020-12-30 | End: 2022-03-02

## 2020-12-30 RX ORDER — AMLODIPINE BESYLATE 5 MG/1
5 TABLET ORAL DAILY
Qty: 90 TABLET | Refills: 3 | Status: SHIPPED | OUTPATIENT
Start: 2020-12-30 | End: 2021-06-30

## 2020-12-30 NOTE — PROGRESS NOTES
Subjective:   Patient ID: Bashir Ramos Sr. is a 85 y.o. male     Chief Complaint:Other (wellness check )      HPI  Review of Systems   Constitutional: Negative for chills and fever.   HENT: Negative for sore throat and trouble swallowing.    Respiratory: Negative for cough and shortness of breath.    Cardiovascular: Negative for chest pain and leg swelling.   Gastrointestinal: Negative for abdominal distention and abdominal pain.   Genitourinary: Negative for dysuria and flank pain.   Musculoskeletal: Negative for arthralgias and back pain.   Skin: Negative for color change and pallor.   Neurological: Negative for weakness and headaches.   Psychiatric/Behavioral: Negative for agitation and confusion.     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 grayson)    EYE SURGERY       Objective:     Vitals:    12/30/20 1317   BP: 122/80   Pulse: 74   Resp: 16   Temp: 97.8 °F (36.6 °C)     Body mass index is 24.16 kg/m².  Physical Exam  Vitals signs and nursing note reviewed.   Constitutional:       Appearance: He is well-developed.   HENT:      Head: Normocephalic and atraumatic.   Eyes:      General: No scleral icterus.     Conjunctiva/sclera: Conjunctivae normal.   Neck:      Musculoskeletal: Normal range of motion and neck supple.   Cardiovascular:      Heart sounds: No murmur.   Pulmonary:      Effort: Pulmonary effort is normal. No respiratory distress.   Musculoskeletal: Normal range of motion.         General: No deformity.   Skin:     Coloration: Skin is not pale.      Findings: No rash.    Neurological:      Mental Status: He is alert and oriented to person, place, and time.   Psychiatric:         Behavior: Behavior normal.         Thought Content: Thought content normal.         Judgment: Judgment normal.       Assessment:     1. Knee pain, unspecified chronicity, unspecified laterality    2. Abnormal finding of blood chemistry, unspecified     3. Impacted cerumen of right ear      Plan:   Knee pain, unspecified chronicity, unspecified laterality  -     Ambulatory referral/consult to Physical/Occupational Therapy; Future; Expected date: 01/13/2021  -     Cancel: Comprehensive Metabolic Panel; Future; Expected date: 12/30/2020  -     Cancel: Lipid Panel; Future; Expected date: 12/30/2020  -     Cancel: Uric Acid; Future; Expected date: 12/30/2020  -     Uric Acid; Future; Expected date: 12/30/2020  -     Lipid Panel; Future; Expected date: 12/30/2020  -     Comprehensive Metabolic Panel; Future; Expected date: 12/30/2020    Abnormal finding of blood chemistry, unspecified   -     Cancel: Lipid Panel; Future; Expected date: 12/30/2020  -     Lipid Panel; Future; Expected date: 12/30/2020    Impacted cerumen of right ear  -     Ear wax removal  -     Ambulatory referral/consult to ENT; Future; Expected date: 01/06/2021    Other orders  -     allopurinoL (ZYLOPRIM) 100 MG tablet; Take 1 tablet (100 mg total) by mouth once daily.  Dispense: 90 tablet; Refill: 3  -     amLODIPine (NORVASC) 5 MG tablet; Take 1 tablet (5 mg total) by mouth once daily.  Dispense: 90 tablet; Refill: 3            Juan Casey MD  12/30/2020    Portions of this note have been dictated with ADEN Monterroso

## 2021-01-04 ENCOUNTER — TELEPHONE (OUTPATIENT)
Dept: FAMILY MEDICINE | Facility: CLINIC | Age: 86
End: 2021-01-04

## 2021-01-06 ENCOUNTER — OFFICE VISIT (OUTPATIENT)
Dept: OTOLARYNGOLOGY | Facility: CLINIC | Age: 86
End: 2021-01-06
Payer: MEDICARE

## 2021-01-06 VITALS
WEIGHT: 144.81 LBS | SYSTOLIC BLOOD PRESSURE: 149 MMHG | OXYGEN SATURATION: 98 % | BODY MASS INDEX: 23.27 KG/M2 | HEART RATE: 95 BPM | HEIGHT: 66 IN | DIASTOLIC BLOOD PRESSURE: 89 MMHG

## 2021-01-06 DIAGNOSIS — H61.21 IMPACTED CERUMEN, RIGHT EAR: Primary | ICD-10-CM

## 2021-01-06 PROCEDURE — 99204 OFFICE O/P NEW MOD 45 MIN: CPT | Mod: 25,S$GLB,, | Performed by: OTOLARYNGOLOGY

## 2021-01-06 PROCEDURE — 3077F PR MOST RECENT SYSTOLIC BLOOD PRESSURE >= 140 MM HG: ICD-10-PCS | Mod: CPTII,S$GLB,, | Performed by: OTOLARYNGOLOGY

## 2021-01-06 PROCEDURE — 3288F FALL RISK ASSESSMENT DOCD: CPT | Mod: CPTII,S$GLB,, | Performed by: OTOLARYNGOLOGY

## 2021-01-06 PROCEDURE — 1101F PT FALLS ASSESS-DOCD LE1/YR: CPT | Mod: CPTII,S$GLB,, | Performed by: OTOLARYNGOLOGY

## 2021-01-06 PROCEDURE — 99204 PR OFFICE/OUTPT VISIT, NEW, LEVL IV, 45-59 MIN: ICD-10-PCS | Mod: 25,S$GLB,, | Performed by: OTOLARYNGOLOGY

## 2021-01-06 PROCEDURE — 3079F DIAST BP 80-89 MM HG: CPT | Mod: CPTII,S$GLB,, | Performed by: OTOLARYNGOLOGY

## 2021-01-06 PROCEDURE — 1159F MED LIST DOCD IN RCRD: CPT | Mod: S$GLB,,, | Performed by: OTOLARYNGOLOGY

## 2021-01-06 PROCEDURE — 1126F PR PAIN SEVERITY QUANTIFIED, NO PAIN PRESENT: ICD-10-PCS | Mod: S$GLB,,, | Performed by: OTOLARYNGOLOGY

## 2021-01-06 PROCEDURE — 3077F SYST BP >= 140 MM HG: CPT | Mod: CPTII,S$GLB,, | Performed by: OTOLARYNGOLOGY

## 2021-01-06 PROCEDURE — 69210 REMOVE IMPACTED EAR WAX UNI: CPT | Mod: S$GLB,,, | Performed by: OTOLARYNGOLOGY

## 2021-01-06 PROCEDURE — 1101F PR PT FALLS ASSESS DOC 0-1 FALLS W/OUT INJ PAST YR: ICD-10-PCS | Mod: CPTII,S$GLB,, | Performed by: OTOLARYNGOLOGY

## 2021-01-06 PROCEDURE — 1126F AMNT PAIN NOTED NONE PRSNT: CPT | Mod: S$GLB,,, | Performed by: OTOLARYNGOLOGY

## 2021-01-06 PROCEDURE — 3288F PR FALLS RISK ASSESSMENT DOCUMENTED: ICD-10-PCS | Mod: CPTII,S$GLB,, | Performed by: OTOLARYNGOLOGY

## 2021-01-06 PROCEDURE — 1159F PR MEDICATION LIST DOCUMENTED IN MEDICAL RECORD: ICD-10-PCS | Mod: S$GLB,,, | Performed by: OTOLARYNGOLOGY

## 2021-01-06 PROCEDURE — 3079F PR MOST RECENT DIASTOLIC BLOOD PRESSURE 80-89 MM HG: ICD-10-PCS | Mod: CPTII,S$GLB,, | Performed by: OTOLARYNGOLOGY

## 2021-01-06 PROCEDURE — 69210 PR REMOVAL IMPACTED CERUMEN REQUIRING INSTRUMENTATION, UNILATERAL: ICD-10-PCS | Mod: S$GLB,,, | Performed by: OTOLARYNGOLOGY

## 2021-01-25 ENCOUNTER — CLINICAL SUPPORT (OUTPATIENT)
Dept: REHABILITATION | Facility: HOSPITAL | Age: 86
End: 2021-01-25
Attending: FAMILY MEDICINE
Payer: MEDICARE

## 2021-01-25 DIAGNOSIS — M25.562 ACUTE PAIN OF LEFT KNEE: ICD-10-CM

## 2021-01-25 DIAGNOSIS — M25.661 DECREASED RANGE OF MOTION OF RIGHT KNEE: ICD-10-CM

## 2021-01-25 DIAGNOSIS — M25.569 KNEE PAIN, UNSPECIFIED CHRONICITY, UNSPECIFIED LATERALITY: ICD-10-CM

## 2021-01-25 PROCEDURE — 97162 PT EVAL MOD COMPLEX 30 MIN: CPT | Mod: PO

## 2021-01-25 PROCEDURE — 97110 THERAPEUTIC EXERCISES: CPT | Mod: PO

## 2021-01-30 LAB
ALBUMIN SERPL-MCNC: 3.9 G/DL (ref 3.6–5.1)
ALBUMIN/GLOB SERPL: 1.3 (CALC) (ref 1–2.5)
ALP SERPL-CCNC: 84 U/L (ref 35–144)
ALT SERPL-CCNC: 9 U/L (ref 9–46)
AST SERPL-CCNC: 12 U/L (ref 10–35)
BILIRUB SERPL-MCNC: 0.5 MG/DL (ref 0.2–1.2)
BUN SERPL-MCNC: 14 MG/DL (ref 7–25)
BUN/CREAT SERPL: 13 (CALC) (ref 6–22)
CALCIUM SERPL-MCNC: 9.8 MG/DL (ref 8.6–10.3)
CHLORIDE SERPL-SCNC: 108 MMOL/L (ref 98–110)
CHOLEST SERPL-MCNC: 205 MG/DL
CHOLEST/HDLC SERPL: 3.5 (CALC)
CO2 SERPL-SCNC: 24 MMOL/L (ref 20–32)
CREAT SERPL-MCNC: 1.12 MG/DL (ref 0.7–1.11)
GFRSERPLBLD MDRD-ARVRAT: 59 ML/MIN/1.73M2
GLOBULIN SER CALC-MCNC: 2.9 G/DL (CALC) (ref 1.9–3.7)
GLUCOSE SERPL-MCNC: 88 MG/DL (ref 65–99)
HDLC SERPL-MCNC: 58 MG/DL
LDLC SERPL CALC-MCNC: 131 MG/DL (CALC)
NONHDLC SERPL-MCNC: 147 MG/DL (CALC)
POTASSIUM SERPL-SCNC: 4.4 MMOL/L (ref 3.5–5.3)
PROT SERPL-MCNC: 6.8 G/DL (ref 6.1–8.1)
SODIUM SERPL-SCNC: 141 MMOL/L (ref 135–146)
TRIGL SERPL-MCNC: 69 MG/DL
URATE SERPL-MCNC: 4.7 MG/DL (ref 4–8)

## 2021-02-01 ENCOUNTER — CLINICAL SUPPORT (OUTPATIENT)
Dept: REHABILITATION | Facility: HOSPITAL | Age: 86
End: 2021-02-01
Attending: FAMILY MEDICINE
Payer: MEDICARE

## 2021-02-01 DIAGNOSIS — M25.661 DECREASED RANGE OF MOTION OF RIGHT KNEE: ICD-10-CM

## 2021-02-01 DIAGNOSIS — M25.562 ACUTE PAIN OF LEFT KNEE: ICD-10-CM

## 2021-02-01 PROCEDURE — 97110 THERAPEUTIC EXERCISES: CPT | Mod: PO

## 2021-02-01 PROCEDURE — 97140 MANUAL THERAPY 1/> REGIONS: CPT | Mod: PO

## 2021-02-15 ENCOUNTER — CLINICAL SUPPORT (OUTPATIENT)
Dept: REHABILITATION | Facility: HOSPITAL | Age: 86
End: 2021-02-15
Attending: FAMILY MEDICINE
Payer: MEDICARE

## 2021-02-15 DIAGNOSIS — M25.661 DECREASED RANGE OF MOTION OF RIGHT KNEE: ICD-10-CM

## 2021-02-15 DIAGNOSIS — M25.562 ACUTE PAIN OF LEFT KNEE: ICD-10-CM

## 2021-02-15 PROCEDURE — 97140 MANUAL THERAPY 1/> REGIONS: CPT | Mod: PO

## 2021-02-15 PROCEDURE — 97110 THERAPEUTIC EXERCISES: CPT | Mod: PO

## 2021-02-22 ENCOUNTER — CLINICAL SUPPORT (OUTPATIENT)
Dept: REHABILITATION | Facility: HOSPITAL | Age: 86
End: 2021-02-22
Attending: FAMILY MEDICINE
Payer: MEDICARE

## 2021-02-22 DIAGNOSIS — M25.562 ACUTE PAIN OF LEFT KNEE: ICD-10-CM

## 2021-02-22 DIAGNOSIS — M25.661 DECREASED RANGE OF MOTION OF RIGHT KNEE: ICD-10-CM

## 2021-02-22 PROCEDURE — 97110 THERAPEUTIC EXERCISES: CPT | Mod: PO

## 2021-03-01 ENCOUNTER — CLINICAL SUPPORT (OUTPATIENT)
Dept: REHABILITATION | Facility: HOSPITAL | Age: 86
End: 2021-03-01
Attending: FAMILY MEDICINE
Payer: MEDICARE

## 2021-03-01 DIAGNOSIS — M25.661 DECREASED RANGE OF MOTION OF RIGHT KNEE: ICD-10-CM

## 2021-03-01 DIAGNOSIS — M25.562 ACUTE PAIN OF LEFT KNEE: ICD-10-CM

## 2021-03-01 PROCEDURE — 97110 THERAPEUTIC EXERCISES: CPT | Mod: PO

## 2021-03-01 PROCEDURE — 97140 MANUAL THERAPY 1/> REGIONS: CPT | Mod: PO

## 2021-03-08 ENCOUNTER — CLINICAL SUPPORT (OUTPATIENT)
Dept: REHABILITATION | Facility: HOSPITAL | Age: 86
End: 2021-03-08
Attending: FAMILY MEDICINE
Payer: MEDICARE

## 2021-03-08 DIAGNOSIS — M25.562 ACUTE PAIN OF LEFT KNEE: ICD-10-CM

## 2021-03-08 DIAGNOSIS — M25.661 DECREASED RANGE OF MOTION OF RIGHT KNEE: ICD-10-CM

## 2021-03-08 PROCEDURE — 97530 THERAPEUTIC ACTIVITIES: CPT | Mod: PO

## 2021-03-08 PROCEDURE — 97110 THERAPEUTIC EXERCISES: CPT | Mod: PO

## 2021-04-01 ENCOUNTER — TELEPHONE (OUTPATIENT)
Dept: FAMILY MEDICINE | Facility: CLINIC | Age: 86
End: 2021-04-01

## 2021-04-08 ENCOUNTER — HOSPITAL ENCOUNTER (OUTPATIENT)
Dept: RADIOLOGY | Facility: CLINIC | Age: 86
Discharge: HOME | End: 2021-04-08
Attending: FAMILY MEDICINE
Payer: MEDICARE

## 2021-04-08 ENCOUNTER — OFFICE VISIT (OUTPATIENT)
Dept: FAMILY MEDICINE | Facility: CLINIC | Age: 86
End: 2021-04-08
Payer: MEDICARE

## 2021-04-08 VITALS
HEART RATE: 98 BPM | OXYGEN SATURATION: 98 % | DIASTOLIC BLOOD PRESSURE: 66 MMHG | WEIGHT: 152.75 LBS | HEIGHT: 66 IN | SYSTOLIC BLOOD PRESSURE: 116 MMHG | BODY MASS INDEX: 24.55 KG/M2 | RESPIRATION RATE: 15 BRPM | TEMPERATURE: 98 F

## 2021-04-08 DIAGNOSIS — M54.50 CHRONIC MIDLINE LOW BACK PAIN WITHOUT SCIATICA: Primary | ICD-10-CM

## 2021-04-08 DIAGNOSIS — G89.29 CHRONIC MIDLINE LOW BACK PAIN WITHOUT SCIATICA: Primary | ICD-10-CM

## 2021-04-08 DIAGNOSIS — M54.50 CHRONIC MIDLINE LOW BACK PAIN WITHOUT SCIATICA: ICD-10-CM

## 2021-04-08 DIAGNOSIS — M54.9 DORSALGIA, UNSPECIFIED: ICD-10-CM

## 2021-04-08 DIAGNOSIS — G89.29 CHRONIC MIDLINE LOW BACK PAIN WITHOUT SCIATICA: ICD-10-CM

## 2021-04-08 PROCEDURE — 71046 X-RAY EXAM CHEST 2 VIEWS: CPT | Mod: 26,,, | Performed by: RADIOLOGY

## 2021-04-08 PROCEDURE — 1101F PT FALLS ASSESS-DOCD LE1/YR: CPT | Mod: CPTII,S$GLB,, | Performed by: FAMILY MEDICINE

## 2021-04-08 PROCEDURE — 1159F MED LIST DOCD IN RCRD: CPT | Mod: S$GLB,,, | Performed by: FAMILY MEDICINE

## 2021-04-08 PROCEDURE — 99213 PR OFFICE/OUTPT VISIT, EST, LEVL III, 20-29 MIN: ICD-10-PCS | Mod: S$GLB,,, | Performed by: FAMILY MEDICINE

## 2021-04-08 PROCEDURE — 3288F FALL RISK ASSESSMENT DOCD: CPT | Mod: CPTII,S$GLB,, | Performed by: FAMILY MEDICINE

## 2021-04-08 PROCEDURE — 1101F PR PT FALLS ASSESS DOC 0-1 FALLS W/OUT INJ PAST YR: ICD-10-PCS | Mod: CPTII,S$GLB,, | Performed by: FAMILY MEDICINE

## 2021-04-08 PROCEDURE — 3288F PR FALLS RISK ASSESSMENT DOCUMENTED: ICD-10-PCS | Mod: CPTII,S$GLB,, | Performed by: FAMILY MEDICINE

## 2021-04-08 PROCEDURE — 71046 XR CHEST PA AND LATERAL: ICD-10-PCS | Mod: 26,,, | Performed by: RADIOLOGY

## 2021-04-08 PROCEDURE — 99999 PR PBB SHADOW E&M-EST. PATIENT-LVL IV: ICD-10-PCS | Mod: PBBFAC,,, | Performed by: FAMILY MEDICINE

## 2021-04-08 PROCEDURE — 99999 PR PBB SHADOW E&M-EST. PATIENT-LVL IV: CPT | Mod: PBBFAC,,, | Performed by: FAMILY MEDICINE

## 2021-04-08 PROCEDURE — 71046 X-RAY EXAM CHEST 2 VIEWS: CPT | Mod: TC,FY,PO

## 2021-04-08 PROCEDURE — 1159F PR MEDICATION LIST DOCUMENTED IN MEDICAL RECORD: ICD-10-PCS | Mod: S$GLB,,, | Performed by: FAMILY MEDICINE

## 2021-04-08 PROCEDURE — 1125F PR PAIN SEVERITY QUANTIFIED, PAIN PRESENT: ICD-10-PCS | Mod: S$GLB,,, | Performed by: FAMILY MEDICINE

## 2021-04-08 PROCEDURE — 99213 OFFICE O/P EST LOW 20 MIN: CPT | Mod: S$GLB,,, | Performed by: FAMILY MEDICINE

## 2021-04-08 PROCEDURE — 1125F AMNT PAIN NOTED PAIN PRSNT: CPT | Mod: S$GLB,,, | Performed by: FAMILY MEDICINE

## 2021-06-30 ENCOUNTER — OFFICE VISIT (OUTPATIENT)
Dept: FAMILY MEDICINE | Facility: CLINIC | Age: 86
End: 2021-06-30
Payer: MEDICARE

## 2021-06-30 VITALS
TEMPERATURE: 98 F | SYSTOLIC BLOOD PRESSURE: 114 MMHG | HEART RATE: 94 BPM | WEIGHT: 150.38 LBS | OXYGEN SATURATION: 98 % | HEIGHT: 66 IN | BODY MASS INDEX: 24.17 KG/M2 | DIASTOLIC BLOOD PRESSURE: 60 MMHG

## 2021-06-30 DIAGNOSIS — E78.5 HYPERLIPIDEMIA, UNSPECIFIED HYPERLIPIDEMIA TYPE: ICD-10-CM

## 2021-06-30 DIAGNOSIS — I10 ESSENTIAL HYPERTENSION: Primary | ICD-10-CM

## 2021-06-30 PROCEDURE — 99999 PR PBB SHADOW E&M-EST. PATIENT-LVL III: ICD-10-PCS | Mod: PBBFAC,,, | Performed by: FAMILY MEDICINE

## 2021-06-30 PROCEDURE — 1101F PR PT FALLS ASSESS DOC 0-1 FALLS W/OUT INJ PAST YR: ICD-10-PCS | Mod: CPTII,S$GLB,, | Performed by: FAMILY MEDICINE

## 2021-06-30 PROCEDURE — 3288F PR FALLS RISK ASSESSMENT DOCUMENTED: ICD-10-PCS | Mod: CPTII,S$GLB,, | Performed by: FAMILY MEDICINE

## 2021-06-30 PROCEDURE — 1101F PT FALLS ASSESS-DOCD LE1/YR: CPT | Mod: CPTII,S$GLB,, | Performed by: FAMILY MEDICINE

## 2021-06-30 PROCEDURE — 99999 PR PBB SHADOW E&M-EST. PATIENT-LVL III: CPT | Mod: PBBFAC,,, | Performed by: FAMILY MEDICINE

## 2021-06-30 PROCEDURE — 3288F FALL RISK ASSESSMENT DOCD: CPT | Mod: CPTII,S$GLB,, | Performed by: FAMILY MEDICINE

## 2021-06-30 PROCEDURE — 99214 PR OFFICE/OUTPT VISIT, EST, LEVL IV, 30-39 MIN: ICD-10-PCS | Mod: S$GLB,,, | Performed by: FAMILY MEDICINE

## 2021-06-30 PROCEDURE — 99214 OFFICE O/P EST MOD 30 MIN: CPT | Mod: S$GLB,,, | Performed by: FAMILY MEDICINE

## 2021-06-30 RX ORDER — AMLODIPINE BESYLATE 2.5 MG/1
2.5 TABLET ORAL DAILY
Qty: 90 TABLET | Refills: 3 | Status: SHIPPED | OUTPATIENT
Start: 2021-06-30 | End: 2022-02-17 | Stop reason: SDUPTHER

## 2021-07-12 DIAGNOSIS — M79.673 PAIN OF FOOT, UNSPECIFIED LATERALITY: Primary | ICD-10-CM

## 2021-08-12 ENCOUNTER — TELEPHONE (OUTPATIENT)
Dept: PAIN MEDICINE | Facility: CLINIC | Age: 86
End: 2021-08-12

## 2021-08-13 ENCOUNTER — HOSPITAL ENCOUNTER (OUTPATIENT)
Dept: RADIOLOGY | Facility: OTHER | Age: 86
Discharge: HOME | End: 2021-08-13
Attending: STUDENT IN AN ORGANIZED HEALTH CARE EDUCATION/TRAINING PROGRAM
Payer: MEDICARE

## 2021-08-13 ENCOUNTER — OFFICE VISIT (OUTPATIENT)
Dept: PAIN MEDICINE | Facility: CLINIC | Age: 86
End: 2021-08-13
Payer: MEDICARE

## 2021-08-13 VITALS
WEIGHT: 149 LBS | TEMPERATURE: 98 F | HEART RATE: 93 BPM | DIASTOLIC BLOOD PRESSURE: 84 MMHG | SYSTOLIC BLOOD PRESSURE: 137 MMHG | BODY MASS INDEX: 23.95 KG/M2 | HEIGHT: 66 IN | RESPIRATION RATE: 18 BRPM

## 2021-08-13 DIAGNOSIS — M79.645 FINGER PAIN, LEFT: Primary | ICD-10-CM

## 2021-08-13 DIAGNOSIS — M79.645 FINGER PAIN, LEFT: ICD-10-CM

## 2021-08-13 PROCEDURE — 1160F PR REVIEW ALL MEDS BY PRESCRIBER/CLIN PHARMACIST DOCUMENTED: ICD-10-PCS | Mod: CPTII,S$GLB,, | Performed by: ANESTHESIOLOGY

## 2021-08-13 PROCEDURE — 99999 PR PBB SHADOW E&M-EST. PATIENT-LVL III: ICD-10-PCS | Mod: PBBFAC,,, | Performed by: ANESTHESIOLOGY

## 2021-08-13 PROCEDURE — 99204 PR OFFICE/OUTPT VISIT, NEW, LEVL IV, 45-59 MIN: ICD-10-PCS | Mod: S$GLB,,, | Performed by: ANESTHESIOLOGY

## 2021-08-13 PROCEDURE — 73130 X-RAY EXAM OF HAND: CPT | Mod: TC,FY,LT

## 2021-08-13 PROCEDURE — 1159F MED LIST DOCD IN RCRD: CPT | Mod: CPTII,S$GLB,, | Performed by: ANESTHESIOLOGY

## 2021-08-13 PROCEDURE — 73130 X-RAY EXAM OF HAND: CPT | Mod: 26,LT,, | Performed by: RADIOLOGY

## 2021-08-13 PROCEDURE — 1101F PR PT FALLS ASSESS DOC 0-1 FALLS W/OUT INJ PAST YR: ICD-10-PCS | Mod: CPTII,S$GLB,, | Performed by: ANESTHESIOLOGY

## 2021-08-13 PROCEDURE — 99204 OFFICE O/P NEW MOD 45 MIN: CPT | Mod: S$GLB,,, | Performed by: ANESTHESIOLOGY

## 2021-08-13 PROCEDURE — 99999 PR PBB SHADOW E&M-EST. PATIENT-LVL III: CPT | Mod: PBBFAC,,, | Performed by: ANESTHESIOLOGY

## 2021-08-13 PROCEDURE — 1125F PR PAIN SEVERITY QUANTIFIED, PAIN PRESENT: ICD-10-PCS | Mod: CPTII,S$GLB,, | Performed by: ANESTHESIOLOGY

## 2021-08-13 PROCEDURE — 1125F AMNT PAIN NOTED PAIN PRSNT: CPT | Mod: CPTII,S$GLB,, | Performed by: ANESTHESIOLOGY

## 2021-08-13 PROCEDURE — 3288F PR FALLS RISK ASSESSMENT DOCUMENTED: ICD-10-PCS | Mod: CPTII,S$GLB,, | Performed by: ANESTHESIOLOGY

## 2021-08-13 PROCEDURE — 1160F RVW MEDS BY RX/DR IN RCRD: CPT | Mod: CPTII,S$GLB,, | Performed by: ANESTHESIOLOGY

## 2021-08-13 PROCEDURE — 3288F FALL RISK ASSESSMENT DOCD: CPT | Mod: CPTII,S$GLB,, | Performed by: ANESTHESIOLOGY

## 2021-08-13 PROCEDURE — 1159F PR MEDICATION LIST DOCUMENTED IN MEDICAL RECORD: ICD-10-PCS | Mod: CPTII,S$GLB,, | Performed by: ANESTHESIOLOGY

## 2021-08-13 PROCEDURE — 73130 XR HAND COMPLETE 3 VIEW LEFT: ICD-10-PCS | Mod: 26,LT,, | Performed by: RADIOLOGY

## 2021-08-13 PROCEDURE — 1101F PT FALLS ASSESS-DOCD LE1/YR: CPT | Mod: CPTII,S$GLB,, | Performed by: ANESTHESIOLOGY

## 2021-08-13 RX ORDER — DICLOFENAC SODIUM 10 MG/G
4 GEL TOPICAL 4 TIMES DAILY
Qty: 1 TUBE | Refills: 2 | Status: SHIPPED | OUTPATIENT
Start: 2021-08-13 | End: 2021-09-30 | Stop reason: ALTCHOICE

## 2021-08-13 RX ORDER — DICLOFENAC SODIUM 10 MG/G
2 GEL TOPICAL 4 TIMES DAILY
Qty: 1 TUBE | Refills: 2 | Status: SHIPPED | OUTPATIENT
Start: 2021-08-13 | End: 2021-08-13

## 2021-09-10 ENCOUNTER — TELEPHONE (OUTPATIENT)
Dept: PAIN MEDICINE | Facility: CLINIC | Age: 86
End: 2021-09-10

## 2021-09-30 ENCOUNTER — OFFICE VISIT (OUTPATIENT)
Dept: FAMILY MEDICINE | Facility: CLINIC | Age: 86
End: 2021-09-30
Payer: MEDICARE

## 2021-09-30 VITALS
BODY MASS INDEX: 24.52 KG/M2 | SYSTOLIC BLOOD PRESSURE: 132 MMHG | DIASTOLIC BLOOD PRESSURE: 76 MMHG | HEART RATE: 90 BPM | WEIGHT: 152.56 LBS | TEMPERATURE: 98 F | OXYGEN SATURATION: 97 % | HEIGHT: 66 IN

## 2021-09-30 DIAGNOSIS — M62.838 MUSCLE SPASMS OF NECK: Primary | ICD-10-CM

## 2021-09-30 PROCEDURE — 99999 PR PBB SHADOW E&M-EST. PATIENT-LVL V: ICD-10-PCS | Mod: PBBFAC,,,

## 2021-09-30 PROCEDURE — 1101F PR PT FALLS ASSESS DOC 0-1 FALLS W/OUT INJ PAST YR: ICD-10-PCS | Mod: CPTII,S$GLB,,

## 2021-09-30 PROCEDURE — 99213 PR OFFICE/OUTPT VISIT, EST, LEVL III, 20-29 MIN: ICD-10-PCS | Mod: S$GLB,,,

## 2021-09-30 PROCEDURE — 1125F PR PAIN SEVERITY QUANTIFIED, PAIN PRESENT: ICD-10-PCS | Mod: CPTII,S$GLB,,

## 2021-09-30 PROCEDURE — 1160F PR REVIEW ALL MEDS BY PRESCRIBER/CLIN PHARMACIST DOCUMENTED: ICD-10-PCS | Mod: CPTII,S$GLB,,

## 2021-09-30 PROCEDURE — 1125F AMNT PAIN NOTED PAIN PRSNT: CPT | Mod: CPTII,S$GLB,,

## 2021-09-30 PROCEDURE — 1159F PR MEDICATION LIST DOCUMENTED IN MEDICAL RECORD: ICD-10-PCS | Mod: CPTII,S$GLB,,

## 2021-09-30 PROCEDURE — 3288F FALL RISK ASSESSMENT DOCD: CPT | Mod: CPTII,S$GLB,,

## 2021-09-30 PROCEDURE — 99213 OFFICE O/P EST LOW 20 MIN: CPT | Mod: S$GLB,,,

## 2021-09-30 PROCEDURE — 1160F RVW MEDS BY RX/DR IN RCRD: CPT | Mod: CPTII,S$GLB,,

## 2021-09-30 PROCEDURE — 99999 PR PBB SHADOW E&M-EST. PATIENT-LVL V: CPT | Mod: PBBFAC,,,

## 2021-09-30 PROCEDURE — 1159F MED LIST DOCD IN RCRD: CPT | Mod: CPTII,S$GLB,,

## 2021-09-30 PROCEDURE — 1101F PT FALLS ASSESS-DOCD LE1/YR: CPT | Mod: CPTII,S$GLB,,

## 2021-09-30 PROCEDURE — 3288F PR FALLS RISK ASSESSMENT DOCUMENTED: ICD-10-PCS | Mod: CPTII,S$GLB,,

## 2021-09-30 RX ORDER — LIDOCAINE HCL 4 G/100G
1 CREAM TOPICAL 2 TIMES DAILY PRN
Qty: 80 ML | Refills: 0 | Status: SHIPPED | OUTPATIENT
Start: 2021-09-30 | End: 2021-10-30

## 2021-09-30 RX ORDER — ASPIRIN 81 MG/1
81 TABLET ORAL DAILY
COMMUNITY
End: 2022-12-07 | Stop reason: SDUPTHER

## 2021-09-30 RX ORDER — CYCLOBENZAPRINE HCL 5 MG
5 TABLET ORAL 3 TIMES DAILY PRN
Qty: 30 TABLET | Refills: 0 | Status: SHIPPED | OUTPATIENT
Start: 2021-09-30 | End: 2021-10-10

## 2021-09-30 RX ORDER — HYDROCODONE BITARTRATE AND ACETAMINOPHEN 5; 325 MG/1; MG/1
1 TABLET ORAL EVERY 8 HOURS PRN
COMMUNITY
End: 2022-10-06

## 2021-12-03 NOTE — PROGRESS NOTES
Machelle Arce MD  [facility] KURT Godwin is a 57 year old who presents for the following health issues     History of Present Illness       CKD: He uses over the counter pain medication, including Tylenol, a few times a month.    Mental Health Follow-up:  Patient presents to follow-up on Depression & Anxiety.Patient's depression since last visit has been:  Worse  The patient is having other symptoms associated with depression.  Patient's anxiety since last visit has been:  Bad  The patient is having other symptoms associated with anxiety.  Any significant life events: job concerns and health concerns  Patient is feeling anxious or having panic attacks.  Patient has no concerns about alcohol or drug use.     Social History  Tobacco Use    Smoking status: Never Smoker    Smokeless tobacco: Never Used    Tobacco comment:  This is a preoperative H and P as well as an office visit.    CHIEF COMPLAINT:  Severe bilateral hand pain.    HISTORY OF PRESENT ILLNESS:  Mr. Ramos is a very pleasant right-hand dominant   82-year-old male presenting today for evaluation of his bilateral hand numbness   and weakness.  He currently works at [employer] in [employer].    He feels that he is having difficulty gripping and grasping, constant numbness   and weakness in his hand.  He reports he continues to have night waking, he   feels he can hardly use his hands because of the pain.  He denies nausea,   vomiting, fever, or chills.  He denies ever having surgery in the past.    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 grayson)    EYE SURGERY         Social History     Social History    Marital status:      Spouse name: N/A    Number of children: N/A    Years of education: N/A     Occupational History    Not on file.     Social History Main Topics    Smoking status: Former Smoker     Packs/day: 1.00     Types: Cigarettes     Quit date: 8/27/1982    Smokeless tobacco: Never Used    Alcohol use No    Drug use: No    Sexual activity: Not on file     Other Topics Concern    Not on file     Social History Narrative       Current Outpatient Prescriptions on File Prior to Visit   Medication Sig Dispense Refill    acetaminophen (TYLENOL) 325 MG tablet Take 2 tablets (650 mg total) by mouth every 8 (eight) hours as needed for Pain.  0    albuterol 90 mcg/actuation inhaler Inhale 2 puffs into the lungs every 6 (six) hours as needed for Wheezing or Shortness of Breath.  "Rescue 1 Inhaler 0    ascorbic acid, vitamin C, (VITAMIN C) 500 MG tablet Take 1 tablet (500 mg total) by mouth every evening.      benazepril (LOTENSIN) 10 MG tablet Take 20 mg by mouth once daily.       diclofenac sodium 1 % Gel Apply 2 g topically 3 (three) times daily. 2 Tube 3    docusate sodium (COLACE) 100 MG capsule Take 1 capsule (100 mg total) by mouth 2 (two) times daily as needed for Constipation. 60 capsule 0    ergocalciferol (ERGOCALCIFEROL) 50,000 unit Cap Take 1 capsule (50,000 Units total) by mouth every 7 days. 8 capsule 0    ferrous sulfate 325 (65 FE) MG EC tablet Take 1 tablet (325 mg total) by mouth 2 (two) times daily. 60 tablet 0    magnesium oxide (MAG-OX) 400 mg tablet Take 1 tablet (400 mg total) by mouth 2 (two) times daily.  0    multivitamin (THERAGRAN) tablet Take 1 tablet by mouth once daily.      polyethylene glycol (GLYCOLAX) 17 gram PwPk Take 17 g by mouth once daily. 30 each 0    sulindac (CLINORIL) 200 MG Tab        No current facility-administered medications on file prior to visit.        Review of patient's allergies indicates:   Allergen Reactions    Iodine and iodide containing products     Shellfish containing products     Pcn [penicillins] Rash       Review of Systems:  Constitutional: no fever or chills  ENT: no nasal congestion or sore throat  Respiratory: no cough or shortness of breath  Cardiovascular: no chest pain or palpitations  Gastrointestinal: no nausea or vomiting  Genitourinary: no hematuria or dysuria  Integument/Breast: no rash or pruritis  Hematologic/Lymphatic: no easy bruising or lymphadenopathy  Musculoskeletal: see HPI  Neurological: no seizures or tremors  Behavioral/Psych: no auditory or visual hallucinations      PHYSICAL EXAM    Vitals:    03/23/17 0836 03/23/17 0838   BP: 138/77 123/75   Pulse: 105 102   Weight: 64 kg (141 lb)    Height: 5' 7" (1.702 m)    PainSc:   9   9   PainLoc: Wrist        GENERAL:  Well developed, well " nourished, no acute distress.  CARDIOVASCULAR:  Regular rate and rhythm.  LUNGS:  Respirations equal and unlabored.  BEHAVIORAL AND PSYCH:  Mood and affect appropriate.  NEUROLOGIC:  No tremor.  HEENT:  Normocephalic, atraumatic.  MUSCULOSKELETAL:  Upper extremity exam:  He has limited range of motion of the   thumb.  He has significant weakness in the thenar, severe muscle atrophy   appreciated in the thenar.  Positive Tinel's, positive Durkan's.  Negative   Tinel's at the elbow.  Sensation diminished in the median nerve distribution   bilaterally compared to the ulnar and radial nerve distribution.    EMG study shows severe bilateral carpal tunnel, right worse than left.    PLAN:  Discussed with Mr. Ramos that at this time injections will likely not   help his pain for very long as he does have severe carpal tunnel.  We discussed   doing a carpal tunnel release, he wishes to proceed with this.  He does   understand he will have to take at least two weeks off work until sutures are   removed.  He will follow up with me for suture removal.  He does understand no   lifting for at least two weeks.  He also understands that numbness and weakness   may not resolve secondary to the longstanding compression.  We are hoping that   carpal tunnel release will help with this pain:    SUPERVISING PHYSICIAN:  Anh An M.D.    DICTATED BY:  Harini Pereira PA-C.      SIMON/ALISON  dd: 03/24/2017 16:31:30 (CDT)  td: 03/25/2017 06:39:39 (CDT)  Doc ID   #[id number]  Job ID #[id number]    CC:

## 2022-02-17 ENCOUNTER — OFFICE VISIT (OUTPATIENT)
Dept: FAMILY MEDICINE | Facility: CLINIC | Age: 87
End: 2022-02-17
Payer: MEDICARE

## 2022-02-17 VITALS
SYSTOLIC BLOOD PRESSURE: 160 MMHG | HEART RATE: 72 BPM | HEIGHT: 66 IN | WEIGHT: 149.94 LBS | DIASTOLIC BLOOD PRESSURE: 92 MMHG | BODY MASS INDEX: 24.1 KG/M2 | OXYGEN SATURATION: 98 %

## 2022-02-17 DIAGNOSIS — I10 ESSENTIAL HYPERTENSION: ICD-10-CM

## 2022-02-17 DIAGNOSIS — E78.5 HYPERLIPIDEMIA, UNSPECIFIED HYPERLIPIDEMIA TYPE: ICD-10-CM

## 2022-02-17 PROCEDURE — 3288F PR FALLS RISK ASSESSMENT DOCUMENTED: ICD-10-PCS | Mod: CPTII,S$GLB,, | Performed by: FAMILY MEDICINE

## 2022-02-17 PROCEDURE — 99214 OFFICE O/P EST MOD 30 MIN: CPT | Mod: S$GLB,,, | Performed by: FAMILY MEDICINE

## 2022-02-17 PROCEDURE — 99214 PR OFFICE/OUTPT VISIT, EST, LEVL IV, 30-39 MIN: ICD-10-PCS | Mod: S$GLB,,, | Performed by: FAMILY MEDICINE

## 2022-02-17 PROCEDURE — 3288F FALL RISK ASSESSMENT DOCD: CPT | Mod: CPTII,S$GLB,, | Performed by: FAMILY MEDICINE

## 2022-02-17 PROCEDURE — 1101F PT FALLS ASSESS-DOCD LE1/YR: CPT | Mod: CPTII,S$GLB,, | Performed by: FAMILY MEDICINE

## 2022-02-17 PROCEDURE — 99999 PR PBB SHADOW E&M-EST. PATIENT-LVL III: CPT | Mod: PBBFAC,,, | Performed by: FAMILY MEDICINE

## 2022-02-17 PROCEDURE — 1126F AMNT PAIN NOTED NONE PRSNT: CPT | Mod: CPTII,S$GLB,, | Performed by: FAMILY MEDICINE

## 2022-02-17 PROCEDURE — 1101F PR PT FALLS ASSESS DOC 0-1 FALLS W/OUT INJ PAST YR: ICD-10-PCS | Mod: CPTII,S$GLB,, | Performed by: FAMILY MEDICINE

## 2022-02-17 PROCEDURE — 1126F PR PAIN SEVERITY QUANTIFIED, NO PAIN PRESENT: ICD-10-PCS | Mod: CPTII,S$GLB,, | Performed by: FAMILY MEDICINE

## 2022-02-17 PROCEDURE — 99999 PR PBB SHADOW E&M-EST. PATIENT-LVL III: ICD-10-PCS | Mod: PBBFAC,,, | Performed by: FAMILY MEDICINE

## 2022-02-17 RX ORDER — SIMVASTATIN 40 MG/1
40 TABLET, FILM COATED ORAL NIGHTLY
Qty: 90 TABLET | Refills: 3 | Status: SHIPPED | OUTPATIENT
Start: 2022-02-17 | End: 2022-10-06

## 2022-02-17 RX ORDER — AMLODIPINE BESYLATE 2.5 MG/1
2.5 TABLET ORAL DAILY
Qty: 90 TABLET | Refills: 3 | Status: SHIPPED | OUTPATIENT
Start: 2022-02-17 | End: 2022-12-07 | Stop reason: SDUPTHER

## 2022-02-17 NOTE — PATIENT INSTRUCTIONS
If you are due for any health screening(s) below please notify me so we can arrange them to be ordered and scheduled to maintain your health.     Health Maintenance   Topic Date Due    TETANUS VACCINE  Never done    Lipid Panel  01/29/2026

## 2022-02-18 NOTE — PROGRESS NOTES
Subjective:   Patient ID: Bashir Ramos Sr. is a 87 y.o. male     Chief Complaint:Hypertension      Here for checkup. Doing well.    Review of Systems   Constitutional: Negative for chills and fever.   HENT: Negative for sore throat and trouble swallowing.    Respiratory: Negative for cough and shortness of breath.    Cardiovascular: Negative for chest pain and leg swelling.   Gastrointestinal: Negative for abdominal distention and abdominal pain.   Genitourinary: Negative for dysuria and flank pain.   Musculoskeletal: Negative for arthralgias and back pain.   Skin: Negative for color change and pallor.   Neurological: Negative for weakness and headaches.   Psychiatric/Behavioral: Negative for agitation and confusion.     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 grayson)    EYE SURGERY       Objective:     Vitals:    02/17/22 1535   BP: (!) 160/92   Pulse: 72     Body mass index is 24.2 kg/m².  Physical Exam  Vitals and nursing note reviewed.   Constitutional:       Appearance: He is well-developed and well-nourished.   HENT:      Head: Normocephalic and atraumatic.   Eyes:      General: No scleral icterus.     Conjunctiva/sclera: Conjunctivae normal.   Cardiovascular:      Heart sounds: No murmur heard.      Pulmonary:      Effort: Pulmonary effort is normal. No respiratory distress.   Musculoskeletal:         General: No deformity. Normal range of motion.      Cervical back: Normal range of motion and neck supple.   Skin:     Coloration: Skin is not pale.      Findings: No rash.   Neurological:       Mental Status: He is alert and oriented to person, place, and time.   Psychiatric:         Mood and Affect: Mood and affect normal.         Behavior: Behavior normal.         Thought Content: Thought content normal.         Judgment: Judgment normal.       Assessment:     1. Hyperlipidemia, unspecified hyperlipidemia type    2. Essential hypertension      Plan:   Hyperlipidemia, unspecified hyperlipidemia type  -     simvastatin (ZOCOR) 40 MG tablet; Take 1 tablet (40 mg total) by mouth every evening.  Dispense: 90 tablet; Refill: 3  -     Comprehensive Metabolic Panel; Future; Expected date: 02/17/2022  -     CBC Auto Differential; Future; Expected date: 02/17/2022  -     Lipid Panel; Future; Expected date: 02/17/2022    Essential hypertension  -     amLODIPine (NORVASC) 2.5 MG tablet; Take 1 tablet (2.5 mg total) by mouth once daily.  Dispense: 90 tablet; Refill: 3  Will restart amlodipine due to elevated BP today and f/u in 4 weeks        Juan Casey MD  02/18/2022    Portions of this note have been dictated with M Faith.

## 2022-02-23 ENCOUNTER — LAB VISIT (OUTPATIENT)
Dept: LAB | Facility: HOSPITAL | Age: 87
End: 2022-02-23
Attending: FAMILY MEDICINE
Payer: MEDICARE

## 2022-02-23 DIAGNOSIS — E78.5 HYPERLIPIDEMIA, UNSPECIFIED HYPERLIPIDEMIA TYPE: ICD-10-CM

## 2022-02-23 LAB
ALBUMIN SERPL BCP-MCNC: 3.8 G/DL (ref 3.5–5.2)
ALP SERPL-CCNC: 96 U/L (ref 55–135)
ALT SERPL W/O P-5'-P-CCNC: 26 U/L (ref 10–44)
ANION GAP SERPL CALC-SCNC: 11 MMOL/L (ref 8–16)
AST SERPL-CCNC: 23 U/L (ref 10–40)
BASOPHILS # BLD AUTO: 0.02 K/UL (ref 0–0.2)
BASOPHILS NFR BLD: 0.4 % (ref 0–1.9)
BILIRUB SERPL-MCNC: 0.7 MG/DL (ref 0.1–1)
BUN SERPL-MCNC: 11 MG/DL (ref 8–23)
CALCIUM SERPL-MCNC: 10.1 MG/DL (ref 8.7–10.5)
CHLORIDE SERPL-SCNC: 106 MMOL/L (ref 95–110)
CHOLEST SERPL-MCNC: 179 MG/DL (ref 120–199)
CHOLEST/HDLC SERPL: 3.1 {RATIO} (ref 2–5)
CO2 SERPL-SCNC: 25 MMOL/L (ref 23–29)
CREAT SERPL-MCNC: 1.1 MG/DL (ref 0.5–1.4)
DIFFERENTIAL METHOD: ABNORMAL
EOSINOPHIL # BLD AUTO: 1.2 K/UL (ref 0–0.5)
EOSINOPHIL NFR BLD: 24.9 % (ref 0–8)
ERYTHROCYTE [DISTWIDTH] IN BLOOD BY AUTOMATED COUNT: 14.8 % (ref 11.5–14.5)
EST. GFR  (AFRICAN AMERICAN): >60 ML/MIN/1.73 M^2
EST. GFR  (NON AFRICAN AMERICAN): >60 ML/MIN/1.73 M^2
GLUCOSE SERPL-MCNC: 88 MG/DL (ref 70–110)
HCT VFR BLD AUTO: 43.2 % (ref 40–54)
HDLC SERPL-MCNC: 58 MG/DL (ref 40–75)
HDLC SERPL: 32.4 % (ref 20–50)
HGB BLD-MCNC: 14.1 G/DL (ref 14–18)
IMM GRANULOCYTES # BLD AUTO: 0.01 K/UL (ref 0–0.04)
IMM GRANULOCYTES NFR BLD AUTO: 0.2 % (ref 0–0.5)
LDLC SERPL CALC-MCNC: 104.2 MG/DL (ref 63–159)
LYMPHOCYTES # BLD AUTO: 1.8 K/UL (ref 1–4.8)
LYMPHOCYTES NFR BLD: 35.7 % (ref 18–48)
MCH RBC QN AUTO: 30.7 PG (ref 27–31)
MCHC RBC AUTO-ENTMCNC: 32.6 G/DL (ref 32–36)
MCV RBC AUTO: 94 FL (ref 82–98)
MONOCYTES # BLD AUTO: 0.4 K/UL (ref 0.3–1)
MONOCYTES NFR BLD: 8.4 % (ref 4–15)
NEUTROPHILS # BLD AUTO: 1.5 K/UL (ref 1.8–7.7)
NEUTROPHILS NFR BLD: 30.4 % (ref 38–73)
NONHDLC SERPL-MCNC: 121 MG/DL
NRBC BLD-RTO: 0 /100 WBC
PLATELET # BLD AUTO: 294 K/UL (ref 150–450)
PMV BLD AUTO: 10.1 FL (ref 9.2–12.9)
POTASSIUM SERPL-SCNC: 4 MMOL/L (ref 3.5–5.1)
PROT SERPL-MCNC: 7.4 G/DL (ref 6–8.4)
RBC # BLD AUTO: 4.6 M/UL (ref 4.6–6.2)
SODIUM SERPL-SCNC: 142 MMOL/L (ref 136–145)
TRIGL SERPL-MCNC: 84 MG/DL (ref 30–150)
WBC # BLD AUTO: 4.9 K/UL (ref 3.9–12.7)

## 2022-02-23 PROCEDURE — 85025 COMPLETE CBC W/AUTO DIFF WBC: CPT | Performed by: FAMILY MEDICINE

## 2022-02-23 PROCEDURE — 36415 COLL VENOUS BLD VENIPUNCTURE: CPT | Mod: PO | Performed by: FAMILY MEDICINE

## 2022-02-23 PROCEDURE — 80053 COMPREHEN METABOLIC PANEL: CPT | Performed by: FAMILY MEDICINE

## 2022-02-23 PROCEDURE — 80061 LIPID PANEL: CPT | Performed by: FAMILY MEDICINE

## 2022-02-28 NOTE — TELEPHONE ENCOUNTER
Care Due:                  Date            Visit Type   Department     Provider  --------------------------------------------------------------------------------                                EP -                              PRIMARY      [facility]  Last Visit: 02-      CARE ([facility])   TED Casey                              EP -                              PRIMARY      [facility]  Next Visit: 03-      CARE ([facility])   TED Casey                                                            Last  Test          Frequency    Reason                     Performed    Due Date  --------------------------------------------------------------------------------    Uric Acid...  12 months..  allopurinoL..............  01- 01-    Powered by [employer]. Reference number: [id number].   2/28/2022 2:20:41 PM CST

## 2022-03-01 NOTE — TELEPHONE ENCOUNTER
This Rx Request does not qualify for assessment with the [facility].    Please review protocol details and the Care Due Message for extra clinical information    Reasons Rx Request may be deferred:  Labs/Vitals overdue  Labs/Vitals abnormal  Patient has been seen in the ED/Hospital since the last PCP visit  Medication is non-delegated  Provider is a non-participating provider   [facility] appropriate indication for medication not met  Annual with PCP overdue

## 2022-03-02 RX ORDER — ALLOPURINOL 100 MG/1
TABLET ORAL
Qty: 90 TABLET | Refills: 1 | Status: SHIPPED | OUTPATIENT
Start: 2022-03-02 | End: 2022-09-06

## 2022-03-21 ENCOUNTER — TELEPHONE (OUTPATIENT)
Dept: FAMILY MEDICINE | Facility: CLINIC | Age: 87
End: 2022-03-21
Payer: MEDICARE

## 2022-03-21 NOTE — TELEPHONE ENCOUNTER
----- Message from Kimberly Dodd sent at 3/21/2022  9:54 AM CDT -----  Caller is requesting a sooner appointment.  Caller declined first available appointment listed below.  Caller will not accept being placed on the waitlist and is requesting a message be sent to doctor.    Name of Caller: Bashir (Son)    When is the first available appointment? 5/31/22    Symptoms: left foot problems    Best Call Back Number: [phone]    Additional Information:

## 2022-03-24 ENCOUNTER — TELEPHONE (OUTPATIENT)
Dept: FAMILY MEDICINE | Facility: CLINIC | Age: 87
End: 2022-03-24
Payer: MEDICARE

## 2022-03-24 NOTE — TELEPHONE ENCOUNTER
----- Message from Juliann Steward sent at 3/24/2022  1:49 PM CDT -----  Regarding: returning call  Contact: patient  Type:  Patient Returning Call    Who Called:  Son Bashir Ramos   Who Left Message for Patient:  Muna  Does the patient know what this is regarding?:  appointment  Best Call Back Number:  call son at [phone]  Additional Information: Patient missed call from 03/21/22.  Patient has pain and swelling in left pain. Please call son to schedule with doctor. Thanks!

## 2022-03-24 NOTE — TELEPHONE ENCOUNTER
Spoke to pt son offered same day appt as well as next day and the following Monday. Pt refused stating he is busy and requested Tuesday of next week. Scheduled pt appt per his request

## 2022-03-27 ENCOUNTER — HOSPITAL ENCOUNTER (EMERGENCY)
Facility: HOSPITAL | Age: 87
Discharge: HOME | End: 2022-03-27
Attending: EMERGENCY MEDICINE
Payer: MEDICARE

## 2022-03-27 VITALS
WEIGHT: 175 LBS | DIASTOLIC BLOOD PRESSURE: 86 MMHG | HEIGHT: 70 IN | SYSTOLIC BLOOD PRESSURE: 165 MMHG | RESPIRATION RATE: 18 BRPM | HEART RATE: 65 BPM | OXYGEN SATURATION: 97 % | TEMPERATURE: 98 F | BODY MASS INDEX: 25.05 KG/M2

## 2022-03-27 DIAGNOSIS — N30.00 ACUTE CYSTITIS WITHOUT HEMATURIA: Primary | ICD-10-CM

## 2022-03-27 LAB
ANION GAP SERPL CALC-SCNC: 12 MMOL/L (ref 8–16)
BACTERIA #/AREA URNS HPF: ABNORMAL /HPF
BASOPHILS # BLD AUTO: 0.02 K/UL (ref 0–0.2)
BASOPHILS NFR BLD: 0.5 % (ref 0–1.9)
BILIRUB UR QL STRIP: NEGATIVE
BUN SERPL-MCNC: 11 MG/DL (ref 8–23)
CALCIUM SERPL-MCNC: 9.9 MG/DL (ref 8.7–10.5)
CHLORIDE SERPL-SCNC: 106 MMOL/L (ref 95–110)
CLARITY UR: CLEAR
CO2 SERPL-SCNC: 21 MMOL/L (ref 23–29)
COLOR UR: YELLOW
CREAT SERPL-MCNC: 1.1 MG/DL (ref 0.5–1.4)
DIFFERENTIAL METHOD: ABNORMAL
EOSINOPHIL # BLD AUTO: 0.7 K/UL (ref 0–0.5)
EOSINOPHIL NFR BLD: 15.8 % (ref 0–8)
ERYTHROCYTE [DISTWIDTH] IN BLOOD BY AUTOMATED COUNT: 14.2 % (ref 11.5–14.5)
EST. GFR  (AFRICAN AMERICAN): >60 ML/MIN/1.73 M^2
EST. GFR  (NON AFRICAN AMERICAN): >60 ML/MIN/1.73 M^2
GLUCOSE SERPL-MCNC: 89 MG/DL (ref 70–110)
GLUCOSE UR QL STRIP: NEGATIVE
HCT VFR BLD AUTO: 43.9 % (ref 40–54)
HGB BLD-MCNC: 14.6 G/DL (ref 14–18)
HGB UR QL STRIP: NEGATIVE
IMM GRANULOCYTES # BLD AUTO: 0.01 K/UL (ref 0–0.04)
IMM GRANULOCYTES NFR BLD AUTO: 0.2 % (ref 0–0.5)
KETONES UR QL STRIP: NEGATIVE
LEUKOCYTE ESTERASE UR QL STRIP: ABNORMAL
LYMPHOCYTES # BLD AUTO: 0.8 K/UL (ref 1–4.8)
LYMPHOCYTES NFR BLD: 18.3 % (ref 18–48)
MCH RBC QN AUTO: 30.7 PG (ref 27–31)
MCHC RBC AUTO-ENTMCNC: 33.3 G/DL (ref 32–36)
MCV RBC AUTO: 92 FL (ref 82–98)
MICROSCOPIC COMMENT: ABNORMAL
MONOCYTES # BLD AUTO: 0.4 K/UL (ref 0.3–1)
MONOCYTES NFR BLD: 8.6 % (ref 4–15)
NEUTROPHILS # BLD AUTO: 2.5 K/UL (ref 1.8–7.7)
NEUTROPHILS NFR BLD: 56.6 % (ref 38–73)
NITRITE UR QL STRIP: POSITIVE
NRBC BLD-RTO: 0 /100 WBC
PH UR STRIP: 7 [PH] (ref 5–8)
PLATELET # BLD AUTO: 278 K/UL (ref 150–450)
PMV BLD AUTO: 9.2 FL (ref 9.2–12.9)
POTASSIUM SERPL-SCNC: 4 MMOL/L (ref 3.5–5.1)
PROT UR QL STRIP: NEGATIVE
RBC # BLD AUTO: 4.75 M/UL (ref 4.6–6.2)
RBC #/AREA URNS HPF: 2 /HPF (ref 0–4)
SODIUM SERPL-SCNC: 139 MMOL/L (ref 136–145)
SP GR UR STRIP: 1.01 (ref 1–1.03)
SQUAMOUS #/AREA URNS HPF: 2 /HPF
URN SPEC COLLECT METH UR: ABNORMAL
UROBILINOGEN UR STRIP-ACNC: NEGATIVE EU/DL
WBC # BLD AUTO: 4.42 K/UL (ref 3.9–12.7)
WBC #/AREA URNS HPF: 11 /HPF (ref 0–5)

## 2022-03-27 PROCEDURE — 87086 URINE CULTURE/COLONY COUNT: CPT | Performed by: EMERGENCY MEDICINE

## 2022-03-27 PROCEDURE — 87186 SC STD MICRODIL/AGAR DIL: CPT | Performed by: EMERGENCY MEDICINE

## 2022-03-27 PROCEDURE — 63600175 PHARM REV CODE 636 W HCPCS: Performed by: EMERGENCY MEDICINE

## 2022-03-27 PROCEDURE — 96361 HYDRATE IV INFUSION ADD-ON: CPT

## 2022-03-27 PROCEDURE — 96374 THER/PROPH/DIAG INJ IV PUSH: CPT

## 2022-03-27 PROCEDURE — 80048 BASIC METABOLIC PNL TOTAL CA: CPT | Performed by: EMERGENCY MEDICINE

## 2022-03-27 PROCEDURE — 25000003 PHARM REV CODE 250: Performed by: EMERGENCY MEDICINE

## 2022-03-27 PROCEDURE — 36415 COLL VENOUS BLD VENIPUNCTURE: CPT | Performed by: EMERGENCY MEDICINE

## 2022-03-27 PROCEDURE — 87077 CULTURE AEROBIC IDENTIFY: CPT | Performed by: EMERGENCY MEDICINE

## 2022-03-27 PROCEDURE — 81000 URINALYSIS NONAUTO W/SCOPE: CPT | Performed by: EMERGENCY MEDICINE

## 2022-03-27 PROCEDURE — 99285 EMERGENCY DEPT VISIT HI MDM: CPT | Mod: 25

## 2022-03-27 PROCEDURE — 85025 COMPLETE CBC W/AUTO DIFF WBC: CPT | Performed by: EMERGENCY MEDICINE

## 2022-03-27 PROCEDURE — 25500020 PHARM REV CODE 255: Performed by: EMERGENCY MEDICINE

## 2022-03-27 PROCEDURE — 87088 URINE BACTERIA CULTURE: CPT | Performed by: EMERGENCY MEDICINE

## 2022-03-27 RX ORDER — NITROFURANTOIN 25; 75 MG/1; MG/1
100 CAPSULE ORAL 2 TIMES DAILY
Qty: 9 CAPSULE | Refills: 0 | Status: SHIPPED | OUTPATIENT
Start: 2022-03-27 | End: 2022-03-29

## 2022-03-27 RX ORDER — MORPHINE SULFATE 2 MG/ML
6 INJECTION, SOLUTION INTRAMUSCULAR; INTRAVENOUS
Status: COMPLETED | OUTPATIENT
Start: 2022-03-27 | End: 2022-03-27

## 2022-03-27 RX ORDER — NITROFURANTOIN 25; 75 MG/1; MG/1
100 CAPSULE ORAL
Status: COMPLETED | OUTPATIENT
Start: 2022-03-27 | End: 2022-03-27

## 2022-03-27 RX ADMIN — IOHEXOL 75 ML: 350 INJECTION, SOLUTION INTRAVENOUS at 11:03

## 2022-03-27 RX ADMIN — MORPHINE SULFATE 6 MG: 2 INJECTION, SOLUTION INTRAMUSCULAR; INTRAVENOUS at 10:03

## 2022-03-27 RX ADMIN — SODIUM CHLORIDE 1000 ML: 0.9 INJECTION, SOLUTION INTRAVENOUS at 10:03

## 2022-03-27 RX ADMIN — NITROFURANTOIN (MONOHYDRATE/MACROCRYSTALS) 100 MG: 75; 25 CAPSULE ORAL at 02:03

## 2022-03-27 NOTE — ED PROVIDER NOTES
Encounter Date: 3/27/2022    SCRIBE #1 NOTE: IDino, karen scribing for, and in the presence of, Jostin Wade MD.       History     Chief Complaint   Patient presents with    Abdominal Pain     Lower abd pain x 3 days.  States that he has been urinating frequently with foul odor.     Time seen by provider: 10:17 AM on 03/27/2022    Bashir Ramos Sr. is a 87 y.o. male who presents to the ED with a 3 day history of lower abdominal pain. The Pt states that the pain has been consistent and constant. He has no history of prior abdominal surgeries. He also c/o more frequent urination. The patient denies dysuria, hematuria, difficulty urinating, vomiting, diarrhea, blood in stool, or any other symptoms at this time. PMHx of HTN. PSHx of back surgery.      The history is provided by the patient and a relative.     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 grayson)    EYE SURGERY       Family History   Problem Relation Age of Onset    No Known Problems Mother     No Known Problems Father     Amblyopia Neg Hx     Blindness Neg Hx     Cancer Neg Hx     Cataracts Neg Hx     Diabetes Neg Hx     Glaucoma Neg Hx     Hypertension Neg Hx     Macular degeneration Neg Hx     Retinal detachment Neg Hx     Strabismus Neg Hx     Stroke Neg Hx     Thyroid disease Neg Hx      Social History     Tobacco Use    Smoking status:  Former Smoker     Packs/day: 1.00     Types: Cigarettes     Quit date: 1982     Years since quittin.6    Smokeless tobacco: Never Used   Substance Use Topics    Alcohol use: No    Drug use: No     Review of Systems   Constitutional: Negative for fever.   HENT: Negative for sore throat.    Respiratory: Negative for shortness of breath.    Cardiovascular: Negative for chest pain.   Gastrointestinal: Positive for abdominal pain. Negative for blood in stool, diarrhea, nausea and vomiting.   Genitourinary: Positive for frequency. Negative for difficulty urinating, dysuria and hematuria.   Musculoskeletal: Negative for back pain.   Skin: Negative for rash.   Neurological: Negative for weakness.   Hematological: Does not bruise/bleed easily.       Physical Exam     Initial Vitals [22 1001]   BP Pulse Resp Temp SpO2   (!) 155/90 90 16 98.6 °F (37 °C) 99 %      MAP       --         Physical Exam    Nursing note and vitals reviewed.  Constitutional: He appears well-developed and well-nourished. He is not diaphoretic. No distress.   HENT:   Head: Normocephalic and atraumatic.   Mouth/Throat: Oropharynx is clear and moist.   Eyes: Conjunctivae are normal.   Neck: Neck supple.   Cardiovascular: Normal rate, regular rhythm, normal heart sounds and intact distal pulses. Exam reveals no gallop and no friction rub.    No murmur heard.  Pulses:       Dorsalis pedis pulses are 2+ on the right side and 2+ on the left side.        Posterior tibial pulses are 2+ on the right side and 2+ on the left side.   Pulmonary/Chest: Breath sounds normal. He has no wheezes. He has no rhonchi. He has no rales.   Abdominal: Abdomen is soft. He exhibits no distension and no mass. There is abdominal tenderness in the right lower quadrant and suprapubic area. No hernia.   Mild voluntary guarding. There is guarding.   Musculoskeletal:         General: Normal range of motion.      Cervical back: Neck supple.     Neurological: He is  alert and oriented to person, place, and time.   Skin: No rash noted. No erythema.         ED Course   Procedures  Labs Reviewed   CBC W/ AUTO DIFFERENTIAL - Abnormal; Notable for the following components:       Result Value    Lymph # 0.8 (*)     Eos # 0.7 (*)     Eosinophil % 15.8 (*)     All other components within normal limits   BASIC METABOLIC PANEL - Abnormal; Notable for the following components:    CO2 21 (*)     All other components within normal limits   URINALYSIS, REFLEX TO URINE CULTURE - Abnormal; Notable for the following components:    Nitrite, UA Positive (*)     Leukocytes, UA 1+ (*)     All other components within normal limits    Narrative:     Specimen Source->Urine   URINALYSIS MICROSCOPIC - Abnormal; Notable for the following components:    WBC, UA 11 (*)     Bacteria Many (*)     All other components within normal limits    Narrative:     Specimen Source->Urine   CULTURE, URINE          Imaging Results          CT Abdomen Pelvis With Contrast (Final result)  Result time 03/27/22 11:37:15    Final result by Jostin Malhotra MD (03/27/22 11:37:15)                 Impression:      No acute findings including normal appendix.      Electronically signed by: Jostin Malhotra  Date:    03/27/2022  Time:    11:37             Narrative:    EXAMINATION:  CT ABDOMEN PELVIS WITH CONTRAST    CLINICAL HISTORY:  RLQ abdominal pain (Age >= 14y);    TECHNIQUE:  Low dose axial images, sagittal and coronal reformations were obtained from the lung bases to the pubic symphysis following the IV administration of 75 mL of Omnipaque 350 .  Oral contrast was not given.    COMPARISON:  None.    FINDINGS:  Minimal dependent lower lobe atelectasis.  Normal size heart.  Nondistended gallbladder.    Subcentimeter hypodense lesion inferior right hepatic lobe, too small to adequately characterize.  Several simple cysts in each renal cortex.  Remaining solid abdominal organs are unremarkable.    There is no enteric  contrast which limits bowel assessment.  No dilated bowel loops.  Tortuous and redundant descending colon segment.  Normal appendix.    Atherosclerosis.  Unremarkable prostate and urinary bladder.    Bilateral L4 pars defects.  There is 24 mm anterolisthesis L4 on L5 with complete ankylosis between the L4 through S1 vertebra.  There is also posterior element fusion L2-L3.  Multilevel spinal degenerative changes.  Bilateral moderate hip joint degenerative change with chondrocalcinosis and acetabular over coverage.                            (rad read)     Medications   morphine injection 6 mg (6 mg Intravenous Given 3/27/22 1037)   sodium chloride 0.9% bolus 1,000 mL (0 mLs Intravenous Stopped 3/27/22 1230)   iohexoL (OMNIPAQUE 350) injection 75 mL (75 mLs Intravenous Given 3/27/22 1105)   nitrofurantoin (macrocrystal-monohydrate) 100 MG capsule 100 mg (100 mg Oral Given 3/27/22 1435)     Medical Decision Making:   History:   Old Medical Records: I decided to obtain old medical records.  Clinical Tests:   Lab Tests: Ordered and Reviewed  Radiological Study: Ordered and Reviewed          Scribe Attestation:   Scribe #1: I performed the above scribed service and the documentation accurately describes the services I performed. I attest to the accuracy of the note.        ED Course as of 03/27/22 1438   Sun Mar 27, 2022   1055 Patient and son deny any hx of IV dye allergy or iodine allergy as well. [MR]   1423 CrCl is 53 on calculation. [MR]      ED Course User Index  [MR] Jostin Wade MD           I, Dr. Jostin Wade, personally performed the services described in this documentation. All medical record entries made by the scribe were at my direction and in my presence.  I have reviewed the chart and agree that the record reflects my personal performance and is accurate and complete. Jostin Wade MD.  2:38 PM 03/27/2022    Bashir Ramos Sr. is a 87 y.o. male presenting with lower abdominal pain in  the setting of positive urinalysis.  There is no sign of acute intra-abdominal process such as appendicitis, abscess, or obstructive uropathy.  I doubt sepsis.  He does have tenderness in lower abdomen including suprapubic region with acute cystitis considered.  I doubt pyelonephritis.  I do not think he requires admission for IV antibiotics.  I will initiate antibiotics for possible acute cystitis.  He outpatient PCP follow-up for reassessment recommended with 1st dose given prior to discharge.    Clinical Impression:   Final diagnoses:  [N30.00] Acute cystitis without hematuria (Primary)          ED Disposition Condition    Discharge Stable        ED Prescriptions     Medication Sig Dispense Start Date End Date Auth. Provider    nitrofurantoin, macrocrystal-monohydrate, (MACROBID) 100 MG capsule Take 1 capsule (100 mg total) by mouth 2 (two) times daily. for 5 days 9 capsule 3/27/2022 4/1/2022 Jostin Wade MD        Follow-up Information     Follow up With Specialties Details Why Contact Info    Juan Casey MD Family Medicine  This week [address] [facility] [address]  [phone]             Jostin Wade MD  03/27/22 6804

## 2022-03-27 NOTE — ED TRIAGE NOTES
Pt here with suprapubic abd pain for 3 days. Pain has been constant not getting worse or better. andres po. abd non-distended, soft. AAO X 4. Maintains airway. Skin warm and dry. 2+ bilat radial pulses. Bed in low and locked position Pulse oximetry on room air is in place.pulse Ox monitoring BP monitoring in place.

## 2022-03-29 ENCOUNTER — OFFICE VISIT (OUTPATIENT)
Dept: FAMILY MEDICINE | Facility: CLINIC | Age: 87
End: 2022-03-29
Payer: MEDICARE

## 2022-03-29 ENCOUNTER — HOSPITAL ENCOUNTER (OUTPATIENT)
Dept: RADIOLOGY | Facility: HOSPITAL | Age: 87
Discharge: HOME | End: 2022-03-29
Attending: FAMILY MEDICINE
Payer: MEDICARE

## 2022-03-29 VITALS
HEIGHT: 70 IN | SYSTOLIC BLOOD PRESSURE: 142 MMHG | OXYGEN SATURATION: 98 % | WEIGHT: 146.63 LBS | BODY MASS INDEX: 20.99 KG/M2 | HEART RATE: 115 BPM | DIASTOLIC BLOOD PRESSURE: 84 MMHG

## 2022-03-29 DIAGNOSIS — R33.9 URINARY RETENTION: ICD-10-CM

## 2022-03-29 DIAGNOSIS — N39.0 URINARY TRACT INFECTION WITHOUT HEMATURIA, SITE UNSPECIFIED: Primary | ICD-10-CM

## 2022-03-29 DIAGNOSIS — N39.0 URINARY TRACT INFECTION WITHOUT HEMATURIA, SITE UNSPECIFIED: ICD-10-CM

## 2022-03-29 LAB — BACTERIA UR CULT: ABNORMAL

## 2022-03-29 PROCEDURE — 1159F PR MEDICATION LIST DOCUMENTED IN MEDICAL RECORD: ICD-10-PCS | Mod: CPTII,S$GLB,, | Performed by: FAMILY MEDICINE

## 2022-03-29 PROCEDURE — 1159F MED LIST DOCD IN RCRD: CPT | Mod: CPTII,S$GLB,, | Performed by: FAMILY MEDICINE

## 2022-03-29 PROCEDURE — 99214 PR OFFICE/OUTPT VISIT, EST, LEVL IV, 30-39 MIN: ICD-10-PCS | Mod: S$GLB,,, | Performed by: FAMILY MEDICINE

## 2022-03-29 PROCEDURE — 76857 US EXAM PELVIC LIMITED: CPT | Mod: 26,,, | Performed by: RADIOLOGY

## 2022-03-29 PROCEDURE — 1101F PR PT FALLS ASSESS DOC 0-1 FALLS W/OUT INJ PAST YR: ICD-10-PCS | Mod: CPTII,S$GLB,, | Performed by: FAMILY MEDICINE

## 2022-03-29 PROCEDURE — 76857 US EXAM PELVIC LIMITED: CPT | Mod: TC

## 2022-03-29 PROCEDURE — 1101F PT FALLS ASSESS-DOCD LE1/YR: CPT | Mod: CPTII,S$GLB,, | Performed by: FAMILY MEDICINE

## 2022-03-29 PROCEDURE — 99999 PR PBB SHADOW E&M-EST. PATIENT-LVL III: ICD-10-PCS | Mod: PBBFAC,,, | Performed by: FAMILY MEDICINE

## 2022-03-29 PROCEDURE — 3288F PR FALLS RISK ASSESSMENT DOCUMENTED: ICD-10-PCS | Mod: CPTII,S$GLB,, | Performed by: FAMILY MEDICINE

## 2022-03-29 PROCEDURE — 3288F FALL RISK ASSESSMENT DOCD: CPT | Mod: CPTII,S$GLB,, | Performed by: FAMILY MEDICINE

## 2022-03-29 PROCEDURE — 76857 US BLADDER: ICD-10-PCS | Mod: 26,,, | Performed by: RADIOLOGY

## 2022-03-29 PROCEDURE — 99214 OFFICE O/P EST MOD 30 MIN: CPT | Mod: S$GLB,,, | Performed by: FAMILY MEDICINE

## 2022-03-29 PROCEDURE — 99999 PR PBB SHADOW E&M-EST. PATIENT-LVL III: CPT | Mod: PBBFAC,,, | Performed by: FAMILY MEDICINE

## 2022-03-29 RX ORDER — TAMSULOSIN HYDROCHLORIDE 0.4 MG/1
0.4 CAPSULE ORAL DAILY
Qty: 30 CAPSULE | Refills: 1 | Status: SHIPPED | OUTPATIENT
Start: 2022-03-29 | End: 2022-04-06

## 2022-03-29 RX ORDER — PHENAZOPYRIDINE HYDROCHLORIDE 200 MG/1
200 TABLET, FILM COATED ORAL
Qty: 6 TABLET | Refills: 0 | Status: SHIPPED | OUTPATIENT
Start: 2022-03-29 | End: 2022-10-06

## 2022-03-29 RX ORDER — CIPROFLOXACIN 500 MG/1
500 TABLET ORAL 2 TIMES DAILY
Qty: 14 TABLET | Refills: 0 | Status: SHIPPED | OUTPATIENT
Start: 2022-03-29 | End: 2022-10-06

## 2022-03-29 NOTE — PROGRESS NOTES
Subjective:       Patient ID: Bashir Ramos Sr. is a 87 y.o. male.    Chief Complaint: No chief complaint on file.    New to me patient here for UC visit.  F/u on ERV from 2 days ago- had lower abd pain and found to have UTI, CT showed unremarkable prostate and bladder and nml Appendix.  UA had WBC's, Nitrites and many bacteria.  Urine Cult prelim shows >100K GNR.    Pt still has pelvic pain, dysuria and frequent, small amounts of urine.  No prior hx of LUT obstructive symptoms.    No fever or nausea.     Review of Systems   Constitutional: Negative for fever.   Respiratory: Negative for shortness of breath.    Cardiovascular: Negative for chest pain.   Gastrointestinal: Negative for abdominal pain and nausea.   Musculoskeletal: Positive for arthralgias and back pain.   Skin: Negative for rash.   Neurological: Negative for numbness.   All other systems reviewed and are negative.      Objective:      Physical Exam  Constitutional:       General: He is not in acute distress.     Appearance: He is well-developed.   Cardiovascular:      Rate and Rhythm: Normal rate and regular rhythm.      Heart sounds: No murmur heard.  Pulmonary:      Effort: Pulmonary effort is normal.      Breath sounds: Normal breath sounds.   Abdominal:      General: Bowel sounds are normal.      Palpations: Abdomen is soft.      Tenderness: There is abdominal tenderness (SP area > R and L lower quadrants.  Bladder feesl distended.).   Musculoskeletal:      Cervical back: Neck supple.   Lymphadenopathy:      Cervical: No cervical adenopathy.   Skin:     General: Skin is warm and dry.         Assessment:       1. Urinary tract infection without hematuria, site unspecified    2. Urinary retention        Plan:       Urinary tract infection without hematuria, site unspecified  -     phenazopyridine (PYRIDIUM) 200 MG tablet; Take 1 tablet (200 mg total) by mouth 3 (three) times daily with meals.  Dispense: 6 tablet; Refill: 0  -     US Bladder;  Future; Expected date: 03/29/2022    Urinary retention  -     tamsulosin (FLOMAX) 0.4 mg Cap; Take 1 capsule (0.4 mg total) by mouth once daily.  Dispense: 30 capsule; Refill: 1  -     Cancel: US Pelvis Limited Non OB; Future; Expected date: 03/29/2022  -      Bladder; Future; Expected date: 03/29/2022    Check post-void residual now.  Will also f/u on culture results this afternoon.

## 2022-03-30 NOTE — PROGRESS NOTES
Spoke with patients son in regards to his results. He was informed of providers message, and verbalized understanding.

## 2022-04-01 ENCOUNTER — PES CALL (OUTPATIENT)
Dept: ADMINISTRATIVE | Facility: CLINIC | Age: 87
End: 2022-04-01
Payer: MEDICARE

## 2022-04-06 ENCOUNTER — OFFICE VISIT (OUTPATIENT)
Dept: FAMILY MEDICINE | Facility: CLINIC | Age: 87
End: 2022-04-06
Payer: MEDICARE

## 2022-04-06 VITALS
WEIGHT: 149.25 LBS | HEART RATE: 92 BPM | BODY MASS INDEX: 21.37 KG/M2 | SYSTOLIC BLOOD PRESSURE: 118 MMHG | OXYGEN SATURATION: 97 % | DIASTOLIC BLOOD PRESSURE: 70 MMHG | HEIGHT: 70 IN

## 2022-04-06 DIAGNOSIS — I10 ESSENTIAL HYPERTENSION: ICD-10-CM

## 2022-04-06 DIAGNOSIS — E78.5 HYPERLIPIDEMIA, UNSPECIFIED HYPERLIPIDEMIA TYPE: Primary | ICD-10-CM

## 2022-04-06 PROCEDURE — 3288F PR FALLS RISK ASSESSMENT DOCUMENTED: ICD-10-PCS | Mod: CPTII,S$GLB,, | Performed by: FAMILY MEDICINE

## 2022-04-06 PROCEDURE — 99999 PR PBB SHADOW E&M-EST. PATIENT-LVL IV: CPT | Mod: PBBFAC,,, | Performed by: FAMILY MEDICINE

## 2022-04-06 PROCEDURE — 3288F FALL RISK ASSESSMENT DOCD: CPT | Mod: CPTII,S$GLB,, | Performed by: FAMILY MEDICINE

## 2022-04-06 PROCEDURE — 1126F PR PAIN SEVERITY QUANTIFIED, NO PAIN PRESENT: ICD-10-PCS | Mod: CPTII,S$GLB,, | Performed by: FAMILY MEDICINE

## 2022-04-06 PROCEDURE — 1101F PT FALLS ASSESS-DOCD LE1/YR: CPT | Mod: CPTII,S$GLB,, | Performed by: FAMILY MEDICINE

## 2022-04-06 PROCEDURE — 1159F MED LIST DOCD IN RCRD: CPT | Mod: CPTII,S$GLB,, | Performed by: FAMILY MEDICINE

## 2022-04-06 PROCEDURE — 99213 PR OFFICE/OUTPT VISIT, EST, LEVL III, 20-29 MIN: ICD-10-PCS | Mod: S$GLB,,, | Performed by: FAMILY MEDICINE

## 2022-04-06 PROCEDURE — 99999 PR PBB SHADOW E&M-EST. PATIENT-LVL IV: ICD-10-PCS | Mod: PBBFAC,,, | Performed by: FAMILY MEDICINE

## 2022-04-06 PROCEDURE — 1159F PR MEDICATION LIST DOCUMENTED IN MEDICAL RECORD: ICD-10-PCS | Mod: CPTII,S$GLB,, | Performed by: FAMILY MEDICINE

## 2022-04-06 PROCEDURE — 99213 OFFICE O/P EST LOW 20 MIN: CPT | Mod: S$GLB,,, | Performed by: FAMILY MEDICINE

## 2022-04-06 PROCEDURE — 1101F PR PT FALLS ASSESS DOC 0-1 FALLS W/OUT INJ PAST YR: ICD-10-PCS | Mod: CPTII,S$GLB,, | Performed by: FAMILY MEDICINE

## 2022-04-06 PROCEDURE — 1126F AMNT PAIN NOTED NONE PRSNT: CPT | Mod: CPTII,S$GLB,, | Performed by: FAMILY MEDICINE

## 2022-04-06 NOTE — PATIENT INSTRUCTIONS
Anton Camejo,     If you are due for any health screening(s) below please notify me so we can arrange them to be ordered and scheduled to maintain your health. Most healthy patients complete it. Don't lose out on improving your health.     Health Maintenance   Topic Date Due    TETANUS VACCINE  Never done    Lipid Panel  02/23/2027

## 2022-04-07 NOTE — PROGRESS NOTES
Subjective:   Patient ID: Bashir Ramos Sr. is a 87 y.o. male     Chief Complaint:Hypertension and Follow-up      Here for follow up after treatment of UTI    Review of Systems   Respiratory: Negative for shortness of breath.    Cardiovascular: Negative for chest pain.   Gastrointestinal: Negative for abdominal pain.   Genitourinary: Negative for dysuria.     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 grayson)    EYE SURGERY       Objective:     Vitals:    04/06/22 1305   BP: 118/70   Pulse: 92     Body mass index is 21.42 kg/m².  Physical Exam  Vitals and nursing note reviewed.   Constitutional:       Appearance: He is well-developed.   HENT:      Head: Normocephalic and atraumatic.   Eyes:      General: No scleral icterus.     Conjunctiva/sclera: Conjunctivae normal.   Cardiovascular:      Heart sounds: No murmur heard.  Pulmonary:      Effort: Pulmonary effort is normal. No respiratory distress.   Musculoskeletal:         General: No deformity. Normal range of motion.      Cervical back: Normal range of motion and neck supple.   Skin:     Coloration: Skin is not pale.      Findings: No rash.   Neurological:      Mental Status: He is alert and oriented to person, place, and time.   Psychiatric:         Behavior: Behavior normal.         Thought Content: Thought content normal.         Judgment: Judgment normal.       Assessment:     1. Hyperlipidemia, unspecified hyperlipidemia type    2. Essential hypertension      Plan:   Hyperlipidemia, unspecified hyperlipidemia type  Stable on statin  Essential  hypertension  Well controlled on oral meds      Pt notes improvement in symptoms of UTI after completed treatment. UA does not show blood.    Total time spent of Less than 30 minutes minutes on the day of the visit.This includes face to face time and preparing to see the patient, obtaining and reviewing separately obtained history, documenting clinical information in the electronic or other health record, independently interpreting results and communicating results to the patient/family/caregiver, or care coordinator.    Juan Casey MD  04/06/2022    Portions of this note have been dictated with ADEN Monterroso

## 2022-07-09 ENCOUNTER — HOSPITAL ENCOUNTER (EMERGENCY)
Facility: HOSPITAL | Age: 87
Discharge: HOME | End: 2022-07-09
Attending: EMERGENCY MEDICINE
Payer: MEDICARE

## 2022-07-09 VITALS
HEIGHT: 70 IN | BODY MASS INDEX: 21.47 KG/M2 | HEART RATE: 78 BPM | DIASTOLIC BLOOD PRESSURE: 66 MMHG | OXYGEN SATURATION: 99 % | SYSTOLIC BLOOD PRESSURE: 120 MMHG | RESPIRATION RATE: 16 BRPM | WEIGHT: 150 LBS | TEMPERATURE: 98 F

## 2022-07-09 DIAGNOSIS — M25.511 STERNOCLAVICULAR JOINT PAIN, RIGHT: ICD-10-CM

## 2022-07-09 PROCEDURE — 99283 EMERGENCY DEPT VISIT LOW MDM: CPT | Mod: 25

## 2022-07-09 RX ORDER — DICLOFENAC SODIUM 10 MG/G
2 GEL TOPICAL 2 TIMES DAILY PRN
Qty: 20 G | Refills: 0 | Status: SHIPPED | OUTPATIENT
Start: 2022-07-09 | End: 2022-10-06

## 2022-07-09 NOTE — ED NOTES
S/P port xray @ bedside. Presents with obvious swelling to Rt clavicular area @ base of Rt neck/denies known trauma

## 2022-07-09 NOTE — ED PROVIDER NOTES
Encounter Date: 7/9/2022    SCRIBE #1 NOTE: I, Dorothy Canela, am scribing for, and in the presence of, Jostin Wade MD.       History     Chief Complaint   Patient presents with    swelling to neck      Pain / worse yesterday      Time seen by provider: 9:02 AM on 07/09/2022    Bashir Ramos Sr. is a 87 y.o. male with a PMHx of HTN, HLD, gout, and prediabetes who presents to the ED for evaluation of swelling and pain below the R clavicular region that has been worsening since yesterday.  Patient reports no recent trauma or injuries to the area.  No aggravating or alleviating factors mentioned.  He denies SOB, difficulty swallowing, CP, back pain, abdominal pain, BUE pain or any other symptoms at this time.  PSHx includes back surgery.      The history is provided by the patient.     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 grayson)    EYE SURGERY       Family History   Problem Relation Age of Onset    No Known Problems Mother     No Known Problems Father     Amblyopia Neg Hx     Blindness Neg Hx     Cancer Neg Hx     Cataracts Neg Hx     Diabetes Neg Hx     Glaucoma Neg Hx     Hypertension Neg Hx     Macular degeneration Neg Hx     Retinal detachment Neg Hx     Strabismus Neg Hx     Stroke Neg Hx     Thyroid disease Neg Hx      Social History     Tobacco Use    Smoking status: Former Smoker     Packs/day: 1.00      Types: Cigarettes     Quit date: 1982     Years since quittin.8    Smokeless tobacco: Never Used   Substance Use Topics    Alcohol use: No    Drug use: No     Review of Systems   Constitutional: Negative for fever.   HENT: Negative for sore throat and trouble swallowing.         Positive for swelling and pain below the R clavicle.     Respiratory: Negative for shortness of breath.    Cardiovascular: Negative for chest pain.   Gastrointestinal: Negative for abdominal pain and nausea.   Genitourinary: Negative for dysuria.   Musculoskeletal: Negative for back pain and neck pain.   Skin: Negative for rash.   Neurological: Negative for weakness.   Hematological: Does not bruise/bleed easily.       Physical Exam     Initial Vitals [22 0849]   BP Pulse Resp Temp SpO2   (!) 155/75 (!) 111 18 97.8 °F (36.6 °C) 100 %      MAP       --         Physical Exam    Nursing note and vitals reviewed.  Constitutional: He appears well-developed and well-nourished. He is not diaphoretic. No distress.   HENT:   Head: Normocephalic and atraumatic.   Mouth/Throat: Oropharynx is clear and moist.   Eyes: Conjunctivae are normal.   Neck: Neck supple.   Tenderness and pain to the R clavicular head with prominence bilaterally.  No fluctuance noted.  Full active ROM at flexion, extension and lateral rotation of the neck without pain.     Cardiovascular: Normal rate, regular rhythm, normal heart sounds and intact distal pulses. Exam reveals no gallop and no friction rub.    No murmur heard.  Pulmonary/Chest: Breath sounds normal. He has no wheezes. He has no rhonchi. He has no rales. He exhibits no tenderness.   Abdominal: Abdomen is soft. He exhibits no distension. There is no abdominal tenderness.   Musculoskeletal:         General: Normal range of motion.      Cervical back: Neck supple. No edema or erythema. No muscular tenderness.     Neurological: He is alert and oriented to person, place, and time.   Skin: No rash  noted. No erythema.         ED Course   Procedures  Labs Reviewed - No data to display       Imaging Results          X-Ray Clavicle Right (In process)                  Medications - No data to display  Medical Decision Making:   History:   Old Medical Records: I decided to obtain old medical records.  Independently Interpreted Test(s):   I have ordered and independently interpreted X-rays - see prior notes.  Clinical Tests:   Radiological Study: Ordered and Reviewed          Scribe Attestation:   Scribe #1: I performed the above scribed service and the documentation accurately describes the services I performed. I attest to the accuracy of the note.        ED Course as of 07/09/22 1652   Sat Jul 09, 2022   0859 The patient was offered a  and declined translation services for my evaluation. [MR]   0954 XR R clavicle:  Multiple areas of arthritis in its articulation including at the SC joint. (my read) [MR]      ED Course User Index  [MR] Jostin Wade MD           I, Dr. Jostin Wade, personally performed the services described in this documentation. All medical record entries made by the scribe were at my direction and in my presence.  I have reviewed the chart and agree that the record reflects my personal performance and is accurate and complete. Jostin Wade MD.  4:52 PM 07/09/2022    Bashir Ramos Sr. is a 87 y.o. male presenting with pain to right proximal clavicle at the sternoclavicular joint.  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  There is no history of trauma.  I doubt fracture.  X-ray reviewed.  I doubt abscess.  Symptomatic treatment with topical NSAID as necessary discussed.  He does not have significant pain with arm movement.  Follow-up with orthopedics.  Return precautions reviewed.    Clinical Impression:   Final diagnoses:  [M25.511] Sternoclavicular joint pain, right                 Jostin Wade MD  07/09/22 1658

## 2022-10-06 ENCOUNTER — OFFICE VISIT (OUTPATIENT)
Dept: FAMILY MEDICINE | Facility: CLINIC | Age: 87
End: 2022-10-06
Payer: MEDICARE

## 2022-10-06 VITALS
WEIGHT: 150.13 LBS | OXYGEN SATURATION: 97 % | HEIGHT: 70 IN | BODY MASS INDEX: 21.49 KG/M2 | DIASTOLIC BLOOD PRESSURE: 60 MMHG | SYSTOLIC BLOOD PRESSURE: 124 MMHG | TEMPERATURE: 98 F | RESPIRATION RATE: 16 BRPM | HEART RATE: 107 BPM

## 2022-10-06 DIAGNOSIS — E78.5 HYPERLIPIDEMIA, UNSPECIFIED HYPERLIPIDEMIA TYPE: Primary | ICD-10-CM

## 2022-10-06 DIAGNOSIS — I10 ESSENTIAL HYPERTENSION: ICD-10-CM

## 2022-10-06 PROCEDURE — 1101F PR PT FALLS ASSESS DOC 0-1 FALLS W/OUT INJ PAST YR: ICD-10-PCS | Mod: CPTII,S$GLB,, | Performed by: FAMILY MEDICINE

## 2022-10-06 PROCEDURE — 1101F PT FALLS ASSESS-DOCD LE1/YR: CPT | Mod: CPTII,S$GLB,, | Performed by: FAMILY MEDICINE

## 2022-10-06 PROCEDURE — 99999 PR PBB SHADOW E&M-EST. PATIENT-LVL IV: CPT | Mod: PBBFAC,,, | Performed by: FAMILY MEDICINE

## 2022-10-06 PROCEDURE — 1159F PR MEDICATION LIST DOCUMENTED IN MEDICAL RECORD: ICD-10-PCS | Mod: CPTII,S$GLB,, | Performed by: FAMILY MEDICINE

## 2022-10-06 PROCEDURE — 99213 OFFICE O/P EST LOW 20 MIN: CPT | Mod: 25,S$GLB,, | Performed by: FAMILY MEDICINE

## 2022-10-06 PROCEDURE — 99213 PR OFFICE/OUTPT VISIT, EST, LEVL III, 20-29 MIN: ICD-10-PCS | Mod: 25,S$GLB,, | Performed by: FAMILY MEDICINE

## 2022-10-06 PROCEDURE — 1126F AMNT PAIN NOTED NONE PRSNT: CPT | Mod: CPTII,S$GLB,, | Performed by: FAMILY MEDICINE

## 2022-10-06 PROCEDURE — 3288F FALL RISK ASSESSMENT DOCD: CPT | Mod: CPTII,S$GLB,, | Performed by: FAMILY MEDICINE

## 2022-10-06 PROCEDURE — 90694 FLU VACCINE - QUADRIVALENT - ADJUVANTED: ICD-10-PCS | Mod: S$GLB,,, | Performed by: FAMILY MEDICINE

## 2022-10-06 PROCEDURE — 90694 VACC AIIV4 NO PRSRV 0.5ML IM: CPT | Mod: S$GLB,,, | Performed by: FAMILY MEDICINE

## 2022-10-06 PROCEDURE — 99999 PR PBB SHADOW E&M-EST. PATIENT-LVL IV: ICD-10-PCS | Mod: PBBFAC,,, | Performed by: FAMILY MEDICINE

## 2022-10-06 PROCEDURE — G0008 FLU VACCINE - QUADRIVALENT - ADJUVANTED: ICD-10-PCS | Mod: S$GLB,,, | Performed by: FAMILY MEDICINE

## 2022-10-06 PROCEDURE — G0008 ADMIN INFLUENZA VIRUS VAC: HCPCS | Mod: S$GLB,,, | Performed by: FAMILY MEDICINE

## 2022-10-06 PROCEDURE — 3288F PR FALLS RISK ASSESSMENT DOCUMENTED: ICD-10-PCS | Mod: CPTII,S$GLB,, | Performed by: FAMILY MEDICINE

## 2022-10-06 PROCEDURE — 1126F PR PAIN SEVERITY QUANTIFIED, NO PAIN PRESENT: ICD-10-PCS | Mod: CPTII,S$GLB,, | Performed by: FAMILY MEDICINE

## 2022-10-06 PROCEDURE — 1159F MED LIST DOCD IN RCRD: CPT | Mod: CPTII,S$GLB,, | Performed by: FAMILY MEDICINE

## 2022-10-06 NOTE — PATIENT INSTRUCTIONS
Anton Camejo,     If you are due for any health screening(s) below please notify me so we can arrange them to be ordered and scheduled to maintain your health. Most healthy patients complete it. Don't lose out on improving your health.     All of your core healthy metrics are met.

## 2022-10-06 NOTE — PROGRESS NOTES
Patient verified by name and . Patient received HD flu vaccine in right Deltoid. Patient tolerated injection well. Patient advised to wait in clinic for 15 minutes in case of adverse reactions. Patient demonstrated understanding.

## 2022-10-07 NOTE — PROGRESS NOTES
Subjective:   Patient ID: Bashir Ramos Sr. is a 87 y.o. male     Chief Complaint:Follow-up (6 month )      Here for checkup. Doing well    Follow-up  Pertinent negatives include no abdominal pain or chest pain.   Review of Systems   Respiratory:  Negative for shortness of breath.    Cardiovascular:  Negative for chest pain.   Gastrointestinal:  Negative for abdominal pain.   Genitourinary:  Negative for dysuria.   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    Prediabetes 10/3/2017    Unspecified hereditary and idiopathic peripheral neuropathy      Past Surgical History:   Procedure Laterality Date    BACK SURGERY      CATARACT EXTRACTION  10/29/13    left eye    CATARACT EXTRACTION W/  INTRAOCULAR LENS IMPLANT  10/15/13    OD (dr. grayson)    EYE SURGERY       Objective:     Vitals:    10/06/22 1408   BP: 124/60   Pulse: 107   Resp: 16   Temp: 97.7 °F (36.5 °C)     Body mass index is 21.54 kg/m².  Physical Exam  Vitals and nursing note reviewed.   Constitutional:       Appearance: He is well-developed.   HENT:      Head: Normocephalic and atraumatic.   Eyes:      General: No scleral icterus.     Conjunctiva/sclera: Conjunctivae normal.   Cardiovascular:      Heart sounds: No murmur heard.  Pulmonary:      Effort: Pulmonary effort is normal. No respiratory distress.   Musculoskeletal:         General: No deformity. Normal range of motion.      Cervical back: Normal range of motion and neck supple.   Skin:     Coloration: Skin is not pale.      Findings: No rash.   Neurological:      Mental Status: He is alert and oriented to person, place, and time.   Psychiatric:         Behavior: Behavior normal.         Thought Content: Thought content normal.         Judgment: Judgment normal.     Assessment:     1. Hyperlipidemia, unspecified hyperlipidemia type    2. Essential hypertension       Plan:   Hyperlipidemia, unspecified hyperlipidemia type  -     Hemoglobin; Future; Expected date: 01/06/2023  -     Platelet Count; Future; Expected date: 01/06/2023  -     Comprehensive Metabolic Panel; Future; Expected date: 01/06/2023  -     Lipid Panel; Future; Expected date: 01/06/2023    Essential hypertension  -     Hemoglobin; Future; Expected date: 01/06/2023  -     Platelet Count; Future; Expected date: 01/06/2023  -     Comprehensive Metabolic Panel; Future; Expected date: 01/06/2023  -     Lipid Panel; Future; Expected date: 01/06/2023    Other orders  -     Influenza (FLUAD) - Quadrivalent (Adjuvanted) *Preferred* (65+) (PF)            Total time spent of Less than 30 minutes minutes on the day of the visit.This includes face to face time and preparing to see the patient, obtaining and reviewing separately obtained history, documenting clinical information in the electronic or other health record, independently interpreting results and communicating results to the patient/family/caregiver, or care coordinator.    Established patient with me has been instructed that must see me at least 1 time yearly for refills of medications. Seeing other providers in this clinic is fine but expectation is to see me yearly.    Juan Casey MD  10/07/2022    Portions of this note have been dictated with ADEN Monterroso

## 2022-12-02 ENCOUNTER — OFFICE VISIT (OUTPATIENT)
Dept: FAMILY MEDICINE | Facility: CLINIC | Age: 87
End: 2022-12-02
Payer: MEDICARE

## 2022-12-02 ENCOUNTER — HOSPITAL ENCOUNTER (OUTPATIENT)
Dept: RADIOLOGY | Facility: HOSPITAL | Age: 87
Discharge: HOME | End: 2022-12-02
Attending: NURSE PRACTITIONER
Payer: MEDICARE

## 2022-12-02 VITALS
BODY MASS INDEX: 21.43 KG/M2 | DIASTOLIC BLOOD PRESSURE: 90 MMHG | WEIGHT: 149.69 LBS | SYSTOLIC BLOOD PRESSURE: 140 MMHG | TEMPERATURE: 98 F | HEART RATE: 87 BPM | HEIGHT: 70 IN | OXYGEN SATURATION: 99 %

## 2022-12-02 DIAGNOSIS — R22.9 LOCALIZED SKIN MASS, LUMP, OR SWELLING: Primary | ICD-10-CM

## 2022-12-02 DIAGNOSIS — R22.9 LOCALIZED SKIN MASS, LUMP, OR SWELLING: ICD-10-CM

## 2022-12-02 PROCEDURE — 3288F PR FALLS RISK ASSESSMENT DOCUMENTED: ICD-10-PCS | Mod: CPTII,S$GLB,, | Performed by: NURSE PRACTITIONER

## 2022-12-02 PROCEDURE — 1101F PR PT FALLS ASSESS DOC 0-1 FALLS W/OUT INJ PAST YR: ICD-10-PCS | Mod: CPTII,S$GLB,, | Performed by: NURSE PRACTITIONER

## 2022-12-02 PROCEDURE — 99999 PR PBB SHADOW E&M-EST. PATIENT-LVL IV: CPT | Mod: PBBFAC,,, | Performed by: NURSE PRACTITIONER

## 2022-12-02 PROCEDURE — 99213 PR OFFICE/OUTPT VISIT, EST, LEVL III, 20-29 MIN: ICD-10-PCS | Mod: S$GLB,,, | Performed by: NURSE PRACTITIONER

## 2022-12-02 PROCEDURE — 99999 PR PBB SHADOW E&M-EST. PATIENT-LVL IV: ICD-10-PCS | Mod: PBBFAC,,, | Performed by: NURSE PRACTITIONER

## 2022-12-02 PROCEDURE — 1126F AMNT PAIN NOTED NONE PRSNT: CPT | Mod: CPTII,S$GLB,, | Performed by: NURSE PRACTITIONER

## 2022-12-02 PROCEDURE — 1126F PR PAIN SEVERITY QUANTIFIED, NO PAIN PRESENT: ICD-10-PCS | Mod: CPTII,S$GLB,, | Performed by: NURSE PRACTITIONER

## 2022-12-02 PROCEDURE — 1159F PR MEDICATION LIST DOCUMENTED IN MEDICAL RECORD: ICD-10-PCS | Mod: CPTII,S$GLB,, | Performed by: NURSE PRACTITIONER

## 2022-12-02 PROCEDURE — 3288F FALL RISK ASSESSMENT DOCD: CPT | Mod: CPTII,S$GLB,, | Performed by: NURSE PRACTITIONER

## 2022-12-02 PROCEDURE — 1160F RVW MEDS BY RX/DR IN RCRD: CPT | Mod: CPTII,S$GLB,, | Performed by: NURSE PRACTITIONER

## 2022-12-02 PROCEDURE — 76604 US EXAM CHEST: CPT | Mod: TC,PO

## 2022-12-02 PROCEDURE — 1101F PT FALLS ASSESS-DOCD LE1/YR: CPT | Mod: CPTII,S$GLB,, | Performed by: NURSE PRACTITIONER

## 2022-12-02 PROCEDURE — 1159F MED LIST DOCD IN RCRD: CPT | Mod: CPTII,S$GLB,, | Performed by: NURSE PRACTITIONER

## 2022-12-02 PROCEDURE — 99213 OFFICE O/P EST LOW 20 MIN: CPT | Mod: S$GLB,,, | Performed by: NURSE PRACTITIONER

## 2022-12-02 PROCEDURE — 1160F PR REVIEW ALL MEDS BY PRESCRIBER/CLIN PHARMACIST DOCUMENTED: ICD-10-PCS | Mod: CPTII,S$GLB,, | Performed by: NURSE PRACTITIONER

## 2022-12-02 NOTE — PROGRESS NOTES
Subjective:       Patient ID: Bashir Ramos Sr. is a 87 y.o. male.    Chief Complaint: No chief complaint on file.     HPI   88 y/o male patient with medical problems listed below presented for a skin bump in the upper back. Patient was accompanied by A son. Patient stated noted it 3 days ago. Denied trauma to the affected area. Patient stated felt itchy so scratched with wood stick. Denied pain, wound discharges, fever, chills, generalized body ache or weakness.     Patient Active Problem List   Diagnosis    Essential hypertension    Gout, arthritis    Hyperopia - Both Eyes    Astigmatism - Both Eyes    Post-operative state - Right Eye    Refractive error - Both Eyes    Vitreous detachment    Pseudophakia    Rotator cuff tear arthropathy of right shoulder    Carpal tunnel syndrome on both sides    Anemia    Bilateral knee pain    HLD (hyperlipidemia)    Right ear impacted cerumen    Debility    Elevated transaminase level    Underweight    Right carpal tunnel syndrome    Vitamin D insufficiency      Review of patient's allergies indicates:   Allergen Reactions    Iodine and iodide containing products     Shellfish containing products     Pcn [penicillins] Rash     Past Surgical History:   Procedure Laterality Date    BACK SURGERY      CATARACT EXTRACTION  10/29/13    left eye    CATARACT EXTRACTION W/  INTRAOCULAR LENS IMPLANT  10/15/13    OD (dr. grayson)    EYE SURGERY          Current Outpatient Medications:     acetaminophen (TYLENOL) 325 MG tablet, Take 2 tablets (650 mg total) by mouth every 8 (eight) hours as needed for Pain., Disp: , Rfl: 0    allopurinoL (ZYLOPRIM) 100 MG tablet, TAKE ONE TABLET BY MOUTH EVERY DAY, Disp: 90 tablet, Rfl: 0    amLODIPine (NORVASC) 2.5 MG tablet, Take 1 tablet (2.5 mg total) by mouth once daily., Disp: 90 tablet, Rfl: 3    aspirin (ECOTRIN) 81 MG EC tablet, Take 81 mg by mouth once daily., Disp: , Rfl:     benazepriL (LOTENSIN) 20 MG tablet, TAKE ONE TABLET BY MOUTH EVERY  "DAY, Disp: 90 tablet, Rfl: 0    Lab Results   Component Value Date    WBC 4.42 03/27/2022    HGB 14.6 03/27/2022    HCT 43.9 03/27/2022     03/27/2022    CHOL 179 02/23/2022    TRIG 84 02/23/2022    HDL 58 02/23/2022    ALT 26 02/23/2022    AST 23 02/23/2022     03/27/2022    K 4.0 03/27/2022     03/27/2022    CREATININE 1.1 03/27/2022    BUN 11 03/27/2022    CO2 21 (L) 03/27/2022    TSH 1.912 12/13/2018    PSA 0.54 10/03/2017    INR 1.1 02/23/2017    HGBA1C 5.6 10/03/2017     Above labs reviewed    Review of Systems   Constitutional:  Negative for chills and fever.   Respiratory:  Negative for chest tightness and shortness of breath.    Cardiovascular:  Negative for chest pain and palpitations.   Gastrointestinal:  Negative for abdominal pain.   Skin:         Skin bump   Neurological:  Negative for dizziness and headaches.       Objective:   BP (!) 140/90 (BP Location: Left arm, Patient Position: Sitting, BP Method: Medium (Manual))   Pulse 87   Temp 98.4 °F (36.9 °C) (Oral)   Ht 5' 10" (1.778 m)   Wt 67.9 kg (149 lb 11.1 oz)   SpO2 99%   BMI 21.48 kg/m²         Physical Exam  Vitals reviewed.   Constitutional:       General: He is not in acute distress.     Appearance: Normal appearance.   Cardiovascular:      Rate and Rhythm: Normal rate and regular rhythm.      Pulses: Normal pulses.      Heart sounds: Normal heart sounds.   Pulmonary:      Effort: Pulmonary effort is normal.      Breath sounds: Normal breath sounds.   Chest:      Chest wall: No deformity, swelling or edema.   Abdominal:      General: Abdomen is flat. Bowel sounds are normal.      Palpations: Abdomen is soft.   Musculoskeletal:      Cervical back: Normal range of motion.   Skin:     General: Skin is warm and dry.      Findings: No erythema.      Comments: +a raised mobile skin nodule in upper back with no erythema or discharges   Neurological:      General: No focal deficit present.      Mental Status: He is alert. "   Psychiatric:         Mood and Affect: Mood normal.         Behavior: Behavior normal.       Assessment:       1. Localized skin mass, lump, or swelling        Plan:       1. Localized skin mass, lump, or swelling  Likely sebaceous cyst vs lipoma vs other pathology  - US Soft Tissue Chest_Upper Back; Future  - Ambulatory referral/consult to Dermatology; Future     Patient with be reevaluated in  as scheduled  or sooner tariq Koroma NP

## 2022-12-05 ENCOUNTER — TELEPHONE (OUTPATIENT)
Dept: FAMILY MEDICINE | Facility: CLINIC | Age: 87
End: 2022-12-05
Payer: MEDICARE

## 2022-12-05 NOTE — TELEPHONE ENCOUNTER
"Derm referral: Phone message on patient and daughter's phone with scheduling information. Spouse and son phone's are "disconnected or no longer in service". No portal access.  "

## 2022-12-06 ENCOUNTER — TELEPHONE (OUTPATIENT)
Dept: DERMATOLOGY | Facility: CLINIC | Age: 87
End: 2022-12-06
Payer: MEDICARE

## 2022-12-07 ENCOUNTER — OFFICE VISIT (OUTPATIENT)
Dept: DERMATOLOGY | Facility: CLINIC | Age: 87
End: 2022-12-07
Payer: MEDICARE

## 2022-12-07 DIAGNOSIS — L72.3 INFLAMED EPIDERMOID CYST OF SKIN: ICD-10-CM

## 2022-12-07 DIAGNOSIS — I10 ESSENTIAL HYPERTENSION: ICD-10-CM

## 2022-12-07 PROCEDURE — 99999 PR PBB SHADOW E&M-EST. PATIENT-LVL II: ICD-10-PCS | Mod: PBBFAC,,, | Performed by: STUDENT IN AN ORGANIZED HEALTH CARE EDUCATION/TRAINING PROGRAM

## 2022-12-07 PROCEDURE — 99499 UNLISTED E&M SERVICE: CPT | Mod: S$GLB,,, | Performed by: STUDENT IN AN ORGANIZED HEALTH CARE EDUCATION/TRAINING PROGRAM

## 2022-12-07 PROCEDURE — 1101F PT FALLS ASSESS-DOCD LE1/YR: CPT | Mod: CPTII,S$GLB,, | Performed by: STUDENT IN AN ORGANIZED HEALTH CARE EDUCATION/TRAINING PROGRAM

## 2022-12-07 PROCEDURE — 1159F PR MEDICATION LIST DOCUMENTED IN MEDICAL RECORD: ICD-10-PCS | Mod: CPTII,S$GLB,, | Performed by: STUDENT IN AN ORGANIZED HEALTH CARE EDUCATION/TRAINING PROGRAM

## 2022-12-07 PROCEDURE — 1101F PR PT FALLS ASSESS DOC 0-1 FALLS W/OUT INJ PAST YR: ICD-10-PCS | Mod: CPTII,S$GLB,, | Performed by: STUDENT IN AN ORGANIZED HEALTH CARE EDUCATION/TRAINING PROGRAM

## 2022-12-07 PROCEDURE — 1126F PR PAIN SEVERITY QUANTIFIED, NO PAIN PRESENT: ICD-10-PCS | Mod: CPTII,S$GLB,, | Performed by: STUDENT IN AN ORGANIZED HEALTH CARE EDUCATION/TRAINING PROGRAM

## 2022-12-07 PROCEDURE — 1160F RVW MEDS BY RX/DR IN RCRD: CPT | Mod: CPTII,S$GLB,, | Performed by: STUDENT IN AN ORGANIZED HEALTH CARE EDUCATION/TRAINING PROGRAM

## 2022-12-07 PROCEDURE — 1160F PR REVIEW ALL MEDS BY PRESCRIBER/CLIN PHARMACIST DOCUMENTED: ICD-10-PCS | Mod: CPTII,S$GLB,, | Performed by: STUDENT IN AN ORGANIZED HEALTH CARE EDUCATION/TRAINING PROGRAM

## 2022-12-07 PROCEDURE — 10060 I&D ABSCESS SIMPLE/SINGLE: CPT | Mod: S$GLB,,, | Performed by: STUDENT IN AN ORGANIZED HEALTH CARE EDUCATION/TRAINING PROGRAM

## 2022-12-07 PROCEDURE — 99499 NO LOS: ICD-10-PCS | Mod: S$GLB,,, | Performed by: STUDENT IN AN ORGANIZED HEALTH CARE EDUCATION/TRAINING PROGRAM

## 2022-12-07 PROCEDURE — 3288F FALL RISK ASSESSMENT DOCD: CPT | Mod: CPTII,S$GLB,, | Performed by: STUDENT IN AN ORGANIZED HEALTH CARE EDUCATION/TRAINING PROGRAM

## 2022-12-07 PROCEDURE — 3288F PR FALLS RISK ASSESSMENT DOCUMENTED: ICD-10-PCS | Mod: CPTII,S$GLB,, | Performed by: STUDENT IN AN ORGANIZED HEALTH CARE EDUCATION/TRAINING PROGRAM

## 2022-12-07 PROCEDURE — 99999 PR PBB SHADOW E&M-EST. PATIENT-LVL II: CPT | Mod: PBBFAC,,, | Performed by: STUDENT IN AN ORGANIZED HEALTH CARE EDUCATION/TRAINING PROGRAM

## 2022-12-07 PROCEDURE — 1126F AMNT PAIN NOTED NONE PRSNT: CPT | Mod: CPTII,S$GLB,, | Performed by: STUDENT IN AN ORGANIZED HEALTH CARE EDUCATION/TRAINING PROGRAM

## 2022-12-07 PROCEDURE — 1159F MED LIST DOCD IN RCRD: CPT | Mod: CPTII,S$GLB,, | Performed by: STUDENT IN AN ORGANIZED HEALTH CARE EDUCATION/TRAINING PROGRAM

## 2022-12-07 PROCEDURE — 10060 PR DRAIN SKIN ABSCESS SIMPLE: ICD-10-PCS | Mod: S$GLB,,, | Performed by: STUDENT IN AN ORGANIZED HEALTH CARE EDUCATION/TRAINING PROGRAM

## 2022-12-07 RX ORDER — ASPIRIN 81 MG/1
81 TABLET ORAL DAILY
Qty: 90 TABLET | Refills: 0 | Status: SHIPPED | OUTPATIENT
Start: 2022-12-07

## 2022-12-07 RX ORDER — BENAZEPRIL HYDROCHLORIDE 20 MG/1
20 TABLET ORAL DAILY
Qty: 90 TABLET | Refills: 0 | Status: SHIPPED | OUTPATIENT
Start: 2022-12-07 | End: 2023-03-07 | Stop reason: SDUPTHER

## 2022-12-07 RX ORDER — AMLODIPINE BESYLATE 2.5 MG/1
2.5 TABLET ORAL DAILY
Qty: 90 TABLET | Refills: 0 | Status: SHIPPED | OUTPATIENT
Start: 2022-12-07 | End: 2023-03-07 | Stop reason: SDUPTHER

## 2022-12-07 NOTE — PROGRESS NOTES
Subjective:       Patient ID:  Bashir Ramos Sr. is a 87 y.o. male who presents for   Chief Complaint   Patient presents with    Mass     back     New patient     Patient here today for lump on back x 1 week. Patient states it itches. No drainage.     Review of Systems   Constitutional:  Negative for fever, chills and fatigue.   Respiratory:  Negative for cough and shortness of breath.    Gastrointestinal:  Negative for nausea and vomiting.      Objective:    Physical Exam   Constitutional: He appears well-developed and well-nourished.   Neurological: He is alert and oriented to person, place, and time.   Psychiatric: He has a normal mood and affect.   Skin:   Areas Examined (abnormalities noted in diagram):   Back Inspection Performed            Diagram Legend     Erythematous scaling macule/papule c/w actinic keratosis       Vascular papule c/w angioma      Pigmented verrucoid papule/plaque c/w seborrheic keratosis      Yellow umbilicated papule c/w sebaceous hyperplasia      Irregularly shaped tan macule c/w lentigo     1-2 mm smooth white papules consistent with Milia      Movable subcutaneous cyst with punctum c/w epidermal inclusion cyst      Subcutaneous movable cyst c/w pilar cyst      Firm pink to brown papule c/w dermatofibroma      Pedunculated fleshy papule(s) c/w skin tag(s)      Evenly pigmented macule c/w junctional nevus     Mildly variegated pigmented, slightly irregular-bordered macule c/w mildly atypical nevus      Flesh colored to evenly pigmented papule c/w intradermal nevus       Pink pearly papule/plaque c/w basal cell carcinoma      Erythematous hyperkeratotic cursted plaque c/w SCC      Surgical scar with no sign of skin cancer recurrence      Open and closed comedones      Inflammatory papules and pustules      Verrucoid papule consistent consistent with wart     Erythematous eczematous patches and plaques     Dystrophic onycholytic nail with subungual debris c/w onychomycosis      Umbilicated papule    Erythematous-base heme-crusted tan verrucoid plaque consistent with inflamed seborrheic keratosis     Erythematous Silvery Scaling Plaque c/w Psoriasis     See annotation      Assessment / Plan:        Inflamed epidermoid cyst of skin  -     Ambulatory referral/consult to Dermatology  Lesion incised with #11 blade and drained on today's date.   Patient instructed to perform warm compresses 2 - 3 times/daily.  No evidence of infection  Will call if worsening            No follow-ups on file.

## 2022-12-07 NOTE — TELEPHONE ENCOUNTER
No new care gaps identified.  [facility] Embedded Care Gaps. Reference number: [id number]. 12/07/2022   11:35:55 AM CST

## 2022-12-07 NOTE — TELEPHONE ENCOUNTER
----- Message from Myron Miller sent at 12/7/2022  9:02 AM CST -----  Contact: self  Type: Needs Medical Advice  Who Called: Patient   Pharmacy name and phone #:   [facility] ([facility] - [place], [place] - [address]  [address] [address] [address]  Phone: [phone] Fax: [phone]  Best Call Back Number: [phone]  Additional Information: Pt is waiting states the pharmacist sent a message to the doctor about pt medications plz get these filled for the pt. Thanks

## 2023-02-06 ENCOUNTER — LAB VISIT (OUTPATIENT)
Dept: LAB | Facility: HOSPITAL | Age: 88
End: 2023-02-06
Attending: FAMILY MEDICINE
Payer: MEDICARE

## 2023-02-06 DIAGNOSIS — E78.5 HYPERLIPIDEMIA, UNSPECIFIED HYPERLIPIDEMIA TYPE: ICD-10-CM

## 2023-02-06 DIAGNOSIS — I10 ESSENTIAL HYPERTENSION: ICD-10-CM

## 2023-02-06 LAB
ALBUMIN SERPL BCP-MCNC: 3.6 G/DL (ref 3.5–5.2)
ALP SERPL-CCNC: 71 U/L (ref 55–135)
ALT SERPL W/O P-5'-P-CCNC: 10 U/L (ref 10–44)
ANION GAP SERPL CALC-SCNC: 11 MMOL/L (ref 8–16)
AST SERPL-CCNC: 14 U/L (ref 10–40)
BILIRUB SERPL-MCNC: 0.3 MG/DL (ref 0.1–1)
BUN SERPL-MCNC: 10 MG/DL (ref 8–23)
CALCIUM SERPL-MCNC: 9.8 MG/DL (ref 8.7–10.5)
CHLORIDE SERPL-SCNC: 105 MMOL/L (ref 95–110)
CHOLEST SERPL-MCNC: 197 MG/DL (ref 120–199)
CHOLEST/HDLC SERPL: 3.1 {RATIO} (ref 2–5)
CO2 SERPL-SCNC: 19 MMOL/L (ref 23–29)
CREAT SERPL-MCNC: 1.2 MG/DL (ref 0.5–1.4)
EST. GFR  (NO RACE VARIABLE): 58.2 ML/MIN/1.73 M^2
GLUCOSE SERPL-MCNC: 101 MG/DL (ref 70–110)
HDLC SERPL-MCNC: 64 MG/DL (ref 40–75)
HDLC SERPL: 32.5 % (ref 20–50)
HGB BLD-MCNC: 13 G/DL (ref 14–18)
LDLC SERPL CALC-MCNC: 113.2 MG/DL (ref 63–159)
NONHDLC SERPL-MCNC: 133 MG/DL
PLATELET # BLD AUTO: 313 K/UL (ref 150–450)
PMV BLD AUTO: 9.6 FL (ref 9.2–12.9)
POTASSIUM SERPL-SCNC: 3.6 MMOL/L (ref 3.5–5.1)
PROT SERPL-MCNC: 6.9 G/DL (ref 6–8.4)
SODIUM SERPL-SCNC: 135 MMOL/L (ref 136–145)
TRIGL SERPL-MCNC: 99 MG/DL (ref 30–150)

## 2023-02-06 PROCEDURE — 85018 HEMOGLOBIN: CPT | Mod: PO | Performed by: FAMILY MEDICINE

## 2023-02-06 PROCEDURE — 36415 COLL VENOUS BLD VENIPUNCTURE: CPT | Mod: PO | Performed by: FAMILY MEDICINE

## 2023-02-06 PROCEDURE — 85049 AUTOMATED PLATELET COUNT: CPT | Mod: PO | Performed by: FAMILY MEDICINE

## 2023-02-06 PROCEDURE — 80053 COMPREHEN METABOLIC PANEL: CPT | Performed by: FAMILY MEDICINE

## 2023-02-06 PROCEDURE — 80061 LIPID PANEL: CPT | Performed by: FAMILY MEDICINE

## 2023-03-07 ENCOUNTER — OFFICE VISIT (OUTPATIENT)
Dept: FAMILY MEDICINE | Facility: CLINIC | Age: 88
End: 2023-03-07
Payer: MEDICARE

## 2023-03-07 VITALS
BODY MASS INDEX: 21.02 KG/M2 | WEIGHT: 146.81 LBS | TEMPERATURE: 98 F | DIASTOLIC BLOOD PRESSURE: 68 MMHG | OXYGEN SATURATION: 98 % | RESPIRATION RATE: 17 BRPM | HEIGHT: 70 IN | HEART RATE: 101 BPM | SYSTOLIC BLOOD PRESSURE: 130 MMHG

## 2023-03-07 DIAGNOSIS — G56.02 CARPAL TUNNEL SYNDROME ON LEFT: ICD-10-CM

## 2023-03-07 DIAGNOSIS — H91.90 HEARING LOSS, UNSPECIFIED HEARING LOSS TYPE, UNSPECIFIED LATERALITY: ICD-10-CM

## 2023-03-07 DIAGNOSIS — N52.9 ERECTILE DYSFUNCTION, UNSPECIFIED ERECTILE DYSFUNCTION TYPE: ICD-10-CM

## 2023-03-07 DIAGNOSIS — I10 ESSENTIAL HYPERTENSION: ICD-10-CM

## 2023-03-07 DIAGNOSIS — M10.9 GOUT, ARTHRITIS: Primary | ICD-10-CM

## 2023-03-07 PROCEDURE — 99999 PR PBB SHADOW E&M-EST. PATIENT-LVL IV: ICD-10-PCS | Mod: PBBFAC,,, | Performed by: FAMILY MEDICINE

## 2023-03-07 PROCEDURE — 1159F PR MEDICATION LIST DOCUMENTED IN MEDICAL RECORD: ICD-10-PCS | Mod: CPTII,S$GLB,, | Performed by: FAMILY MEDICINE

## 2023-03-07 PROCEDURE — 1159F MED LIST DOCD IN RCRD: CPT | Mod: CPTII,S$GLB,, | Performed by: FAMILY MEDICINE

## 2023-03-07 PROCEDURE — 1101F PT FALLS ASSESS-DOCD LE1/YR: CPT | Mod: CPTII,S$GLB,, | Performed by: FAMILY MEDICINE

## 2023-03-07 PROCEDURE — 99214 OFFICE O/P EST MOD 30 MIN: CPT | Mod: S$GLB,,, | Performed by: FAMILY MEDICINE

## 2023-03-07 PROCEDURE — 99214 PR OFFICE/OUTPT VISIT, EST, LEVL IV, 30-39 MIN: ICD-10-PCS | Mod: S$GLB,,, | Performed by: FAMILY MEDICINE

## 2023-03-07 PROCEDURE — 1126F PR PAIN SEVERITY QUANTIFIED, NO PAIN PRESENT: ICD-10-PCS | Mod: CPTII,S$GLB,, | Performed by: FAMILY MEDICINE

## 2023-03-07 PROCEDURE — 1101F PR PT FALLS ASSESS DOC 0-1 FALLS W/OUT INJ PAST YR: ICD-10-PCS | Mod: CPTII,S$GLB,, | Performed by: FAMILY MEDICINE

## 2023-03-07 PROCEDURE — 3288F PR FALLS RISK ASSESSMENT DOCUMENTED: ICD-10-PCS | Mod: CPTII,S$GLB,, | Performed by: FAMILY MEDICINE

## 2023-03-07 PROCEDURE — 3288F FALL RISK ASSESSMENT DOCD: CPT | Mod: CPTII,S$GLB,, | Performed by: FAMILY MEDICINE

## 2023-03-07 PROCEDURE — 1126F AMNT PAIN NOTED NONE PRSNT: CPT | Mod: CPTII,S$GLB,, | Performed by: FAMILY MEDICINE

## 2023-03-07 PROCEDURE — 99999 PR PBB SHADOW E&M-EST. PATIENT-LVL IV: CPT | Mod: PBBFAC,,, | Performed by: FAMILY MEDICINE

## 2023-03-07 RX ORDER — TADALAFIL 20 MG/1
20 TABLET ORAL DAILY
Qty: 10 TABLET | Refills: 1 | Status: SHIPPED | OUTPATIENT
Start: 2023-03-07 | End: 2023-03-20

## 2023-03-07 RX ORDER — ALLOPURINOL 100 MG/1
100 TABLET ORAL DAILY
Qty: 90 TABLET | Refills: 1 | Status: SHIPPED | OUTPATIENT
Start: 2023-03-07

## 2023-03-07 RX ORDER — BENAZEPRIL HYDROCHLORIDE 20 MG/1
20 TABLET ORAL DAILY
Qty: 90 TABLET | Refills: 3 | Status: SHIPPED | OUTPATIENT
Start: 2023-03-07

## 2023-03-07 RX ORDER — AMLODIPINE BESYLATE 2.5 MG/1
2.5 TABLET ORAL DAILY
Qty: 90 TABLET | Refills: 3 | Status: SHIPPED | OUTPATIENT
Start: 2023-03-07 | End: 2024-03-06

## 2023-03-07 NOTE — PROGRESS NOTES
Subjective:   Patient ID: Bashir Ramos Sr. is a 88 y.o. male     Chief Complaint:Follow-up (3 month)      Here for checkup    Follow-up  Pertinent negatives include no abdominal pain, arthralgias, chest pain, chills, coughing, fever, headaches, sore throat or weakness.   Review of Systems   Constitutional:  Negative for chills and fever.   HENT:  Negative for sore throat and trouble swallowing.    Respiratory:  Negative for cough and shortness of breath.    Cardiovascular:  Negative for chest pain and leg swelling.   Gastrointestinal:  Negative for abdominal distention and abdominal pain.   Genitourinary:  Negative for dysuria and flank pain.   Musculoskeletal:  Negative for arthralgias and back pain.   Skin:  Negative for color change and pallor.   Neurological:  Negative for weakness and headaches.   Psychiatric/Behavioral:  Negative for agitation and confusion.    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    Prediabetes 10/3/2017    Unspecified hereditary and idiopathic peripheral neuropathy      Past Surgical History:   Procedure Laterality Date    BACK SURGERY      CATARACT EXTRACTION  10/29/13    left eye    CATARACT EXTRACTION W/  INTRAOCULAR LENS IMPLANT  10/15/13    OD (dr. grayson)    EYE SURGERY       Objective:     Vitals:    03/07/23 1359   BP: 130/68   Pulse: 101   Resp: 17   Temp: 98.3 °F (36.8 °C)     Body mass index is 21.07 kg/m².  Physical Exam  Vitals and nursing note reviewed.   Constitutional:       Appearance: He is well-developed.   HENT:      Head: Normocephalic and atraumatic.   Eyes:      General: No scleral icterus.     Conjunctiva/sclera: Conjunctivae normal.   Cardiovascular:      Heart sounds: No murmur heard.  Pulmonary:      Effort: Pulmonary effort is normal. No respiratory distress.   Musculoskeletal:         General: No deformity. Normal range of  motion.      Cervical back: Normal range of motion and neck supple.   Skin:     Coloration: Skin is not pale.      Findings: No rash.   Neurological:      Mental Status: He is alert and oriented to person, place, and time.   Psychiatric:         Behavior: Behavior normal.         Thought Content: Thought content normal.         Judgment: Judgment normal.     Assessment:     1. Gout, arthritis    2. Essential hypertension    3. Erectile dysfunction, unspecified erectile dysfunction type    4. Carpal tunnel syndrome on left    5. Hearing loss, unspecified hearing loss type, unspecified laterality      Plan:   Gout, arthritis  -     allopurinoL (ZYLOPRIM) 100 MG tablet; Take 1 tablet (100 mg total) by mouth once daily.  Dispense: 90 tablet; Refill: 1    Essential hypertension  -     amLODIPine (NORVASC) 2.5 MG tablet; Take 1 tablet (2.5 mg total) by mouth once daily.  Dispense: 90 tablet; Refill: 3  -     benazepriL (LOTENSIN) 20 MG tablet; Take 1 tablet (20 mg total) by mouth once daily.  Dispense: 90 tablet; Refill: 3    Erectile dysfunction, unspecified erectile dysfunction type  -     tadalafiL (CIALIS) 20 MG Tab; Take 1 tablet (20 mg total) by mouth once daily.  Dispense: 10 tablet; Refill: 1    Carpal tunnel syndrome on left  -     Ambulatory referral/consult to Hand Surgery; Future; Expected date: 03/14/2023    Hearing loss, unspecified hearing loss type, unspecified laterality  -     Ambulatory referral/consult to Audiology; Future; Expected date: 03/14/2023  -     Ambulatory referral/consult to ENT; Future; Expected date: 03/14/2023        Total time spent of Greater than 30 minutes minutes on the day of the visit.This includes face to face time and preparing to see the patient, obtaining and reviewing separately obtained history, documenting clinical information in the electronic or other health record, independently interpreting results and communicating results to the patient/family/caregiver, or care  coordinator.    Established patient with me has been instructed that must see me at least 1 time yearly (every 365 days) for refills of medications. Seeing other providers in this clinic is fine but expectation is to see me yearly.    Juan Casey MD  03/07/2023    Portions of this note have been dictated with ADEN Monterroso

## 2023-04-05 ENCOUNTER — CLINICAL SUPPORT (OUTPATIENT)
Dept: AUDIOLOGY | Facility: CLINIC | Age: 88
End: 2023-04-05
Payer: MEDICARE

## 2023-04-05 ENCOUNTER — OFFICE VISIT (OUTPATIENT)
Dept: OTOLARYNGOLOGY | Facility: CLINIC | Age: 88
End: 2023-04-05
Payer: MEDICARE

## 2023-04-05 VITALS — BODY MASS INDEX: 20.91 KG/M2 | WEIGHT: 145.75 LBS

## 2023-04-05 DIAGNOSIS — H91.90 HEARING LOSS, UNSPECIFIED HEARING LOSS TYPE, UNSPECIFIED LATERALITY: ICD-10-CM

## 2023-04-05 DIAGNOSIS — H90.3 SENSORINEURAL HEARING LOSS (SNHL) OF BOTH EARS: Primary | ICD-10-CM

## 2023-04-05 DIAGNOSIS — H91.13 PRESBYCUSIS OF BOTH EARS: Primary | ICD-10-CM

## 2023-04-05 PROCEDURE — 1159F MED LIST DOCD IN RCRD: CPT | Mod: CPTII,S$GLB,, | Performed by: OTOLARYNGOLOGY

## 2023-04-05 PROCEDURE — 99214 PR OFFICE/OUTPT VISIT, EST, LEVL IV, 30-39 MIN: ICD-10-PCS | Mod: S$GLB,,, | Performed by: OTOLARYNGOLOGY

## 2023-04-05 PROCEDURE — 99999 PR PBB SHADOW E&M-EST. PATIENT-LVL II: CPT | Mod: PBBFAC,,, | Performed by: OTOLARYNGOLOGY

## 2023-04-05 PROCEDURE — 99214 OFFICE O/P EST MOD 30 MIN: CPT | Mod: S$GLB,,, | Performed by: OTOLARYNGOLOGY

## 2023-04-05 PROCEDURE — 99999 PR PBB SHADOW E&M-EST. PATIENT-LVL II: ICD-10-PCS | Mod: PBBFAC,,, | Performed by: OTOLARYNGOLOGY

## 2023-04-05 PROCEDURE — 1159F PR MEDICATION LIST DOCUMENTED IN MEDICAL RECORD: ICD-10-PCS | Mod: CPTII,S$GLB,, | Performed by: OTOLARYNGOLOGY

## 2023-04-05 PROCEDURE — 92567 PR TYMPA2METRY: ICD-10-PCS | Mod: S$GLB,,,

## 2023-04-05 PROCEDURE — 92557 COMPREHENSIVE HEARING TEST: CPT | Mod: S$GLB,,,

## 2023-04-05 PROCEDURE — 92567 TYMPANOMETRY: CPT | Mod: S$GLB,,,

## 2023-04-05 PROCEDURE — 92557 PR COMPREHENSIVE HEARING TEST: ICD-10-PCS | Mod: S$GLB,,,

## 2023-04-05 NOTE — PROGRESS NOTES
Subjective     Patient ID: Bashir Ramos Sr. is a 88 y.o. male.    Chief Complaint: Hearing Loss    HPI: Hx of Prog HL.    Pos hx noise exp in MM.    No prior tests.    Notbothersome.    Past Medical History: Patient has a past medical history of Gout, unspecified, Hyperglycemia (1/11/2017), Hyperlipidemia, Hypertension, Nuclear sclerosis - Both Eyes (9/16/2013), Prediabetes (10/3/2017), and Unspecified hereditary and idiopathic peripheral neuropathy.    Past Surgical History: Patient has a past surgical history that includes Back surgery; Eye surgery; Cataract extraction w/  intraocular lens implant (10/15/13); and Cataract extraction (10/29/13).    Social History: Patient reports that he quit smoking about 40 years ago. His smoking use included cigarettes. He smoked an average of 1 pack per day. He has never used smokeless tobacco. He reports that he does not drink alcohol and does not use drugs.    Family History: family history includes No Known Problems in his father and mother.    Medications:   Current Outpatient Medications   Medication Sig    acetaminophen (TYLENOL) 325 MG tablet Take 2 tablets (650 mg total) by mouth every 8 (eight) hours as needed for Pain.    allopurinoL (ZYLOPRIM) 100 MG tablet Take 1 tablet (100 mg total) by mouth once daily.    amLODIPine (NORVASC) 2.5 MG tablet Take 1 tablet (2.5 mg total) by mouth once daily.    aspirin (ECOTRIN) 81 MG EC tablet Take 1 tablet (81 mg total) by mouth once daily.    benazepriL (LOTENSIN) 20 MG tablet Take 1 tablet (20 mg total) by mouth once daily.    tadalafiL (CIALIS) 20 MG Tab TAKE ONE (1) TABLET (20 MG TOTAL) BY MOUTH ONCE DAILY.     No current facility-administered medications for this visit.       Allergies: Patient is allergic to iodine and iodide containing products, shellfish containing products, and pcn [penicillins].    Review of Systems   Constitutional:  Negative for activity change, appetite change, chills, diaphoresis, fatigue, fever  and unexpected weight change.   HENT:  Positive for hearing loss. Negative for nasal congestion, ear discharge, ear pain, facial swelling, nosebleeds, postnasal drip, rhinorrhea, sinus pressure/congestion, sneezing, sore throat, tinnitus, trouble swallowing and voice change.    Eyes:  Negative for photophobia, pain, discharge, redness and visual disturbance.   Respiratory:  Negative for cough, chest tightness, shortness of breath and wheezing.    Cardiovascular:  Negative for chest pain and palpitations.   Gastrointestinal:  Negative for abdominal pain, constipation, diarrhea and nausea.   Genitourinary:  Negative for dysuria and frequency.   Musculoskeletal:  Negative for arthralgias, back pain, gait problem, joint swelling, myalgias, neck pain and neck stiffness.   Integumentary:  Negative for color change, pallor and rash.   Neurological:  Negative for dizziness, tremors, seizures, syncope, facial asymmetry, speech difficulty, weakness, light-headedness, numbness and headaches.   Hematological:  Negative for adenopathy. Does not bruise/bleed easily.   Psychiatric/Behavioral:  Negative for agitation, confusion, decreased concentration, dysphoric mood and sleep disturbance. The patient is not nervous/anxious and is not hyperactive.         Objective     Physical Exam  Vitals and nursing note reviewed.   Constitutional:       General: He is not in acute distress.     Appearance: Normal appearance. He is well-developed. He is not ill-appearing, toxic-appearing or diaphoretic.   HENT:      Head: Normocephalic and atraumatic. Not macrocephalic and not microcephalic. No raccoon eyes, Monte's sign, abrasion, contusion, right periorbital erythema, left periorbital erythema or laceration. Hair is normal.      Right Ear: Ear canal normal. Decreased hearing noted. No laceration, drainage, swelling or tenderness. No middle ear effusion. No foreign body. No mastoid tenderness. No hemotympanum. Tympanic membrane is not  injected, scarred, perforated, erythematous, retracted or bulging. Tympanic membrane has normal mobility.      Left Ear: Ear canal normal. Decreased hearing noted. No laceration, drainage, swelling or tenderness.  No middle ear effusion. No foreign body. No mastoid tenderness. No hemotympanum. Tympanic membrane is not injected, scarred, perforated, erythematous, retracted or bulging. Tympanic membrane has normal mobility.      Nose: No nasal deformity, septal deviation, laceration, mucosal edema or rhinorrhea.      Right Sinus: No maxillary sinus tenderness.      Left Sinus: No maxillary sinus tenderness.   Eyes:      General: Lids are normal.      Conjunctiva/sclera: Conjunctivae normal.      Pupils: Pupils are equal, round, and reactive to light.   Neck:      Thyroid: No thyroid mass or thyromegaly.      Vascular: No JVD.      Trachea: No tracheal tenderness or tracheal deviation.   Cardiovascular:      Rate and Rhythm: Normal rate and regular rhythm.   Pulmonary:      Effort: Pulmonary effort is normal. No tachypnea, bradypnea, accessory muscle usage or respiratory distress.      Breath sounds: No stridor.   Abdominal:      Palpations: Abdomen is soft.   Musculoskeletal:         General: Normal range of motion.      Cervical back: Normal range of motion and neck supple. No edema, erythema or rigidity. No muscular tenderness. Normal range of motion.   Lymphadenopathy:      Head:      Right side of head: No submental, submandibular, tonsillar, preauricular or posterior auricular adenopathy.      Left side of head: No submental, submandibular, tonsillar, preauricular or posterior auricular adenopathy.      Cervical: No cervical adenopathy.      Right cervical: No superficial, deep or posterior cervical adenopathy.     Left cervical: No superficial, deep or posterior cervical adenopathy.   Skin:     General: Skin is warm and dry.      Coloration: Skin is not pale.      Findings: No abrasion, bruising, burn,  ecchymosis, erythema, laceration, lesion or rash.   Neurological:      Mental Status: He is alert and oriented to person, place, and time.      Cranial Nerves: No cranial nerve deficit.      Sensory: No sensory deficit.      Motor: No tremor, atrophy, abnormal muscle tone or seizure activity.   Psychiatric:         Speech: He is communicative. Speech is not rapid and pressured or slurred.         Behavior: Behavior normal. Behavior is cooperative.         Thought Content: Thought content normal.         Judgment: Judgment normal.            Assessment and Plan     Problem List Items Addressed This Visit    None  Visit Diagnoses       Presbycusis of both ears    -  Primary    Hearing loss, unspecified hearing loss type, unspecified laterality              RTC 2-3 yrs.

## 2023-04-05 NOTE — PROGRESS NOTES
Bashir Ramos Sr., a 88 y.o. male, was seen today in the clinic for an audiologic evaluation.  The patient's main complaint was a slight decrease in hearing bilaterally.  Mr. Ramos denied otalgia, aural fullness, tinnitus and vertigo.    Tympanometry revealed Type A in the right ear and Type A in the left ear. Audiogram results revealed a mild sloping to moderately-severe sensorineural hearing loss in the right and left ears. Speech reception thresholds were noted at 30 dB in the right ear and 35 dB in the left ear.  Speech discrimination scores were 100% in the right ear and 100% in the left ear.    Recommendations:  Otologic evaluation  Hearing aid consultation   Annual audiogram  Hearing protection when in noise

## 2023-04-12 DIAGNOSIS — M79.642 LEFT HAND PAIN: Primary | ICD-10-CM

## 2023-04-17 ENCOUNTER — HOSPITAL ENCOUNTER (OUTPATIENT)
Dept: RADIOLOGY | Facility: OTHER | Age: 88
Discharge: HOME | End: 2023-04-17
Attending: ORTHOPAEDIC SURGERY
Payer: MEDICARE

## 2023-04-17 ENCOUNTER — OFFICE VISIT (OUTPATIENT)
Dept: ORTHOPEDICS | Facility: CLINIC | Age: 88
End: 2023-04-17
Payer: MEDICARE

## 2023-04-17 VITALS — WEIGHT: 145 LBS | HEIGHT: 70 IN | BODY MASS INDEX: 20.76 KG/M2

## 2023-04-17 DIAGNOSIS — G56.02 CARPAL TUNNEL SYNDROME ON LEFT: Primary | ICD-10-CM

## 2023-04-17 DIAGNOSIS — M79.642 LEFT HAND PAIN: ICD-10-CM

## 2023-04-17 PROCEDURE — 1159F MED LIST DOCD IN RCRD: CPT | Mod: CPTII,S$GLB,, | Performed by: ORTHOPAEDIC SURGERY

## 2023-04-17 PROCEDURE — 73130 X-RAY EXAM OF HAND: CPT | Mod: 26,LT,, | Performed by: RADIOLOGY

## 2023-04-17 PROCEDURE — 1159F PR MEDICATION LIST DOCUMENTED IN MEDICAL RECORD: ICD-10-PCS | Mod: CPTII,S$GLB,, | Performed by: ORTHOPAEDIC SURGERY

## 2023-04-17 PROCEDURE — 3288F PR FALLS RISK ASSESSMENT DOCUMENTED: ICD-10-PCS | Mod: CPTII,S$GLB,, | Performed by: ORTHOPAEDIC SURGERY

## 2023-04-17 PROCEDURE — 1101F PR PT FALLS ASSESS DOC 0-1 FALLS W/OUT INJ PAST YR: ICD-10-PCS | Mod: CPTII,S$GLB,, | Performed by: ORTHOPAEDIC SURGERY

## 2023-04-17 PROCEDURE — 99204 PR OFFICE/OUTPT VISIT, NEW, LEVL IV, 45-59 MIN: ICD-10-PCS | Mod: S$GLB,,, | Performed by: ORTHOPAEDIC SURGERY

## 2023-04-17 PROCEDURE — 99204 OFFICE O/P NEW MOD 45 MIN: CPT | Mod: S$GLB,,, | Performed by: ORTHOPAEDIC SURGERY

## 2023-04-17 PROCEDURE — 73130 X-RAY EXAM OF HAND: CPT | Mod: TC,FY,LT

## 2023-04-17 PROCEDURE — 73130 XR HAND COMPLETE 3 VIEW LEFT: ICD-10-PCS | Mod: 26,LT,, | Performed by: RADIOLOGY

## 2023-04-17 PROCEDURE — 1125F PR PAIN SEVERITY QUANTIFIED, PAIN PRESENT: ICD-10-PCS | Mod: CPTII,S$GLB,, | Performed by: ORTHOPAEDIC SURGERY

## 2023-04-17 PROCEDURE — 99999 PR PBB SHADOW E&M-EST. PATIENT-LVL III: ICD-10-PCS | Mod: PBBFAC,,, | Performed by: ORTHOPAEDIC SURGERY

## 2023-04-17 PROCEDURE — 1125F AMNT PAIN NOTED PAIN PRSNT: CPT | Mod: CPTII,S$GLB,, | Performed by: ORTHOPAEDIC SURGERY

## 2023-04-17 PROCEDURE — 1101F PT FALLS ASSESS-DOCD LE1/YR: CPT | Mod: CPTII,S$GLB,, | Performed by: ORTHOPAEDIC SURGERY

## 2023-04-17 PROCEDURE — 3288F FALL RISK ASSESSMENT DOCD: CPT | Mod: CPTII,S$GLB,, | Performed by: ORTHOPAEDIC SURGERY

## 2023-04-17 PROCEDURE — 99999 PR PBB SHADOW E&M-EST. PATIENT-LVL III: CPT | Mod: PBBFAC,,, | Performed by: ORTHOPAEDIC SURGERY

## 2023-04-17 NOTE — PROGRESS NOTES
[facility]  History & Physical  Orthopedics    SUBJECTIVE:      COVID-19 attestation:  This patient was treated during the COVID-19 pandemic.  This was discussed with the patient, they are aware of our current policies and procedures, were given the option of delaying their visit and or switching to a virtual visit, delaying their surgery when applicable, and they elect to proceed.    Chief Complaint: Left Hand numbness & tingling    Referring Provider: uJan Casey MD     History of Present Illness:  Patient is a 88 y.o. right hand dominant male who presents today with complaints of worsening left handed numbness and tingling that radiates from his wrist to his second, third, fourth, and fifth digits. He states that in 2016 he had an EMG that diagnosed him with bilateral carpal tunnel syndrome, worse on his right than left.  In 2017 he had carpal tunnel release with Dr. Richey.  Patient reports that she instructed him to go ahead with a left-sided release as well at this time, but he decided against it.  He presents to clinic today for evaluation because the numbness and tingling in his left hand have begun progressively worsening and he is interested in carpal tunnel release now.    The patient is a/an retired, used to work cleaning boat.    Onset of symptoms/DOI was greater than 5 years ago.    Symptoms are aggravated by activity, movement, and at night.    Symptoms are alleviated by rest and immobilization.    Symptoms consist of pain and numbness/tingling.    The patient rates their pain as a 10/10.    Attempted treatment(s) and/or interventions include activity modifications, rest, immobilization.     The patient denies any fevers, chills, N/V, D/C and presents for evaluation.       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    Prediabetes 10/3/2017     "Unspecified hereditary and idiopathic peripheral neuropathy      Past Surgical History:   Procedure Laterality Date    BACK SURGERY      CATARACT EXTRACTION  10/29/13    left eye    CATARACT EXTRACTION W/  INTRAOCULAR LENS IMPLANT  10/15/13    OD (dr. grayson)    EYE SURGERY       Review of patient's allergies indicates:   Allergen Reactions    Iodine and iodide containing products     Shellfish containing products     Pcn [penicillins] Rash     Social History     Social History Narrative    Not on file     Family History   Problem Relation Age of Onset    No Known Problems Mother     No Known Problems Father     Amblyopia Neg Hx     Blindness Neg Hx     Cancer Neg Hx     Cataracts Neg Hx     Diabetes Neg Hx     Glaucoma Neg Hx     Hypertension Neg Hx     Macular degeneration Neg Hx     Retinal detachment Neg Hx     Strabismus Neg Hx     Stroke Neg Hx     Thyroid disease Neg Hx          Current Outpatient Medications:     acetaminophen (TYLENOL) 325 MG tablet, Take 2 tablets (650 mg total) by mouth every 8 (eight) hours as needed for Pain., Disp: , Rfl: 0    allopurinoL (ZYLOPRIM) 100 MG tablet, Take 1 tablet (100 mg total) by mouth once daily., Disp: 90 tablet, Rfl: 1    amLODIPine (NORVASC) 2.5 MG tablet, Take 1 tablet (2.5 mg total) by mouth once daily., Disp: 90 tablet, Rfl: 3    aspirin (ECOTRIN) 81 MG EC tablet, Take 1 tablet (81 mg total) by mouth once daily., Disp: 90 tablet, Rfl: 0    benazepriL (LOTENSIN) 20 MG tablet, Take 1 tablet (20 mg total) by mouth once daily., Disp: 90 tablet, Rfl: 3    tadalafiL (CIALIS) 20 MG Tab, TAKE ONE (1) TABLET (20 MG TOTAL) BY MOUTH ONCE DAILY., Disp: 10 tablet, Rfl: 1      Review of Systems:  As per HPI otherwise noncontributory    OBJECTIVE:      Vital Signs (Most Recent):  Vitals:    04/17/23 1401   Weight: 65.8 kg (145 lb)   Height: 5' 10" (1.778 m)     Body mass index is 20.81 kg/m².      Physical Exam:  Constitutional: The patient appears well-developed and " well-nourished. No distress.   Skin: No lesions appreciated  Head: Normocephalic and atraumatic.   Nose: Nose normal.   Ears: No deformities seen  Eyes: Conjunctivae and EOM are normal.   Neck: No tracheal deviation present.   Cardiovascular: Normal rate and intact distal pulses.    Pulmonary/Chest: Effort normal. No respiratory distress.   Abdominal: There is no guarding.   Neurological: The patient is alert.   Psychiatric: The patient has a normal mood and affect.     Left Hand/Wrist Examination:    Observation/Inspection:  Swelling  none    Deformity  none  Discoloration  none     Scars   none    Atrophy  none    HAND/WRIST EXAMINATION:  Finkelstein's Test   Neg  WHAT Test    Neg  Snuff box tenderness   Neg  Daniels's Test    Neg  Hook of [place]ate Tenderness  Neg  CMC grind    Neg  Circumduction test   Neg    Neurovascular Exam:  Digits WWP, brisk CR < 3s throughout  NVI motor/LTS to M/R/U nerves, radial pulse 2+  Tinel's Test - Carpal Tunnel  POS  Tinel's Test - Cubital Tunnel  Neg  Phalen's Test    Neg  Median Nerve Compression Test Neg    ROM hand full, painless    ROM wrist full, painless    ROM elbow full, painless    Abdomen not guarded  Respirations nonlabored  Perfusion intact    Diagnostic Results:     Imaging - I independently viewed the patient's imaging as well as the radiology report.  Xrays of the patient's left hand demonstrate degenerative changes of wrist and carpometacarpal joint with no evidence of any acute fractures or dislocations.    EMG - EMG in 2016 found severe right carpal tunnel syndrome and evidence of moderate to severe left carpal tunnel syndrome    ASSESSMENT/PLAN:      88 y.o. yo male with likely left-sided carpal tunnel syndrome.    Plan: The patient and I had a thorough discussion today.  We discussed the working diagnosis as well as several other potential alternative diagnoses.  Treatment options were discussed, both conservative and surgical.  Conservative treatment options  would include things such as activity modifications, workplace modifications, a period of rest, oral vs topical OTC and prescription anti-inflammatory medications, occupational therapy, splinting/bracing, immobilization, corticosteroid injections, and others.  Surgical options were discussed as well.     At this time, the patient would like to proceed with a repeat EMG.  Follow-up with Dr. Richey to interpret results as she performed prior CT release in 2017.     Should the patient's symptoms worsen, persist, or fail to improve they should return for reevaluation and I would be happy to see them back anytime.        Tod Duron M.D.    Please be aware that this note has been generated with the assistance of RingCaptcha voice-to-text.  Please excuse any spelling or grammatical errors.    Thank you for choosing Dr. Tod Duron for your orthopedic hand and upper extremity care. It is our goal to provide you with exceptional care that will help keep you healthy, active, and get you back in the game.     If you felt that you received exemplary care today, please consider leaving feedback for Dr. Duron on [employer] at [email or web address].    Please do not hesitate to reach out to us via email, phone, or MyChart with any questions, concerns, or feedback.

## 2023-04-26 ENCOUNTER — OFFICE VISIT (OUTPATIENT)
Dept: FAMILY MEDICINE | Facility: CLINIC | Age: 88
End: 2023-04-26
Payer: MEDICARE

## 2023-04-26 VITALS
WEIGHT: 141.75 LBS | TEMPERATURE: 98 F | HEIGHT: 70 IN | HEART RATE: 99 BPM | DIASTOLIC BLOOD PRESSURE: 80 MMHG | RESPIRATION RATE: 16 BRPM | SYSTOLIC BLOOD PRESSURE: 126 MMHG | BODY MASS INDEX: 20.29 KG/M2 | OXYGEN SATURATION: 99 %

## 2023-04-26 DIAGNOSIS — F51.09 SITUATIONAL INSOMNIA: ICD-10-CM

## 2023-04-26 DIAGNOSIS — R35.0 URINARY FREQUENCY: Primary | ICD-10-CM

## 2023-04-26 DIAGNOSIS — Z12.5 SCREENING FOR PROSTATE CANCER: ICD-10-CM

## 2023-04-26 DIAGNOSIS — I10 ESSENTIAL HYPERTENSION: ICD-10-CM

## 2023-04-26 LAB
BILIRUBIN, UA POC OHS: NEGATIVE
BLOOD, UA POC OHS: NEGATIVE
CLARITY, UA POC OHS: CLEAR
COLOR, UA POC OHS: YELLOW
GLUCOSE, UA POC OHS: NEGATIVE
KETONES, UA POC OHS: ABNORMAL
LEUKOCYTES, UA POC OHS: NEGATIVE
NITRITE, UA POC OHS: NEGATIVE
PH, UA POC OHS: 5.5
PROTEIN, UA POC OHS: 30
SPECIFIC GRAVITY, UA POC OHS: 1.02
UROBILINOGEN, UA POC OHS: 0.2

## 2023-04-26 PROCEDURE — 1126F PR PAIN SEVERITY QUANTIFIED, NO PAIN PRESENT: ICD-10-PCS | Mod: CPTII,S$GLB,, | Performed by: FAMILY MEDICINE

## 2023-04-26 PROCEDURE — 81003 URINALYSIS AUTO W/O SCOPE: CPT | Mod: QW,S$GLB,, | Performed by: FAMILY MEDICINE

## 2023-04-26 PROCEDURE — 99213 PR OFFICE/OUTPT VISIT, EST, LEVL III, 20-29 MIN: ICD-10-PCS | Mod: S$GLB,,, | Performed by: FAMILY MEDICINE

## 2023-04-26 PROCEDURE — 1101F PR PT FALLS ASSESS DOC 0-1 FALLS W/OUT INJ PAST YR: ICD-10-PCS | Mod: CPTII,S$GLB,, | Performed by: FAMILY MEDICINE

## 2023-04-26 PROCEDURE — 1101F PT FALLS ASSESS-DOCD LE1/YR: CPT | Mod: CPTII,S$GLB,, | Performed by: FAMILY MEDICINE

## 2023-04-26 PROCEDURE — 1159F PR MEDICATION LIST DOCUMENTED IN MEDICAL RECORD: ICD-10-PCS | Mod: CPTII,S$GLB,, | Performed by: FAMILY MEDICINE

## 2023-04-26 PROCEDURE — 1126F AMNT PAIN NOTED NONE PRSNT: CPT | Mod: CPTII,S$GLB,, | Performed by: FAMILY MEDICINE

## 2023-04-26 PROCEDURE — 99999 PR PBB SHADOW E&M-EST. PATIENT-LVL IV: CPT | Mod: PBBFAC,,, | Performed by: FAMILY MEDICINE

## 2023-04-26 PROCEDURE — 1160F RVW MEDS BY RX/DR IN RCRD: CPT | Mod: CPTII,S$GLB,, | Performed by: FAMILY MEDICINE

## 2023-04-26 PROCEDURE — 3288F FALL RISK ASSESSMENT DOCD: CPT | Mod: CPTII,S$GLB,, | Performed by: FAMILY MEDICINE

## 2023-04-26 PROCEDURE — 81003 POCT URINALYSIS(INSTRUMENT): ICD-10-PCS | Mod: QW,S$GLB,, | Performed by: FAMILY MEDICINE

## 2023-04-26 PROCEDURE — 1159F MED LIST DOCD IN RCRD: CPT | Mod: CPTII,S$GLB,, | Performed by: FAMILY MEDICINE

## 2023-04-26 PROCEDURE — 99213 OFFICE O/P EST LOW 20 MIN: CPT | Mod: S$GLB,,, | Performed by: FAMILY MEDICINE

## 2023-04-26 PROCEDURE — 99999 PR PBB SHADOW E&M-EST. PATIENT-LVL IV: ICD-10-PCS | Mod: PBBFAC,,, | Performed by: FAMILY MEDICINE

## 2023-04-26 PROCEDURE — 1160F PR REVIEW ALL MEDS BY PRESCRIBER/CLIN PHARMACIST DOCUMENTED: ICD-10-PCS | Mod: CPTII,S$GLB,, | Performed by: FAMILY MEDICINE

## 2023-04-26 PROCEDURE — 3288F PR FALLS RISK ASSESSMENT DOCUMENTED: ICD-10-PCS | Mod: CPTII,S$GLB,, | Performed by: FAMILY MEDICINE

## 2023-04-26 RX ORDER — ZOLPIDEM TARTRATE 5 MG/1
5 TABLET ORAL NIGHTLY PRN
Qty: 15 TABLET | Refills: 0 | Status: SHIPPED | OUTPATIENT
Start: 2023-04-26 | End: 2023-05-08 | Stop reason: SDUPTHER

## 2023-04-26 RX ORDER — TAMSULOSIN HYDROCHLORIDE 0.4 MG/1
0.4 CAPSULE ORAL DAILY
Qty: 30 CAPSULE | Refills: 11 | Status: SHIPPED | OUTPATIENT
Start: 2023-04-26 | End: 2023-05-08 | Stop reason: SDUPTHER

## 2023-04-26 NOTE — PROGRESS NOTES
Subjective:       Patient ID: Bashir Ramos Sr. is a 88 y.o. male.    Chief Complaint: No chief complaint on file.    Patient here for UC visit.  1- urinary frequency - no pain or blood or decreased stream.  2-last few days he can't sleep well - new onset.  Some nocturia as above and recent death of his uncle may be a factor.  3-some general achiness.      Review of Systems   Constitutional:  Negative for fever.   Respiratory:  Negative for shortness of breath.    Cardiovascular:  Negative for chest pain.   Gastrointestinal:  Negative for abdominal pain and nausea.   Genitourinary:  Positive for frequency.   Musculoskeletal:  Positive for arthralgias and myalgias.   Skin:  Negative for rash.   Neurological:  Negative for numbness.   Psychiatric/Behavioral:  Positive for sleep disturbance.    All other systems reviewed and are negative.    Objective:      Physical Exam  Vitals reviewed.   Constitutional:       General: He is not in acute distress.     Appearance: Normal appearance. He is well-developed. He is not ill-appearing.   HENT:      Mouth/Throat:      Mouth: Mucous membranes are moist.      Pharynx: No posterior oropharyngeal erythema.   Cardiovascular:      Rate and Rhythm: Normal rate and regular rhythm.      Heart sounds: No murmur heard.  Pulmonary:      Effort: Pulmonary effort is normal.      Breath sounds: Normal breath sounds.   Abdominal:      General: Bowel sounds are normal.      Palpations: Abdomen is soft.      Tenderness: There is no abdominal tenderness. There is no right CVA tenderness or left CVA tenderness.   Musculoskeletal:      Cervical back: Neck supple.   Lymphadenopathy:      Cervical: No cervical adenopathy.   Skin:     General: Skin is warm and dry.   Neurological:      Mental Status: He is alert.       Assessment:       1. Urinary frequency    2. Situational insomnia    3. Essential hypertension    4. Screening for prostate cancer        Plan:       Urinary frequency  -     POCT  Urinalysis(Instrument) - negative     -     zolpidem (AMBIEN) 5 MG Tab; Take 1 tablet (5 mg total) by mouth nightly as needed (trouble sleeping).  Dispense: 15 tablet; Refill: 0  -     PSA, SCREENING; Future; Expected date: 06/05/2023    Situational insomnia  -     tamsulosin (FLOMAX) 0.4 mg Cap; Take 1 capsule (0.4 mg total) by mouth once daily.  Dispense: 30 capsule; Refill: 11    Essential hypertension  -     CBC W/ AUTO DIFFERENTIAL; Future; Expected date: 06/05/2023  -     BASIC METABOLIC PANEL; Future; Expected date: 06/05/2023    Screening for prostate cancer  -     PSA, SCREENING; Future; Expected date: 06/05/2023    Labs ordered for prior to his next PCP visit.    Patient Instructions   Ask the pharmacist about a saw palmetto product for prostate relief.

## 2023-05-02 ENCOUNTER — TELEPHONE (OUTPATIENT)
Dept: FAMILY MEDICINE | Facility: CLINIC | Age: 88
End: 2023-05-02
Payer: MEDICARE

## 2023-05-02 NOTE — TELEPHONE ENCOUNTER
----- Message from Edson Asencio sent at 5/2/2023  9:31 AM CDT -----  Regarding: appt  Contact: NABIL ALVAREZ SR. [[id number]]  Type:  Same Day Appointment Request    Caller is requesting a same day appointment.  Caller declined first available appointment listed below.      Name of Caller:  Nabil, son    When is the first available appointment?  Dept book    Symptoms:  Insomnia and incontinense    Best Call Back Number:  [phone]    Additional Information: Please call to advise.

## 2023-05-02 NOTE — TELEPHONE ENCOUNTER
Spoke to pt son whom states his father is not sleeping at all and is frequently going to the restroom. He is taking his father to urgent care and scheduled and urgent follow up with PA

## 2023-05-02 NOTE — TELEPHONE ENCOUNTER
----- Message from Dorothy Hightower sent at 5/2/2023  7:22 AM CDT -----  Regarding: sameday appointment  Contact: Son, Bashir  Type:  Same Day Appointment Request    Caller is requesting a same day appointment.  Caller declined first available appointment listed below.      Name of Caller:  Bashir  When is the first available appointment?  No availability   Symptoms:  not sleeping  Best Call Back Number:  [phone] (home) [phone] (work)    Case number [id number]

## 2023-05-05 ENCOUNTER — LAB VISIT (OUTPATIENT)
Dept: LAB | Facility: HOSPITAL | Age: 88
End: 2023-05-05
Attending: FAMILY MEDICINE
Payer: MEDICARE

## 2023-05-05 DIAGNOSIS — I10 ESSENTIAL HYPERTENSION: ICD-10-CM

## 2023-05-05 DIAGNOSIS — Z12.5 SCREENING FOR PROSTATE CANCER: ICD-10-CM

## 2023-05-05 DIAGNOSIS — R35.0 URINARY FREQUENCY: ICD-10-CM

## 2023-05-05 LAB
ANION GAP SERPL CALC-SCNC: 11 MMOL/L (ref 8–16)
BASOPHILS # BLD AUTO: 0.02 K/UL (ref 0–0.2)
BASOPHILS NFR BLD: 0.4 % (ref 0–1.9)
BUN SERPL-MCNC: 12 MG/DL (ref 8–23)
CALCIUM SERPL-MCNC: 10 MG/DL (ref 8.7–10.5)
CHLORIDE SERPL-SCNC: 104 MMOL/L (ref 95–110)
CO2 SERPL-SCNC: 22 MMOL/L (ref 23–29)
COMPLEXED PSA SERPL-MCNC: 1.1 NG/ML (ref 0–4)
CREAT SERPL-MCNC: 1.3 MG/DL (ref 0.5–1.4)
DIFFERENTIAL METHOD: ABNORMAL
EOSINOPHIL # BLD AUTO: 0.7 K/UL (ref 0–0.5)
EOSINOPHIL NFR BLD: 12.7 % (ref 0–8)
ERYTHROCYTE [DISTWIDTH] IN BLOOD BY AUTOMATED COUNT: 15.1 % (ref 11.5–14.5)
EST. GFR  (NO RACE VARIABLE): 52.8 ML/MIN/1.73 M^2
GLUCOSE SERPL-MCNC: 82 MG/DL (ref 70–110)
HCT VFR BLD AUTO: 40.4 % (ref 40–54)
HGB BLD-MCNC: 12.3 G/DL (ref 14–18)
IMM GRANULOCYTES # BLD AUTO: 0.01 K/UL (ref 0–0.04)
IMM GRANULOCYTES NFR BLD AUTO: 0.2 % (ref 0–0.5)
LYMPHOCYTES # BLD AUTO: 1.1 K/UL (ref 1–4.8)
LYMPHOCYTES NFR BLD: 20.7 % (ref 18–48)
MCH RBC QN AUTO: 27.9 PG (ref 27–31)
MCHC RBC AUTO-ENTMCNC: 30.4 G/DL (ref 32–36)
MCV RBC AUTO: 92 FL (ref 82–98)
MONOCYTES # BLD AUTO: 0.6 K/UL (ref 0.3–1)
MONOCYTES NFR BLD: 10 % (ref 4–15)
NEUTROPHILS # BLD AUTO: 3.1 K/UL (ref 1.8–7.7)
NEUTROPHILS NFR BLD: 56 % (ref 38–73)
NRBC BLD-RTO: 0 /100 WBC
PLATELET # BLD AUTO: 316 K/UL (ref 150–450)
PMV BLD AUTO: 9.6 FL (ref 9.2–12.9)
POTASSIUM SERPL-SCNC: 4.1 MMOL/L (ref 3.5–5.1)
RBC # BLD AUTO: 4.41 M/UL (ref 4.6–6.2)
SODIUM SERPL-SCNC: 137 MMOL/L (ref 136–145)
WBC # BLD AUTO: 5.5 K/UL (ref 3.9–12.7)

## 2023-05-05 PROCEDURE — 36415 COLL VENOUS BLD VENIPUNCTURE: CPT | Mod: PO | Performed by: FAMILY MEDICINE

## 2023-05-05 PROCEDURE — 84153 ASSAY OF PSA TOTAL: CPT | Performed by: FAMILY MEDICINE

## 2023-05-05 PROCEDURE — 85025 COMPLETE CBC W/AUTO DIFF WBC: CPT | Performed by: FAMILY MEDICINE

## 2023-05-05 PROCEDURE — 80048 BASIC METABOLIC PNL TOTAL CA: CPT | Performed by: FAMILY MEDICINE

## 2023-05-08 ENCOUNTER — TELEPHONE (OUTPATIENT)
Dept: FAMILY MEDICINE | Facility: CLINIC | Age: 88
End: 2023-05-08
Payer: MEDICARE

## 2023-05-08 ENCOUNTER — OFFICE VISIT (OUTPATIENT)
Dept: FAMILY MEDICINE | Facility: CLINIC | Age: 88
End: 2023-05-08
Payer: MEDICARE

## 2023-05-08 ENCOUNTER — OFFICE VISIT (OUTPATIENT)
Dept: UROLOGY | Facility: CLINIC | Age: 88
End: 2023-05-08
Payer: MEDICARE

## 2023-05-08 VITALS
HEIGHT: 65 IN | WEIGHT: 142.75 LBS | DIASTOLIC BLOOD PRESSURE: 89 MMHG | SYSTOLIC BLOOD PRESSURE: 122 MMHG | TEMPERATURE: 98 F | OXYGEN SATURATION: 99 % | DIASTOLIC BLOOD PRESSURE: 74 MMHG | BODY MASS INDEX: 23.78 KG/M2 | HEIGHT: 70 IN | HEART RATE: 115 BPM | HEART RATE: 104 BPM | WEIGHT: 142.88 LBS | BODY MASS INDEX: 20.46 KG/M2 | SYSTOLIC BLOOD PRESSURE: 132 MMHG

## 2023-05-08 DIAGNOSIS — R35.0 FREQUENT URINATION: Primary | ICD-10-CM

## 2023-05-08 DIAGNOSIS — F51.09 SITUATIONAL INSOMNIA: ICD-10-CM

## 2023-05-08 DIAGNOSIS — R35.0 URINARY FREQUENCY: ICD-10-CM

## 2023-05-08 DIAGNOSIS — R39.12 WEAK URINARY STREAM: Primary | ICD-10-CM

## 2023-05-08 DIAGNOSIS — R54 AGE-RELATED PHYSICAL DEBILITY: ICD-10-CM

## 2023-05-08 DIAGNOSIS — N40.1 BPH WITH OBSTRUCTION/LOWER URINARY TRACT SYMPTOMS: ICD-10-CM

## 2023-05-08 DIAGNOSIS — R80.9 PROTEINURIA, UNSPECIFIED TYPE: ICD-10-CM

## 2023-05-08 DIAGNOSIS — N13.8 BPH WITH OBSTRUCTION/LOWER URINARY TRACT SYMPTOMS: ICD-10-CM

## 2023-05-08 LAB
BACTERIA #/AREA URNS HPF: NORMAL /HPF
BILIRUBIN, UA POC OHS: NEGATIVE
BLOOD, UA POC OHS: NEGATIVE
CLARITY, UA POC OHS: CLEAR
COLOR, UA POC OHS: YELLOW
GLUCOSE, UA POC OHS: NEGATIVE
KETONES, UA POC OHS: NEGATIVE
LEUKOCYTES, UA POC OHS: NEGATIVE
MICROSCOPIC COMMENT: NORMAL
NITRITE, UA POC OHS: NEGATIVE
PH, UA POC OHS: 5.5
POC RESIDUAL URINE VOLUME: 550 ML (ref 0–100)
PROTEIN, UA POC OHS: NEGATIVE
SPECIFIC GRAVITY, UA POC OHS: <=1.005
UROBILINOGEN, UA POC OHS: 0.2
WBC #/AREA URNS HPF: 1 /HPF (ref 0–5)

## 2023-05-08 PROCEDURE — 1126F PR PAIN SEVERITY QUANTIFIED, NO PAIN PRESENT: ICD-10-PCS | Mod: CPTII,S$GLB,, | Performed by: NURSE PRACTITIONER

## 2023-05-08 PROCEDURE — 1160F RVW MEDS BY RX/DR IN RCRD: CPT | Mod: CPTII,S$GLB,, | Performed by: NURSE PRACTITIONER

## 2023-05-08 PROCEDURE — 1126F AMNT PAIN NOTED NONE PRSNT: CPT | Mod: CPTII,S$GLB,, | Performed by: NURSE PRACTITIONER

## 2023-05-08 PROCEDURE — 51798 US URINE CAPACITY MEASURE: CPT | Mod: S$GLB,,, | Performed by: NURSE PRACTITIONER

## 2023-05-08 PROCEDURE — 1101F PT FALLS ASSESS-DOCD LE1/YR: CPT | Mod: CPTII,S$GLB,, | Performed by: NURSE PRACTITIONER

## 2023-05-08 PROCEDURE — 81003 URINALYSIS AUTO W/O SCOPE: CPT | Mod: QW,S$GLB,, | Performed by: NURSE PRACTITIONER

## 2023-05-08 PROCEDURE — 99999 PR PBB SHADOW E&M-EST. PATIENT-LVL V: CPT | Mod: PBBFAC,,, | Performed by: PHYSICIAN ASSISTANT

## 2023-05-08 PROCEDURE — 1126F AMNT PAIN NOTED NONE PRSNT: CPT | Mod: CPTII,S$GLB,, | Performed by: PHYSICIAN ASSISTANT

## 2023-05-08 PROCEDURE — 81003 POCT URINALYSIS(INSTRUMENT): ICD-10-PCS | Mod: QW,S$GLB,, | Performed by: NURSE PRACTITIONER

## 2023-05-08 PROCEDURE — 99213 OFFICE O/P EST LOW 20 MIN: CPT | Mod: S$GLB,,, | Performed by: PHYSICIAN ASSISTANT

## 2023-05-08 PROCEDURE — 1159F PR MEDICATION LIST DOCUMENTED IN MEDICAL RECORD: ICD-10-PCS | Mod: CPTII,S$GLB,, | Performed by: NURSE PRACTITIONER

## 2023-05-08 PROCEDURE — 99213 PR OFFICE/OUTPT VISIT, EST, LEVL III, 20-29 MIN: ICD-10-PCS | Mod: S$GLB,,, | Performed by: PHYSICIAN ASSISTANT

## 2023-05-08 PROCEDURE — 1101F PR PT FALLS ASSESS DOC 0-1 FALLS W/OUT INJ PAST YR: ICD-10-PCS | Mod: CPTII,S$GLB,, | Performed by: PHYSICIAN ASSISTANT

## 2023-05-08 PROCEDURE — 1159F MED LIST DOCD IN RCRD: CPT | Mod: CPTII,S$GLB,, | Performed by: NURSE PRACTITIONER

## 2023-05-08 PROCEDURE — 1101F PT FALLS ASSESS-DOCD LE1/YR: CPT | Mod: CPTII,S$GLB,, | Performed by: PHYSICIAN ASSISTANT

## 2023-05-08 PROCEDURE — 1101F PR PT FALLS ASSESS DOC 0-1 FALLS W/OUT INJ PAST YR: ICD-10-PCS | Mod: CPTII,S$GLB,, | Performed by: NURSE PRACTITIONER

## 2023-05-08 PROCEDURE — 51798 POCT BLADDER SCAN: ICD-10-PCS | Mod: S$GLB,,, | Performed by: NURSE PRACTITIONER

## 2023-05-08 PROCEDURE — 99999 PR PBB SHADOW E&M-EST. PATIENT-LVL IV: CPT | Mod: PBBFAC,,, | Performed by: NURSE PRACTITIONER

## 2023-05-08 PROCEDURE — 99999 PR PBB SHADOW E&M-EST. PATIENT-LVL IV: ICD-10-PCS | Mod: PBBFAC,,, | Performed by: NURSE PRACTITIONER

## 2023-05-08 PROCEDURE — 1159F MED LIST DOCD IN RCRD: CPT | Mod: CPTII,S$GLB,, | Performed by: PHYSICIAN ASSISTANT

## 2023-05-08 PROCEDURE — 99999 PR PBB SHADOW E&M-EST. PATIENT-LVL V: ICD-10-PCS | Mod: PBBFAC,,, | Performed by: PHYSICIAN ASSISTANT

## 2023-05-08 PROCEDURE — 3288F FALL RISK ASSESSMENT DOCD: CPT | Mod: CPTII,S$GLB,, | Performed by: PHYSICIAN ASSISTANT

## 2023-05-08 PROCEDURE — 99204 OFFICE O/P NEW MOD 45 MIN: CPT | Mod: S$GLB,,, | Performed by: NURSE PRACTITIONER

## 2023-05-08 PROCEDURE — 81000 URINALYSIS NONAUTO W/SCOPE: CPT | Performed by: NURSE PRACTITIONER

## 2023-05-08 PROCEDURE — 1126F PR PAIN SEVERITY QUANTIFIED, NO PAIN PRESENT: ICD-10-PCS | Mod: CPTII,S$GLB,, | Performed by: PHYSICIAN ASSISTANT

## 2023-05-08 PROCEDURE — 87086 URINE CULTURE/COLONY COUNT: CPT | Performed by: NURSE PRACTITIONER

## 2023-05-08 PROCEDURE — 3288F FALL RISK ASSESSMENT DOCD: CPT | Mod: CPTII,S$GLB,, | Performed by: NURSE PRACTITIONER

## 2023-05-08 PROCEDURE — 1159F PR MEDICATION LIST DOCUMENTED IN MEDICAL RECORD: ICD-10-PCS | Mod: CPTII,S$GLB,, | Performed by: PHYSICIAN ASSISTANT

## 2023-05-08 PROCEDURE — 1160F PR REVIEW ALL MEDS BY PRESCRIBER/CLIN PHARMACIST DOCUMENTED: ICD-10-PCS | Mod: CPTII,S$GLB,, | Performed by: NURSE PRACTITIONER

## 2023-05-08 PROCEDURE — 99204 PR OFFICE/OUTPT VISIT, NEW, LEVL IV, 45-59 MIN: ICD-10-PCS | Mod: S$GLB,,, | Performed by: NURSE PRACTITIONER

## 2023-05-08 PROCEDURE — 3288F PR FALLS RISK ASSESSMENT DOCUMENTED: ICD-10-PCS | Mod: CPTII,S$GLB,, | Performed by: PHYSICIAN ASSISTANT

## 2023-05-08 PROCEDURE — 3288F PR FALLS RISK ASSESSMENT DOCUMENTED: ICD-10-PCS | Mod: CPTII,S$GLB,, | Performed by: NURSE PRACTITIONER

## 2023-05-08 RX ORDER — TAMSULOSIN HYDROCHLORIDE 0.4 MG/1
0.4 CAPSULE ORAL DAILY
Qty: 60 CAPSULE | Refills: 11 | Status: SHIPPED | OUTPATIENT
Start: 2023-05-08

## 2023-05-08 RX ORDER — ZOLPIDEM TARTRATE 5 MG/1
5 TABLET ORAL NIGHTLY PRN
Qty: 15 TABLET | Refills: 0 | Status: SHIPPED | OUTPATIENT
Start: 2023-05-08 | End: 2023-11-29

## 2023-05-08 RX ORDER — SIMVASTATIN 40 MG/1
40 TABLET, FILM COATED ORAL NIGHTLY
COMMUNITY
End: 2023-06-07

## 2023-05-08 NOTE — PATIENT INSTRUCTIONS
Increase flomax to 0.8 mg. If dizziness occurs, reduce back to 0.4 mg.      Discussed conservative measures to control urgency and frequency including   1. Avoiding/minimizing bladder irritants (see below), especially in afternoon and evening hours    Discussed bladder irritants include coffe (even decaf), tea, alcohol, soda, spicy foods, acidic juices (orange, tomato), vinegar, and artificial sweeteners/sugary beverages.    2. timed voiding - empty on a schedule (approx ~2-3 hours) in spite of need to urinate, to get ahead of urge    3. dont postponing voiding - dont hold it on purpose     4. bowel regimen as distended bowel has extrinsic compressive effect on bladder.   - any or all of the following in any combination, titrate to soft daily bowel movement without pushing or straining  - colace/stool softener capsule - once to twice daily  - miralax - 1 capful daily to start, can increase to 2x daily (or decrease to 1/2 cap daily)  - increase dietary fibery  - fiber supplements, such as metamucil  - prunes, prune juice    5. INCREASE water intake    6. Stop fluids 2 hours before bed, and urinate just before bed

## 2023-05-08 NOTE — PROGRESS NOTES
Subjective:       Patient ID: Bashir Ramos Sr. is a 88 y.o. male.    Chief Complaint: Insomnia    Patient presents accompanied by son and daughter for follow up of urinary frequency, insomnia, and fatigue.  He was seen 2 weeks ago for UC visit.  He has had better sleep with Ambien so fatigue is improved slightly.  He continues to have frequent urination.  He states that he only urinates just a small amount at a time and then returns to urinated again 10 minutes later.  No abdominal pain or pelvic pain.  Labs showed no abnormality to account for symptoms.  Family also reports worsening weakness in legs.  No falls.  Requesting therapy.   Patients patient medical/surgical, social and family histories have been reviewed      Review of Systems   Constitutional:  Positive for fatigue. Negative for activity change, appetite change, chills, diaphoresis, fever and unexpected weight change.   Cardiovascular:  Negative for palpitations.   Genitourinary:  Positive for decreased urine volume, frequency and urgency. Negative for difficulty urinating, dysuria, hematuria and testicular pain.   Neurological:  Negative for dizziness, light-headedness and headaches.   Psychiatric/Behavioral:  Positive for sleep disturbance. The patient is not nervous/anxious.      Objective:      Physical Exam  Constitutional:       General: He is not in acute distress.     Appearance: He is well-developed.   HENT:      Head: Normocephalic and atraumatic.      Mouth/Throat:      Mouth: Mucous membranes are moist.   Cardiovascular:      Rate and Rhythm: Regular rhythm. Tachycardia present.      Heart sounds: Normal heart sounds.   Pulmonary:      Effort: Pulmonary effort is normal.      Breath sounds: Normal breath sounds.   Abdominal:      General: Abdomen is protuberant. Bowel sounds are normal.      Palpations: Abdomen is soft.      Tenderness: There is no abdominal tenderness.   Musculoskeletal:      Cervical back: Neck supple. No tenderness.      " Right lower leg: No edema.      Left lower leg: No edema.   Skin:     General: Skin is warm and dry.      Capillary Refill: Capillary refill takes less than 2 seconds.   Neurological:      General: No focal deficit present.      Mental Status: He is alert.   Psychiatric:         Mood and Affect: Mood normal.         Behavior: Behavior normal. Behavior is cooperative.         Judgment: Judgment normal.       Assessment:       1. Frequent urination    2. Proteinuria, unspecified type    3. 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 Situational insomnia    5. Urinary frequency        Plan:       Bashir was seen today for insomnia.    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    Urinary frequency           Follow up for urolgoy , urine today,  pcp in 2-3 mo .           Documentation entered by me for this encounter may have been done in part using speech-recognition technology. Although I have made an effort to ensure accuracy, "sound like" errors may exist and should be interpreted in context.     "

## 2023-05-08 NOTE — PROGRESS NOTES
CHIEF COMPLAINT:    Mr. Ramos is a 88 y.o. male presenting for urinary frequency.  PRESENTING ILLNESS:    Bashir Ramos Sr. is a 88 y.o. male who presents for urinary frequency. This is his initial clinic visit.    Pt is accompanied by family members who will assist translating. He speaks and understands some English.  declined.    5/8/23  Patient presents to clinic for LUTS. Pt reports daytime frequency, nocturia x 4-5, weak stream and hesitancy that has worsened in the past few weeks. He was started on flomax 4/28/23 with not much improvement. Denies dysuria, gross hematuria, flank pain, fever, chills, nausea or vomiting.  UA today negative   ml  5/5/23 creatinine 1.3  4/26/23 UA trace ketone, 30 protein, otherwise negative    3/27/22 CT abd pelvis with contrast  Several simple cysts in each renal cortex.  Unremarkable prostate and urinary bladder.    History of kidney stones: denies  Personal or family hx of  malignancy: denies  History of  trauma: denies  Smoking history: former smoker, quit 1982    Urine cultures:   Lab Results   Component Value Date    LABURIN ESCHERICHIA COLI  >100,000 cfu/ml   (A) 03/27/2022    LABURIN No significant growth 02/24/2017       REVIEW OF SYSTEMS:    Review of Systems    Constitutional: Negative for fever and chills.   HENT: Negative for hearing loss.   Eyes: Negative for visual disturbance.   Respiratory: Negative for shortness of breath.   Cardiovascular: Negative for chest pain.   Gastrointestinal: Negative for nausea, vomiting.   Genitourinary:  See above  Neurological: Negative for dizziness.   Hematological: Does not bruise/bleed easily.   Psychiatric/Behavioral: Negative for confusion.       PATIENT HISTORY:    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    Prediabetes 10/3/2017    Unspecified hereditary and idiopathic  peripheral neuropathy        Past Surgical History:   Procedure Laterality Date    BACK SURGERY      CATARACT EXTRACTION  10/29/13    left eye    CATARACT EXTRACTION W/  INTRAOCULAR LENS IMPLANT  10/15/13    OD (dr. grayson)    EYE SURGERY         Family History   Problem Relation Age of Onset    No Known Problems Mother     No Known Problems Father     Amblyopia Neg Hx     Blindness Neg Hx     Cancer Neg Hx     Cataracts Neg Hx     Diabetes Neg Hx     Glaucoma Neg Hx     Hypertension Neg Hx     Macular degeneration Neg Hx     Retinal detachment Neg Hx     Strabismus Neg Hx     Stroke Neg Hx     Thyroid disease Neg Hx        Social History     Socioeconomic History    Marital status:    Tobacco Use    Smoking status: Former     Packs/day: 1.00     Types: Cigarettes     Quit date: 1982     Years since quittin.7    Smokeless tobacco: Never   Substance and Sexual Activity    Alcohol use: No    Drug use: No    Sexual activity: Not Currently     Partners: Female       Allergies:  Iodine and iodide containing products, Shellfish containing products, and Pcn [penicillins]    Medications:    Current Outpatient Medications:     acetaminophen (TYLENOL) 325 MG tablet, Take 2 tablets (650 mg total) by mouth every 8 (eight) hours as needed for Pain., Disp: , Rfl: 0    allopurinoL (ZYLOPRIM) 100 MG tablet, Take 1 tablet (100 mg total) by mouth once daily., Disp: 90 tablet, Rfl: 1    amLODIPine (NORVASC) 2.5 MG tablet, Take 1 tablet (2.5 mg total) by mouth once daily., Disp: 90 tablet, Rfl: 3    aspirin (ECOTRIN) 81 MG EC tablet, Take 1 tablet (81 mg total) by mouth once daily., Disp: 90 tablet, Rfl: 0    benazepriL (LOTENSIN) 20 MG tablet, Take 1 tablet (20 mg total) by mouth once daily., Disp: 90 tablet, Rfl: 3    simvastatin (ZOCOR) 40 MG tablet, Take 40 mg by mouth every evening., Disp: , Rfl:     tadalafiL (CIALIS) 20 MG Tab, TAKE ONE (1) TABLET (20 MG TOTAL) BY MOUTH ONCE DAILY., Disp: 10 tablet, Rfl: 1     zolpidem (AMBIEN) 5 MG Tab, Take 1 tablet (5 mg total) by mouth nightly as needed (trouble sleeping)., Disp: 15 tablet, Rfl: 0    tamsulosin (FLOMAX) 0.4 mg Cap, Take 1 capsule (0.4 mg total) by mouth once daily., Disp: 60 capsule, Rfl: 11    PHYSICAL EXAMINATION:    Constitutional: He is oriented to person, place, and time. He appears well-developed and well-nourished.  He is in no apparent distress.    Neck: Normal ROM.     Pulmonary/Chest: Effort normal. No respiratory distress.     Abdominal:  He exhibits no distension.  There is no CVA tenderness.     Neurological: He is alert and oriented to person, place, and time.     Skin: Skin is warm and dry.     Psych: Cooperative with normal affect.      Physical Exam      LABS:    Lab Results   Component Value Date    PSA 1.1 05/05/2023    PSA 0.54 10/03/2017     Lab Results   Component Value Date    CREATININE 1.3 05/05/2023         IMPRESSION:    Encounter Diagnoses   Name Primary?    Weak urinary stream Yes    Urinary frequency     BPH with obstruction/lower urinary tract symptoms          PLAN:  -Urine sent for UA and culture. Will call with results  -Discussed elevated PVR. Risks associated with incomplete bladder emptying/urinary retention reviewed with patient and his family. Offered CIC, indwelling catheter or monitor PVR after increasing flomax to 0.8 mg. Pt does not want catheter at this time.  Will increase flomax to 0.8 mg, refilled medication.  If dizziness occurs, decrease back to 0.4 mg and notify me in clinic. Pt and his family verbalized understanding.  -RTC 1 week for PVR check.  If continues with elevated PVR, may need to consider porras catheter.   If symptoms worsen, please go to ED for evaluation    I encouraged him or any of his family members to call or email me with questions and/or concerns.      45 minutes of total time spent on the encounter, which includes face to face time and non-face to face time preparing to see the patient (eg, review  of tests), Obtaining and/or reviewing separately obtained history, Documenting clinical information in the electronic or other health record, Independently interpreting results (not separately reported) and communicating results to the patient/family/caregiver, or Care coordination (not separately reported).

## 2023-05-08 NOTE — TELEPHONE ENCOUNTER
----- Message from Brittanie Wesley sent at 5/8/2023  8:25 AM CDT -----  Contact: pt  [phone]  Type: Needs Medical Advice  Who Called:  pt  Best Call Back Number: [phone]    Additional Information: Pt son is calling the office to inform they will be a half an hour late to appt. Please call back and advise.

## 2023-05-10 LAB — BACTERIA UR CULT: NORMAL

## 2023-05-11 ENCOUNTER — HOSPITAL ENCOUNTER (EMERGENCY)
Facility: HOSPITAL | Age: 88
Discharge: HOME | End: 2023-05-11
Attending: EMERGENCY MEDICINE
Payer: MEDICARE

## 2023-05-11 VITALS
SYSTOLIC BLOOD PRESSURE: 162 MMHG | WEIGHT: 156 LBS | RESPIRATION RATE: 20 BRPM | HEART RATE: 94 BPM | HEIGHT: 65 IN | BODY MASS INDEX: 25.99 KG/M2 | DIASTOLIC BLOOD PRESSURE: 87 MMHG | TEMPERATURE: 98 F | OXYGEN SATURATION: 99 %

## 2023-05-11 DIAGNOSIS — I95.1 ORTHOSTASIS: ICD-10-CM

## 2023-05-11 DIAGNOSIS — R07.9 CHEST PAIN: ICD-10-CM

## 2023-05-11 DIAGNOSIS — T50.905A MEDICATION SIDE EFFECT, INITIAL ENCOUNTER: Primary | ICD-10-CM

## 2023-05-11 LAB
ALBUMIN SERPL BCP-MCNC: 3.7 G/DL (ref 3.5–5.2)
ALP SERPL-CCNC: 80 U/L (ref 55–135)
ALT SERPL W/O P-5'-P-CCNC: 16 U/L (ref 10–44)
ANION GAP SERPL CALC-SCNC: 11 MMOL/L (ref 8–16)
AST SERPL-CCNC: 16 U/L (ref 10–40)
BASOPHILS # BLD AUTO: 0.01 K/UL (ref 0–0.2)
BASOPHILS NFR BLD: 0.3 % (ref 0–1.9)
BILIRUB SERPL-MCNC: 0.4 MG/DL (ref 0.1–1)
BILIRUB UR QL STRIP: NEGATIVE
BUN SERPL-MCNC: 15 MG/DL (ref 8–23)
CALCIUM SERPL-MCNC: 9.9 MG/DL (ref 8.7–10.5)
CHLORIDE SERPL-SCNC: 107 MMOL/L (ref 95–110)
CLARITY UR: CLEAR
CO2 SERPL-SCNC: 22 MMOL/L (ref 23–29)
COLOR UR: YELLOW
CREAT SERPL-MCNC: 1.2 MG/DL (ref 0.5–1.4)
DIFFERENTIAL METHOD: ABNORMAL
EOSINOPHIL # BLD AUTO: 0.2 K/UL (ref 0–0.5)
EOSINOPHIL NFR BLD: 6.1 % (ref 0–8)
ERYTHROCYTE [DISTWIDTH] IN BLOOD BY AUTOMATED COUNT: 14.6 % (ref 11.5–14.5)
EST. GFR  (NO RACE VARIABLE): 58 ML/MIN/1.73 M^2
GLUCOSE SERPL-MCNC: 84 MG/DL (ref 70–110)
GLUCOSE UR QL STRIP: NEGATIVE
HCT VFR BLD AUTO: 39 % (ref 40–54)
HCV AB SERPL QL IA: NORMAL
HGB BLD-MCNC: 12.2 G/DL (ref 14–18)
HGB UR QL STRIP: NEGATIVE
HIV 1+2 AB+HIV1 P24 AG SERPL QL IA: NORMAL
IMM GRANULOCYTES # BLD AUTO: 0.01 K/UL (ref 0–0.04)
IMM GRANULOCYTES NFR BLD AUTO: 0.3 % (ref 0–0.5)
KETONES UR QL STRIP: NEGATIVE
LACTATE SERPL-SCNC: 0.9 MMOL/L (ref 0.5–2.2)
LACTATE SERPL-SCNC: 1 MMOL/L (ref 0.5–2.2)
LEUKOCYTE ESTERASE UR QL STRIP: NEGATIVE
LYMPHOCYTES # BLD AUTO: 0.6 K/UL (ref 1–4.8)
LYMPHOCYTES NFR BLD: 19.4 % (ref 18–48)
MCH RBC QN AUTO: 28.6 PG (ref 27–31)
MCHC RBC AUTO-ENTMCNC: 31.3 G/DL (ref 32–36)
MCV RBC AUTO: 92 FL (ref 82–98)
MONOCYTES # BLD AUTO: 0.3 K/UL (ref 0.3–1)
MONOCYTES NFR BLD: 9.4 % (ref 4–15)
NEUTROPHILS # BLD AUTO: 2.1 K/UL (ref 1.8–7.7)
NEUTROPHILS NFR BLD: 64.5 % (ref 38–73)
NITRITE UR QL STRIP: NEGATIVE
NRBC BLD-RTO: 0 /100 WBC
PH UR STRIP: 7 [PH] (ref 5–8)
PLATELET # BLD AUTO: 320 K/UL (ref 150–450)
PMV BLD AUTO: 9.1 FL (ref 9.2–12.9)
POTASSIUM SERPL-SCNC: 4.6 MMOL/L (ref 3.5–5.1)
PROT SERPL-MCNC: 7.3 G/DL (ref 6–8.4)
PROT UR QL STRIP: NEGATIVE
RBC # BLD AUTO: 4.26 M/UL (ref 4.6–6.2)
SODIUM SERPL-SCNC: 140 MMOL/L (ref 136–145)
SP GR UR STRIP: 1.01 (ref 1–1.03)
TROPONIN I SERPL DL<=0.01 NG/ML-MCNC: 0.01 NG/ML (ref 0–0.03)
URN SPEC COLLECT METH UR: NORMAL
UROBILINOGEN UR STRIP-ACNC: NEGATIVE EU/DL
WBC # BLD AUTO: 3.3 K/UL (ref 3.9–12.7)

## 2023-05-11 PROCEDURE — 25000003 PHARM REV CODE 250: Performed by: EMERGENCY MEDICINE

## 2023-05-11 PROCEDURE — 36415 COLL VENOUS BLD VENIPUNCTURE: CPT | Performed by: EMERGENCY MEDICINE

## 2023-05-11 PROCEDURE — 87040 BLOOD CULTURE FOR BACTERIA: CPT | Mod: 59 | Performed by: EMERGENCY MEDICINE

## 2023-05-11 PROCEDURE — 99285 EMERGENCY DEPT VISIT HI MDM: CPT | Mod: 25

## 2023-05-11 PROCEDURE — 86803 HEPATITIS C AB TEST: CPT | Performed by: EMERGENCY MEDICINE

## 2023-05-11 PROCEDURE — 93010 ELECTROCARDIOGRAM REPORT: CPT | Mod: ,,, | Performed by: GENERAL PRACTICE

## 2023-05-11 PROCEDURE — 83605 ASSAY OF LACTIC ACID: CPT | Performed by: EMERGENCY MEDICINE

## 2023-05-11 PROCEDURE — 81003 URINALYSIS AUTO W/O SCOPE: CPT | Performed by: EMERGENCY MEDICINE

## 2023-05-11 PROCEDURE — 84484 ASSAY OF TROPONIN QUANT: CPT | Performed by: EMERGENCY MEDICINE

## 2023-05-11 PROCEDURE — 93005 ELECTROCARDIOGRAM TRACING: CPT

## 2023-05-11 PROCEDURE — 93010 EKG 12-LEAD: ICD-10-PCS | Mod: ,,, | Performed by: GENERAL PRACTICE

## 2023-05-11 PROCEDURE — 96360 HYDRATION IV INFUSION INIT: CPT

## 2023-05-11 PROCEDURE — 85025 COMPLETE CBC W/AUTO DIFF WBC: CPT | Performed by: EMERGENCY MEDICINE

## 2023-05-11 PROCEDURE — 96361 HYDRATE IV INFUSION ADD-ON: CPT

## 2023-05-11 PROCEDURE — 80053 COMPREHEN METABOLIC PANEL: CPT | Performed by: EMERGENCY MEDICINE

## 2023-05-11 PROCEDURE — 94760 N-INVAS EAR/PLS OXIMETRY 1: CPT

## 2023-05-11 PROCEDURE — 87389 HIV-1 AG W/HIV-1&-2 AB AG IA: CPT | Performed by: EMERGENCY MEDICINE

## 2023-05-11 RX ADMIN — SODIUM CHLORIDE 500 ML: 9 INJECTION, SOLUTION INTRAVENOUS at 01:05

## 2023-05-11 RX ADMIN — SODIUM CHLORIDE 500 ML: 9 INJECTION, SOLUTION INTRAVENOUS at 03:05

## 2023-05-11 NOTE — ED NOTES
Son reports pt has been having midsternal chest pain, weakness, and chills. Son also reports urinary frequency but pt is being followed by urologist on outpatient basis.

## 2023-05-11 NOTE — DISCHARGE INSTRUCTIONS
Decrease the Flomax to 0.4 mg.  Drink plenty of fluids.  Return the ER if you are having increased difficulty urinating.

## 2023-05-11 NOTE — ED PROVIDER NOTES
Encounter Date: 5/11/2023    SCRIBE #1 NOTE: I, Gary Thao, am scribing for, and in the presence of,  Rio Hoff MD.     History     Chief Complaint   Patient presents with    Weakness     Patients son states that he is having weakness, chills, chest pain  the son listed multiple complaints to include urinating too much and being seen by an urologist        Time seen by provider: 12:20 PM on 05/11/2023    Bashir Ramos Sr. is a 88 y.o. male who presents to the ED with an onset of generalized weakness and fatigue for 2 days, as well as subjective fever and chills, and pressure-like chest pain and palpitations for 3 days, SOB for 3 days, and lower right abdominal pain for 3 days. History is limited by a language barrier. The patient has recently been placed on several new medications, 2 for urinary frequency and retention, and one to help him sleep. History obtained from independent historian: the son of the patient states the patient has not been eating much and has been sleeping a lot. The son states the patient looks better than he did a couple of days ago. The patient has not had a documented fever. The patient denies vomiting, diarrhea, cough, congestion, runny nose, sore throat, or any other symptoms at this time. He denies a history of appendectomy. PMHx of HTN, HLD, and pre-DM. There is no pertinent PSHx.    The history is provided by the patient and a relative. The history is limited by a language barrier. No  was used (son translated for him).   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 grayson)    EYE SURGERY       Family History   Problem Relation Age of Onset    No Known Problems Mother     No Known Problems Father     Amblyopia Neg Hx     Blindness Neg Hx     Cancer Neg Hx     Cataracts Neg Hx     Diabetes Neg Hx     Glaucoma Neg Hx     Hypertension Neg Hx     Macular degeneration Neg Hx     Retinal detachment Neg Hx     Strabismus Neg Hx     Stroke Neg Hx     Thyroid disease Neg Hx      Social History     Tobacco Use    Smoking status: Former     Packs/day: 1.00     Types: Cigarettes     Quit date: 1982     Years since quittin.7    Smokeless tobacco: Never   Substance Use Topics    Alcohol use: No    Drug use: No     Review of Systems   Constitutional:  Positive for activity change, appetite change, chills, fatigue and fever.   HENT:  Negative for congestion, rhinorrhea and sore throat.    Respiratory:  Positive for shortness of breath. Negative for cough.    Cardiovascular:  Positive for chest pain and palpitations.   Gastrointestinal:  Positive for abdominal pain. Negative for diarrhea, nausea and vomiting.   Genitourinary:  Negative for dysuria.   Musculoskeletal:  Negative for back pain.   Skin:  Negative for rash.   Neurological:  Positive for weakness.   Hematological:  Does not bruise/bleed easily.     Physical Exam     Initial Vitals [23 1132]   BP Pulse Resp Temp SpO2   121/75 (!) 120 20 98.5 °F (36.9 °C) 97 %      MAP       --         Physical Exam    Nursing note and vitals reviewed.  Constitutional: He appears well-developed and well-nourished. No distress.   HENT:   Head: Normocephalic and atraumatic.   Mouth/Throat: Mucous membranes are dry.   Eyes: Conjunctivae and EOM are normal. Pupils are equal, round, and reactive to light.   Neck: Neck supple.   Cardiovascular:  Normal rate, regular  rhythm and normal heart sounds.     Exam reveals no gallop and no friction rub.       No murmur heard.  Pulmonary/Chest: Breath sounds normal. No respiratory distress. He has no wheezes. He has no rhonchi. He has no rales.   Abdominal: Abdomen is soft. Bowel sounds are normal. He exhibits no distension. There is abdominal tenderness in the right lower quadrant.   Genitourinary:    Testes and penis normal.     Musculoskeletal:         General: No tenderness or edema. Normal range of motion.      Cervical back: Neck supple.      Right lower leg: No tenderness. No edema.      Left lower leg: No tenderness. No edema.     Neurological: He is alert and oriented to person, place, and time.   5/5 strength with intact sensation to BUE's and BLE's.   Skin: Skin is warm and dry.   Psychiatric: He has a normal mood and affect.       ED Course   Procedures  Labs Reviewed   CBC W/ AUTO DIFFERENTIAL - Abnormal; Notable for the following components:       Result Value    WBC 3.30 (*)     RBC 4.26 (*)     Hemoglobin 12.2 (*)     Hematocrit 39.0 (*)     MCHC 31.3 (*)     RDW 14.6 (*)     MPV 9.1 (*)     Lymph # 0.6 (*)     All other components within normal limits   COMPREHENSIVE METABOLIC PANEL - Abnormal; Notable for the following components:    CO2 22 (*)     eGFR 58 (*)     All other components within normal limits   CULTURE, BLOOD   CULTURE, BLOOD   LACTIC ACID, PLASMA   URINALYSIS, REFLEX TO URINE CULTURE    Narrative:     Specimen Source->Urine   TROPONIN I   LACTIC ACID, PLASMA   HIV 1 / 2 ANTIBODY   HEPATITIS C ANTIBODY        ECG Results              EKG 12-lead (In process)  Result time 05/11/23 13:12:46      In process by Interface, Lab In [facility] (05/11/23 13:12:46)                   Narrative:    Test Reason : R07.9,    Vent. Rate : 105 BPM     Atrial Rate : 105 BPM     P-R Int : 154 ms          QRS Dur : 070 ms      QT Int : 332 ms       P-R-T Axes : 046 012 032 degrees     QTc Int : 438 ms    Sinus  tachycardia  Minimal voltage criteria for LVH, may be normal variant  Borderline Abnormal ECG  When compared with ECG of 23-FEB-2017 12:56,  No significant change was found    Referred By: [email or web address]   SELF           Confirmed By:                                   Imaging Results              CT Abdomen Pelvis  Without Contrast (Final result)  Result time 05/11/23 13:29:31      Final result by Ryann Hidalgo MD (05/11/23 13:29:31)                   Impression:      There are no findings to explain this patient's acute abdominal pain.      Electronically signed by: Ryann Hidalgo MD  Date:    05/11/2023  Time:    13:29               Narrative:    EXAMINATION:  CT ABDOMEN PELVIS WITHOUT CONTRAST    CLINICAL HISTORY:  RLQ abdominal pain (Age >= 14y);    TECHNIQUE:  Low dose axial images, sagittal and coronal reformations were obtained from the lung bases to the pubic symphysis.  30 mL of oral Omnipaque 350 was administered.    COMPARISON:  None    FINDINGS:  The liver, spleen, adrenal glands and pancreas are unremarkable.  There are right renal cysts the largest of which measures 5 cm.  The gallbladder is in place.  There is no evidence bowel obstruction the appendix is within normal limits.    There is no evidence bowel obstruction.  Stool is seen throughout the colon.  The bladder is distended.  There is no evidence of abdominal nor pelvic lymphadenopathy.  There is grade 4 anterolisthesis of L5 with respect S1.                                       X-Ray Chest AP Portable (Final result)  Result time 05/11/23 13:00:41      Final result by Ryann Hidalgo MD (05/11/23 13:00:41)                   Impression:      No acute abnormality.      Electronically signed by: Ryann Hidalgo MD  Date:    05/11/2023  Time:    13:00               Narrative:    EXAMINATION:  XR CHEST AP PORTABLE    CLINICAL HISTORY:  tachycardic;    TECHNIQUE:  Single frontal view of the chest was  performed.    COMPARISON:  04/08/2021    FINDINGS:  The lungs are clear, with normal appearance of pulmonary vasculature and no pleural effusion or pneumothorax.    The cardiac silhouette is normal in size. The hilar and mediastinal contours are unremarkable.    Bones are intact.                                       Medications   sodium chloride 0.9% bolus 500 mL 500 mL (0 mLs Intravenous Stopped 5/11/23 1448)   sodium chloride 0.9% bolus 500 mL 500 mL (0 mLs Intravenous Stopped 5/11/23 1625)     Medical Decision Making:   History:   Old Medical Records: I decided to obtain old medical records.  Independently Interpreted Test(s):   I have ordered and independently interpreted EKG Reading(s) - see prior notes  Clinical Tests:   Lab Tests: Ordered and Reviewed  Radiological Study: Ordered and Reviewed  Medical Tests: Ordered and Reviewed        Scribe Attestation:   Scribe #1: I performed the above scribed service and the documentation accurately describes the services I performed. I attest to the accuracy of the note.      ED Course as of 05/11/23 1940   Thu May 11, 2023   1300 EKG independently interpreted by me as rate 105 sinus tachycardia left ventricular hypertrophy normal intervals normal axis no ST segment elevation or depression. [JS]   9445 I believe the patient's symptoms are likely secondary to increasing his Flomax from 0.4 mg .8 mg.  No evidence of significant urinary retention here currently.  He is urinating.  I will have him follow back up with Urology.  I gave him fluids in his heart rate decreased.  He feels much better.  I believe he is stable for discharge at this time with return precautions.  He will go back down 0.4 mg of Flomax and follow up with Urology. [JS]      ED Course User Index  [JS] Roi Hoff MD          I, Dr. Rio Hoff personally performed the services described in this documentation. All medical record entries made by the scribe were at my direction and in my  presence.  I have reviewed the chart and agree that the record reflects my personal performance and is accurate and complete. Rio Hoff MD.  7:40 PM 05/11/2023    DISCLAIMER: This note was prepared with Dragon NaturallySpeaking voice recognition transcription software. Garbled syntax, mangled pronouns, and other bizarre constructions may be attributed to that software system        Clinical Impression:   Final diagnoses:  [R07.9] Chest pain  [T50.905A] Medication side effect, initial encounter (Primary)  [I95.1] Orthostasis        ED Disposition Condition    Discharge Stable          ED Prescriptions    None       Follow-up Information       Follow up With Specialties Details Why Contact Info    Rachel Casarez NP Urology Schedule an appointment as soon as possible for a visit   [address] [place] [address]  [address]  [place] [address]  [phone]      Juan Casey MD Family Medicine Schedule an appointment as soon as possible for a visit   [address] [facility]  [place] [address]  [phone]               Rio Hoff MD  05/11/23 1940

## 2023-05-15 ENCOUNTER — OFFICE VISIT (OUTPATIENT)
Dept: UROLOGY | Facility: CLINIC | Age: 88
End: 2023-05-15
Payer: MEDICARE

## 2023-05-15 VITALS
HEIGHT: 65 IN | HEART RATE: 111 BPM | SYSTOLIC BLOOD PRESSURE: 142 MMHG | WEIGHT: 141.56 LBS | DIASTOLIC BLOOD PRESSURE: 91 MMHG | BODY MASS INDEX: 23.58 KG/M2

## 2023-05-15 DIAGNOSIS — N40.1 BPH WITH OBSTRUCTION/LOWER URINARY TRACT SYMPTOMS: Primary | ICD-10-CM

## 2023-05-15 DIAGNOSIS — N13.8 BPH WITH OBSTRUCTION/LOWER URINARY TRACT SYMPTOMS: Primary | ICD-10-CM

## 2023-05-15 LAB — POC RESIDUAL URINE VOLUME: 139 ML (ref 0–100)

## 2023-05-15 PROCEDURE — 1159F MED LIST DOCD IN RCRD: CPT | Mod: CPTII,S$GLB,, | Performed by: NURSE PRACTITIONER

## 2023-05-15 PROCEDURE — 3288F FALL RISK ASSESSMENT DOCD: CPT | Mod: CPTII,S$GLB,, | Performed by: NURSE PRACTITIONER

## 2023-05-15 PROCEDURE — 1160F RVW MEDS BY RX/DR IN RCRD: CPT | Mod: CPTII,S$GLB,, | Performed by: NURSE PRACTITIONER

## 2023-05-15 PROCEDURE — 99214 PR OFFICE/OUTPT VISIT, EST, LEVL IV, 30-39 MIN: ICD-10-PCS | Mod: S$GLB,,, | Performed by: NURSE PRACTITIONER

## 2023-05-15 PROCEDURE — 1101F PT FALLS ASSESS-DOCD LE1/YR: CPT | Mod: CPTII,S$GLB,, | Performed by: NURSE PRACTITIONER

## 2023-05-15 PROCEDURE — 1101F PR PT FALLS ASSESS DOC 0-1 FALLS W/OUT INJ PAST YR: ICD-10-PCS | Mod: CPTII,S$GLB,, | Performed by: NURSE PRACTITIONER

## 2023-05-15 PROCEDURE — 3288F PR FALLS RISK ASSESSMENT DOCUMENTED: ICD-10-PCS | Mod: CPTII,S$GLB,, | Performed by: NURSE PRACTITIONER

## 2023-05-15 PROCEDURE — 1160F PR REVIEW ALL MEDS BY PRESCRIBER/CLIN PHARMACIST DOCUMENTED: ICD-10-PCS | Mod: CPTII,S$GLB,, | Performed by: NURSE PRACTITIONER

## 2023-05-15 PROCEDURE — 99999 PR PBB SHADOW E&M-EST. PATIENT-LVL III: ICD-10-PCS | Mod: PBBFAC,,, | Performed by: NURSE PRACTITIONER

## 2023-05-15 PROCEDURE — 99214 OFFICE O/P EST MOD 30 MIN: CPT | Mod: S$GLB,,, | Performed by: NURSE PRACTITIONER

## 2023-05-15 PROCEDURE — 1159F PR MEDICATION LIST DOCUMENTED IN MEDICAL RECORD: ICD-10-PCS | Mod: CPTII,S$GLB,, | Performed by: NURSE PRACTITIONER

## 2023-05-15 PROCEDURE — 51798 US URINE CAPACITY MEASURE: CPT | Mod: S$GLB,,, | Performed by: NURSE PRACTITIONER

## 2023-05-15 PROCEDURE — 99999 PR PBB SHADOW E&M-EST. PATIENT-LVL III: CPT | Mod: PBBFAC,,, | Performed by: NURSE PRACTITIONER

## 2023-05-15 PROCEDURE — 51798 POCT BLADDER SCAN: ICD-10-PCS | Mod: S$GLB,,, | Performed by: NURSE PRACTITIONER

## 2023-05-15 NOTE — PROGRESS NOTES
CHIEF COMPLAINT:    Mr. Ramos is a 88 y.o. male presenting for f/u elevated PVR and urinary frequency.  PRESENTING ILLNESS:    Bashir Ramos Sr. is a 88 y.o. male who presents for f/u elevated PVR and urinary frequency. Last clinic visit was 5/8/23.    Pt is accompanied by family members who will assist translating. He speaks and understands some English.  declined.    5/15/23  Patient was unable to tolerate flomax 0.8 mg and is taking 0.4 mg.   Today patient reports voiding has improved. He is voiding less frequently and stream is stronger. He feels he is emptying his bladder better on flomax. Denies dysuria, gross hematuria, flank pain, fever, chills, nausea or vomiting. He is pleased with how he is voiding on flomax 0.4 mg    PVR today 139 ml    5/11/23   UA negative  Creatinine 1.2 / GFR 58      5/8/23  Patient presents to clinic for LUTS. Pt reports daytime frequency, nocturia x 4-5, weak stream and hesitancy that has worsened in the past few weeks. He was started on flomax 4/28/23 with not much improvement. Denies dysuria, gross hematuria, flank pain, fever, chills, nausea or vomiting.  UA today negative   ml  5/5/23 creatinine 1.3  4/26/23 UA trace ketone, 30 protein, otherwise negative    3/27/22 CT abd pelvis with contrast  Several simple cysts in each renal cortex.  Unremarkable prostate and urinary bladder.    History of kidney stones: denies  Personal or family hx of  malignancy: denies  History of  trauma: denies  Smoking history: former smoker, quit 1982    Urine cultures:   Lab Results   Component Value Date    LABURIN No significant growth 05/08/2023    LABURIN ESCHERICHIA COLI  >100,000 cfu/ml   (A) 03/27/2022    LABURIN No significant growth 02/24/2017       REVIEW OF SYSTEMS:    Review of Systems    Constitutional: Negative for fever and chills.   HENT: Negative for hearing loss.   Eyes: Negative for visual disturbance.   Respiratory: Negative for shortness of breath.    Cardiovascular: Negative for chest pain.   Gastrointestinal: Negative for nausea, vomiting.   Genitourinary:  See above  Neurological: Negative for dizziness.   Hematological: Does not bruise/bleed easily.   Psychiatric/Behavioral: Negative for confusion.       PATIENT HISTORY:    Past Medical History:   Diagnosis Date    Gout, unspecified     Hyperglycemia 2017    Hyperlipidemia     Hypertension     Nuclear sclerosis - Both Eyes 2013    Prediabetes 10/3/2017    Unspecified hereditary and idiopathic peripheral neuropathy        Past Surgical History:   Procedure Laterality Date    BACK SURGERY      CATARACT EXTRACTION  10/29/13    left eye    CATARACT EXTRACTION W/  INTRAOCULAR LENS IMPLANT  10/15/13    OD (dr. grayson)    EYE SURGERY         Family History   Problem Relation Age of Onset    No Known Problems Mother     No Known Problems Father     Amblyopia Neg Hx     Blindness Neg Hx     Cancer Neg Hx     Cataracts Neg Hx     Diabetes Neg Hx     Glaucoma Neg Hx     Hypertension Neg Hx     Macular degeneration Neg Hx     Retinal detachment Neg Hx     Strabismus Neg Hx     Stroke Neg Hx     Thyroid disease Neg Hx        Social History     Socioeconomic History    Marital status:    Tobacco Use    Smoking status: Former     Packs/day: 1.00     Types: Cigarettes     Quit date: 1982     Years since quittin.7    Smokeless tobacco: Never   Substance and Sexual Activity    Alcohol use: No    Drug use: No    Sexual activity: Not Currently     Partners: Female       Allergies:  Iodine and iodide containing products, Shellfish containing products, and Pcn [penicillins]    Medications:    Current Outpatient Medications:     acetaminophen (TYLENOL) 325 MG tablet, Take 2 tablets (650 mg total) by mouth every 8 (eight) hours as needed for Pain., Disp: , Rfl: 0    allopurinoL (ZYLOPRIM) 100 MG tablet, Take 1 tablet (100 mg total) by mouth once daily., Disp: 90 tablet, Rfl: 1    amLODIPine (NORVASC)  2.5 MG tablet, Take 1 tablet (2.5 mg total) by mouth once daily., Disp: 90 tablet, Rfl: 3    aspirin (ECOTRIN) 81 MG EC tablet, Take 1 tablet (81 mg total) by mouth once daily., Disp: 90 tablet, Rfl: 0    benazepriL (LOTENSIN) 20 MG tablet, Take 1 tablet (20 mg total) by mouth once daily., Disp: 90 tablet, Rfl: 3    simvastatin (ZOCOR) 40 MG tablet, Take 40 mg by mouth every evening., Disp: , Rfl:     tadalafiL (CIALIS) 20 MG Tab, TAKE ONE (1) TABLET (20 MG TOTAL) BY MOUTH ONCE DAILY., Disp: 10 tablet, Rfl: 1    tamsulosin (FLOMAX) 0.4 mg Cap, Take 1 capsule (0.4 mg total) by mouth once daily., Disp: 60 capsule, Rfl: 11    zolpidem (AMBIEN) 5 MG Tab, Take 1 tablet (5 mg total) by mouth nightly as needed (trouble sleeping)., Disp: 15 tablet, Rfl: 0    PHYSICAL EXAMINATION:    Constitutional: He is oriented to person, place, and time. He appears well-developed and well-nourished.  He is in no apparent distress.    Neck: Normal ROM.     Pulmonary/Chest: Effort normal. No respiratory distress.     Abdominal:  He exhibits no distension.  There is no CVA tenderness.     Neurological: He is alert and oriented to person, place, and time.     Skin: Skin is warm and dry.     Psych: Cooperative with normal affect.      Physical Exam      LABS:    Lab Results   Component Value Date    PSA 1.1 05/05/2023    PSA 0.54 10/03/2017     Lab Results   Component Value Date    CREATININE 1.2 05/11/2023         IMPRESSION:    Encounter Diagnoses   Name Primary?    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 Want to keep <200 ml  -Continue flomax 0.4 mg. No refill needed  -Discussed conservative measures to control urgency and frequency including   1. Avoiding/minimizing bladder irritants (see below), especially in afternoon and evening hours    Discussed bladder irritants include coffe (even decaf), tea, alcohol, soda, spicy foods, acidic juices (orange, tomato), vinegar, and artificial  sweeteners/sugary beverages.    2. timed voiding - empty on a schedule (approx ~2-3 hours) in spite of need to urinate, to get ahead of urge    3. dont postponing voiding - dont hold it on purpose     4. bowel regimen as distended bowel has extrinsic compressive effect on bladder.   - any or all of the following in any combination, titrate to soft daily bowel movement without pushing or straining  - colace/stool softener capsule - once to twice daily  - miralax - 1 capful daily to start, can increase to 2x daily (or decrease to 1/2 cap daily)  - increase dietary fibery  - fiber supplements, such as metamucil  - prunes, prune juice    5. INCREASE water intake    6. Stop fluids 2 hours before bed, and urinate just before bed    -RTC 6 months    I encouraged him or any of his family members to call or email me with questions and/or concerns.      30 minutes of total time spent on the encounter, which includes face to face time and non-face to face time preparing to see the patient (eg, review of tests), Obtaining and/or reviewing separately obtained history, Documenting clinical information in the electronic or other health record, Independently interpreting results (not separately reported) and communicating results to the patient/family/caregiver, or Care coordination (not separately reported).

## 2023-05-16 LAB
BACTERIA BLD CULT: NORMAL
BACTERIA BLD CULT: NORMAL

## 2023-05-30 ENCOUNTER — PROCEDURE VISIT (OUTPATIENT)
Dept: NEUROLOGY | Facility: CLINIC | Age: 88
End: 2023-05-30
Payer: MEDICARE

## 2023-05-30 DIAGNOSIS — G56.02 CARPAL TUNNEL SYNDROME ON LEFT: ICD-10-CM

## 2023-05-30 PROCEDURE — 95886 PR EMG COMPLETE, W/ NERVE CONDUCTION STUDIES, 5+ MUSCLES: ICD-10-PCS | Mod: S$GLB,,, | Performed by: PHYSICAL MEDICINE & REHABILITATION

## 2023-05-30 PROCEDURE — 95885 PR MUSC TST DONE W/NERV TST LIM: ICD-10-PCS | Mod: 59,S$GLB,, | Performed by: PHYSICAL MEDICINE & REHABILITATION

## 2023-05-30 PROCEDURE — 95885 MUSC TST DONE W/NERV TST LIM: CPT | Mod: 59,S$GLB,, | Performed by: PHYSICAL MEDICINE & REHABILITATION

## 2023-05-30 PROCEDURE — 95911 NRV CNDJ TEST 9-10 STUDIES: CPT | Mod: S$GLB,,, | Performed by: PHYSICAL MEDICINE & REHABILITATION

## 2023-05-30 PROCEDURE — 95911 PR NERVE CONDUCTION STUDY; 9-10 STUDIES: ICD-10-PCS | Mod: S$GLB,,, | Performed by: PHYSICAL MEDICINE & REHABILITATION

## 2023-05-30 PROCEDURE — 95886 MUSC TEST DONE W/N TEST COMP: CPT | Mod: S$GLB,,, | Performed by: PHYSICAL MEDICINE & REHABILITATION

## 2023-05-30 NOTE — PROCEDURES
Test Date:  2023    Patient: Bashir Ramos : 1935 Physician: Jeffrey Ibarra D.O.   ID#: [id number] SEX: Male Ref. Phys: Tod Duron MD     HPI: Bashir Ramos Sr. is a 88 y.o.male who presents for NCS/EMG to evaluate CTS bilaterally.  Hx of right CTR in 2017.      NCV & EMG Findings:  Evaluation of the left median motor and the right median motor nerves showed prolonged distal onset latency and reduced amplitude.  The left median sensory nerve showed no response.  The right median sensory nerve showed prolonged distal peak latency and decreased conduction velocity.  All remaining nerves (as indicated in the following tables) were within normal limits.  Needle evaluation of the left Abductor Pollicis Brevis muscle showed increased insertional activity, slightly increased spontaneous activity, increased motor unit amplitude, increased motor unit duration, and diminished recruitment.  The right Abductor Pollicis Brevis muscle showed increased motor unit amplitude.  All remaining muscles (as indicated in the following table) showed no evidence of electrical instability.    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  There is motor axonal loss, left>right.  There is active denervation on the left.  This is graded as Severe in severity bilaterally (somewhat difficult to grade with certainty on the right as the motor axonal loss may be due to thenar atrophy from before his prior CT release.  He does not have any active denervation changes on the right on needle EMG).      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  This is not a pathologic finding, rather a normal variant.          ___________________________  Jeffrey Ibarra D.O.        NCS+  Motor Nerve Results      Latency Amplitude F-Lat Segment Distance CV  Comment   Site (ms) Norm (mV) Norm (ms)  (cm) (m/s) Norm    Left Median (APB)   Palm 1.35 - 0.97 -         Wrist *5.1  < 4.7 *0.20  > 3.8  Wrist-Palm 6 16 -    Elbow 6.6 - 0.05 -  Elbow-Wrist 27 180  > 47    Right Median (APB)   Palm 1.28 - 1.78 -         Wrist *5.1  < 4.7 *1.58  > 3.8  Wrist-Palm 6 16 -    Elbow 6.3 - 0.96 -  Elbow-Wrist 25 208  > 47    Left Ulnar (ADM)   Wrist 2.8  < 3.7 7.0  > 3.0         Bel Elbow 8.1 - 5.9 -  Bel Elbow-Wrist 33 62  > 52    Abv Elbow 9.7 - 5.6 -  Abv Elbow-Bel Elbow 9 56  > 43    Right Ulnar (ADM)   Wrist 2.7  < 3.7 6.3  > 3.0         Bel Elbow 7.4 - 5.7 -  Bel Elbow-Wrist 28 60  > 52    Abv Elbow 8.8 - 5.8 -  Abv Elbow-Bel Elbow 10 71  > 43      Sensory Nerve Results      Latency (Peak) Amplitude (P-P) Segment Distance CV Comment   Site (ms) Norm (µV) Norm  (cm) (m/s) Norm    Left Median   Wrist-Dig II *NR  < 4.0 *NR - Wrist-Dig II 14 *NR  > 39    Right Median   Wrist-Dig II *5.7  < 4.0 8 - Wrist-Dig II 14 *25  > 39    Left Ulnar   Wrist-Dig V 4.0  < 4.0 8 - Wrist-Dig V 16 40  > 38    Right Ulnar   Wrist-Dig V 3.8  < 4.0 6 - Wrist-Dig V 16 42  > 38    Left Radial   Forearm-Wrist 2.6  < 2.8 13  > 11 Forearm-Wrist 10 38 -      EMG+     Side Muscle Nerve Root Ins Act Fibs Psw Amp Dur Poly Recrt Int Pat Comment   Left Deltoid Axillary C5-C6 Nml Nml Nml Nml Nml 0 Nml Nml    Left Biceps Musculocut C5-C6 Nml Nml Nml Nml Nml 0 Nml Nml    Left Triceps Radial C6-C8 Nml Nml Nml Nml Nml 0 Nml Nml    Left Pronator Teres Median C6-C7 Nml Nml Nml Nml Nml 0 Nml Nml    Left APB Median C8-T1 *Incr *1+ *1+ *Incr *>12ms 0 *Reduced Nml    Right APB Median C8-T1 Nml Nml Nml *Incr Nml 0 Nml Nml            Waveforms:    Motor              Sensory

## 2023-06-06 ENCOUNTER — OFFICE VISIT (OUTPATIENT)
Dept: ORTHOPEDICS | Facility: CLINIC | Age: 88
End: 2023-06-06
Payer: MEDICARE

## 2023-06-06 VITALS — HEART RATE: 102 BPM | DIASTOLIC BLOOD PRESSURE: 76 MMHG | SYSTOLIC BLOOD PRESSURE: 127 MMHG

## 2023-06-06 DIAGNOSIS — G56.02 CARPAL TUNNEL SYNDROME ON LEFT: Primary | ICD-10-CM

## 2023-06-06 PROCEDURE — 1159F MED LIST DOCD IN RCRD: CPT | Mod: CPTII,S$GLB,, | Performed by: ORTHOPAEDIC SURGERY

## 2023-06-06 PROCEDURE — 99999 PR PBB SHADOW E&M-EST. PATIENT-LVL II: CPT | Mod: PBBFAC,,, | Performed by: ORTHOPAEDIC SURGERY

## 2023-06-06 PROCEDURE — 99215 PR OFFICE/OUTPT VISIT, EST, LEVL V, 40-54 MIN: ICD-10-PCS | Mod: S$GLB,,, | Performed by: ORTHOPAEDIC SURGERY

## 2023-06-06 PROCEDURE — 1125F AMNT PAIN NOTED PAIN PRSNT: CPT | Mod: CPTII,S$GLB,, | Performed by: ORTHOPAEDIC SURGERY

## 2023-06-06 PROCEDURE — 1159F PR MEDICATION LIST DOCUMENTED IN MEDICAL RECORD: ICD-10-PCS | Mod: CPTII,S$GLB,, | Performed by: ORTHOPAEDIC SURGERY

## 2023-06-06 PROCEDURE — 1125F PR PAIN SEVERITY QUANTIFIED, PAIN PRESENT: ICD-10-PCS | Mod: CPTII,S$GLB,, | Performed by: ORTHOPAEDIC SURGERY

## 2023-06-06 PROCEDURE — 99999 PR PBB SHADOW E&M-EST. PATIENT-LVL II: ICD-10-PCS | Mod: PBBFAC,,, | Performed by: ORTHOPAEDIC SURGERY

## 2023-06-06 PROCEDURE — 99215 OFFICE O/P EST HI 40 MIN: CPT | Mod: S$GLB,,, | Performed by: ORTHOPAEDIC SURGERY

## 2023-06-06 NOTE — TELEPHONE ENCOUNTER
Care Due:                  Date            Visit Type   Department     Provider  --------------------------------------------------------------------------------                                SAME DAY -                              ESTABLISHED   [facility]  Last Visit: 04-      PATIENT      MEDICINE       Elliott Mcgarry                              EP -                              PRIMARY      [facility]  Next Visit: 08-      CARE ([facility])   MEDICINE       Juan Casey                                                            Last  Test          Frequency    Reason                     Performed    Due Date  --------------------------------------------------------------------------------    Uric Acid...  12 months..  allopurinoL..............  Not Found    Overdue    [employer] Embedded Care Due Messages. Reference number: [id number].   6/06/2023 4:41:50 PM CDT

## 2023-06-06 NOTE — H&P (VIEW-ONLY)
[facility]  History & Physical  Orthopedics    SUBJECTIVE:      COVID-19 attestation:  This patient was treated during the COVID-19 pandemic.  This was discussed with the patient, they are aware of our current policies and procedures, were given the option of delaying their visit and or switching to a virtual visit, delaying their surgery when applicable, and they elect to proceed.    Chief Complaint: Left Hand numbness & tingling    Referring Provider: No ref. provider found     History of Present Illness:  Patient is a 88 y.o. right hand dominant male who presents today with complaints of worsening left handed numbness and tingling that radiates from his wrist to his second, third, fourth, and fifth digits. He states that in 2016 he had an EMG that diagnosed him with bilateral carpal tunnel syndrome, worse on his right than left.  In 2017 he had carpal tunnel release with Dr. Richey.  Patient reports that she instructed him to go ahead with a left-sided release as well at this time, but he decided against it.  He presents to clinic today for evaluation because the numbness and tingling in his left hand have begun progressively worsening and he is interested in carpal tunnel release now.    Interval History 6/6/23:   Patient returns for follow up after obtaining EMG    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    Prediabetes 10/3/2017    Unspecified hereditary and idiopathic peripheral neuropathy      Past Surgical History:   Procedure Laterality Date    BACK SURGERY      CATARACT EXTRACTION  10/29/13    left eye    CATARACT EXTRACTION W/  INTRAOCULAR LENS IMPLANT  10/15/13    OD (dr. grayson)    EYE SURGERY       Review of patient's allergies indicates:   Allergen Reactions    Iodine and iodide containing products     Shellfish containing products     Pcn  [penicillins] Rash     Social History     Social History Narrative    Not on file     Family History   Problem Relation Age of Onset    No Known Problems Mother     No Known Problems Father     Amblyopia Neg Hx     Blindness Neg Hx     Cancer Neg Hx     Cataracts Neg Hx     Diabetes Neg Hx     Glaucoma Neg Hx     Hypertension Neg Hx     Macular degeneration Neg Hx     Retinal detachment Neg Hx     Strabismus Neg Hx     Stroke Neg Hx     Thyroid disease Neg Hx          Current Outpatient Medications:     acetaminophen (TYLENOL) 325 MG tablet, Take 2 tablets (650 mg total) by mouth every 8 (eight) hours as needed for Pain., Disp: , Rfl: 0    allopurinoL (ZYLOPRIM) 100 MG tablet, Take 1 tablet (100 mg total) by mouth once daily., Disp: 90 tablet, Rfl: 1    amLODIPine (NORVASC) 2.5 MG tablet, Take 1 tablet (2.5 mg total) by mouth once daily., Disp: 90 tablet, Rfl: 3    aspirin (ECOTRIN) 81 MG EC tablet, Take 1 tablet (81 mg total) by mouth once daily., Disp: 90 tablet, Rfl: 0    benazepriL (LOTENSIN) 20 MG tablet, Take 1 tablet (20 mg total) by mouth once daily., Disp: 90 tablet, Rfl: 3    simvastatin (ZOCOR) 40 MG tablet, Take 40 mg by mouth every evening., Disp: , Rfl:     tadalafiL (CIALIS) 20 MG Tab, TAKE ONE (1) TABLET (20 MG TOTAL) BY MOUTH ONCE DAILY., Disp: 10 tablet, Rfl: 1    tamsulosin (FLOMAX) 0.4 mg Cap, Take 1 capsule (0.4 mg total) by mouth once daily., Disp: 60 capsule, Rfl: 11    zolpidem (AMBIEN) 5 MG Tab, Take 1 tablet (5 mg total) by mouth nightly as needed (trouble sleeping)., Disp: 15 tablet, Rfl: 0      Review of Systems:  As per HPI otherwise noncontributory    OBJECTIVE:      Vital Signs (Most Recent):  Vitals:    06/06/23 1332   BP: 127/76   Pulse: 102     There is no height or weight on file to calculate BMI.      Physical Exam:  Constitutional: The patient appears well-developed and well-nourished. No distress.   Skin: No lesions appreciated  Head: Normocephalic and atraumatic.   Nose: Nose  normal.   Ears: No deformities seen  Eyes: Conjunctivae and EOM are normal.   Neck: No tracheal deviation present.   Cardiovascular: Normal rate and intact distal pulses.    Pulmonary/Chest: Effort normal. No respiratory distress.   Abdominal: There is no guarding.   Neurological: The patient is alert.   Psychiatric: The patient has a normal mood and affect.     Left Hand/Wrist Examination:    Observation/Inspection:  Swelling  none    Deformity  none  Discoloration  none     Scars   none    Atrophy  none    HAND/WRIST EXAMINATION:  Finkelstein's Test   Neg  WHAT Test    Neg  Snuff box tenderness   Neg  Daniels's Test    Neg  Hook of Hamate Tenderness  Neg  CMC grind    Neg  Circumduction test   Neg    Neurovascular Exam:  Digits WWP, brisk CR < 3s throughout  NVI motor/LTS to M/R/U nerves, radial pulse 2+  Tinel's Test - Carpal Tunnel  POS  Tinel's Test - Cubital Tunnel  Neg  Phalen's Test    Neg  Median Nerve Compression Test Neg    ROM hand full, painless    ROM wrist full, painless    ROM elbow full, painless    Abdomen not guarded  Respirations nonlabored  Perfusion intact    Diagnostic Results:     Imaging - I independently viewed the patient's imaging as well as the radiology report.  Xrays of the patient's left hand demonstrate degenerative changes of wrist and carpometacarpal joint with no evidence of any acute fractures or dislocations.    EMG - EMG in 2016 found severe right carpal tunnel syndrome and evidence of moderate to severe left carpal tunnel syndrome    EMG 2023 - severe bilateral CTS    ASSESSMENT/PLAN:      88 y.o. yo male with left CTS    Surgical and nonsurgical options discussed with patient. He did well from last CTR in 2017 on contralateral side. He has failed conservative management to this point. Will proceed with L CTR. Consents signed today

## 2023-06-07 RX ORDER — SIMVASTATIN 40 MG/1
TABLET, FILM COATED ORAL
Qty: 90 TABLET | Refills: 2 | Status: SHIPPED | OUTPATIENT
Start: 2023-06-07

## 2023-06-08 ENCOUNTER — PES CALL (OUTPATIENT)
Dept: ADMINISTRATIVE | Facility: CLINIC | Age: 88
End: 2023-06-08
Payer: MEDICARE

## 2023-06-08 DIAGNOSIS — G56.02 CARPAL TUNNEL SYNDROME ON LEFT: Primary | ICD-10-CM

## 2023-06-08 DIAGNOSIS — M79.642 LEFT HAND PAIN: ICD-10-CM

## 2023-06-09 NOTE — PROGRESS NOTES
I have personally taken the history and examined this patient. I agree with the resident's note as stated above.     88 y.o. yo male with left CTS     Surgical and nonsurgical options discussed with patient. He did well from last CTR in 2017 on contralateral side. He has failed conservative management to this point. Will proceed with L CTR. Consents signed today

## 2023-06-12 ENCOUNTER — ANESTHESIA EVENT (OUTPATIENT)
Dept: SURGERY | Facility: HOSPITAL | Age: 88
End: 2023-06-12
Payer: MEDICARE

## 2023-06-13 DIAGNOSIS — G56.00 CARPAL TUNNEL SYNDROME: ICD-10-CM

## 2023-06-13 DIAGNOSIS — Z98.890 POST-OPERATIVE STATE: Primary | ICD-10-CM

## 2023-06-13 RX ORDER — MUPIROCIN 20 MG/G
OINTMENT TOPICAL
Status: CANCELLED | OUTPATIENT
Start: 2023-06-13

## 2023-06-13 RX ORDER — TRAMADOL HYDROCHLORIDE 50 MG/1
50 TABLET ORAL EVERY 6 HOURS PRN
Qty: 10 TABLET | Refills: 0 | Status: SHIPPED | OUTPATIENT
Start: 2023-06-13

## 2023-06-16 ENCOUNTER — TELEPHONE (OUTPATIENT)
Dept: ORTHOPEDICS | Facility: CLINIC | Age: 88
End: 2023-06-16
Payer: MEDICARE

## 2023-06-16 NOTE — TELEPHONE ENCOUNTER
Spoke c pts son Informed pt of 5:15 a.m. arrival time for his father's 06/19/23 surgery at the [facility]. Reminded  of NPO status & PO appt. Pts son expressed understanding & was thankful.

## 2023-06-19 ENCOUNTER — ANESTHESIA (OUTPATIENT)
Dept: SURGERY | Facility: HOSPITAL | Age: 88
End: 2023-06-19
Payer: MEDICARE

## 2023-06-19 ENCOUNTER — HOSPITAL ENCOUNTER (OUTPATIENT)
Facility: HOSPITAL | Age: 88
Discharge: HOME | End: 2023-06-19
Attending: ORTHOPAEDIC SURGERY | Admitting: ORTHOPAEDIC SURGERY
Payer: MEDICARE

## 2023-06-19 VITALS
BODY MASS INDEX: 23.49 KG/M2 | HEART RATE: 57 BPM | TEMPERATURE: 98 F | SYSTOLIC BLOOD PRESSURE: 120 MMHG | RESPIRATION RATE: 11 BRPM | HEIGHT: 65 IN | WEIGHT: 141 LBS | OXYGEN SATURATION: 98 % | DIASTOLIC BLOOD PRESSURE: 65 MMHG

## 2023-06-19 DIAGNOSIS — G56.00 CARPAL TUNNEL SYNDROME: ICD-10-CM

## 2023-06-19 PROCEDURE — 36000707: Performed by: ORTHOPAEDIC SURGERY

## 2023-06-19 PROCEDURE — D9220A PRA ANESTHESIA: Mod: ANES,,, | Performed by: ANESTHESIOLOGY

## 2023-06-19 PROCEDURE — 25000003 PHARM REV CODE 250: Performed by: STUDENT IN AN ORGANIZED HEALTH CARE EDUCATION/TRAINING PROGRAM

## 2023-06-19 PROCEDURE — D9220A PRA ANESTHESIA: Mod: CRNA,,, | Performed by: NURSE ANESTHETIST, CERTIFIED REGISTERED

## 2023-06-19 PROCEDURE — 25000003 PHARM REV CODE 250: Performed by: ORTHOPAEDIC SURGERY

## 2023-06-19 PROCEDURE — 64721 CARPAL TUNNEL SURGERY: CPT | Mod: LT,,, | Performed by: ORTHOPAEDIC SURGERY

## 2023-06-19 PROCEDURE — D9220A PRA ANESTHESIA: ICD-10-PCS | Mod: ANES,,, | Performed by: ANESTHESIOLOGY

## 2023-06-19 PROCEDURE — 25000003 PHARM REV CODE 250: Performed by: NURSE ANESTHETIST, CERTIFIED REGISTERED

## 2023-06-19 PROCEDURE — 37000009 HC ANESTHESIA EA ADD 15 MINS: Performed by: ORTHOPAEDIC SURGERY

## 2023-06-19 PROCEDURE — 27201423 OPTIME MED/SURG SUP & DEVICES STERILE SUPPLY: Performed by: ORTHOPAEDIC SURGERY

## 2023-06-19 PROCEDURE — 37000008 HC ANESTHESIA 1ST 15 MINUTES: Performed by: ORTHOPAEDIC SURGERY

## 2023-06-19 PROCEDURE — 63600175 PHARM REV CODE 636 W HCPCS: Performed by: NURSE ANESTHETIST, CERTIFIED REGISTERED

## 2023-06-19 PROCEDURE — 94761 N-INVAS EAR/PLS OXIMETRY MLT: CPT

## 2023-06-19 PROCEDURE — 71000033 HC RECOVERY, INTIAL HOUR: Performed by: ORTHOPAEDIC SURGERY

## 2023-06-19 PROCEDURE — 71000015 HC POSTOP RECOV 1ST HR: Performed by: ORTHOPAEDIC SURGERY

## 2023-06-19 PROCEDURE — 36000706: Performed by: ORTHOPAEDIC SURGERY

## 2023-06-19 PROCEDURE — 99900035 HC TECH TIME PER 15 MIN (STAT)

## 2023-06-19 PROCEDURE — 63600175 PHARM REV CODE 636 W HCPCS: Performed by: STUDENT IN AN ORGANIZED HEALTH CARE EDUCATION/TRAINING PROGRAM

## 2023-06-19 PROCEDURE — 64721 PR REVISE MEDIAN N/CARPAL TUNNEL SURG: ICD-10-PCS | Mod: LT,,, | Performed by: ORTHOPAEDIC SURGERY

## 2023-06-19 PROCEDURE — D9220A PRA ANESTHESIA: ICD-10-PCS | Mod: CRNA,,, | Performed by: NURSE ANESTHETIST, CERTIFIED REGISTERED

## 2023-06-19 RX ORDER — ONDANSETRON 2 MG/ML
INJECTION INTRAMUSCULAR; INTRAVENOUS
Status: DISCONTINUED | OUTPATIENT
Start: 2023-06-19 | End: 2023-06-19

## 2023-06-19 RX ORDER — DEXAMETHASONE SODIUM PHOSPHATE 4 MG/ML
INJECTION, SOLUTION INTRA-ARTICULAR; INTRALESIONAL; INTRAMUSCULAR; INTRAVENOUS; SOFT TISSUE
Status: DISCONTINUED | OUTPATIENT
Start: 2023-06-19 | End: 2023-06-19

## 2023-06-19 RX ORDER — LIDOCAINE HYDROCHLORIDE 10 MG/ML
INJECTION, SOLUTION EPIDURAL; INFILTRATION; INTRACAUDAL; PERINEURAL
Status: DISCONTINUED | OUTPATIENT
Start: 2023-06-19 | End: 2023-06-19 | Stop reason: HOSPADM

## 2023-06-19 RX ORDER — OXYCODONE HYDROCHLORIDE 5 MG/1
5 TABLET ORAL EVERY 4 HOURS PRN
Status: CANCELLED | OUTPATIENT
Start: 2023-06-19

## 2023-06-19 RX ORDER — BUPIVACAINE HYDROCHLORIDE 2.5 MG/ML
INJECTION, SOLUTION EPIDURAL; INFILTRATION; INTRACAUDAL
Status: DISCONTINUED | OUTPATIENT
Start: 2023-06-19 | End: 2023-06-19 | Stop reason: HOSPADM

## 2023-06-19 RX ORDER — ACETAMINOPHEN 500 MG
1000 TABLET ORAL
Status: COMPLETED | OUTPATIENT
Start: 2023-06-19 | End: 2023-06-19

## 2023-06-19 RX ORDER — OXYCODONE HYDROCHLORIDE 5 MG/1
15 TABLET ORAL EVERY 4 HOURS PRN
Status: CANCELLED | OUTPATIENT
Start: 2023-06-19

## 2023-06-19 RX ORDER — ONDANSETRON 2 MG/ML
4 INJECTION INTRAMUSCULAR; INTRAVENOUS ONCE AS NEEDED
Status: CANCELLED | OUTPATIENT
Start: 2023-06-19 | End: 2034-11-14

## 2023-06-19 RX ORDER — PROPOFOL 10 MG/ML
VIAL (ML) INTRAVENOUS
Status: DISCONTINUED | OUTPATIENT
Start: 2023-06-19 | End: 2023-06-19

## 2023-06-19 RX ORDER — PROPOFOL 10 MG/ML
VIAL (ML) INTRAVENOUS CONTINUOUS PRN
Status: DISCONTINUED | OUTPATIENT
Start: 2023-06-19 | End: 2023-06-19

## 2023-06-19 RX ORDER — ONDANSETRON 2 MG/ML
8 INJECTION INTRAMUSCULAR; INTRAVENOUS EVERY 12 HOURS PRN
Status: CANCELLED | OUTPATIENT
Start: 2023-06-19

## 2023-06-19 RX ORDER — SODIUM CHLORIDE 0.9 % (FLUSH) 0.9 %
3 SYRINGE (ML) INJECTION
Status: CANCELLED | OUTPATIENT
Start: 2023-06-19

## 2023-06-19 RX ORDER — MUPIROCIN 20 MG/G
OINTMENT TOPICAL
Status: DISCONTINUED | OUTPATIENT
Start: 2023-06-19 | End: 2023-06-19 | Stop reason: HOSPADM

## 2023-06-19 RX ORDER — FENTANYL CITRATE 50 UG/ML
INJECTION, SOLUTION INTRAMUSCULAR; INTRAVENOUS
Status: DISCONTINUED | OUTPATIENT
Start: 2023-06-19 | End: 2023-06-19

## 2023-06-19 RX ORDER — BACITRACIN ZINC 500 UNIT/G
OINTMENT (GRAM) TOPICAL
Status: DISCONTINUED | OUTPATIENT
Start: 2023-06-19 | End: 2023-06-19 | Stop reason: HOSPADM

## 2023-06-19 RX ORDER — LIDOCAINE HYDROCHLORIDE 20 MG/ML
INJECTION INTRAVENOUS
Status: DISCONTINUED | OUTPATIENT
Start: 2023-06-19 | End: 2023-06-19

## 2023-06-19 RX ORDER — PHENYLEPHRINE HYDROCHLORIDE 10 MG/ML
INJECTION INTRAVENOUS
Status: DISCONTINUED | OUTPATIENT
Start: 2023-06-19 | End: 2023-06-19

## 2023-06-19 RX ORDER — ACETAMINOPHEN 325 MG/1
650 TABLET ORAL EVERY 4 HOURS PRN
Status: CANCELLED | OUTPATIENT
Start: 2023-06-19

## 2023-06-19 RX ADMIN — SODIUM CHLORIDE: 9 INJECTION, SOLUTION INTRAVENOUS at 06:06

## 2023-06-19 RX ADMIN — PHENYLEPHRINE HYDROCHLORIDE 200 MCG: 10 INJECTION INTRAVENOUS at 07:06

## 2023-06-19 RX ADMIN — PHENYLEPHRINE HYDROCHLORIDE 100 MCG: 10 INJECTION INTRAVENOUS at 07:06

## 2023-06-19 RX ADMIN — FENTANYL CITRATE 50 MCG: 50 INJECTION, SOLUTION INTRAMUSCULAR; INTRAVENOUS at 07:06

## 2023-06-19 RX ADMIN — PROPOFOL 50 MG: 10 INJECTION, EMULSION INTRAVENOUS at 07:06

## 2023-06-19 RX ADMIN — ACETAMINOPHEN 1000 MG: 500 TABLET ORAL at 06:06

## 2023-06-19 RX ADMIN — DEXAMETHASONE SODIUM PHOSPHATE 4 MG: 4 INJECTION, SOLUTION INTRAMUSCULAR; INTRAVENOUS at 07:06

## 2023-06-19 RX ADMIN — PROPOFOL 30 MG: 10 INJECTION, EMULSION INTRAVENOUS at 07:06

## 2023-06-19 RX ADMIN — PROPOFOL 100 MCG/KG/MIN: 10 INJECTION, EMULSION INTRAVENOUS at 07:06

## 2023-06-19 RX ADMIN — FENTANYL CITRATE 25 MCG: 50 INJECTION, SOLUTION INTRAMUSCULAR; INTRAVENOUS at 07:06

## 2023-06-19 RX ADMIN — MUPIROCIN: 20 OINTMENT TOPICAL at 06:06

## 2023-06-19 RX ADMIN — LIDOCAINE HYDROCHLORIDE 20 MG: 20 INJECTION INTRAVENOUS at 07:06

## 2023-06-19 RX ADMIN — CEFAZOLIN 2 G: 2 INJECTION, POWDER, FOR SOLUTION INTRAMUSCULAR; INTRAVENOUS at 07:06

## 2023-06-19 RX ADMIN — ONDANSETRON 4 MG: 2 INJECTION, SOLUTION INTRAMUSCULAR; INTRAVENOUS at 07:06

## 2023-06-19 NOTE — PATIENT INSTRUCTIONS
Do not remove dressing/splint until postop clinic appointment  Keep dressing/splint clean, dry, and intact  Elevate and ice frequently over dressing to decrease swelling  Take medications as prescribed  Call clinic with any questions

## 2023-06-19 NOTE — TRANSFER OF CARE
"Anesthesia Transfer of Care Note    Patient: Bashir Ramos Sr.    Procedure(s) Performed: Procedure(s) (LRB):  RELEASE, CARPAL TUNNEL,LEFT (Left)    Patient location: PACU    Anesthesia Type: general    Transport from OR: Transported from OR on 6-10 L/min O2 by face mask with adequate spontaneous ventilation    Post pain: adequate analgesia    Post assessment: no apparent anesthetic complications    Post vital signs: stable    Level of consciousness: sedated    Nausea/Vomiting: no nausea/vomiting    Complications: none    Transfer of care protocol was followed      Last vitals:   Visit Vitals  BP (!) 154/88 (BP Location: Right arm, Patient Position: Lying)   Pulse 77   Temp 36.7 °C (98.1 °F) (Oral)   Resp 16   Ht 5' 5" (1.651 m)   Wt 64 kg (141 lb)   SpO2 99%   BMI 23.46 kg/m²     "

## 2023-06-19 NOTE — ANESTHESIA PREPROCEDURE EVALUATION
06/19/2023  Pre-operative evaluation for Procedure(s) (LRB):  RELEASE, CARPAL TUNNEL,LEFT (Left)    Bashir Ramos Sr. is a 88 y.o. male     Patient Active Problem List   Diagnosis    Essential hypertension    Gout, arthritis    Hyperopia - Both Eyes    Astigmatism - Both Eyes    Post-operative state - Right Eye    Refractive error - Both Eyes    Vitreous detachment    Pseudophakia    Rotator cuff tear arthropathy of right shoulder    Carpal tunnel syndrome on both sides    Anemia    Bilateral knee pain    HLD (hyperlipidemia)    Right ear impacted cerumen    Debility    Elevated transaminase level    Underweight    Right carpal tunnel syndrome    Vitamin D insufficiency       Review of patient's allergies indicates:   Allergen Reactions    Shellfish containing products Anaphylaxis    Iodine and iodide containing products     Pcn [penicillins] Rash       No current facility-administered medications on file prior to encounter.     Current Outpatient Medications on File Prior to Encounter   Medication Sig Dispense Refill    allopurinoL (ZYLOPRIM) 100 MG tablet Take 1 tablet (100 mg total) by mouth once daily. 90 tablet 1    amLODIPine (NORVASC) 2.5 MG tablet Take 1 tablet (2.5 mg total) by mouth once daily. 90 tablet 3    benazepriL (LOTENSIN) 20 MG tablet Take 1 tablet (20 mg total) by mouth once daily. 90 tablet 3    simvastatin (ZOCOR) 40 MG tablet TAKE ONE TABLET BY MOUTH EVERY EVENING 90 tablet 2    tadalafiL (CIALIS) 20 MG Tab TAKE ONE (1) TABLET (20 MG TOTAL) BY MOUTH ONCE DAILY. 10 tablet 1    tamsulosin (FLOMAX) 0.4 mg Cap Take 1 capsule (0.4 mg total) by mouth once daily. 60 capsule 11    acetaminophen (TYLENOL) 325 MG tablet Take 2 tablets (650 mg total) by mouth every 8 (eight) hours as needed for Pain.  0    aspirin (ECOTRIN) 81 MG EC tablet Take 1 tablet (81 mg total)  by mouth once daily. 90 tablet 0    zolpidem (AMBIEN) 5 MG Tab Take 1 tablet (5 mg total) by mouth nightly as needed (trouble sleeping). 15 tablet 0       Past Surgical History:   Procedure Laterality Date    BACK SURGERY      CATARACT EXTRACTION  10/29/13    left eye    CATARACT EXTRACTION W/  INTRAOCULAR LENS IMPLANT  10/15/13    OD (dr. grayson)    EYE SURGERY         Social History     Socioeconomic History    Marital status:    Tobacco Use    Smoking status: Former     Packs/day: 1.00     Types: Cigarettes     Quit date: 1982     Years since quittin.8    Smokeless tobacco: Never   Substance and Sexual Activity    Alcohol use: No    Drug use: No    Sexual activity: Not Currently     Partners: Female       EKD Echo:  No results found for this or any previous visit.        Pre-op Assessment    I have reviewed the Patient Summary Reports.     I have reviewed the Nursing Notes. I have reviewed the NPO Status.   I have reviewed the Medications.     Review of Systems  Anesthesia Hx:  Denies Family Hx of Anesthesia complications.   Denies Personal Hx of Anesthesia complications.   Cardiovascular:   Exercise tolerance: good Hypertension Denies Dysrhythmias.   Denies Angina.  Denies CHF.  Denies SANCHEZ.    Pulmonary:   Denies COPD.  Denies Asthma.    Renal/:   Denies Chronic Renal Disease.     Hepatic/GI:   Denies GERD.    Musculoskeletal:   Arthritis     Neurological:   Denies CVA. Denies Seizures.   Peripheral Neuropathy        Physical Exam  General: Well nourished, Cooperative, Alert and Oriented    Airway:  Mouth Opening: Normal  TM Distance: Normal  Tongue: Normal  Neck ROM: Normal ROM    Dental:  Intact    Chest/Lungs:  Clear to auscultation, Normal Respiratory Rate    Heart:  Rate: Normal  Rhythm: Regular Rhythm        Anesthesia Plan  Type of Anesthesia, risks & benefits discussed:    Anesthesia Type: Gen Natural Airway  Intra-op Monitoring Plan: Standard ASA Monitors  Post  Op Pain Control Plan: multimodal analgesia  Induction:  IV  Informed Consent: Informed consent signed with the Patient and all parties understand the risks and agree with anesthesia plan.  All questions answered. Patient consented to blood products? Yes  ASA Score: 2  Day of Surgery Review of History & Physical: H&P Update referred to the surgeon/provider.    Ready For Surgery From Anesthesia Perspective.     .

## 2023-06-19 NOTE — OP NOTE
[facility] ([facility])  Surgery Department  Operative Note    SUMMARY     Date of Procedure: 6/19/2023     Procedure: Procedure(s) (LRB):  RELEASE, CARPAL TUNNEL,LEFT (Left)     Surgeon(s) and Role:     * Anh An MD - Primary    Assisting Surgeon: None    Pre-Operative Diagnosis: Carpal tunnel syndrome on left [G56.02]  Left hand pain [M79.642]    Post-Operative Diagnosis: Post-Op Diagnosis Codes:     * Carpal tunnel syndrome on left [G56.02]     * Left hand pain [M79.642]    Anesthesia: Local MAC    Technical Procedures Used: surgery    Description of the Findings of the Procedure: COMPLICATIONS: None.   INDICATIONS FOR PROCEDURE: Mr Ramos  is a 88-year-old male who had numbness   and tingling into her left hand. He has failed conservative treatment, EMG   was positive for carpal tunnel syndrome. Therefore, operative intervention was   deemed necessary. Risks and benefits were explained to the patient in clinic   since performed in clinic.   PROCEDURE IN DETAIL: After correct site was marked with the patient's   participation in the holding area, the patient was brought to the Operating   Room, placed in supine position, underwent MAC anesthesia. A well-padded   nonsterile tourniquet was placed on the left arm. A time-out was called for   correct site, procedure and patient to be indicated. Under sterile conditions,   an injection of lidocaine 1% was injected into the carpal tunnel space. The arm   was prepped and draped in normal sterile fashion. The incision was marked out   using Garcia cardinal lines. The arm was exsanguinated using Esmarch.   Tourniquet was insufflated to 250 mmHg and that is where it remained for a total   of 10 minutes. The incision was made down to the palmar fascia. The palmar   fascia was sharply incised. The transverse ligament was identified. It was   very thick in nature. It was sharply incised. Median nerve was identified. A   Mosquito hemostat was passed from  the undersurface of the transverse ligament   proximally and distally to free it from the median nerve. Metzenbaum scissors   were utilized to cut the transverse ligament proximally and distally. Once that   was completely released, a Mosquito hemostat was passed again to confirm a   complete release. Once that was performed, the area was irrigated with copious   amounts of normal saline. Nylon closed the skin. Sterile dressing was applied.   Tourniquet was deflated. Brisk capillary refill ensued. The patient was   transported to the Recovery Room in stable condition.   POSTOPERATIVE PLAN FOR THIS PATIENT: She is to keep her dressing clean, dry and   intact. We will see her back in 2 weeks for stitch removal.     Of note : Pt had severe compression of the median nerve.     Significant Surgical Tasks Conducted by the Assistant(s), if Applicable: retraction    Complications: No    Estimated Blood Loss (EBL): * No values recorded between 6/19/2023  7:30 AM and 6/19/2023  7:44 AM *           Implants: * No implants in log *    Specimens:   Specimen (24h ago, onward)      None                    Condition: Good    Disposition: PACU - hemodynamically stable.    Attestation: I performed the procedure.    Discharge Note    SUMMARY     Admit Date: 6/19/2023    Discharge Date and Time: No discharge date for patient encounter.    Hospital Course (synopsis of major diagnoses, care, treatment, and services provided during the course of the hospital stay): suregry     Final Diagnosis: Post-Op Diagnosis Codes:     * Carpal tunnel syndrome on left [G56.02]     * Left hand pain [M79.642]    Disposition: Home or Self Care    Follow Up/Patient Instructions:     Medications:  Reconciled Home Medications:      Medication List        CONTINUE taking these medications      acetaminophen 325 MG tablet  Commonly known as: TYLENOL  Take 2 tablets (650 mg total) by mouth every 8 (eight) hours as needed for Pain.     allopurinoL 100 MG  tablet  Commonly known as: ZYLOPRIM  Take 1 tablet (100 mg total) by mouth once daily.     amLODIPine 2.5 MG tablet  Commonly known as: NORVASC  Take 1 tablet (2.5 mg total) by mouth once daily.     aspirin 81 MG EC tablet  Commonly known as: ECOTRIN  Take 1 tablet (81 mg total) by mouth once daily.     benazepriL 20 MG tablet  Commonly known as: LOTENSIN  Take 1 tablet (20 mg total) by mouth once daily.     simvastatin 40 MG tablet  Commonly known as: ZOCOR  TAKE ONE TABLET BY MOUTH EVERY EVENING     tadalafiL 20 MG Tab  Commonly known as: CIALIS  TAKE ONE (1) TABLET (20 MG TOTAL) BY MOUTH ONCE DAILY.     tamsulosin 0.4 mg Cap  Commonly known as: FLOMAX  Take 1 capsule (0.4 mg total) by mouth once daily.     traMADoL 50 mg tablet  Commonly known as: ULTRAM  [place] 1 tableta (50 mg en total) por vía oral cada 6 (seis) horas según sea necesario para el dolor.  (Take 1 tablet (50 mg total) by mouth every 6 (six) hours as needed for Pain.)     zolpidem 5 MG Tab  Commonly known as: AMBIEN  Take 1 tablet (5 mg total) by mouth nightly as needed (trouble sleeping).            Discharge Procedure Orders   Diet general     Call MD for:  temperature >100.4     Call MD for:  persistent nausea and vomiting     Call MD for:  severe uncontrolled pain     Call MD for:  difficulty breathing, headache or visual disturbances     Call MD for:  redness, tenderness, or signs of infection (pain, swelling, redness, odor or green/yellow discharge around incision site)     Call MD for:  hives     Call MD for:  persistent dizziness or light-headedness     Call MD for:  extreme fatigue

## 2023-06-20 NOTE — ANESTHESIA POSTPROCEDURE EVALUATION
Anesthesia Post Evaluation    Patient: Bashir Ramos Sr.    Procedure(s) Performed: Procedure(s) (LRB):  RELEASE, CARPAL TUNNEL,LEFT (Left)    Final Anesthesia Type: general      Patient location during evaluation: PACU  Patient participation: Yes- Able to Participate  Level of consciousness: awake and alert and oriented  Post-procedure vital signs: reviewed and stable  Pain management: adequate  Airway patency: patent    PONV status at discharge: No PONV  Anesthetic complications: no      Cardiovascular status: blood pressure returned to baseline and hemodynamically stable  Respiratory status: unassisted, room air and spontaneous ventilation  Hydration status: euvolemic  Follow-up not needed.          Vitals Value Taken Time   /65 06/19/23 0816   Temp 36.6 °C (97.9 °F) 06/19/23 0815   Pulse 59 06/19/23 0821   Resp 29 06/19/23 0821   SpO2 98 % 06/19/23 0821   Vitals shown include unvalidated device data.      Event Time   Out of Recovery 08:20:00         Pain/Sveta Score: Pain Rating Prior to Med Admin: 0 (6/19/2023  6:05 AM)  Sveta Score: 10 (6/19/2023  8:12 AM)

## 2023-06-28 ENCOUNTER — HOSPITAL ENCOUNTER (EMERGENCY)
Facility: HOSPITAL | Age: 88
Discharge: HOME | End: 2023-06-28
Attending: EMERGENCY MEDICINE
Payer: MEDICARE

## 2023-06-28 VITALS
HEIGHT: 65 IN | TEMPERATURE: 99 F | SYSTOLIC BLOOD PRESSURE: 177 MMHG | BODY MASS INDEX: 23.49 KG/M2 | RESPIRATION RATE: 18 BRPM | OXYGEN SATURATION: 98 % | DIASTOLIC BLOOD PRESSURE: 87 MMHG | WEIGHT: 141 LBS | HEART RATE: 77 BPM

## 2023-06-28 DIAGNOSIS — R53.81 PHYSICAL DECONDITIONING: Primary | ICD-10-CM

## 2023-06-28 DIAGNOSIS — T50.905A MEDICATION SIDE EFFECT, INITIAL ENCOUNTER: ICD-10-CM

## 2023-06-28 DIAGNOSIS — E86.0 DEHYDRATION: ICD-10-CM

## 2023-06-28 DIAGNOSIS — R53.1 GENERALIZED WEAKNESS: ICD-10-CM

## 2023-06-28 LAB
ALBUMIN SERPL BCP-MCNC: 3 G/DL (ref 3.5–5.2)
ALP SERPL-CCNC: 82 U/L (ref 55–135)
ALT SERPL W/O P-5'-P-CCNC: 32 U/L (ref 10–44)
ANION GAP SERPL CALC-SCNC: 12 MMOL/L (ref 8–16)
AST SERPL-CCNC: 44 U/L (ref 10–40)
BACTERIA #/AREA URNS HPF: ABNORMAL /HPF
BASOPHILS # BLD AUTO: 0.02 K/UL (ref 0–0.2)
BASOPHILS NFR BLD: 0.3 % (ref 0–1.9)
BILIRUB SERPL-MCNC: 0.5 MG/DL (ref 0.1–1)
BILIRUB UR QL STRIP: NEGATIVE
BUN SERPL-MCNC: 24 MG/DL (ref 8–23)
CALCIUM SERPL-MCNC: 9.7 MG/DL (ref 8.7–10.5)
CHLORIDE SERPL-SCNC: 104 MMOL/L (ref 95–110)
CLARITY UR: CLEAR
CO2 SERPL-SCNC: 21 MMOL/L (ref 23–29)
COLOR UR: YELLOW
CREAT SERPL-MCNC: 1.1 MG/DL (ref 0.5–1.4)
DIFFERENTIAL METHOD: ABNORMAL
EOSINOPHIL # BLD AUTO: 0.1 K/UL (ref 0–0.5)
EOSINOPHIL NFR BLD: 1.7 % (ref 0–8)
ERYTHROCYTE [DISTWIDTH] IN BLOOD BY AUTOMATED COUNT: 14.6 % (ref 11.5–14.5)
EST. GFR  (NO RACE VARIABLE): >60 ML/MIN/1.73 M^2
GLUCOSE SERPL-MCNC: 105 MG/DL (ref 70–110)
GLUCOSE UR QL STRIP: NEGATIVE
HCT VFR BLD AUTO: 38.2 % (ref 40–54)
HGB BLD-MCNC: 12.5 G/DL (ref 14–18)
HGB UR QL STRIP: NEGATIVE
HYALINE CASTS #/AREA URNS LPF: 0 /LPF
IMM GRANULOCYTES # BLD AUTO: 0.02 K/UL (ref 0–0.04)
IMM GRANULOCYTES NFR BLD AUTO: 0.3 % (ref 0–0.5)
INFLUENZA A, MOLECULAR: NEGATIVE
INFLUENZA B, MOLECULAR: NEGATIVE
KETONES UR QL STRIP: NEGATIVE
LACTATE SERPL-SCNC: 1 MMOL/L (ref 0.5–2.2)
LEUKOCYTE ESTERASE UR QL STRIP: NEGATIVE
LYMPHOCYTES # BLD AUTO: 0.6 K/UL (ref 1–4.8)
LYMPHOCYTES NFR BLD: 10.4 % (ref 18–48)
MCH RBC QN AUTO: 29.1 PG (ref 27–31)
MCHC RBC AUTO-ENTMCNC: 32.7 G/DL (ref 32–36)
MCV RBC AUTO: 89 FL (ref 82–98)
MICROSCOPIC COMMENT: ABNORMAL
MONOCYTES # BLD AUTO: 0.5 K/UL (ref 0.3–1)
MONOCYTES NFR BLD: 9.2 % (ref 4–15)
NEUTROPHILS # BLD AUTO: 4.5 K/UL (ref 1.8–7.7)
NEUTROPHILS NFR BLD: 78.1 % (ref 38–73)
NITRITE UR QL STRIP: NEGATIVE
NRBC BLD-RTO: 0 /100 WBC
PH UR STRIP: 6 [PH] (ref 5–8)
PLATELET # BLD AUTO: 341 K/UL (ref 150–450)
PMV BLD AUTO: 9.2 FL (ref 9.2–12.9)
POTASSIUM SERPL-SCNC: 4 MMOL/L (ref 3.5–5.1)
PROT SERPL-MCNC: 7.3 G/DL (ref 6–8.4)
PROT UR QL STRIP: ABNORMAL
RBC # BLD AUTO: 4.3 M/UL (ref 4.6–6.2)
RBC #/AREA URNS HPF: 2 /HPF (ref 0–4)
SARS-COV-2 RDRP RESP QL NAA+PROBE: NEGATIVE
SODIUM SERPL-SCNC: 137 MMOL/L (ref 136–145)
SP GR UR STRIP: 1.02 (ref 1–1.03)
SPECIMEN SOURCE: NORMAL
SQUAMOUS #/AREA URNS HPF: 5 /HPF
URN SPEC COLLECT METH UR: ABNORMAL
UROBILINOGEN UR STRIP-ACNC: NEGATIVE EU/DL
WBC # BLD AUTO: 5.77 K/UL (ref 3.9–12.7)
WBC #/AREA URNS HPF: 1 /HPF (ref 0–5)

## 2023-06-28 PROCEDURE — 96360 HYDRATION IV INFUSION INIT: CPT

## 2023-06-28 PROCEDURE — 85025 COMPLETE CBC W/AUTO DIFF WBC: CPT | Performed by: EMERGENCY MEDICINE

## 2023-06-28 PROCEDURE — 36415 COLL VENOUS BLD VENIPUNCTURE: CPT | Performed by: EMERGENCY MEDICINE

## 2023-06-28 PROCEDURE — 99285 EMERGENCY DEPT VISIT HI MDM: CPT | Mod: 25

## 2023-06-28 PROCEDURE — 63600175 PHARM REV CODE 636 W HCPCS: Performed by: EMERGENCY MEDICINE

## 2023-06-28 PROCEDURE — 96361 HYDRATE IV INFUSION ADD-ON: CPT

## 2023-06-28 PROCEDURE — 80053 COMPREHEN METABOLIC PANEL: CPT | Performed by: EMERGENCY MEDICINE

## 2023-06-28 PROCEDURE — 87040 BLOOD CULTURE FOR BACTERIA: CPT | Performed by: EMERGENCY MEDICINE

## 2023-06-28 PROCEDURE — 87502 INFLUENZA DNA AMP PROBE: CPT | Performed by: EMERGENCY MEDICINE

## 2023-06-28 PROCEDURE — 94761 N-INVAS EAR/PLS OXIMETRY MLT: CPT

## 2023-06-28 PROCEDURE — 81000 URINALYSIS NONAUTO W/SCOPE: CPT | Performed by: EMERGENCY MEDICINE

## 2023-06-28 PROCEDURE — 83605 ASSAY OF LACTIC ACID: CPT | Performed by: EMERGENCY MEDICINE

## 2023-06-28 PROCEDURE — U0002 COVID-19 LAB TEST NON-CDC: HCPCS | Performed by: EMERGENCY MEDICINE

## 2023-06-28 RX ADMIN — SODIUM CHLORIDE, SODIUM LACTATE, POTASSIUM CHLORIDE, AND CALCIUM CHLORIDE 250 ML: .6; .31; .03; .02 INJECTION, SOLUTION INTRAVENOUS at 05:06

## 2023-06-28 RX ADMIN — SODIUM CHLORIDE, POTASSIUM CHLORIDE, SODIUM LACTATE AND CALCIUM CHLORIDE 500 ML: 600; 310; 30; 20 INJECTION, SOLUTION INTRAVENOUS at 01:06

## 2023-06-28 NOTE — ED NOTES
Patient resting in bed with eyes closed, chest rise is symmetrical, respirations are regular and unlabored. CONCHITA PAEZ

## 2023-06-28 NOTE — DISCHARGE INSTRUCTIONS
I suspect you are having nausea and decreased appetite secondary to the pain medicine tramadol.  This caused her to get slightly dehydrated and generally weak.  You need to drink fluids.  Stop the tramadol and take only Tylenol 500 mg 4 times a day as needed.  Make sure to follow up with your primary care doctor.  You may need home health and home physical therapy to help strengthen you.  Return to the ER for fevers greater than 100.4 or any other concerning symptoms.

## 2023-06-28 NOTE — ED NOTES
Bashir Ramos Sr. presents to the ED with c/o generalized weakness that started approximately one week ago. Patient has swelling to his left hand that is secondary to carpal tunnel surgery. Son reports that patient has had a 5 lb weight loss secondary to decreased appetite. Patient uses a cane at home for ambulation but has been unable to do so secondary to his weakness. + for nausea. Patient reports having some burning chest pain yesterday but denies any today.   JEANNINE VSS  Verified patient's name and date of birth. .

## 2023-06-28 NOTE — ED PROVIDER NOTES
Encounter Date: 6/28/2023    SCRIBE #1 NOTE: IGriffin, am scribing for, and in the presence of,  Rio Hoff MD.     History     Chief Complaint   Patient presents with    Generalized Body Aches     C/o generalized body aches and weakness x 1 week. Pt recently had left hand surgery     Time seen by provider: 12:35 PM on 06/28/2023    Bashir Ramos Sr. is a 88 y.o. male who presents to the ED with an onset of generalized body aches, weakness, swollen left hand, bilateral knee pain, occasional abdominal pain, decreased appetite, and nausea starting 1 week ago. History obtained from an independent historian: The son reports the patient started having chest pain since yesterday, and the patient describes the pain as a burning sensation. The son states the patient had diarrhea last week and a subjective fever last week but not currently. Review of external records performed: The patient had a release of left carpal tunnel on 6/19/2023 by Anh An MD at [facility]. The son states the patient has taken 1 dose of Tramadol and 2 doses of Tylenol daily since his carpal tunnel surgery. The son reports the patient has lost about 5 lbs of weight since his decreased appetite started. The patient denies vomiting, cough, runny nose, rash, burning with urination or any other symptoms at this time. PMHx of HTN, HLD, gout, hereditary and idiopathic peripheral neuropathy, prediabetes, and hyperglycemia. PSHx of left carpal tunnel release.      The history is provided by a relative and the patient.   Review of patient's allergies indicates:   Allergen Reactions    Shellfish containing products Anaphylaxis    Iodine and iodide containing products     Pcn [penicillins] Rash     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    Prediabetes 10/3/2017    Unspecified hereditary and idiopathic  peripheral neuropathy      Past Surgical History:   Procedure Laterality Date    BACK SURGERY      CARPAL TUNNEL RELEASE Left 2023    Procedure: RELEASE, CARPAL TUNNEL,LEFT;  Surgeon: Anh An MD;  Location: [facility];  Service: Orthopedics;  Laterality: Left;    CATARACT EXTRACTION  10/29/13    left eye    CATARACT EXTRACTION W/  INTRAOCULAR LENS IMPLANT  10/15/13    OD (dr. grayson)    EYE SURGERY       Family History   Problem Relation Age of Onset    No Known Problems Mother     No Known Problems Father     Amblyopia Neg Hx     Blindness Neg Hx     Cancer Neg Hx     Cataracts Neg Hx     Diabetes Neg Hx     Glaucoma Neg Hx     Hypertension Neg Hx     Macular degeneration Neg Hx     Retinal detachment Neg Hx     Strabismus Neg Hx     Stroke Neg Hx     Thyroid disease Neg Hx      Social History     Tobacco Use    Smoking status: Former     Packs/day: 1.00     Types: Cigarettes     Quit date: 1982     Years since quittin.8    Smokeless tobacco: Never   Substance Use Topics    Alcohol use: No    Drug use: No     Review of Systems   Constitutional:  Positive for appetite change (decrease). Negative for fever.   HENT:  Negative for rhinorrhea and sore throat.    Respiratory:  Negative for cough and shortness of breath.    Cardiovascular:  Positive for chest pain.   Gastrointestinal:  Positive for abdominal pain (occasional) and nausea. Negative for diarrhea and vomiting.   Genitourinary:  Negative for dysuria.   Musculoskeletal:  Positive for arthralgias (bilateral knees) and myalgias (general). Negative for back pain.        Positive for swelling of left hand.   Skin:  Negative for rash.   Neurological:  Positive for weakness.   Hematological:  Does not bruise/bleed easily.     Physical Exam     Initial Vitals [23 1154]   BP Pulse Resp Temp SpO2   113/70 (!) 112 18 99.5 °F (37.5 °C) 97 %      MAP       --         Physical Exam    Nursing note and vitals reviewed.  Constitutional: He  appears well-developed and well-nourished. No distress.   Elderly   HENT:   Head: Normocephalic and atraumatic.   Edentulous   Eyes: Conjunctivae and EOM are normal. Pupils are equal, round, and reactive to light.   Neck: Neck supple.   Cardiovascular:  Normal rate, regular rhythm and normal heart sounds.     Exam reveals no gallop and no friction rub.       No murmur heard.  Pulmonary/Chest: Breath sounds normal. No respiratory distress. He has no wheezes. He has no rhonchi. He has no rales.   Abdominal: Abdomen is soft. Bowel sounds are normal. He exhibits no distension. There is abdominal tenderness (mild) in the periumbilical area.   Musculoskeletal:         General: No tenderness or edema. Normal range of motion.      Cervical back: Neck supple.     Neurological: He is alert and oriented to person, place, and time.   Skin: Skin is warm and dry.   Psychiatric: He has a normal mood and affect.       ED Course   Procedures  Labs Reviewed   CBC W/ AUTO DIFFERENTIAL - Abnormal; Notable for the following components:       Result Value    RBC 4.30 (*)     Hemoglobin 12.5 (*)     Hematocrit 38.2 (*)     RDW 14.6 (*)     Lymph # 0.6 (*)     Gran % 78.1 (*)     Lymph % 10.4 (*)     All other components within normal limits   COMPREHENSIVE METABOLIC PANEL - Abnormal; Notable for the following components:    CO2 21 (*)     BUN 24 (*)     Albumin 3.0 (*)     AST 44 (*)     All other components within normal limits   URINALYSIS, REFLEX TO URINE CULTURE - Abnormal; Notable for the following components:    Protein, UA 1+ (*)     All other components within normal limits    Narrative:     Specimen Source->Urine   URINALYSIS MICROSCOPIC - Abnormal; Notable for the following components:    Bacteria Few (*)     All other components within normal limits    Narrative:     Specimen Source->Urine   INFLUENZA A & B BY MOLECULAR   CULTURE, BLOOD   CULTURE, BLOOD   SARS-COV-2 RNA AMPLIFICATION, QUAL   LACTIC ACID, PLASMA          Imaging  Results              CT Abdomen Pelvis  Without Contrast (Final result)  Result time 06/28/23 16:56:17      Final result by Jostin Malhotra MD (06/28/23 16:56:17)                   Impression:      No acute findings in the abdomen allowing for lack of contrast.  Coronary artery disease.      Electronically signed by: Jostin Malhotra  Date:    06/28/2023  Time:    16:56               Narrative:    EXAMINATION:  CT ABDOMEN PELVIS WITHOUT CONTRAST    CLINICAL HISTORY:  Nausea/vomiting;weight loss, allergic to contrast;    TECHNIQUE:  Low dose axial images, sagittal and coronal reformations were obtained from the lung bases to the pubic symphysis.  Oral contrast was not administered.    COMPARISON:  05/11/2023    FINDINGS:  Mild atelectasis in the lung bases. Heart size normal.  Coronary artery disease.  Nonspecific gallbladder dilation.    There are a few cystic lesions along each kidney cortex.  No hydroureteronephrosis or calcified nephroureterolithiasis.  Mild pancreas atrophy.  Remaining solid abdominal organs are grossly unremarkable on this noncontrast exam.    There is no enteric contrast which limits bowel assessment.  No dilated bowel loops.    Normal size prostate.  Urinary bladder only partially distended.  Atherosclerosis.    There are bilateral L5 pars defects and 19 mm anterolisthesis L5 on S1.  There is fusion posterior elements L3-L4 and vertebral bodies L5 to S1.  Multilevel spinal degenerative change.  Moderate degenerative change of the pubic symphysis and both hip joints with chondrocalcinosis.                                       X-Ray Chest AP Portable (Final result)  Result time 06/28/23 13:41:04      Final result by Harvey Nunes Jr., MD (06/28/23 13:41:04)                   Impression:      No acute abnormality.      Electronically signed by: Harvey Nunes MD  Date:    06/28/2023  Time:    13:41               Narrative:    EXAMINATION:  XR CHEST AP PORTABLE    CLINICAL  HISTORY:  Sepsis;    TECHNIQUE:  Single frontal view of the chest was performed.    COMPARISON:  None    FINDINGS:  The lungs are clear, with normal appearance of pulmonary vasculature and no pleural effusion or pneumothorax.    The cardiac silhouette is normal in size. The hilar and mediastinal contours are unremarkable.    Bones are intact.                                       Medications   lactated ringers bolus 500 mL (0 mLs Intravenous Stopped 6/28/23 1513)   lactated ringers bolus 250 mL (0 mLs Intravenous Stopped 6/28/23 1830)     Medical Decision Making:   History:   Old Medical Records: I decided to obtain old medical records.  Clinical Tests:   Lab Tests: Ordered and Reviewed  Radiological Study: Ordered and Reviewed        Scribe Attestation:   Scribe #1: I performed the above scribed service and the documentation accurately describes the services I performed. I attest to the accuracy of the note.      ED Course as of 06/28/23 2222 Wed Jun 28, 2023 1810 Spoke to the patient's family.  They would like to take him home.  He has no findings of an infectious process at this time.  I believe he is deconditioned secondary to surgery and appetite was slightly depressed secondary to taking tramadol.  I gave him IV fluids.  The patient feels better.  His heart rate improved.  I suspect he was slightly dehydrated given BUN 24 CO2 21. [JS]      ED Course User Index  [JS] Rio Hoff MD               I, Dr. Rio Hoff personally performed the services described in this documentation. All medical record entries made by the scribe were at my direction and in my presence.  I have reviewed the chart and agree that the record reflects my personal performance and is accurate and complete. Rio Hoff MD.  10:22 PM 06/28/2023    DISCLAIMER: This note was prepared with Dragon NaturallySpeaking voice recognition transcription software. Garbled syntax, mangled pronouns, and other bizarre constructions may  be attributed to that software system     Clinical Impression:   Final diagnoses:  [R53.81] Physical deconditioning (Primary)  [T50.905A] Medication side effect, initial encounter  [R53.1] Generalized weakness  [E86.0] Dehydration        ED Disposition Condition    Discharge Stable          ED Prescriptions    None       Follow-up Information       Follow up With Specialties Details Why Contact Info    Juan Casey MD Family Medicine  Call tomorrow to schedule an appointment and ask for home physical therapy. [address] [facility] [address]  [phone]               Rio Hoff MD  06/28/23 5408

## 2023-06-28 NOTE — ED NOTES
Patient is unable to ambulate. Patient stood and attempted to step forward and almost fell. Dr. Hoff is aware.

## 2023-06-28 NOTE — ED NOTES
Upon discharge, patient is AAOx4, no cardiac or respiratory complications. Follow up care reviewed with patient and has been instructed to return to the ER if needed. Patient verbalized understanding and ambulated to the lobby without difficulty. CONCHITA PAEZ

## 2023-06-30 ENCOUNTER — TELEPHONE (OUTPATIENT)
Dept: FAMILY MEDICINE | Facility: CLINIC | Age: 88
End: 2023-06-30
Payer: MEDICARE

## 2023-06-30 NOTE — TELEPHONE ENCOUNTER
----- Message from Julia Gal sent at 6/30/2023  3:51 PM CDT -----  Contact: Patient son  Type:  Sooner Apoointment Request    Caller is requesting a sooner appointment.  Caller declined first available appointment listed below.  Caller will not accept being placed on the waitlist and is requesting a message be sent to doctor.    Name of Caller:Patient's sonBashir     When is the first available appointment?     Symptoms: er f/u     Would the patient rather a call back or a response via [facility]? Call     Best Call Back Number:[phone]    Additional Information:

## 2023-07-03 ENCOUNTER — TELEPHONE (OUTPATIENT)
Dept: ORTHOPEDICS | Facility: CLINIC | Age: 88
End: 2023-07-03
Payer: MEDICARE

## 2023-07-03 LAB
BACTERIA BLD CULT: NORMAL
BACTERIA BLD CULT: NORMAL

## 2023-07-05 ENCOUNTER — OFFICE VISIT (OUTPATIENT)
Dept: ORTHOPEDICS | Facility: CLINIC | Age: 88
End: 2023-07-05
Payer: MEDICARE

## 2023-07-05 DIAGNOSIS — Z98.890 POST-OPERATIVE STATE: Primary | ICD-10-CM

## 2023-07-05 PROCEDURE — 99024 PR POST-OP FOLLOW-UP VISIT: ICD-10-PCS | Mod: S$GLB,,, | Performed by: PHYSICIAN ASSISTANT

## 2023-07-05 PROCEDURE — 99999 PR PBB SHADOW E&M-EST. PATIENT-LVL I: CPT | Mod: PBBFAC,,, | Performed by: PHYSICIAN ASSISTANT

## 2023-07-05 PROCEDURE — 99024 POSTOP FOLLOW-UP VISIT: CPT | Mod: S$GLB,,, | Performed by: PHYSICIAN ASSISTANT

## 2023-07-05 PROCEDURE — 99999 PR PBB SHADOW E&M-EST. PATIENT-LVL I: ICD-10-PCS | Mod: PBBFAC,,, | Performed by: PHYSICIAN ASSISTANT

## 2023-07-05 RX ORDER — SULFAMETHOXAZOLE AND TRIMETHOPRIM 800; 160 MG/1; MG/1
1 TABLET ORAL 2 TIMES DAILY
Qty: 20 TABLET | Refills: 0 | Status: SHIPPED | OUTPATIENT
Start: 2023-07-05 | End: 2023-11-29

## 2023-07-05 NOTE — PROGRESS NOTES
Mr. Ramos is here today for a post-operative visit.  He is 16 days status post left carpal tunnel release by Dr. An on 6/19/23. He reports that he is doing okay. He is quite swollen and stiff. He was unaware he was supposed to move the fingers. Pain is intermittent he is taking tylenol as needed.  He denies fever, chills, and sweats since the time of the surgery.     Of note : Pt had severe compression of the median nerve.     Physical exam:    There were no vitals filed for this visit.  Vital signs are stable, patient is afebrile.  Patient is well dressed and well groomed, no acute distress.  Alert and oriented to person, place, and time.  Post op dressing taken down.  Incision is clean, dry and intact.  There is no erythema or exudate.  There is no sign of any infection. He is NVI. Sutures removed without difficulty. Moderate edema of the hand and fingers. Decreased finger flexion secondary to edema. He is most swollen and stiff at the index with ttp at the volar P1 mild erythema. Otherwise non tender throughout the finger, hand.    Assessment: status post left carpal tunnel release by Dr. An on 6/19/23    Plan:  Diagnoses and all orders for this visit:    Post-operative state  -     sulfamethoxazole-trimethoprim 800-160mg (BACTRIM DS) 800-160 mg Tab; Take 1 tablet by mouth 2 (two) times daily.  -     Ambulatory referral/consult to Physical/Occupational Therapy; Future        PO instruction reviewed and provided to patient  Recommend ice, elevation, ROM  Therapy ordered   Bactrim prescribed   RTC 1 wk call sooner if needed

## 2023-07-12 ENCOUNTER — TELEPHONE (OUTPATIENT)
Dept: ORTHOPEDICS | Facility: CLINIC | Age: 88
End: 2023-07-12
Payer: MEDICARE

## 2023-07-13 ENCOUNTER — OFFICE VISIT (OUTPATIENT)
Dept: ORTHOPEDICS | Facility: CLINIC | Age: 88
End: 2023-07-13
Payer: MEDICARE

## 2023-07-13 ENCOUNTER — CLINICAL SUPPORT (OUTPATIENT)
Dept: REHABILITATION | Facility: HOSPITAL | Age: 88
End: 2023-07-13
Payer: MEDICARE

## 2023-07-13 VITALS — BODY MASS INDEX: 23.49 KG/M2 | WEIGHT: 141 LBS | HEIGHT: 65 IN

## 2023-07-13 DIAGNOSIS — M25.642 FINGER STIFFNESS, LEFT: ICD-10-CM

## 2023-07-13 DIAGNOSIS — M79.642 LEFT HAND PAIN: ICD-10-CM

## 2023-07-13 DIAGNOSIS — R29.898 DECREASED GRIP STRENGTH OF LEFT HAND: ICD-10-CM

## 2023-07-13 DIAGNOSIS — Z98.890 POST-OPERATIVE STATE: Primary | ICD-10-CM

## 2023-07-13 DIAGNOSIS — R22.32 LOCALIZED SWELLING, MASS AND LUMP, LEFT UPPER LIMB: ICD-10-CM

## 2023-07-13 DIAGNOSIS — Z98.890 POST-OPERATIVE STATE: ICD-10-CM

## 2023-07-13 PROCEDURE — 1125F AMNT PAIN NOTED PAIN PRSNT: CPT | Mod: CPTII,S$GLB,, | Performed by: PHYSICIAN ASSISTANT

## 2023-07-13 PROCEDURE — 1159F PR MEDICATION LIST DOCUMENTED IN MEDICAL RECORD: ICD-10-PCS | Mod: CPTII,S$GLB,, | Performed by: PHYSICIAN ASSISTANT

## 2023-07-13 PROCEDURE — 1101F PR PT FALLS ASSESS DOC 0-1 FALLS W/OUT INJ PAST YR: ICD-10-PCS | Mod: CPTII,S$GLB,, | Performed by: PHYSICIAN ASSISTANT

## 2023-07-13 PROCEDURE — 3288F FALL RISK ASSESSMENT DOCD: CPT | Mod: CPTII,S$GLB,, | Performed by: PHYSICIAN ASSISTANT

## 2023-07-13 PROCEDURE — 1125F PR PAIN SEVERITY QUANTIFIED, PAIN PRESENT: ICD-10-PCS | Mod: CPTII,S$GLB,, | Performed by: PHYSICIAN ASSISTANT

## 2023-07-13 PROCEDURE — 99024 POSTOP FOLLOW-UP VISIT: CPT | Mod: S$GLB,,, | Performed by: PHYSICIAN ASSISTANT

## 2023-07-13 PROCEDURE — 99999 PR PBB SHADOW E&M-EST. PATIENT-LVL III: ICD-10-PCS | Mod: PBBFAC,,, | Performed by: PHYSICIAN ASSISTANT

## 2023-07-13 PROCEDURE — 99999 PR PBB SHADOW E&M-EST. PATIENT-LVL III: CPT | Mod: PBBFAC,,, | Performed by: PHYSICIAN ASSISTANT

## 2023-07-13 PROCEDURE — 1159F MED LIST DOCD IN RCRD: CPT | Mod: CPTII,S$GLB,, | Performed by: PHYSICIAN ASSISTANT

## 2023-07-13 PROCEDURE — 3288F PR FALLS RISK ASSESSMENT DOCUMENTED: ICD-10-PCS | Mod: CPTII,S$GLB,, | Performed by: PHYSICIAN ASSISTANT

## 2023-07-13 PROCEDURE — 97165 OT EVAL LOW COMPLEX 30 MIN: CPT

## 2023-07-13 PROCEDURE — 97110 THERAPEUTIC EXERCISES: CPT

## 2023-07-13 PROCEDURE — 1101F PT FALLS ASSESS-DOCD LE1/YR: CPT | Mod: CPTII,S$GLB,, | Performed by: PHYSICIAN ASSISTANT

## 2023-07-13 PROCEDURE — 99024 PR POST-OP FOLLOW-UP VISIT: ICD-10-PCS | Mod: S$GLB,,, | Performed by: PHYSICIAN ASSISTANT

## 2023-07-13 RX ORDER — AMITRIPTYLINE HYDROCHLORIDE 50 MG/1
50 TABLET, FILM COATED ORAL NIGHTLY
COMMUNITY
Start: 2023-05-02

## 2023-07-13 NOTE — PLAN OF CARE
"  [facility] Occupational Therapy  Initial Evaluation     Date: 7/13/2023  Patient: Bashir Ramos Sr.  Chart Number: [id number]    Therapy Diagnosis:   1. Post-operative state  Ambulatory referral/consult to Physical/Occupational Therapy      2. Decreased  strength of left hand        3. Left hand pain          Medical Diagnosis: Z98.890 (ICD-10-CM) - Post-operative state     Referring Physician: Veronica Valverde PA-C  Physician Orders: Eval and treat  Order comments: status post left carpal tunnel release by Dr. An on 6/19/23 2x/wk x 6 wk   Date of Return to MD: TBD    Date of Surgery: 6/19/23  Procedure: Left CTR    Evaluation Date: 7/13/2023  Authorization Period: 7/13/23 - 9/7/23  Plan of Care Expiration: 10/5/23  Visit #/ Visits Authorized: 1 of 1  FOTO Completion: Initial eval (7/13/2023)  FOTO #2:  FOTO #3:    Time In: 8:30 am  Time Out: 9:05 am  Total Appointment Time (timed & untimed codes): 35 min    Precautions: Standard, strengthening/weightbearing     used, ID #[id number] and #[id number]    Subjective     History of Current Condition: Bashir Ramos Sr. is a 88 y.o. year old Right hand dominant male who underwent Left CTR on 6/19/23. He had symptoms for a couple of years prior to surgery. Bashir Ramos Sr. is referred to Occupational Therapy for evaluation and treatment. Patient presents today alone.  Per PA note: "Of note : Pt had severe compression of the median nerve."     Falls: none    Involved Side: Left  Dominant Side: Right    Date of Onset: 6/19/23 surgery for CTR  Imaging: EMG indicating bilateral severe median neuropathy at carpal tunnel  Previous Therapy: none    Pain:  Functional Pain Scale Rating 0-10:   Current: 9/10  At Best: 9/10  At Worst: 9/10    Location: Left volar wrist, MF/RF  Description: stabbing pain  Aggravating Factors: functional use  Easing Factors: Rest    Functional Limitations/Social History:  Prior Level of Function: Independent " "with all ADLs/IADLs    Current Level of Function: Lives alone but son comes to help, and hires someone to help with cooking/cleaning  ADLs: buttons, fine motor tasks are difficult  IADLs: difficulty lifting, gripping objects - receives assistance from hired help and family  Leisure:   Driving: No    Occupation:  Retired    Patient's Goals for Therapy: "to get more motion"    Past Medical History/Physical Systems Review:   Bashir Ramos Sr.  has a past medical history of Gout, unspecified, Hyperglycemia, Hyperlipidemia, Hypertension, Nuclear sclerosis - Both Eyes, Prediabetes, and Unspecified hereditary and idiopathic peripheral neuropathy.    Bashir Ramos Sr.  has a past surgical history that includes Back surgery; Eye surgery; Cataract extraction w/  intraocular lens implant (10/15/13); Cataract extraction (10/29/13); and Carpal tunnel release (Left, 6/19/2023).    Bashir has a current medication list which includes the following prescription(s): acetaminophen, allopurinol, amlodipine, aspirin, benazepril, simvastatin, sulfamethoxazole-trimethoprim 800-160mg, tadalafil, tamsulosin, tramadol, and zolpidem.    Review of patient's allergies indicates:   Allergen Reactions    Shellfish containing products Anaphylaxis    Iodine and iodide containing products     Pcn [penicillins] Rash          Objective     Mental status: alert, oriented x3    Observation/Appearance:   Left volar wrist scar thick with poor mobility, noted dead tissue surrounding  Atrophy about Left thenar, weakness about thumb and IF during full fist with mild Hand of Pansey pattern  Moderate swelling throughout hand    Sensation: Patient reports numbness/tingling in the digits 1-3  Median Nerve Distribution   Left  7/13/23   Monofilament Testing    Normal 1.65-2.83    Diminished Light Touch 3.22-3.61 Left thumb   Diminished Protective 3.84-4.31 Left IF/MF   Loss of Protective 4.56-6.65    Untestable >6.65          Edema:  (Measured circumferential, " in centimeters)  Hand/Wrist: Right  7/13/2023 Left  7/13/2023   Wrist Crease 16.5 cm 17.7 cm   DPC 20.3 cm 21.0 cm       ELBOW, WRIST RANGE OF MOTION:   Measured in degrees of active motion with goniometer   Right  7/13/23 Left  7/13/23   Pronation/Supination WNL WNL   Wrist Extension/Flexion 60/40 55/15   Ulnar/Radial Deviation 20/20 15/20     Digit AROM:  Measured in degrees of active motion with goniometer and/or distance measured from finger tip to the distal palmar crease (DPC)   Right  7/13/2023 Left  7/13/2023        Index: MP   /65              PIP      /43              DIP  /18              GOULD  126        Long:  MP  /62              PIP  /70              DIP  /63              GOULD  195        Ring:   MP  /70              PIP  /85              DIP  /35              GOULD  190        Small:  MP  /70               PIP  /52               DIP  /35              GOULD  157        Thumb: MP /65 /50                IP /40 /30       Rad ADD/ABD         Pal ADD/ABD         Opposition Base of SF Unable to oppose to SF       STRENGTH: Not tested due to precautions  (Measured in pounds using a Dynamometer and pinch meter)   Right  7/13/2023 Left  7/13/2023    Setting 2      Average     Key     3 Pt     Tip         Intake Outcome Measure for FOTO Initial Evaluation Survey    Therapist reviewed FOTO scores for Bashir Ramos Sr. on 7/13/2023.   FOTO documents entered into [facility] - see Media section.    Intake Score: NEXT VISIT         Treatment     Treatment Time In: 8:50 am  Treatment Time Out: 9:05 am  Total Treatment time separate from Evaluation time: 15 min    Bashir performed therapeutic exercises for 15 minutes including:  - AROM tendon glides, wrist flex/ext, UD/RD  - Instructed in STM to volar wrist incision, 5 min, 3-5x/day  - Provided with compression glove for night  Provided with written HEP in [place]      Patient Education and Home Exercises     Patient/Family Education Provided:   - Role of OT, goals for  OT, scheduling/cancellations - pt verbalized understanding. Discussed insurance limitations with patient.    Written Home Exercises Provided: yes.  Exercises were reviewed and Bashir was able to demonstrate them prior to the end of the session.  Bashir demonstrated good  understanding of the education provided. See EMR under Patient Instructions for exercises provided during therapy sessions.     Pt was advised to perform these exercises free of pain, and to stop performing them if pain occurs.      Assessment     Bashir Ramos Sr. is a 88 y.o. male referred to outpatient occupational therapy and presents with a medical diagnosis of Left CTR.      Assessment of Occupational Performance   Performance Deficits    Physical:  Joint Mobility  Muscle Power/Strength  Skin Integrity/Scar Formation  Edema   Strength  Pinch Strength  Fine Motor Coordination  Pain    Cognitive:  No Deficits    Psychosocial:    Habits  Routines  Rituals     Following medical record review it is determined that pt will benefit from occupational therapy services in order to maximize pain free and/or functional use of left hand. The following goals were discussed with the patient and patient is in agreement with them as to be addressed in the treatment plan. The patient's rehab potential is Fair.     Anticipated barriers to occupational therapy: time since onset of symptoms, severity of symptoms prior to surgery    Plan of care discussed with patient: Yes  Patient's spiritual, cultural and educational needs considered and patient is agreeable to the plan of care and goals as stated below:     Medical Necessity is demonstrated by the following  Occupational Profile/History  Co-morbidities and personal factors that may impact the plan of care [x] LOW: Brief chart review  [] MODERATE: Expanded chart review   [] HIGH: Extensive chart review    Moderate / High Support Documentation:      Examination  Performance deficits relating to physical,  cognitive or psychosocial skills that result in activity limitations and/or participation restrictions  [] LOW: addressing 1-3 Performance deficits  [x] MODERATE: 3-5 Performance deficits  [] HIGH: 5+ Performance deficits (please support below)    Moderate / High Support Documentation:      Treatment Options [] LOW: Limited options  [x] MODERATE: Several options  [] HIGH: Multiple options      Decision Making/ Complexity Score: low       The following goals were discussed with the patient and patient is in agreement with them as to be addressed in the treatment plan.     Long Term Goals (to be met by discharge):  Date Goal Met:     2.) Bashir Ramos Sr. will return to near to prior level of function for ADLs and household management reporting independence or modified independence.    Goal Status:   In progress    3. Bashir Ramos Sr. will report pain 2 out of 10 at worst to increase functional use of Left hand for ADL/IADL tasks.    Goal Status:   In progress     Short Term Goals (to be met by 8/10/23):  Date Goal Met:     Bashir Ramos Sr. will be independent with home exercise program with written instructions.    Goal Status:   In progress    Bashir Richard Sr. will demonstrate 30 degrees of wrist flexion to improve functional performance in ADLs/work/leisure tasks.    Goal Status:   In progress    Bashir Richard Sr. will demonstrate 210 degrees of GOULD of all digits to improve functional performance in ADLs/work/leisure tasks.     Goal Status:   In progress       Bashir Richard Sr. will demonstrate within 0.5 cm of DPC edema measurement compared to other hand to improve functional grasp for ADLs/work/leisure tasks.    Goal Status:   In progress    Bashir Ramos Sr. will demonstrate the ability to complete ADL/IADL tasks with 4/10 pain.    Goal Status:   In progress       Plan     Pt to be treated by Occupational Therapy 2 times per week for 12 weeks during the certification period from 7/13/2023 to 10/5/23 to  achieve the established goals.     Treatment to include: Paraffin, Fluidotherapy, Manual therapy/joint mobilizations, Modalities for pain management, US 3 mhz, Therapeutic exercises/activities., Iontophoresis with 2.0 cc Dexamethasone, Strengthening, Orthotic Fabrication/Fit/Training, Edema Control, Scar Management, Wound Care, Electrical Modalities, Joint Protection, and Energy Conservation, as well as any other treatments deemed necessary based on the patient's needs or progress.       Shari Del Valle, OTR/L

## 2023-07-13 NOTE — PROGRESS NOTES
"Mr. Ramos is here today for a post-operative visit.  He is 3 weeks status post left carpal tunnel release by Dr. An on 6/19/23. He reports that he is doing okay.he began therapy today. He has better motion today than last week but still very stiff at the index.  He denies fever, chills, and sweats since the time of the surgery.     Of note : Pt had severe compression of the median nerve.     Physical exam:    Vitals:    07/13/23 0850   Weight: 64 kg (141 lb)   Height: 5' 5" (1.651 m)   PainSc:   9   PainLoc: Hand     Vital signs are stable, patient is afebrile.  Patient is well dressed and well groomed, no acute distress.  Alert and oriented to person, place, and time.  Incision is well healed.  There is no erythema or exudate.  There is no sign of any infection. He is NVI. Moderate edema of the hand and fingers. Decreased finger flexion secondary to edema. He is most swollen and stiff at the index with ttp at the volar P1. No erythema. Otherwise non tender throughout the finger, hand.    Assessment: status post left carpal tunnel release by Dr. An on 6/19/23    Plan:  Bashir was seen today for pain and post-op evaluation.    Diagnoses and all orders for this visit:    Post-operative state  -     US Extremity Non Vascular Complete Left; Future    Finger stiffness, left  -     US Extremity Non Vascular Complete Left; Future    Localized swelling, mass and lump, left upper limb  -     US Extremity Non Vascular Complete Left; Future          PO instruction reviewed and provided to patient  Continue regular therapy, ROM   Will get US left index   RTC 2 weeks, following US           "

## 2023-07-13 NOTE — PATIENT INSTRUCTIONS
[facility], OCCUPATIONAL THERAPY  HOME EXERCISE PROGRAM      Use el guante de compresión por la noche para ayudar con la hinchazón.      Deven el masaje por 2-3 minutos 4 veces al día:        Deven 10 repeticiones de cada ejercicio 4 veces cada día:

## 2023-07-18 ENCOUNTER — HOSPITAL ENCOUNTER (OUTPATIENT)
Dept: RADIOLOGY | Facility: OTHER | Age: 88
Discharge: HOME | End: 2023-07-18
Attending: PHYSICIAN ASSISTANT
Payer: MEDICARE

## 2023-07-18 DIAGNOSIS — Z98.890 POST-OPERATIVE STATE: ICD-10-CM

## 2023-07-18 DIAGNOSIS — M25.642 FINGER STIFFNESS, LEFT: ICD-10-CM

## 2023-07-18 DIAGNOSIS — R22.32 LOCALIZED SWELLING, MASS AND LUMP, LEFT UPPER LIMB: ICD-10-CM

## 2023-07-18 PROCEDURE — 76882 US LMTD JT/FCL EVL NVASC XTR: CPT | Mod: 26,LT,, | Performed by: RADIOLOGY

## 2023-07-18 PROCEDURE — 76882 US LMTD JT/FCL EVL NVASC XTR: CPT | Mod: TC,LT

## 2023-07-18 PROCEDURE — 76882 US EXTREMITY NON VASCULAR LIMITED LEFT: ICD-10-PCS | Mod: 26,LT,, | Performed by: RADIOLOGY

## 2023-07-26 ENCOUNTER — TELEPHONE (OUTPATIENT)
Dept: ORTHOPEDICS | Facility: CLINIC | Age: 88
End: 2023-07-26
Payer: MEDICARE

## 2023-07-27 ENCOUNTER — OFFICE VISIT (OUTPATIENT)
Dept: ORTHOPEDICS | Facility: CLINIC | Age: 88
End: 2023-07-27
Payer: MEDICARE

## 2023-07-27 VITALS — HEIGHT: 65 IN | WEIGHT: 141 LBS | BODY MASS INDEX: 23.49 KG/M2

## 2023-07-27 DIAGNOSIS — Z98.890 POST-OPERATIVE STATE: Primary | ICD-10-CM

## 2023-07-27 PROCEDURE — 1125F PR PAIN SEVERITY QUANTIFIED, PAIN PRESENT: ICD-10-PCS | Mod: CPTII,S$GLB,, | Performed by: PHYSICIAN ASSISTANT

## 2023-07-27 PROCEDURE — 99024 PR POST-OP FOLLOW-UP VISIT: ICD-10-PCS | Mod: S$GLB,,, | Performed by: PHYSICIAN ASSISTANT

## 2023-07-27 PROCEDURE — 99024 POSTOP FOLLOW-UP VISIT: CPT | Mod: S$GLB,,, | Performed by: PHYSICIAN ASSISTANT

## 2023-07-27 PROCEDURE — 1159F MED LIST DOCD IN RCRD: CPT | Mod: CPTII,S$GLB,, | Performed by: PHYSICIAN ASSISTANT

## 2023-07-27 PROCEDURE — 1101F PR PT FALLS ASSESS DOC 0-1 FALLS W/OUT INJ PAST YR: ICD-10-PCS | Mod: CPTII,S$GLB,, | Performed by: PHYSICIAN ASSISTANT

## 2023-07-27 PROCEDURE — 3288F FALL RISK ASSESSMENT DOCD: CPT | Mod: CPTII,S$GLB,, | Performed by: PHYSICIAN ASSISTANT

## 2023-07-27 PROCEDURE — 99999 PR PBB SHADOW E&M-EST. PATIENT-LVL III: ICD-10-PCS | Mod: PBBFAC,,, | Performed by: PHYSICIAN ASSISTANT

## 2023-07-27 PROCEDURE — 3288F PR FALLS RISK ASSESSMENT DOCUMENTED: ICD-10-PCS | Mod: CPTII,S$GLB,, | Performed by: PHYSICIAN ASSISTANT

## 2023-07-27 PROCEDURE — 1125F AMNT PAIN NOTED PAIN PRSNT: CPT | Mod: CPTII,S$GLB,, | Performed by: PHYSICIAN ASSISTANT

## 2023-07-27 PROCEDURE — 1101F PT FALLS ASSESS-DOCD LE1/YR: CPT | Mod: CPTII,S$GLB,, | Performed by: PHYSICIAN ASSISTANT

## 2023-07-27 PROCEDURE — 99999 PR PBB SHADOW E&M-EST. PATIENT-LVL III: CPT | Mod: PBBFAC,,, | Performed by: PHYSICIAN ASSISTANT

## 2023-07-27 PROCEDURE — 1159F PR MEDICATION LIST DOCUMENTED IN MEDICAL RECORD: ICD-10-PCS | Mod: CPTII,S$GLB,, | Performed by: PHYSICIAN ASSISTANT

## 2023-07-27 NOTE — PROGRESS NOTES
"Mr. Ramos is here today for a post-operative visit.  He is 6 weeks status post left carpal tunnel release by Dr. An on 6/19/23. He reports that he is doing okay. He continues to have notable edema and decreased finger motion. Also notes pain. He began therapy several weeks ago attended one session but has not returned.  He denies fever, chills, and sweats since the time of the surgery.     Of note : Pt had severe compression of the median nerve.     Physical exam:    Vitals:    07/27/23 0937   Weight: 64 kg (141 lb)   Height: 5' 5" (1.651 m)   PainSc: 10-Worst pain ever       Vital signs are stable, patient is afebrile.  Patient is well dressed and well groomed, no acute distress.  Alert and oriented to person, place, and time.  Incision is well healed.  There is no erythema or exudate.  There is no sign of any infection. He is NVI. Moderate edema of the hand and fingers. Decreased finger flexion secondary to edema. He is most swollen and stiff at the index.    US L index   FINDINGS:  I see no discrete solid or soft tissue focus in the subcutaneous tissues.  If the palpable mass persists or enlarges, correlation with MRI may be considered in further evaluating.    Assessment: status post left carpal tunnel release by Dr. An on 6/19/23    Plan:  Bashir was seen today for post-op evaluation, swelling, pain and numbness.    Diagnoses and all orders for this visit:    Post-operative state  -     Ambulatory referral/consult to Pain Clinic; Future        PO instruction reviewed and provided to patient  Continue regular therapy, ROM. They will call to get scheduled   Compressive glove provided today for edema   Pain management referral placed he may benefit from stellate ganglion blocks possible crps   RTC 4-6 wks          "

## 2023-08-07 ENCOUNTER — OFFICE VISIT (OUTPATIENT)
Dept: PAIN MEDICINE | Facility: CLINIC | Age: 88
End: 2023-08-07
Payer: MEDICARE

## 2023-08-07 VITALS
DIASTOLIC BLOOD PRESSURE: 82 MMHG | BODY MASS INDEX: 22.44 KG/M2 | HEART RATE: 47 BPM | HEIGHT: 65 IN | RESPIRATION RATE: 18 BRPM | WEIGHT: 134.69 LBS | OXYGEN SATURATION: 100 % | TEMPERATURE: 98 F | SYSTOLIC BLOOD PRESSURE: 145 MMHG

## 2023-08-07 DIAGNOSIS — G56.42 COMPLEX REGIONAL PAIN SYNDROME TYPE 2 OF LEFT UPPER EXTREMITY: Primary | ICD-10-CM

## 2023-08-07 DIAGNOSIS — R29.898 DECREASED GRIP STRENGTH OF LEFT HAND: ICD-10-CM

## 2023-08-07 DIAGNOSIS — G89.29 CHRONIC PAIN OF LEFT HAND: ICD-10-CM

## 2023-08-07 DIAGNOSIS — M79.642 CHRONIC PAIN OF LEFT HAND: ICD-10-CM

## 2023-08-07 DIAGNOSIS — Z98.890 POST-OPERATIVE STATE: ICD-10-CM

## 2023-08-07 PROCEDURE — 1125F AMNT PAIN NOTED PAIN PRSNT: CPT | Mod: CPTII,S$GLB,, | Performed by: ANESTHESIOLOGY

## 2023-08-07 PROCEDURE — 1159F PR MEDICATION LIST DOCUMENTED IN MEDICAL RECORD: ICD-10-PCS | Mod: CPTII,S$GLB,, | Performed by: ANESTHESIOLOGY

## 2023-08-07 PROCEDURE — 1101F PR PT FALLS ASSESS DOC 0-1 FALLS W/OUT INJ PAST YR: ICD-10-PCS | Mod: CPTII,S$GLB,, | Performed by: ANESTHESIOLOGY

## 2023-08-07 PROCEDURE — 1160F PR REVIEW ALL MEDS BY PRESCRIBER/CLIN PHARMACIST DOCUMENTED: ICD-10-PCS | Mod: CPTII,S$GLB,, | Performed by: ANESTHESIOLOGY

## 2023-08-07 PROCEDURE — 1160F RVW MEDS BY RX/DR IN RCRD: CPT | Mod: CPTII,S$GLB,, | Performed by: ANESTHESIOLOGY

## 2023-08-07 PROCEDURE — 99999 PR PBB SHADOW E&M-EST. PATIENT-LVL V: ICD-10-PCS | Mod: PBBFAC,,, | Performed by: ANESTHESIOLOGY

## 2023-08-07 PROCEDURE — 1125F PR PAIN SEVERITY QUANTIFIED, PAIN PRESENT: ICD-10-PCS | Mod: CPTII,S$GLB,, | Performed by: ANESTHESIOLOGY

## 2023-08-07 PROCEDURE — 1159F MED LIST DOCD IN RCRD: CPT | Mod: CPTII,S$GLB,, | Performed by: ANESTHESIOLOGY

## 2023-08-07 PROCEDURE — 99999 PR PBB SHADOW E&M-EST. PATIENT-LVL V: CPT | Mod: PBBFAC,,, | Performed by: ANESTHESIOLOGY

## 2023-08-07 PROCEDURE — 1101F PT FALLS ASSESS-DOCD LE1/YR: CPT | Mod: CPTII,S$GLB,, | Performed by: ANESTHESIOLOGY

## 2023-08-07 PROCEDURE — 3288F FALL RISK ASSESSMENT DOCD: CPT | Mod: CPTII,S$GLB,, | Performed by: ANESTHESIOLOGY

## 2023-08-07 PROCEDURE — 3288F PR FALLS RISK ASSESSMENT DOCUMENTED: ICD-10-PCS | Mod: CPTII,S$GLB,, | Performed by: ANESTHESIOLOGY

## 2023-08-07 PROCEDURE — 99214 PR OFFICE/OUTPT VISIT, EST, LEVL IV, 30-39 MIN: ICD-10-PCS | Mod: GC,S$GLB,, | Performed by: ANESTHESIOLOGY

## 2023-08-07 PROCEDURE — 99214 OFFICE O/P EST MOD 30 MIN: CPT | Mod: GC,S$GLB,, | Performed by: ANESTHESIOLOGY

## 2023-08-07 NOTE — PROGRESS NOTES
PCP: Juan Casey MD    REFERRING PHYSICIAN: Veronica Valverde PA-C    CHIEF COMPLAINT: Left Index Finger Pain    Interval History 8/7/23:  Patient presents to clinic for follow up of Left wrist and index finger pain. Patient last saw Dr. Bradley 8/13/21 for same pain. Since that time, patient has had a Left carpal tunnel release with Dr. An on 6/19/23. Patient states that since that time, his left wrist pain has improved, however, the digits of his hand remain stiff and painful with limited range of motion. Patient describes a cramping, aching pain in his digits. Patient is supposed to start physical therapy soon, but has not yet been called to schedule PT. Pain is exacerbated by gripping, flexion, activity. Pain is improved with rest. Currently taking tylenol and tramadol 50mg q6h PRN with minimal to no relief. Pain is currently 9/10. Symptoms interfere with daily activity. The patient feels like symptoms have been stable.    Original HISTORY OF PRESENT ILLNESS: Bashirsea Ramos Sr. presents to the clinic for the evaluation of the above pain. The pain started 1 month ago.    Original Pain Description:  Patient is [place] Speaking. An  was used to for the collection of the patient's history, examination, assessment and plan.  The pain is located in the L index finger, starting at the MCP jt and radiates to the PIP and DIP. The pain is described as burning. Exacerbating factors: Flexing. Mitigating factors nothing. Symptoms interfere with daily activity. The patient feels like symptoms have been worsening. Patient denies night fever/night sweats, significant weight loss, significant motor weakness and loss of sensations.    Original PAIN SCORES:  Best: Pain is 6  Worst: Pain is 10  Usually: Pain is 8  Current: Pain is 8    INTERVAL HISTORY:     6 weeks of Conservative therapy (PT/Chiro/Home Exercises with Dates)  None - PT supposed to start soon    Treatments / Medications:  (Ice/Heat/NSAIDS/APAP/etc):  Allopurinol  Tramadol  Acetaminophen     Report: reviewed    Interventional Pain Procedures: (Previous injections)  None    Past Medical History:   Diagnosis Date    Gout, unspecified     Hyperglycemia 2017    Hyperlipidemia     Hypertension     Nuclear sclerosis - Both Eyes 2013    Prediabetes 10/3/2017    Unspecified hereditary and idiopathic peripheral neuropathy      Past Surgical History:   Procedure Laterality Date    BACK SURGERY      CARPAL TUNNEL RELEASE Left 2023    Procedure: RELEASE, CARPAL TUNNEL,LEFT;  Surgeon: Anh An MD;  Location: [facility];  Service: Orthopedics;  Laterality: Left;    CATARACT EXTRACTION  10/29/13    left eye    CATARACT EXTRACTION W/  INTRAOCULAR LENS IMPLANT  10/15/13    OD (dr. grayson)    EYE SURGERY       Social History     Socioeconomic History    Marital status:    Tobacco Use    Smoking status: Former     Current packs/day: 0.00     Types: Cigarettes     Quit date: 1982     Years since quittin.9    Smokeless tobacco: Never   Substance and Sexual Activity    Alcohol use: No    Drug use: No    Sexual activity: Not Currently     Partners: Female     Social Determinants of Health     Stress: No Stress Concern Present (2020)    [employer] - Occupational Stress Questionnaire     Feeling of Stress : Not at all     Family History   Problem Relation Age of Onset    No Known Problems Mother     No Known Problems Father     Amblyopia Neg Hx     Blindness Neg Hx     Cancer Neg Hx     Cataracts Neg Hx     Diabetes Neg Hx     Glaucoma Neg Hx     Hypertension Neg Hx     Macular degeneration Neg Hx     Retinal detachment Neg Hx     Strabismus Neg Hx     Stroke Neg Hx     Thyroid disease Neg Hx        Review of patient's allergies indicates:   Allergen Reactions    Shellfish containing products Anaphylaxis    Iodine and iodide containing products     Pcn [penicillins] Rash        Current Outpatient Medications   Medication Sig    acetaminophen (TYLENOL) 325 MG tablet Take 2 tablets (650 mg total) by mouth every 8 (eight) hours as needed for Pain.    allopurinoL (ZYLOPRIM) 100 MG tablet Take 1 tablet (100 mg total) by mouth once daily.    amitriptyline (ELAVIL) 50 MG tablet Take 50 mg by mouth every evening.    amLODIPine (NORVASC) 2.5 MG tablet Take 1 tablet (2.5 mg total) by mouth once daily.    aspirin (ECOTRIN) 81 MG EC tablet Take 1 tablet (81 mg total) by mouth once daily.    benazepriL (LOTENSIN) 20 MG tablet Take 1 tablet (20 mg total) by mouth once daily.    simvastatin (ZOCOR) 40 MG tablet TAKE ONE TABLET BY MOUTH EVERY EVENING    sulfamethoxazole-trimethoprim 800-160mg (BACTRIM DS) 800-160 mg Tab Take 1 tablet by mouth 2 (two) times daily.    tadalafiL (CIALIS) 20 MG Tab TAKE ONE (1) TABLET (20 MG TOTAL) BY MOUTH ONCE DAILY.    tamsulosin (FLOMAX) 0.4 mg Cap Take 1 capsule (0.4 mg total) by mouth once daily.    traMADoL (ULTRAM) 50 mg tablet Take 1 tablet (50 mg total) by mouth every 6 (six) hours as needed for Pain.    zolpidem (AMBIEN) 5 MG Tab Take 1 tablet (5 mg total) by mouth nightly as needed (trouble sleeping).     No current facility-administered medications for this visit.       ROS:  GENERAL: No fever. No chills. No fatigue. Denies weight loss. Denies weight gain.  HEENT: Denies headaches. Denies vision change. Denies eye pain. Denies double vision. Denies ear pain.   CV: Denies chest pain.   PULM: Denies of shortness of breath.  GI: Denies constipation. No diarrhea. No abdominal pain. Denies nausea. Denies vomiting. No blood in stool.  HEME: Denies bleeding problems.  : Denies urgency. No painful urination. No blood in urine.  MS: L MCP/PIP/DIP joint stiffness in 2-5th digits. L 2-5th finger joint swelling.  Denies back pain. Bilateral carpal tunnel syndrome. L ankle gout  SKIN: Denies rash.   NEURO: Denies seizures. No weakness.  PSYCH:  Denies difficulty  "sleeping. No anxiety. Denies depression. No suicidal thoughts.       VITALS:   Vitals:    08/07/23 1102   BP: (!) 145/82   Pulse: (!) 47   Resp: 18   Temp: 97.5 °F (36.4 °C)   TempSrc: Oral   SpO2: 100%   Weight: 61.1 kg (134 lb 11.2 oz)   Height: 5' 5" (1.651 m)   PainSc:   8   PainLoc: Hand         PHYSICAL EXAM:   GENERAL: Well appearing, in no acute distress, alert and oriented x3.  PSYCH:  Mood and affect appropriate.  SKIN: Skin color, texture, turgor normal, no rashes or lesions. Skin of left hand and digits shiny when compared to right  HEENT:  Normocephalic, atraumatic. Cranial nerves grossly intact.  PULM: No evidence of respiratory difficulty, symmetric chest rise.  GI:  Non-distended  BACK: Normal range of motion. No pain to palpation over the spinous processes.  EXTREMITIES: Edema and skin shininess in left hand, more pronounced in 2-5th digits. Fingers of left hand and left hand TTP over MCP>PIP>DIP. Swelling and Warmth over L fingers. Limited RoM w flexion and extension of L wrist and all digits. Hyperalgesia to light touch.  MUSCULOSKELETAL: Shoulder, hip, and knee provocative maneuvers are negative. Bilateral upper and lower extremity strength is normal and symmetric. No atrophy is noted.  NEURO: Sensation is equal and appropriate bilaterally. Bilateral upper and lower extremity coordination and muscle stretch reflexes are physiologic and symmetric.   GAIT: normal.      LABS:  Lab Results   Component Value Date    WBC 5.77 06/28/2023    HGB 12.5 (L) 06/28/2023    HCT 38.2 (L) 06/28/2023    MCV 89 06/28/2023     06/28/2023       BMP  Lab Results   Component Value Date     06/28/2023    K 4.0 06/28/2023     06/28/2023    CO2 21 (L) 06/28/2023    BUN 24 (H) 06/28/2023    CREATININE 1.1 06/28/2023    CALCIUM 9.7 06/28/2023    ANIONGAP 12 06/28/2023    ESTGFRAFRICA >60 03/27/2022    EGFRNONAA >60 03/27/2022     Lab Results   Component Value Date    HGBA1C 5.6 10/03/2017 "         IMAGING:    X-Ray Hand Complete Left  Order: [id number]  Status: Final result     Visible to patient: No (inaccessible in Patient Portal)     Next appt: Today at 01:30 PM in Outpatient Rehab (Livia Frias OT)     Dx: Left hand pain     0 Result Notes  Details    Reading Physician Reading Date Result Priority   Sadie Murphy MD  [phone] 4/17/2023 Routine     Narrative & Impression  EXAMINATION:  XR HAND COMPLETE 3 VIEW LEFT     CLINICAL HISTORY:  . Pain in left hand     TECHNIQUE:  PA, lateral, and oblique views of the left hand were performed.     COMPARISON:  08/13/2021     FINDINGS:  No acute fracture or dislocation seen.     No significant soft tissue edema or radiopaque retained foreign body.  Stable soft tissue calcifications along the 1st, 3rd, 4th, and 5th MCP joints, volar aspect of the hand at the level of the 4th metacarpal, and about the wrist.     Impression:     No acute osseous abnormality seen.     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  Those adjacent to the 4th metacarpal may be the sequela of prior injury.        Electronically signed by: Sadie Murphy  Date:                                            04/17/2023  Time:                                           12:19         ASSESSMENT: 88 y.o. year old male with pain, consistent with    Encounter Diagnoses   Name Primary?    Complex regional pain syndrome type 2 of left upper extremity Yes    Post-operative state     Chronic pain of left hand     Decreased  strength of left hand        PLAN:  Previous records and imaging reviewed  Continue current medications as he is receiving some benefit without adverse side effects  Referral placed to PT to help improve strength and mobility of hand  Schedule Left Stellate ganglion block in a series of 3 to help with PT  RTC 2 weeks after last injection for follow up  Counseled patient on importance of elevation, PT, and activity  modification.      Cesar Chacko MD  08/07/2023      I have reviewed and concur with the resident's history, physical, assessment, and plan.  I have personally interviewed and examined the patient at bedside.  See below addendum for my evaluation and additional findings.    Kali Holt MD

## 2023-08-09 ENCOUNTER — CLINICAL SUPPORT (OUTPATIENT)
Dept: REHABILITATION | Facility: HOSPITAL | Age: 88
End: 2023-08-09
Payer: MEDICARE

## 2023-08-09 DIAGNOSIS — M79.642 LEFT HAND PAIN: ICD-10-CM

## 2023-08-09 DIAGNOSIS — M79.642 CHRONIC PAIN OF LEFT HAND: ICD-10-CM

## 2023-08-09 DIAGNOSIS — G56.42 COMPLEX REGIONAL PAIN SYNDROME TYPE 2 OF LEFT UPPER EXTREMITY: ICD-10-CM

## 2023-08-09 DIAGNOSIS — G89.29 CHRONIC PAIN OF LEFT HAND: ICD-10-CM

## 2023-08-09 DIAGNOSIS — R29.898 DECREASED GRIP STRENGTH OF LEFT HAND: Primary | ICD-10-CM

## 2023-08-09 PROCEDURE — 97530 THERAPEUTIC ACTIVITIES: CPT

## 2023-08-09 PROCEDURE — 97140 MANUAL THERAPY 1/> REGIONS: CPT

## 2023-08-09 PROCEDURE — 97018 PARAFFIN BATH THERAPY: CPT | Mod: 59

## 2023-08-09 NOTE — PROGRESS NOTES
Occupational Therapy Treatment Note     Date: 8/9/2023  Name: Bashir Ramos SrAlina  Clinic Number: [id number]    Therapy Diagnosis:   Encounter Diagnoses   Name Primary?    Chronic pain of left hand     Complex regional pain syndrome type 2 of left upper extremity     Decreased  strength of left hand Yes    Left hand pain      Physician: Veronica Valverde PA-C    Referring Physician: Veronica Valverde PA-C  Physician Orders: Eval and treat  Order comments: status post left carpal tunnel release by Dr. An on 6/19/23 2x/wk x 6 wk   Date of Return to MD: TBD     Date of Surgery: 6/19/23  Procedure: Left CTR  Evaluation Date: 7/13/2023  Authorization Period: 7/13/23 - 9/7/23  Plan of Care Expiration: 10/5/23  FOTO Completion: Initial eval (7/13/2023)  FOTO #2:  FOTO #3:    Visit #/ Visits Authorized: 2 of 1    Time In: 1:30 pm  Time Out: 2:15 pm  Total Billable Time: 45 minutes    Precautions:  Standard and Diabetes      Subjective     Pt reports: he has pain in his fingers  he was compliant with home exercise program given last session.   Response to previous treatment: none noted yet    Pain: 9/10  Location: left fingers      OBJECTIVE     Observation/Appearance:   Left volar wrist scar thick with poor mobility, noted dead tissue surrounding  Atrophy about Left thenar, weakness about thumb and IF during full fist with mild Hand of [place] pattern  Moderate swelling throughout hand     Sensation: Patient reports numbness/tingling in the digits 1-3  Median Nerve Distribution    Left  7/13/23   Monofilament Testing     Normal 1.65-2.83     Diminished Light Touch 3.22-3.61 Left thumb   Diminished Protective 3.84-4.31 Left IF/MF   Loss of Protective 4.56-6.65     Untestable >6.65              Edema:  (Measured circumferential, in centimeters)  Hand/Wrist: Right  7/13/2023 Left  7/13/2023   Wrist Crease 16.5 cm 17.7 cm   DPC 20.3 cm 21.0 cm         ELBOW, WRIST RANGE OF MOTION:   Measured in degrees of active motion  with goniometer    Right  7/13/23 Left  7/13/23   Pronation/Supination WNL WNL   Wrist Extension/Flexion 60/40 55/15   Ulnar/Radial Deviation 20/20 15/20      Digit AROM:  Measured in degrees of active motion with goniometer and/or distance measured from finger tip to the distal palmar crease (DPC)    Right  7/13/2023 Left  7/13/2023           Index: MP    /65              PIP       /43              DIP   /18              GOULD   126           Long:  MP   /62              PIP   /70              DIP   /63              GOULD   195           Ring:   MP   /70              PIP   /85              DIP   /35              GOULD   190           Small:  MP   /70               PIP   /52               DIP   /35              GOULD   157           Thumb: MP /65 /50                IP /40 /30       Rad ADD/ABD           Pal ADD/ABD           Opposition Base of SF Unable to oppose to SF         STRENGTH: Not tested due to precautions  (Measured in pounds using a Dynamometer and pinch meter)    Right  7/13/2023 Left  7/13/2023    Setting 2        Average       Key       3 Pt       Tip           Treatment     Bashir received the following supervised modalities after being cleared for contradictions for 10 minutes:   -Paraffin to decrease pain and stiffness and increase tissue elasticity    Bashir received the following manual therapy techniques for 10 minutes:   -IASTM, STM and retrograde massage to decrease pain and stiffness in surrounding tissues    Bashir received therapeutic activities for 25 minutes including:  -TGEs  -Blocking exercises  -Boncarbo  with manipulation and translation x 1 container  -Yellow theraputty pancake press, dowel press, and roll and pinch x 5 min  -Wrist 3 ways with 1# x 15 reps  -Yellow flexbar smiles, frowns and rolls x 20 reps    Home Exercises and Education Provided     Education provided:   - Continue HEP  - Progress towards goals     Written Home Exercises Provided: Patient instructed to cont prior  HEP.  Exercises were reviewed and Bashir was able to demonstrate them prior to the end of the session.  Bashir demonstrated good  understanding of the HEP provided.   .   See EMR under Patient Instructions for exercises provided prior visit.        Assessment     Pt would continue to benefit from skilled OT. He demonstrated limited AROM in IF. Pt performed all exercises without difficulty but held IF out straight. Decreased pain reported at end of session. Plan to progress as tolerated.     Bashir is progressing well towards his goals and there are no updates to goals at this time. Pt prognosis is Good.     Pt will continue to benefit from skilled outpatient occupational therapy to address the deficits listed in the problem list on initial evaluation provide pt/family education and to maximize pt's level of independence in the home and community environment.     Anticipated barriers to occupational therapy: none    Pt's spiritual, cultural and educational needs considered and pt agreeable to plan of care and goals.    Goals:  The following goals were discussed with the patient and patient is in agreement with them as to be addressed in the treatment plan.      Long Term Goals (to be met by discharge):  Date Goal Met:       2.) Bashir Ramos Sr. will return to near to prior level of function for ADLs and household management reporting independence or modified independence.     Goal Status:   In progress     3. Basihr Ramos Sr. will report pain 2 out of 10 at worst to increase functional use of Left hand for ADL/IADL tasks.     Goal Status:   In progress      Short Term Goals (to be met by 8/10/23):  Date Goal Met:       Bashir Ramos Sr. will be independent with home exercise program with written instructions.     Goal Status:   In progress     Bashir Ramos Sr. will demonstrate 30 degrees of wrist flexion to improve functional performance in ADLs/work/leisure tasks.     Goal Status:   In progress     Bashir  Richard Kumar will demonstrate 210 degrees of GOULD of all digits to improve functional performance in ADLs/work/leisure tasks.       Goal Status:   In progress        Bashir Ramos Sr. will demonstrate within 0.5 cm of DPC edema measurement compared to other hand to improve functional grasp for ADLs/work/leisure tasks.     Goal Status:   In progress     Bashir Ramos Sr. will demonstrate the ability to complete ADL/IADL tasks with 4/10 pain.     Goal Status:   In progress       Plan   Continue with POC  Updates/Grading for next session: progress as tolerated      Margaret Boasberg, OT

## 2023-08-14 ENCOUNTER — CLINICAL SUPPORT (OUTPATIENT)
Dept: REHABILITATION | Facility: HOSPITAL | Age: 88
End: 2023-08-14
Payer: MEDICARE

## 2023-08-14 DIAGNOSIS — M79.642 LEFT HAND PAIN: ICD-10-CM

## 2023-08-14 DIAGNOSIS — R29.898 DECREASED GRIP STRENGTH OF LEFT HAND: Primary | ICD-10-CM

## 2023-08-14 PROCEDURE — 97140 MANUAL THERAPY 1/> REGIONS: CPT

## 2023-08-14 PROCEDURE — 97530 THERAPEUTIC ACTIVITIES: CPT

## 2023-08-14 PROCEDURE — 97018 PARAFFIN BATH THERAPY: CPT

## 2023-08-14 NOTE — PROGRESS NOTES
"  Occupational Therapy Treatment Note     Date: 8/14/2023  Name: Bashir Ramos Sr.  Clinic Number: [id number]    Therapy Diagnosis:   Encounter Diagnoses   Name Primary?    Decreased  strength of left hand Yes    Left hand pain        Physician: Veronica Valverde PA-C    Referring Physician: Veronica Valverde PA-C  Physician Orders: Eval and treat  Order comments: status post left carpal tunnel release by Dr. An on 6/19/23 2x/wk x 6 wk   Date of Return to MD: TBGABI     Date of Surgery: 6/19/23  Procedure: Left CTR  Evaluation Date: 7/13/2023  Authorization Period: 7/13/23 - 9/7/23  Plan of Care Expiration: 10/5/23  FOTO Completion: Initial eval (7/13/2023)  FOTO #2:  FOTO #3:    Visit #/ Visits Authorized: 3 / 23    Time In: 1:30 pm  Time Out: 2:15 pm  Total Billable Time: 45 minutes    Precautions:  Standard and Diabetes      Subjective     Pt reports: Pain and stiffness in fingers   he was compliant with home exercise program given last session.   Response to previous treatment: none noted yet    Pain: 7/10  Location: left fingers      OBJECTIVE     Objective measurers taken at reassessment unless otherwise specified.       Treatment     Bashir received the following supervised modalities after being cleared for contradictions for 10 minutes:   -Paraffin to decrease pain and stiffness and increase tissue elasticity    Bashir received the following manual therapy techniques for 10 minutes:   -IASTM, STM and retrograde massage to decrease pain and stiffness in surrounding tissues and scared palmar region    Bashir received therapeutic activities for 25 minutes including:  -TGEs  -Blocking exercises IP exercises for IF   -[place]  with manipulation and translation x 1 container  -Yellow theraputty pancake press, dowel press, and roll and pinch x 5 min - issued putty for HEP  -Wrist 3 ways with 1# x 15 reps  -Yellow clothespin 2pt "C" pom   -Yellow flexbar smiles, frowns and rolls x 20 reps    Home Exercises " and Education Provided     Education provided:   - Continue HEP  - Progress towards goals     Written Home Exercises Provided: Patient instructed to cont prior HEP.  Exercises were reviewed and Bashir was able to demonstrate them prior to the end of the session.  Bashir demonstrated good  understanding of the HEP provided.   .   See EMR under Patient Instructions for exercises provided prior visit.        Assessment     Pt would continue to benefit from skilled OT. Pt performed all exercises without difficulty but held IF out straight. Decreased pain reported at end of session. Plan to progress as tolerated.     Bashir is progressing well towards his goals and there are no updates to goals at this time. Pt prognosis is Good.     Pt will continue to benefit from skilled outpatient occupational therapy to address the deficits listed in the problem list on initial evaluation provide pt/family education and to maximize pt's level of independence in the home and community environment.     Anticipated barriers to occupational therapy: none    Pt's spiritual, cultural and educational needs considered and pt agreeable to plan of care and goals.    Goals:  The following goals were discussed with the patient and patient is in agreement with them as to be addressed in the treatment plan.      Long Term Goals (to be met by discharge):  Date Goal Met:       2.) Bsahir Ramos Sr. will return to near to prior level of function for ADLs and household management reporting independence or modified independence.     Goal Status:   In progress     3. Bashir Ramos Sr. will report pain 2 out of 10 at worst to increase functional use of Left hand for ADL/IADL tasks.     Goal Status:   In progress      Short Term Goals (to be met by 8/10/23):  Date Goal Met:       Bashir Ramos Sr. will be independent with home exercise program with written instructions.     Goal Status:   In progress     Bashir Ramos Sr. will demonstrate 30 degrees of  wrist flexion to improve functional performance in ADLs/work/leisure tasks.     Goal Status:   In progress     Bashir Ramos Sr. will demonstrate 210 degrees of GOULD of all digits to improve functional performance in ADLs/work/leisure tasks.       Goal Status:   In progress        Bashir Ramos Sr. will demonstrate within 0.5 cm of DPC edema measurement compared to other hand to improve functional grasp for ADLs/work/leisure tasks.     Goal Status:   In progress     Bashir Ramos Sr. will demonstrate the ability to complete ADL/IADL tasks with 4/10 pain.     Goal Status:   In progress       Plan   Continue with POC  Updates/Grading for next session: progress as tolerated      Livia Frias, OT

## 2023-08-16 ENCOUNTER — CLINICAL SUPPORT (OUTPATIENT)
Dept: REHABILITATION | Facility: HOSPITAL | Age: 88
End: 2023-08-16
Payer: MEDICARE

## 2023-08-16 DIAGNOSIS — R29.898 DECREASED GRIP STRENGTH OF LEFT HAND: Primary | ICD-10-CM

## 2023-08-16 DIAGNOSIS — M79.642 LEFT HAND PAIN: ICD-10-CM

## 2023-08-16 PROCEDURE — 97018 PARAFFIN BATH THERAPY: CPT | Mod: 59

## 2023-08-16 PROCEDURE — 97530 THERAPEUTIC ACTIVITIES: CPT

## 2023-08-16 PROCEDURE — 97140 MANUAL THERAPY 1/> REGIONS: CPT

## 2023-08-16 NOTE — PROGRESS NOTES
Occupational Therapy Treatment Note     Date: 8/16/2023  Name: Bashir Ramos Sr.  Clinic Number: [id number]    Therapy Diagnosis:   Encounter Diagnoses   Name Primary?    Decreased  strength of left hand Yes    Left hand pain        Physician: Veronica Valverde PA-C    Referring Physician: Veronica Valverde PA-C  Physician Orders: Eval and treat  Order comments: status post left carpal tunnel release by Dr. An on 6/19/23 2x/wk x 6 wk   Date of Return to MD: TBD     Date of Surgery: 6/19/23  Procedure: Left CTR  Evaluation Date: 7/13/2023  Authorization Period: 7/13/23 - 9/7/23  Plan of Care Expiration: 10/5/23  FOTO Completion: Initial eval (7/13/2023)  FOTO #2:  FOTO #3:    Visit #/ Visits Authorized: 4 / 23    Time In: 1:30 pm  Time Out: 2:15 pm  Total Billable Time: 45 minutes    Precautions:  Standard and Diabetes      Subjective     Pt reports: Pain and stiffness in fingers   he was compliant with home exercise program given last session.   Response to previous treatment: none noted yet    Pain: 7/10  Location: left fingers      OBJECTIVE     Objective measurers taken at reassessment unless otherwise specified.       Treatment     Bashir received the following supervised modalities after being cleared for contradictions for 10 minutes:   -Paraffin to decrease pain and stiffness and increase tissue elasticity    Bashir received the following manual therapy techniques for 10 minutes:   -IASTM, STM and retrograde massage to decrease pain and stiffness in surrounding tissues and scared palmar region    Bashir received therapeutic activities for 25 minutes including:  -TGEs  -Blocking exercises IP exercises for IF   -[place]  with manipulation and translation x 1 container  -Red theraputty pancake press, dowel press, and roll and pinch x 5 min - issued putty for HEP  -Wrist 3 ways with 1# x 15 reps  -Yellow flexbar smiles, frowns and rolls x 20 reps  -Golf ball rolling x 2 min    Home Exercises and Education  Provided     Education provided:   - Continue HEP  - Progress towards goals     Written Home Exercises Provided: Patient instructed to cont prior HEP.  Exercises were reviewed and Bashir was able to demonstrate them prior to the end of the session.  Bashir demonstrated good  understanding of the HEP provided.   .   See EMR under Patient Instructions for exercises provided prior visit.        Assessment     Pt would continue to benefit from skilled OT. Pt performed all exercises without difficulty but held IF out straight. Decreased pain reported at end of session. Plan to progress as tolerated.     Bashir is progressing well towards his goals and there are no updates to goals at this time. Pt prognosis is Good.     Pt will continue to benefit from skilled outpatient occupational therapy to address the deficits listed in the problem list on initial evaluation provide pt/family education and to maximize pt's level of independence in the home and community environment.     Anticipated barriers to occupational therapy: none    Pt's spiritual, cultural and educational needs considered and pt agreeable to plan of care and goals.    Goals:  The following goals were discussed with the patient and patient is in agreement with them as to be addressed in the treatment plan.      Long Term Goals (to be met by discharge):  Date Goal Met:       2.) Bashir Ramos Sr. will return to near to prior level of function for ADLs and household management reporting independence or modified independence.     Goal Status:   In progress     3. Bashir Ramos Sr. will report pain 2 out of 10 at worst to increase functional use of Left hand for ADL/IADL tasks.     Goal Status:   In progress      Short Term Goals (to be met by 8/10/23):  Date Goal Met:       Bashir Ramos Sr. will be independent with home exercise program with written instructions.     Goal Status:   In progress     Bashir Ramos Sr. will demonstrate 30 degrees of wrist flexion  to improve functional performance in ADLs/work/leisure tasks.     Goal Status:   In progress     Bashir Ramos Sr. will demonstrate 210 degrees of GOULD of all digits to improve functional performance in ADLs/work/leisure tasks.       Goal Status:   In progress        Bashir Ramos Sr. will demonstrate within 0.5 cm of DPC edema measurement compared to other hand to improve functional grasp for ADLs/work/leisure tasks.     Goal Status:   In progress     Bashir Ramos Sr. will demonstrate the ability to complete ADL/IADL tasks with 4/10 pain.     Goal Status:   In progress       Plan   Continue with POC  Updates/Grading for next session: progress as tolerated      Margaret Boasberg, OT

## 2023-08-22 ENCOUNTER — CLINICAL SUPPORT (OUTPATIENT)
Dept: REHABILITATION | Facility: HOSPITAL | Age: 88
End: 2023-08-22
Payer: MEDICARE

## 2023-08-22 DIAGNOSIS — M79.642 LEFT HAND PAIN: ICD-10-CM

## 2023-08-22 DIAGNOSIS — R29.898 DECREASED GRIP STRENGTH OF LEFT HAND: Primary | ICD-10-CM

## 2023-08-22 PROCEDURE — 97140 MANUAL THERAPY 1/> REGIONS: CPT

## 2023-08-22 PROCEDURE — 97530 THERAPEUTIC ACTIVITIES: CPT

## 2023-08-22 PROCEDURE — 97018 PARAFFIN BATH THERAPY: CPT | Mod: 59

## 2023-08-22 NOTE — PROGRESS NOTES
Occupational Therapy Treatment Note     Date: 8/22/2023  Name: Bashir Ramos Sr.  Clinic Number: [id number]    Therapy Diagnosis:   Encounter Diagnoses   Name Primary?    Decreased  strength of left hand Yes    Left hand pain        Physician: Veronica Valverde PA-C    Referring Physician: Veronica Valverde PA-C  Physician Orders: Eval and treat  Order comments: status post left carpal tunnel release by Dr. An on 6/19/23 2x/wk x 6 wk   Date of Return to MD: TBGABI     Date of Surgery: 6/19/23  Procedure: Left CTR  Evaluation Date: 7/13/2023  Authorization Period: 7/13/23 - 9/7/23  Plan of Care Expiration: 10/5/23  FOTO Completion: Initial eval (7/13/2023)  FOTO #2:  FOTO #3:    Visit #/ Visits Authorized: 5 / 23    Time In: 12:45 pm  Time Out: 1:30 pm  Total Billable Time: 45 minutes    Precautions:  Standard and Diabetes      Subjective     Pt reports: continued Pain and stiffness in fingers   he was compliant with home exercise program given last session.   Response to previous treatment: none noted yet    Pain: 8/10  Location: left fingers      OBJECTIVE     Objective measurers taken at reassessment unless otherwise specified.       Treatment     Bashir received the following supervised modalities after being cleared for contradictions for 10 minutes:   -Paraffin to decrease pain and stiffness and increase tissue elasticity    Bashir received the following manual therapy techniques for 10 minutes:   -IASTM, STM and retrograde massage to decrease pain and stiffness in surrounding tissues and scared palmar region    Bashir received therapeutic activities for 25 minutes including:  -TGEs  -Shoulder, elbow, wrist and finger AROM  -Shoulder blade squeezes and torso rotations  -[place]  with manipulation and translation x 1 container  -Red theraputty pancake press, dowel press, and roll and pinch x 5 min - issued putty for HEP  -Wrist maze x 3 min  -Yellow flexbar smiles, frowns and rolls x 20 reps  -Yellow  digi-extender x 20 reps    Home Exercises and Education Provided     Education provided:   - Continue HEP  - Progress towards goals     Written Home Exercises Provided: Patient instructed to cont prior HEP.  Exercises were reviewed and Bashir was able to demonstrate them prior to the end of the session.  Bashir demonstrated good  understanding of the HEP provided.   .   See EMR under Patient Instructions for exercises provided prior visit.        Assessment     Pt would continue to benefit from skilled OT. Increased pain reported today. Treatment focused on entire UE AROM, which pt displayed limitations in shoulder. He performed all exercises with min difficulty and c/o shoulder pain. Pt has been referred to pain management. Plan to progress as tolerated.     Bashir is progressing well towards his goals and there are no updates to goals at this time. Pt prognosis is Good.     Pt will continue to benefit from skilled outpatient occupational therapy to address the deficits listed in the problem list on initial evaluation provide pt/family education and to maximize pt's level of independence in the home and community environment.     Anticipated barriers to occupational therapy: none    Pt's spiritual, cultural and educational needs considered and pt agreeable to plan of care and goals.    Goals:  The following goals were discussed with the patient and patient is in agreement with them as to be addressed in the treatment plan.      Long Term Goals (to be met by discharge):  Date Goal Met:       2.) Bashir Ramos Sr. will return to near to prior level of function for ADLs and household management reporting independence or modified independence.     Goal Status:   In progress     3. Bashir Ramos Sr. will report pain 2 out of 10 at worst to increase functional use of Left hand for ADL/IADL tasks.     Goal Status:   In progress      Short Term Goals (to be met by 8/10/23):  Date Goal Met:       Bashir Ramos Sr. will be  independent with home exercise program with written instructions.     Goal Status:   In progress     Bashir Ramos Sr. will demonstrate 30 degrees of wrist flexion to improve functional performance in ADLs/work/leisure tasks.     Goal Status:   In progress     Bashir Ramos Sr. will demonstrate 210 degrees of GOULD of all digits to improve functional performance in ADLs/work/leisure tasks.       Goal Status:   In progress        Bashir Ramos Sr. will demonstrate within 0.5 cm of DPC edema measurement compared to other hand to improve functional grasp for ADLs/work/leisure tasks.     Goal Status:   In progress     Bashir Ramos Sr. will demonstrate the ability to complete ADL/IADL tasks with 4/10 pain.     Goal Status:   In progress       Plan   Continue with POC  Updates/Grading for next session: progress as tolerated      Margaret Boasberg, OT

## 2023-08-23 ENCOUNTER — CLINICAL SUPPORT (OUTPATIENT)
Dept: REHABILITATION | Facility: HOSPITAL | Age: 88
End: 2023-08-23
Payer: MEDICARE

## 2023-08-23 DIAGNOSIS — M79.642 LEFT HAND PAIN: ICD-10-CM

## 2023-08-23 DIAGNOSIS — R29.898 DECREASED GRIP STRENGTH OF LEFT HAND: Primary | ICD-10-CM

## 2023-08-23 PROCEDURE — 97018 PARAFFIN BATH THERAPY: CPT | Mod: 59

## 2023-08-23 PROCEDURE — 97140 MANUAL THERAPY 1/> REGIONS: CPT

## 2023-08-23 PROCEDURE — 97530 THERAPEUTIC ACTIVITIES: CPT

## 2023-08-23 NOTE — PROGRESS NOTES
"  Occupational Therapy Treatment Note     Date: 8/23/2023  Name: Bashir Ramos Sr.  Clinic Number: [id number]    Therapy Diagnosis:   Encounter Diagnoses   Name Primary?    Decreased  strength of left hand Yes    Left hand pain        Physician: Veronica Valverde PA-C    Referring Physician: Veronica Valverde PA-C  Physician Orders: Eval and treat  Order comments: status post left carpal tunnel release by Dr. An on 6/19/23 2x/wk x 6 wk   Date of Return to MD: TBGABI     Date of Surgery: 6/19/23  Procedure: Left CTR  Evaluation Date: 7/13/2023  Authorization Period: 7/13/23 - 9/7/23  Plan of Care Expiration: 10/5/23  FOTO Completion: Initial eval (7/13/2023)  FOTO #2:  FOTO #3:    Visit #/ Visits Authorized: 6 / 23    Time In: 1:30 pm  Time Out: 2:15 pm  Total Billable Time: 45 minutes    Precautions:  Standard and Diabetes      Subjective     Pt reports: "it feels the same"  he was compliant with home exercise program given last session.   Response to previous treatment: none noted yet    Pain: 7/10  Location: left fingers      OBJECTIVE     Objective measurers taken at reassessment unless otherwise specified.       Treatment     Bashir received the following supervised modalities after being cleared for contradictions for 10 minutes:   -Paraffin to decrease pain and stiffness and increase tissue elasticity    Bashir received the following manual therapy techniques for 10 minutes:   -IASTM, STM and retrograde massage to decrease pain and stiffness in surrounding tissues and scared palmar region    Bashir received therapeutic activities for 25 minutes including:  -TGEs  -Shoulder, elbow, wrist and finger AROM  -Shoulder blade squeezes and torso rotations  -[place]  with manipulation and translation x 1 container  -Red theraputty gripping  -Wrist maze x 3 min  -Red flexbar smiles, frowns and rolls x 20 reps  -Yellow digi-extender x 20 reps  -Pom pom  with yellow clothespin    Home Exercises and Education " Provided     Education provided:   - Continue HEP  - Progress towards goals     Written Home Exercises Provided: Patient instructed to cont prior HEP.  Exercises were reviewed and Bashir was able to demonstrate them prior to the end of the session.  Bashir demonstrated good  understanding of the HEP provided.   .   See EMR under Patient Instructions for exercises provided prior visit.        Assessment     Pt would continue to benefit from skilled OT. Treatment focused on entire UE AROM, which pt displayed limitations in shoulder. He performed all exercises with min difficulty and c/o shoulder pain. Plan to progress as tolerated.     Bashir is progressing well towards his goals and there are no updates to goals at this time. Pt prognosis is Good.     Pt will continue to benefit from skilled outpatient occupational therapy to address the deficits listed in the problem list on initial evaluation provide pt/family education and to maximize pt's level of independence in the home and community environment.     Anticipated barriers to occupational therapy: none    Pt's spiritual, cultural and educational needs considered and pt agreeable to plan of care and goals.    Goals:  The following goals were discussed with the patient and patient is in agreement with them as to be addressed in the treatment plan.      Long Term Goals (to be met by discharge):  Date Goal Met:       2.) Bashir Ramos Sr. will return to near to prior level of function for ADLs and household management reporting independence or modified independence.     Goal Status:   In progress     3. Bashir Ramos Sr. will report pain 2 out of 10 at worst to increase functional use of Left hand for ADL/IADL tasks.     Goal Status:   In progress      Short Term Goals (to be met by 8/10/23):  Date Goal Met:       Bashir Ramos Sr. will be independent with home exercise program with written instructions.     Goal Status:   In progress     Bashir Ramos Sr. will  demonstrate 30 degrees of wrist flexion to improve functional performance in ADLs/work/leisure tasks.     Goal Status:   In progress     Bashir Ramos Sr. will demonstrate 210 degrees of GOULD of all digits to improve functional performance in ADLs/work/leisure tasks.       Goal Status:   In progress        Bashir Ramos Sr. will demonstrate within 0.5 cm of DPC edema measurement compared to other hand to improve functional grasp for ADLs/work/leisure tasks.     Goal Status:   In progress     Bashir Ramos Sr. will demonstrate the ability to complete ADL/IADL tasks with 4/10 pain.     Goal Status:   In progress       Plan   Continue with POC  Updates/Grading for next session: progress as tolerated      Margaret Boasberg, OT

## 2023-08-28 ENCOUNTER — CLINICAL SUPPORT (OUTPATIENT)
Dept: REHABILITATION | Facility: HOSPITAL | Age: 88
End: 2023-08-28
Payer: MEDICARE

## 2023-08-28 DIAGNOSIS — M79.642 LEFT HAND PAIN: ICD-10-CM

## 2023-08-28 DIAGNOSIS — R29.898 DECREASED GRIP STRENGTH OF LEFT HAND: Primary | ICD-10-CM

## 2023-08-28 PROCEDURE — 97140 MANUAL THERAPY 1/> REGIONS: CPT

## 2023-08-28 PROCEDURE — 97018 PARAFFIN BATH THERAPY: CPT

## 2023-08-28 PROCEDURE — 97530 THERAPEUTIC ACTIVITIES: CPT

## 2023-08-28 NOTE — PROGRESS NOTES
Occupational Therapy Treatment Note     Date: 8/28/2023  Name: Bashir Ramos Sr.  Clinic Number: [id number]    Therapy Diagnosis:   Encounter Diagnoses   Name Primary?    Decreased  strength of left hand Yes    Left hand pain        Physician: Veronica Valverde PA-C    Referring Physician: Veronica Valverde PA-C  Physician Orders: Eval and treat  Order comments: status post left carpal tunnel release by Dr. An on 6/19/23 2x/wk x 6 wk   Date of Return to MD: TBD     Date of Surgery: 6/19/23  Procedure: Left CTR  Evaluation Date: 7/13/2023  Authorization Period: 7/13/23 - 9/7/23  Plan of Care Expiration: 10/5/23  FOTO Completion: Initial eval (7/13/2023)  FOTO #2:  FOTO #3:    Visit #/ Visits Authorized: 7 / 23    Time In: 1:30 pm  Time Out: 2:15 pm  Total Billable Time: 45 minutes    Precautions:  Standard and Diabetes      Subjective     Pt reports: he still has constant pain in his hand  he was compliant with home exercise program given last session.   Response to previous treatment: none noted yet    Pain: 8/10  Location: left fingers      OBJECTIVE     Edema:  (Measured circumferential, in centimeters)  Hand/Wrist: Right  7/13/2023 Left  7/13/2023 Left  8/28/23   Wrist Crease 16.5 cm 17.7 cm 19.0   DPC 20.3 cm 21.0 cm 21.7         ELBOW, WRIST RANGE OF MOTION:   Measured in degrees of active motion with goniometer    Right  7/13/23 Left  7/13/23 Left  8/28/23   Pronation/Supination WNL WNL WNL   Wrist Extension/Flexion 60/40 55/15 55/15   Ulnar/Radial Deviation 20/20 15/20 20/25      Digit AROM:    Right  7/13/2023 Left  7/13/2023 Left  8/28/23            Index: MP    /65 65              PIP       /43 45              DIP   /18 25              GOULD   126             Long:  MP   /62 65              PIP   /70 70              DIP   /63 60              GOULD   195             Ring:   MP   /70 70              PIP   /85 90              DIP   /35 50              GOULD   190             Small:  MP   /70 70                PIP   /52 60               DIP   /35 40              GOULD   157             Thumb: MP /65 /50 50                IP /40 /30 40       Rad ADD/ABD            Pal ADD/ABD            Opposition Base of SF Unable to oppose to SF Tip of RF          and Pinch STRENGTH:   (Measured in pounds using a Dynamometer and pinch meter)    Right  8/28/2023 Left  8/28/2023    Setting 2  50.4 7.2   Key  7 0   3 Pt  7 0       Treatment     Bashir received the following supervised modalities after being cleared for contradictions for 10 minutes:   -Paraffin to decrease pain and stiffness and increase tissue elasticity    Bashir received the following manual therapy techniques for 10 minutes:   -IASTM, STM and retrograde massage to decrease pain and stiffness in surrounding tissues and scared palmar region    Bashir received therapeutic activities for 25 minutes including:  -TGEs  -Shoulder, elbow, wrist and finger AROM  -Shoulder blade squeezes   -[place]  with manipulation and translation x 1 container  -Yellow theraputty pancake press and dowel press x 4 min  -Wrist maze x 3 min  -Red flexbar smiles, frowns and rolls x 20 reps  -Yellow digi-extender x 20 reps    Home Exercises and Education Provided     Education provided:   - Continue HEP  - Progress towards goals     Written Home Exercises Provided: Patient instructed to cont prior HEP.  Exercises were reviewed and Bashir was able to demonstrate them prior to the end of the session.  Bashir demonstrated good  understanding of the HEP provided.   .   See EMR under Patient Instructions for exercises provided prior visit.        Assessment     Pt would continue to benefit from skilled OT. He continues to c/o constant pain in Left hand and wrist. Pt was able to perform all exercises with min difficulty. He displays increased ROM in fingers. Initial  and pinch measurements taken today. Plan to progress as tolerated.     Bashir is progressing well towards his goals and there are  no updates to goals at this time. Pt prognosis is Good.     Pt will continue to benefit from skilled outpatient occupational therapy to address the deficits listed in the problem list on initial evaluation provide pt/family education and to maximize pt's level of independence in the home and community environment.     Anticipated barriers to occupational therapy: none    Pt's spiritual, cultural and educational needs considered and pt agreeable to plan of care and goals.    Goals:  The following goals were discussed with the patient and patient is in agreement with them as to be addressed in the treatment plan.      Long Term Goals (to be met by discharge):  Date Goal Met:       2.) Bashir Richard Sr. will return to near to prior level of function for ADLs and household management reporting independence or modified independence.     Goal Status:   In progress     3. Bashir Richard Sr. will report pain 2 out of 10 at worst to increase functional use of Left hand for ADL/IADL tasks.     Goal Status:   In progress      Short Term Goals (to be met by 8/10/23):  Date Goal Met:       Bashir Ramos Sr. will be independent with home exercise program with written instructions.     Goal Status:   In progress     Bashir Richard Sr. will demonstrate 30 degrees of wrist flexion to improve functional performance in ADLs/work/leisure tasks.     Goal Status:   In progress     Bashir Richard Sr. will demonstrate 210 degrees of GOULD of all digits to improve functional performance in ADLs/work/leisure tasks.       Goal Status:   In progress        Bashir Richard Sr. will demonstrate within 0.5 cm of DPC edema measurement compared to other hand to improve functional grasp for ADLs/work/leisure tasks.     Goal Status:   In progress     Bashir Richard Sr. will demonstrate the ability to complete ADL/IADL tasks with 4/10 pain.     Goal Status:   In progress       Plan   Continue with POC  Updates/Grading for next session: progress as  tolerated      Margaret Boasberg, OT

## 2023-08-29 ENCOUNTER — DOCUMENTATION ONLY (OUTPATIENT)
Dept: ORTHOPEDICS | Facility: CLINIC | Age: 88
End: 2023-08-29
Payer: MEDICARE

## 2023-08-30 ENCOUNTER — CLINICAL SUPPORT (OUTPATIENT)
Dept: REHABILITATION | Facility: HOSPITAL | Age: 88
End: 2023-08-30
Payer: MEDICARE

## 2023-08-30 ENCOUNTER — OFFICE VISIT (OUTPATIENT)
Dept: ORTHOPEDICS | Facility: CLINIC | Age: 88
End: 2023-08-30
Payer: MEDICARE

## 2023-08-30 DIAGNOSIS — M79.642 LEFT HAND PAIN: ICD-10-CM

## 2023-08-30 DIAGNOSIS — R29.898 DECREASED GRIP STRENGTH OF LEFT HAND: Primary | ICD-10-CM

## 2023-08-30 DIAGNOSIS — Z98.890 POST-OPERATIVE STATE: Primary | ICD-10-CM

## 2023-08-30 PROCEDURE — 99024 PR POST-OP FOLLOW-UP VISIT: ICD-10-PCS | Mod: S$GLB,,, | Performed by: PHYSICIAN ASSISTANT

## 2023-08-30 PROCEDURE — 97140 MANUAL THERAPY 1/> REGIONS: CPT

## 2023-08-30 PROCEDURE — 97530 THERAPEUTIC ACTIVITIES: CPT

## 2023-08-30 PROCEDURE — 97018 PARAFFIN BATH THERAPY: CPT | Mod: 59

## 2023-08-30 PROCEDURE — 99024 POSTOP FOLLOW-UP VISIT: CPT | Mod: S$GLB,,, | Performed by: PHYSICIAN ASSISTANT

## 2023-08-30 NOTE — PROGRESS NOTES
Mr. Ramos is here today for a post-operative visit. Pt seen while in therapy.  He is 6 weeks status post left carpal tunnel release by Dr. An on 6/19/23. He reports that he is doing okay. He continues to have notable edema and decreased finger motion. Also notes pain. At the time of his 6 wk PO he had only been to one therapy session but has recently been attending regular therapy. He continues to have pain and stiffness. He has been seen by pain management and dx with CRPS  he is scheduled for upcoming stellate ganglion blocks. He denies fever, chills, and sweats since the time of the surgery.     Of note : Pt had severe compression of the median nerve.     Physical exam:    There were no vitals filed for this visit.  Patient is well dressed and well groomed, no acute distress.  Alert and oriented to person, place, and time.  Incision is well healed.  There is no erythema or exudate.  There is no sign of any infection. He is NVI. Moderate edema of the hand and fingers. Decreased finger flexion secondary to edema. He is most swollen and stiff at the index.    US L index   FINDINGS:  I see no discrete solid or soft tissue focus in the subcutaneous tissues.  If the palpable mass persists or enlarges, correlation with MRI may be considered in further evaluating.    Assessment: status post left carpal tunnel release by Dr. An on 6/19/23    Plan:  Diagnoses and all orders for this visit:    Post-operative state    PO instruction reviewed and provided to patient  Discussed CRPS  Continue regular therapy  He has upcoming appt for stellate ganglion blocks with Dr. Holt   RTC following above

## 2023-08-30 NOTE — PROGRESS NOTES
Occupational Therapy Treatment Note     Date: 8/30/2023  Name: Bashir Ramos SrAlina  Clinic Number: [id number]    Therapy Diagnosis:   Encounter Diagnoses   Name Primary?    Decreased  strength of left hand Yes    Left hand pain        Physician: Veronica Valverde PA-C    Referring Physician: Veronica Valverde PA-C  Physician Orders: Eval and treat  Order comments: status post left carpal tunnel release by Dr. An on 6/19/23 2x/wk x 6 wk   Date of Return to MD: TBD     Date of Surgery: 6/19/23  Procedure: Left CTR  Evaluation Date: 7/13/2023  Authorization Period: 7/13/23 - 9/7/23  Plan of Care Expiration: 10/5/23  FOTO Completion: Initial eval (7/13/2023)  FOTO #2:  FOTO #3:    Visit #/ Visits Authorized: 8 / 23    Time In: 12:45 pm  Time Out: 1:30 pm  Total Billable Time: 45 minutes    Precautions:  Standard and Diabetes      Subjective     Pt reports: he has less pain today  he was compliant with home exercise program given last session.   Response to previous treatment: none noted yet    Pain: 6/10  Location: left fingers      OBJECTIVE     Edema:  (Measured circumferential, in centimeters)  Hand/Wrist: Right  7/13/2023 Left  7/13/2023 Left  8/28/23   Wrist Crease 16.5 cm 17.7 cm 19.0   DPC 20.3 cm 21.0 cm 21.7         ELBOW, WRIST RANGE OF MOTION:   Measured in degrees of active motion with goniometer    Right  7/13/23 Left  7/13/23 Left  8/28/23   Pronation/Supination WNL WNL WNL   Wrist Extension/Flexion 60/40 55/15 55/15   Ulnar/Radial Deviation 20/20 15/20 20/25      Digit AROM:    Right  7/13/2023 Left  7/13/2023 Left  8/28/23            Index: MP    /65 65              PIP       /43 45              DIP   /18 25              GOULD   126             Long:  MP   /62 65              PIP   /70 70              DIP   /63 60              GOULD   195             Ring:   MP   /70 70              PIP   /85 90              DIP   /35 50              GOULD   190             Small:  MP   /70 70               PIP   /52 60                DIP   /35 40              GOULD   157             Thumb: MP /65 /50 50                IP /40 /30 40       Rad ADD/ABD            Pal ADD/ABD            Opposition Base of SF Unable to oppose to SF Tip of RF          and Pinch STRENGTH:   (Measured in pounds using a Dynamometer and pinch meter)    Right  8/28/2023 Left  8/28/2023    Setting 2  50.4 7.2   Key  7 0   3 Pt  7 0       Treatment     Bashir received the following supervised modalities after being cleared for contradictions for 10 minutes:   -Paraffin to decrease pain and stiffness and increase tissue elasticity    Bashir received the following manual therapy techniques for 10 minutes:   -IASTM, STM and retrograde massage to decrease pain and stiffness in surrounding tissues and scared palmar region    Bashir received therapeutic activities for 25 minutes including:  -TGEs  -Shoulder, elbow, wrist and finger AROM  -Shoulder blade squeezes   -[place]  with manipulation and translation x 1 container  -Yellow theraputty pancake press and dowel press x 4 min  -Wrist maze x 3 min  -Red flexbar smiles, frowns and rolls x 20 reps  -Yellow digi-extender x 20 reps    Home Exercises and Education Provided     Education provided:   - Continue HEP  - Progress towards goals     Written Home Exercises Provided: Patient instructed to cont prior HEP.  Exercises were reviewed and Bashir was able to demonstrate them prior to the end of the session.  Bashir demonstrated good  understanding of the HEP provided.   .   See EMR under Patient Instructions for exercises provided prior visit.        Assessment     Pt would continue to benefit from skilled OT. He reports decreased pain today and was able to perform exercises with less difficulty today. Discussed continuing therapy until after he has finished with pain management. Plan to progress as tolerated.    Bashir is progressing well towards his goals and there are no updates to goals at this time. Pt prognosis  is Good.     Pt will continue to benefit from skilled outpatient occupational therapy to address the deficits listed in the problem list on initial evaluation provide pt/family education and to maximize pt's level of independence in the home and community environment.     Anticipated barriers to occupational therapy: none    Pt's spiritual, cultural and educational needs considered and pt agreeable to plan of care and goals.    Goals:  The following goals were discussed with the patient and patient is in agreement with them as to be addressed in the treatment plan.      Long Term Goals (to be met by discharge):  Date Goal Met:       2.) Bashir Richard Sr. will return to near to prior level of function for ADLs and household management reporting independence or modified independence.     Goal Status:   In progress     3. Bashir Richard Sr. will report pain 2 out of 10 at worst to increase functional use of Left hand for ADL/IADL tasks.     Goal Status:   In progress      Short Term Goals (to be met by 8/10/23):  Date Goal Met:       Bashir Ramos Sr. will be independent with home exercise program with written instructions.     Goal Status:   In progress     Bashir Richard Sr. will demonstrate 30 degrees of wrist flexion to improve functional performance in ADLs/work/leisure tasks.     Goal Status:   In progress     Bashir Richard Sr. will demonstrate 210 degrees of GOULD of all digits to improve functional performance in ADLs/work/leisure tasks.       Goal Status:   In progress        Bashir Richard Sr. will demonstrate within 0.5 cm of DPC edema measurement compared to other hand to improve functional grasp for ADLs/work/leisure tasks.     Goal Status:   In progress     Bashir Richard Sr. will demonstrate the ability to complete ADL/IADL tasks with 4/10 pain.     Goal Status:   In progress       Plan   Continue with POC  Updates/Grading for next session: progress as tolerated      Margaret Boasberg, OT

## 2023-09-05 ENCOUNTER — CLINICAL SUPPORT (OUTPATIENT)
Dept: REHABILITATION | Facility: HOSPITAL | Age: 88
End: 2023-09-05
Payer: MEDICARE

## 2023-09-05 DIAGNOSIS — R29.898 DECREASED GRIP STRENGTH OF LEFT HAND: Primary | ICD-10-CM

## 2023-09-05 DIAGNOSIS — M79.642 LEFT HAND PAIN: ICD-10-CM

## 2023-09-05 PROCEDURE — 97140 MANUAL THERAPY 1/> REGIONS: CPT

## 2023-09-05 PROCEDURE — 97018 PARAFFIN BATH THERAPY: CPT | Mod: 59

## 2023-09-05 PROCEDURE — 97530 THERAPEUTIC ACTIVITIES: CPT

## 2023-09-05 NOTE — PROGRESS NOTES
Occupational Therapy Treatment Note     Date: 9/5/2023  Name: Bashir Ramos SrAlina  Clinic Number: [id number]    Therapy Diagnosis:   Encounter Diagnoses   Name Primary?    Decreased  strength of left hand Yes    Left hand pain          Physician: Veronica Valverde PA-C    Referring Physician: Veronica Valverde PA-C  Physician Orders: Eval and treat  Order comments: status post left carpal tunnel release by Dr. An on 6/19/23 2x/wk x 6 wk   Date of Return to MD: TBD     Date of Surgery: 6/19/23  Procedure: Left CTR  Evaluation Date: 7/13/2023  Authorization Period: 7/13/23 - 9/7/23  Plan of Care Expiration: 10/5/23  FOTO Completion: Initial eval (7/13/2023)  FOTO #2:  FOTO #3:    Visit #/ Visits Authorized: 9 / 23    Time In: 130 pm  Time Out: 215 pm  Total Billable Time: 45 minutes    Precautions:  Standard and Diabetes      Subjective     Pt reports: he has less pain today; he reports he wants to continue with therapy but not do the pain blocks yet; he understands he needs to cancel these appointments. He reports continued numbness in index   he was compliant with home exercise program given last session.   Response to previous treatment: none noted yet    Pain: 6/10  Location: left fingers      OBJECTIVE     Edema:  (Measured circumferential, in centimeters)  Hand/Wrist: Right  7/13/2023 Left  7/13/2023 Left  8/28/23   Wrist Crease 16.5 cm 17.7 cm 19.0   DPC 20.3 cm 21.0 cm 21.7         ELBOW, WRIST RANGE OF MOTION:   Measured in degrees of active motion with goniometer    Right  7/13/23 Left  7/13/23 Left  8/28/23   Pronation/Supination WNL WNL WNL   Wrist Extension/Flexion 60/40 55/15 55/15   Ulnar/Radial Deviation 20/20 15/20 20/25      Digit AROM:    Right  7/13/2023 Left  7/13/2023 Left  8/28/23            Index: MP    /65 65              PIP       /43 45              DIP   /18 25              GOULD   126             Long:  MP   /62 65              PIP   /70 70              DIP   /63 60              GOULD    195             Ring:   MP   /70 70              PIP   /85 90              DIP   /35 50              GOULD   190             Small:  MP   /70 70               PIP   /52 60               DIP   /35 40              GOULD   157             Thumb: MP /65 /50 50                IP /40 /30 40       Rad ADD/ABD            Pal ADD/ABD            Opposition Base of SF Unable to oppose to SF Tip of RF          and Pinch STRENGTH:   (Measured in pounds using a Dynamometer and pinch meter)    Right  8/28/2023 Left  8/28/2023    Setting 2  50.4 7.2   Key  7 0   3 Pt  7 0       Treatment     Bashir received the following supervised modalities after being cleared for contradictions for 10 minutes:   -Paraffin to decrease pain and stiffness and increase tissue elasticity    Bashir received the following manual therapy techniques for 10 minutes:   -IASTM, STM and retrograde massage to decrease pain and stiffness in surrounding tissues and scared palmar region    Bashir received therapeutic activities for 25 minutes including:  -TGEs  - yellow putty for gross , key, 2 and 3  pt pinch   - desensitization with rice x 5 min for nerve re-education and pain management.   -Shoulder, elbow, wrist and finger AROM  -Shoulder blade squeezes   - 2 # clothespin with pom poms x 30 reps for pinch strength   -Wrist maze x 3 min  -Red flexbar smiles, frowns and rolls x 20 reps  -Yellow digi-extender x 20 reps    Home Exercises and Education Provided     Education provided:   - Continue HEP  - Progress towards goals     Written Home Exercises Provided: Patient instructed to cont prior HEP.  Exercises were reviewed and Bashir was able to demonstrate them prior to the end of the session.  Bashir demonstrated good  understanding of the HEP provided.   .   See EMR under Patient Instructions for exercises provided prior visit.        Assessment     Pt would continue to benefit from skilled OT. He reports decreased pain today and was able to perform  exercises with less difficulty today. Discussed continuing therapy but he needs to cancel pain blocks already scheduled.  Plan to progress as tolerated. Decreased numbness reported with desensitization activity.     Bashir is progressing well towards his goals and there are no updates to goals at this time. Pt prognosis is Good.     Pt will continue to benefit from skilled outpatient occupational therapy to address the deficits listed in the problem list on initial evaluation provide pt/family education and to maximize pt's level of independence in the home and community environment.     Anticipated barriers to occupational therapy: none    Pt's spiritual, cultural and educational needs considered and pt agreeable to plan of care and goals.    Goals:  The following goals were discussed with the patient and patient is in agreement with them as to be addressed in the treatment plan.      Long Term Goals (to be met by discharge):  Date Goal Met:       2.) Bashir Ramos Sr. will return to near to prior level of function for ADLs and household management reporting independence or modified independence.     Goal Status:   In progress     3. Bashir Ramos Sr. will report pain 2 out of 10 at worst to increase functional use of Left hand for ADL/IADL tasks.     Goal Status:   In progress      Short Term Goals (to be met by 8/10/23):  Date Goal Met:       Bashir Ramos Sr. will be independent with home exercise program with written instructions.     Goal Status:   In progress     Bashir Ramos Sr. will demonstrate 30 degrees of wrist flexion to improve functional performance in ADLs/work/leisure tasks.     Goal Status:   In progress     Bashirsae Ramos Sr. will demonstrate 210 degrees of GOULD of all digits to improve functional performance in ADLs/work/leisure tasks.       Goal Status:   In progress        Bashir Ramos Sr. will demonstrate within 0.5 cm of DPC edema measurement compared to other hand to improve functional  grasp for ADLs/work/leisure tasks.     Goal Status:   In progress     Bashir Ramos Sr. will demonstrate the ability to complete ADL/IADL tasks with 4/10 pain.     Goal Status:   In progress       Plan   Continue with POC  Updates/Grading for next session: progress as tolerated      Aneta Cox, OT

## 2023-09-06 ENCOUNTER — TELEPHONE (OUTPATIENT)
Dept: PAIN MEDICINE | Facility: CLINIC | Age: 88
End: 2023-09-06
Payer: MEDICARE

## 2023-09-06 ENCOUNTER — TELEPHONE (OUTPATIENT)
Dept: ADMINISTRATIVE | Facility: OTHER | Age: 88
End: 2023-09-06
Payer: MEDICARE

## 2023-09-12 ENCOUNTER — CLINICAL SUPPORT (OUTPATIENT)
Dept: REHABILITATION | Facility: HOSPITAL | Age: 88
End: 2023-09-12
Payer: MEDICARE

## 2023-09-12 DIAGNOSIS — M79.642 LEFT HAND PAIN: ICD-10-CM

## 2023-09-12 DIAGNOSIS — R29.898 DECREASED GRIP STRENGTH OF LEFT HAND: Primary | ICD-10-CM

## 2023-09-12 PROCEDURE — 97018 PARAFFIN BATH THERAPY: CPT

## 2023-09-12 PROCEDURE — 97140 MANUAL THERAPY 1/> REGIONS: CPT

## 2023-09-12 PROCEDURE — 97530 THERAPEUTIC ACTIVITIES: CPT

## 2023-09-12 NOTE — PROGRESS NOTES
Occupational Therapy Treatment Note     Date: 9/12/2023  Name: Bashir Ramos Sr.  Clinic Number: [id number]    Therapy Diagnosis:   No diagnosis found.        Physician: Veronica Valverde PA-C    Referring Physician: Veronica Valverde PA-C  Physician Orders: Eval and treat  Order comments: status post left carpal tunnel release by Dr. An on 6/19/23 2x/wk x 6 wk   Date of Return to MD: TBD     Date of Surgery: 6/19/23  Procedure: Left CTR  Evaluation Date: 7/13/2023  Authorization Period: 7/13/23 - 9/7/23  Plan of Care Expiration: 10/5/23  FOTO Completion: Initial eval (7/13/2023)  FOTO #2:  FOTO #3:    Visit #/ Visits Authorized: 9 / 23    Time In: 130 pm  Time Out: 215 pm  Total Billable Time: 45 minutes    Precautions:  Standard and Diabetes      Subjective     Pt reports: he has less pain today; he reports he wants to continue with therapy but not do the pain blocks yet; he understands he needs to cancel these appointments. He reports continued numbness in index   he was compliant with home exercise program given last session.   Response to previous treatment: none noted yet    Pain: 6/10  Location: left fingers      OBJECTIVE     Edema:  (Measured circumferential, in centimeters)  Hand/Wrist: Right  7/13/2023 Left  7/13/2023 Left  8/28/23   Wrist Crease 16.5 cm 17.7 cm 19.0   DPC 20.3 cm 21.0 cm 21.7         ELBOW, WRIST RANGE OF MOTION:   Measured in degrees of active motion with goniometer    Right  7/13/23 Left  7/13/23 Left  8/28/23   Pronation/Supination WNL WNL WNL   Wrist Extension/Flexion 60/40 55/15 55/15   Ulnar/Radial Deviation 20/20 15/20 20/25      Digit AROM:    Right  7/13/2023 Left  7/13/2023 Left  8/28/23            Index: MP    /65 65              PIP       /43 45              DIP   /18 25              GOULD   126             Long:  MP   /62 65              PIP   /70 70              DIP   /63 60              GOULD   195             Ring:   MP   /70 70              PIP   /85 90              DIP    /35 50              GOULD   190             Small:  MP   /70 70               PIP   /52 60               DIP   /35 40              GOULD   157             Thumb: MP /65 /50 50                IP /40 /30 40       Rad ADD/ABD            Pal ADD/ABD            Opposition Base of SF Unable to oppose to SF Tip of RF          and Pinch STRENGTH:   (Measured in pounds using a Dynamometer and pinch meter)    Right  8/28/2023 Left  8/28/2023    Setting 2  50.4 7.2   Key  7 0   3 Pt  7 0       Treatment     Bashir received the following supervised modalities after being cleared for contradictions for 10 minutes:   -Paraffin to decrease pain and stiffness and increase tissue elasticity    Bashir received the following manual therapy techniques for 10 minutes:   -IASTM, STM and retrograde massage to decrease pain and stiffness in surrounding tissues and scared palmar region    Bashir received therapeutic activities for 25 minutes including:  -TGEs  - yellow putty for gross , key, 2 and 3  pt pinch   - desensitization with rice x 5 min for nerve re-education and pain management.   -Shoulder, elbow, wrist and finger AROM  -Shoulder blade squeezes   - 2 # clothespin with pom poms x 30 reps for pinch strength   -Wrist maze x 3 min  -Red flexbar smiles, frowns and rolls x 20 reps  -Yellow digi-extender x 20 reps    Home Exercises and Education Provided     Education provided:   - Continue HEP  - Progress towards goals     Written Home Exercises Provided: Patient instructed to cont prior HEP.  Exercises were reviewed and Bashir was able to demonstrate them prior to the end of the session.  Bashir demonstrated good  understanding of the HEP provided.   .   See EMR under Patient Instructions for exercises provided prior visit.        Assessment     Pt would continue to benefit from skilled OT. He reports decreased pain today and was able to perform exercises with less difficulty today. Discussed continuing therapy but he needs to  cancel pain blocks already scheduled.  Plan to progress as tolerated. Decreased numbness reported with desensitization activity.     Bashir is progressing well towards his goals and there are no updates to goals at this time. Pt prognosis is Good.     Pt will continue to benefit from skilled outpatient occupational therapy to address the deficits listed in the problem list on initial evaluation provide pt/family education and to maximize pt's level of independence in the home and community environment.     Anticipated barriers to occupational therapy: none    Pt's spiritual, cultural and educational needs considered and pt agreeable to plan of care and goals.    Goals:  The following goals were discussed with the patient and patient is in agreement with them as to be addressed in the treatment plan.      Long Term Goals (to be met by discharge):  Date Goal Met:       2.) Bashir Ramos Sr. will return to near to prior level of function for ADLs and household management reporting independence or modified independence.     Goal Status:   In progress     3. Bashir Ramos Sr. will report pain 2 out of 10 at worst to increase functional use of Left hand for ADL/IADL tasks.     Goal Status:   In progress      Short Term Goals (to be met by 8/10/23):  Date Goal Met:       Bashir Ramos Sr. will be independent with home exercise program with written instructions.     Goal Status:   In progress     Bashir Ramos Sr. will demonstrate 30 degrees of wrist flexion to improve functional performance in ADLs/work/leisure tasks.     Goal Status:   In progress     Bashir Ramos Sr. will demonstrate 210 degrees of GOULD of all digits to improve functional performance in ADLs/work/leisure tasks.       Goal Status:   In progress        Bashir Ramos Sr. will demonstrate within 0.5 cm of DPC edema measurement compared to other hand to improve functional grasp for ADLs/work/leisure tasks.     Goal Status:   In progress     Bashir  Richard Kumar will demonstrate the ability to complete ADL/IADL tasks with 4/10 pain.     Goal Status:   In progress       Plan   Continue with POC  Updates/Grading for next session: progress as tolerated      Aneta Cox OT

## 2023-09-14 ENCOUNTER — CLINICAL SUPPORT (OUTPATIENT)
Dept: REHABILITATION | Facility: HOSPITAL | Age: 88
End: 2023-09-14
Payer: MEDICARE

## 2023-09-14 DIAGNOSIS — R29.898 DECREASED GRIP STRENGTH OF LEFT HAND: Primary | ICD-10-CM

## 2023-09-14 DIAGNOSIS — M79.642 LEFT HAND PAIN: ICD-10-CM

## 2023-09-14 PROCEDURE — 97140 MANUAL THERAPY 1/> REGIONS: CPT

## 2023-09-14 PROCEDURE — 97018 PARAFFIN BATH THERAPY: CPT

## 2023-09-14 PROCEDURE — 97530 THERAPEUTIC ACTIVITIES: CPT

## 2023-09-14 NOTE — PROGRESS NOTES
Occupational Therapy Treatment Note     Date: 9/14/2023  Name: Bashir Ramos Sr.  Clinic Number: [id number]    Therapy Diagnosis:   Encounter Diagnoses   Name Primary?    Decreased  strength of left hand Yes    Left hand pain            Physician: Veronica Valverde PA-C    Referring Physician: Veronica Valverde PA-C  Physician Orders: Eval and treat  Order comments: status post left carpal tunnel release by Dr. An on 6/19/23 2x/wk x 6 wk   Date of Return to MD: TBD     Date of Surgery: 6/19/23  Procedure: Left CTR  Evaluation Date: 7/13/2023  Authorization Period: 7/13/23 - 9/7/23  Plan of Care Expiration: 10/5/23  FOTO Completion: Initial eval (7/13/2023)  FOTO #2:  FOTO #3:    Visit #/ Visits Authorized: 10 / 23    Time In: 150 pm  Time Out: 225  Total Billable Time: 35 minutes    Precautions:  Standard and Diabetes      Subjective     Pt reports: he has less pain today; he reports he wants to continue with therapy but not do the pain blocks yet; he  cancelled these appointments. He reports continued numbness in index   he was compliant with home exercise program given last session.   Response to previous treatment: none noted yet    Pain: 6/10  Location: left fingers      OBJECTIVE     Edema:  (Measured circumferential, in centimeters)  Hand/Wrist: Right  7/13/2023 Left  7/13/2023 Left  8/28/23   Wrist Crease 16.5 cm 17.7 cm 19.0   DPC 20.3 cm 21.0 cm 21.7         ELBOW, WRIST RANGE OF MOTION:   Measured in degrees of active motion with goniometer    Right  7/13/23 Left  7/13/23 Left  8/28/23   Pronation/Supination WNL WNL WNL   Wrist Extension/Flexion 60/40 55/15 55/15   Ulnar/Radial Deviation 20/20 15/20 20/25      Digit AROM:    Right  7/13/2023 Left  7/13/2023 Left  8/28/23            Index: MP    /65 65              PIP       /43 45              DIP   /18 25              GOULD   126             Long:  MP   /62 65              PIP   /70 70              DIP   /63 60              GOULD   195             Ring:    MP   /70 70              PIP   /85 90              DIP   /35 50              GOULD   190             Small:  MP   /70 70               PIP   /52 60               DIP   /35 40              GOULD   157             Thumb: MP /65 /50 50                IP /40 /30 40       Rad ADD/ABD            Pal ADD/ABD            Opposition Base of SF Unable to oppose to SF Tip of RF          and Pinch STRENGTH:   (Measured in pounds using a Dynamometer and pinch meter)    Right  8/28/2023 Left  8/28/2023    Setting 2  50.4 7.2   Key  7 0   3 Pt  7 0       Treatment     Bashir received the following supervised modalities after being cleared for contradictions for 10 minutes:   -Paraffin to decrease pain and stiffness and increase tissue elasticity    Bashir received the following manual therapy techniques for 10 minutes:   -IASTM, STM and retrograde massage to decrease pain and stiffness in surrounding tissues and scared palmar region    Bashir received therapeutic activities for 25 minutes including:  -TGEs  - red putty for gross , key, 2 and 3  pt pinch   - desensitization with rice x 5 min for nerve re-education, stereognosis and pain management.   -- 2 # clothespin with pom poms x 30 reps for pinch strength   -Wrist maze x 3 min  -Red flexbar smiles, frowns and rolls x 20 reps  -constant force red x 20 reps for digit extension     Home Exercises and Education Provided     Education provided:   - Continue HEP  - Progress towards goals     Written Home Exercises Provided: Patient instructed to cont prior HEP.  Exercises were reviewed and Bashir was able to demonstrate them prior to the end of the session.  Bashir demonstrated good  understanding of the HEP provided.   .   See EMR under Patient Instructions for exercises provided prior visit.        Assessment     Pt would continue to benefit from skilled OT. He reports decreased pain today and was able to perform exercises with less difficulty today. Discussed continuing  therapy but he needs to cancel pain blocks already scheduled.  Plan to progress as tolerated. Decreased numbness reported with desensitization activity.     Bashir is progressing well towards his goals and there are no updates to goals at this time. Pt prognosis is Good.     Pt will continue to benefit from skilled outpatient occupational therapy to address the deficits listed in the problem list on initial evaluation provide pt/family education and to maximize pt's level of independence in the home and community environment.     Anticipated barriers to occupational therapy: none    Pt's spiritual, cultural and educational needs considered and pt agreeable to plan of care and goals.    Goals:  The following goals were discussed with the patient and patient is in agreement with them as to be addressed in the treatment plan.      Long Term Goals (to be met by discharge):  Date Goal Met:       2.) Bashir Ramos Sr. will return to near to prior level of function for ADLs and household management reporting independence or modified independence.     Goal Status:   In progress     3. Bashir Ramos Sr. will report pain 2 out of 10 at worst to increase functional use of Left hand for ADL/IADL tasks.     Goal Status:   In progress      Short Term Goals (to be met by 8/10/23):  Date Goal Met:       Bashir Ramos Sr. will be independent with home exercise program with written instructions.     Goal Status:   In progress     Bashirsea Ramos Sr. will demonstrate 30 degrees of wrist flexion to improve functional performance in ADLs/work/leisure tasks.     Goal Status:   In progress     Bashir Richard Sr. will demonstrate 210 degrees of GOULD of all digits to improve functional performance in ADLs/work/leisure tasks.       Goal Status:   In progress        Bashir Ramos Sr. will demonstrate within 0.5 cm of DPC edema measurement compared to other hand to improve functional grasp for ADLs/work/leisure tasks.     Goal Status:   In  progress     Bashir Ramos Sr. will demonstrate the ability to complete ADL/IADL tasks with 4/10 pain.     Goal Status:   In progress       Plan   Continue with POC  Updates/Grading for next session: progress as tolerated      Aneta Cox, OT

## 2023-09-18 NOTE — PROGRESS NOTES
Occupational Therapy Treatment Note     Date: 9/12/2023  Name: Bashir Ramos Sr.  Clinic Number: [id number]    Therapy Diagnosis:   Encounter Diagnoses   Name Primary?    Decreased  strength of left hand Yes    Left hand pain          Physician: Veronica Valverde PA-C    Referring Physician: Veronica Valverde PA-C  Physician Orders: Eval and treat  Order comments: status post left carpal tunnel release by Dr. An on 6/19/23 2x/wk x 6 wk   Date of Return to MD: TBD     Date of Surgery: 6/19/23  Procedure: Left CTR  Evaluation Date: 7/13/2023  Authorization Period: 7/13/23 - 9/7/23  Plan of Care Expiration: 10/5/23  FOTO Completion: Initial eval (7/13/2023)  FOTO #2:  FOTO #3:    Visit #/ Visits Authorized: 10 / 23    Time In: 130 pm  Time Out: 215 pm  Total Billable Time: 45 minutes    Precautions:  Standard and Diabetes      Subjective     Pt reports: he has less pain today; he has been using hand more.   he was compliant with home exercise program given last session.   Response to previous treatment: none noted yet    Pain: 6/10  Location: left fingers      OBJECTIVE     Edema:  (Measured circumferential, in centimeters)  Hand/Wrist: Right  7/13/2023 Left  7/13/2023 Left  8/28/23   Wrist Crease 16.5 cm 17.7 cm 19.0   DPC 20.3 cm 21.0 cm 21.7         ELBOW, WRIST RANGE OF MOTION:   Measured in degrees of active motion with goniometer    Right  7/13/23 Left  7/13/23 Left  8/28/23   Pronation/Supination WNL WNL WNL   Wrist Extension/Flexion 60/40 55/15 55/15   Ulnar/Radial Deviation 20/20 15/20 20/25      Digit AROM:    Right  7/13/2023 Left  7/13/2023 Left  8/28/23            Index: MP    /65 65              PIP       /43 45              DIP   /18 25              GOULD   126             Long:  MP   /62 65              PIP   /70 70              DIP   /63 60              GOULD   195             Ring:   MP   /70 70              PIP   /85 90              DIP   /35 50              GOULD   190             Small:  MP   /70  70               PIP   /52 60               DIP   /35 40              GOULD   157             Thumb: MP /65 /50 50                IP /40 /30 40       Rad ADD/ABD            Pal ADD/ABD            Opposition Base of SF Unable to oppose to SF Tip of RF          and Pinch STRENGTH:   (Measured in pounds using a Dynamometer and pinch meter)    Right  8/28/2023 Left  8/28/2023    Setting 2  50.4 7.2   Key  7 0   3 Pt  7 0       Treatment     Bashir received the following supervised modalities after being cleared for contradictions for 10 minutes:   -Paraffin to decrease pain and stiffness and increase tissue elasticity    Bashir received the following manual therapy techniques for 10 minutes:   -IASTM, STM and retrograde massage to decrease pain and stiffness in surrounding tissues and scared palmar region    Bashir received therapeutic activities for 25 minutes including:  -TGEs  - yellow putty for gross , key, 2 and 3  pt pinch   - desensitization with rice x 5 min for nerve re-education and pain management.   -Shoulder, elbow, wrist and finger AROM  -Shoulder blade squeezes   - 2 # clothespin with pom poms x 30 reps for pinch strength   -Wrist maze x 3 min  -Red flexbar smiles, frowns and rolls x 20 reps  -Yellow digi-extender x 20 reps    Home Exercises and Education Provided     Education provided:   - Continue HEP  - Progress towards goals     Written Home Exercises Provided: Patient instructed to cont prior HEP.  Exercises were reviewed and Bashir was able to demonstrate them prior to the end of the session.  Bashir demonstrated good  understanding of the HEP provided.   .   See EMR under Patient Instructions for exercises provided prior visit.        Assessment     Pt would continue to benefit from skilled OT. He reports decreased pain today and was able to perform exercises with less difficulty today. Reports of using hand more.     Bashir is progressing well towards his goals and there are no updates to  goals at this time. Pt prognosis is Good.     Pt will continue to benefit from skilled outpatient occupational therapy to address the deficits listed in the problem list on initial evaluation provide pt/family education and to maximize pt's level of independence in the home and community environment.     Anticipated barriers to occupational therapy: none    Pt's spiritual, cultural and educational needs considered and pt agreeable to plan of care and goals.    Goals:  The following goals were discussed with the patient and patient is in agreement with them as to be addressed in the treatment plan.      Long Term Goals (to be met by discharge):  Date Goal Met:       2.) Bashir Richard Sr. will return to near to prior level of function for ADLs and household management reporting independence or modified independence.     Goal Status:   In progress     3. Bashir Ramos Sr. will report pain 2 out of 10 at worst to increase functional use of Left hand for ADL/IADL tasks.     Goal Status:   In progress      Short Term Goals (to be met by 8/10/23):  Date Goal Met:       Bashir Richard Sr. will be independent with home exercise program with written instructions.     Goal Status:   In progress     Bashir Ramos Sr. will demonstrate 30 degrees of wrist flexion to improve functional performance in ADLs/work/leisure tasks.     Goal Status:   In progress     Bashir Ramos Sr. will demonstrate 210 degrees of GOULD of all digits to improve functional performance in ADLs/work/leisure tasks.       Goal Status:   In progress        Bashir Richard Sr. will demonstrate within 0.5 cm of DPC edema measurement compared to other hand to improve functional grasp for ADLs/work/leisure tasks.     Goal Status:   In progress     Bashir Richard Sr. will demonstrate the ability to complete ADL/IADL tasks with 4/10 pain.     Goal Status:   In progress       Plan   Continue with POC  Updates/Grading for next session: progress as  tolerated      Livia Frias, OT

## 2023-09-19 ENCOUNTER — CLINICAL SUPPORT (OUTPATIENT)
Dept: REHABILITATION | Facility: HOSPITAL | Age: 88
End: 2023-09-19
Payer: MEDICARE

## 2023-09-19 DIAGNOSIS — R29.898 DECREASED GRIP STRENGTH OF LEFT HAND: Primary | ICD-10-CM

## 2023-09-19 DIAGNOSIS — M79.642 LEFT HAND PAIN: ICD-10-CM

## 2023-09-19 PROCEDURE — 97140 MANUAL THERAPY 1/> REGIONS: CPT

## 2023-09-19 PROCEDURE — 97018 PARAFFIN BATH THERAPY: CPT

## 2023-09-19 PROCEDURE — 97530 THERAPEUTIC ACTIVITIES: CPT

## 2023-09-19 NOTE — PROGRESS NOTES
Occupational Therapy Treatment Note     Date: 9/19/2023  Name: Bashir Ramos Sr.  Clinic Number: [id number]    Therapy Diagnosis:   Encounter Diagnoses   Name Primary?    Decreased  strength of left hand Yes    Left hand pain      Physician: Veronica Valverde PA-C    Referring Physician: Veronica Valverde PA-C  Physician Orders: Eval and treat  Order comments: status post left carpal tunnel release by Dr. An on 6/19/23 2x/wk x 6 wk   Date of Return to MD: TBD     Date of Surgery: 6/19/23  Procedure: Left CTR  Evaluation Date: 7/13/2023  Authorization Period: 7/13/23 - 9/7/23  Plan of Care Expiration: 10/5/23  FOTO Completion: Initial eval (7/13/2023)  FOTO #2:  FOTO #3:    Visit #/ Visits Authorized: 13 / 23    Time In: 2 pm  Time Out:245  Total Billable Time: 245 minutes    Precautions:  Standard and Diabetes      Subjective     Pt reports: he has less pain today; he reports he wants to continue with therapy but not do the pain blocks yet; he  cancelled these appointments. He reports continued numbness, stiffness and weakness in index.  Buttons he reports are difficult  he was compliant with home exercise program given last session.   Response to previous treatment: none noted yet    Pain: 4-5/10  Location: left fingers      OBJECTIVE   RE-assess before 9/28 and update POC or discharge     Edema:  (Measured circumferential, in centimeters)  Hand/Wrist: Right  7/13/2023 Left  7/13/2023 Left  8/28/23   Wrist Crease 16.5 cm 17.7 cm 19.0   DPC 20.3 cm 21.0 cm 21.7         ELBOW, WRIST RANGE OF MOTION:   Measured in degrees of active motion with goniometer    Right  7/13/23 Left  7/13/23 Left  8/28/23   Pronation/Supination WNL WNL WNL   Wrist Extension/Flexion 60/40 55/15 55/15   Ulnar/Radial Deviation 20/20 15/20 20/25      Digit AROM:    Right  7/13/2023 Left  7/13/2023 Left  8/28/23            Index: MP    /65 65              PIP       /43 45              DIP   /18 25              GOULD   126             Long:   "MP   /62 65              PIP   /70 70              DIP   /63 60              GOULD   195             Ring:   MP   /70 70              PIP   /85 90              DIP   /35 50              GOULD   190             Small:  MP   /70 70               PIP   /52 60               DIP   /35 40              GOULD   157             Thumb: MP /65 /50 50                IP /40 /30 40       Rad ADD/ABD            Pal ADD/ABD            Opposition Base of SF Unable to oppose to SF Tip of RF          and Pinch STRENGTH:   (Measured in pounds using a Dynamometer and pinch meter)    Right  8/28/2023 Left  8/28/2023    Setting 2  50.4 7.2   Key  7 0   3 Pt  7 0       Treatment     Bashir received the following supervised modalities after being cleared for contradictions for 10 minutes:   -Paraffin to decrease pain and stiffness and increase tissue elasticity    Bashir received the following manual therapy techniques for 10 minutes:   -IASTM, STM and retrograde massage to decrease pain and stiffness in surrounding tissues and scared palmar region    Bashir received therapeutic activities for 25 minutes including:  -TGEs  - red putty for gross , key, 2 and 3  pt pinch   - added FDS glide to index with red putty   - putty press and pull with 1/2 " dowel for  strengthening   - desensitization with rice x 5 min for nerve re-education, stereognosis and pain management.   -- 2 # clothespin with pom poms x 30 reps for pinch strength ; attempted 4# but unable to complete   -Wrist maze x 3 min  -Red flexbar smiles, frowns and rolls x 20 reps  -constant force red x 20 reps for digit extension     Home Exercises and Education Provided     Education provided:   - Continue HEP  - Progress towards goals     Written Home Exercises Provided: Patient instructed to cont prior HEP.  Exercises were reviewed and Bashir was able to demonstrate them prior to the end of the session.  Bashir demonstrated good  understanding of the HEP provided.   .   See EMR " under Patient Instructions for exercises provided prior visit.        Assessment     Pt would continue to benefit from skilled OT. He reports decreased pain today and was able to perform exercises with less difficulty today. Discussed continuing therapy but he needs to cancel pain blocks already scheduled.  Plan to progress as tolerated. Decreased numbness reported with desensitization activity. Primary complaints of index finger stiffness and weakness.     Bashir is progressing well towards his goals and there are no updates to goals at this time. Pt prognosis is Good.     Pt will continue to benefit from skilled outpatient occupational therapy to address the deficits listed in the problem list on initial evaluation provide pt/family education and to maximize pt's level of independence in the home and community environment.     Anticipated barriers to occupational therapy: none    Pt's spiritual, cultural and educational needs considered and pt agreeable to plan of care and goals.    Goals:  The following goals were discussed with the patient and patient is in agreement with them as to be addressed in the treatment plan.      Long Term Goals (to be met by discharge):  Date Goal Met:       2.) Bashir Ramos Sr. will return to near to prior level of function for ADLs and household management reporting independence or modified independence.     Goal Status:   In progress     3. Bashir Ramos Sr. will report pain 2 out of 10 at worst to increase functional use of Left hand for ADL/IADL tasks.     Goal Status:   In progress      Short Term Goals (to be met by 8/10/23):  Date Goal Met:       Bashir Ramos Sr. will be independent with home exercise program with written instructions.     Goal Status:   In progress     Bashir Ramos Sr. will demonstrate 30 degrees of wrist flexion to improve functional performance in ADLs/work/leisure tasks.     Goal Status:   In progress     Bashir Ramos Sr. will demonstrate 210  degrees of GOULD of all digits to improve functional performance in ADLs/work/leisure tasks.       Goal Status:   In progress        Bashir Ramos Sr. will demonstrate within 0.5 cm of DPC edema measurement compared to other hand to improve functional grasp for ADLs/work/leisure tasks.     Goal Status:   In progress     Bashir Ramos Sr. will demonstrate the ability to complete ADL/IADL tasks with 4/10 pain.    met 9/19/23 Goal Status:   In progress       Plan   Continue with POC  Plan of Care Expiration: 10/5/23  Updates/Grading for next session: progress as tolerated      Aneta Cox, OT , CHT

## 2023-09-21 ENCOUNTER — CLINICAL SUPPORT (OUTPATIENT)
Dept: REHABILITATION | Facility: HOSPITAL | Age: 88
End: 2023-09-21
Payer: MEDICARE

## 2023-09-21 DIAGNOSIS — R29.898 DECREASED GRIP STRENGTH OF LEFT HAND: Primary | ICD-10-CM

## 2023-09-21 DIAGNOSIS — M79.642 LEFT HAND PAIN: ICD-10-CM

## 2023-09-21 PROCEDURE — 97530 THERAPEUTIC ACTIVITIES: CPT

## 2023-09-21 PROCEDURE — 97018 PARAFFIN BATH THERAPY: CPT

## 2023-09-21 PROCEDURE — 97140 MANUAL THERAPY 1/> REGIONS: CPT

## 2023-09-21 NOTE — PROGRESS NOTES
Occupational Therapy Treatment Note     Date: 9/21/2023  Name: Bashir Ramos Sr.  Clinic Number: [id number]    Therapy Diagnosis:   Encounter Diagnoses   Name Primary?    Decreased  strength of left hand Yes    Left hand pain        Physician: Veronica Valverde PA-C    Referring Physician: Veronica Valverde PA-C  Physician Orders: Eval and treat  Order comments: status post left carpal tunnel release by Dr. An on 6/19/23 2x/wk x 6 wk   Date of Return to MD: TBD     Date of Surgery: 6/19/23  Procedure: Left CTR  Evaluation Date: 7/13/2023  Authorization Period: 7/13/23 - 9/7/23  Plan of Care Expiration: 10/5/23  FOTO Completion: Initial eval (7/13/2023)  FOTO #2:  FOTO #3:    Visit #/ Visits Authorized: 12 / 23    Time In: 2 pm  Time Out:245  Total Billable Time: 245 minutes    Precautions:  Standard and Diabetes      Subjective     Pt reports: he has less pain today; he reports he wants to continue with therapy but not do the pain blocks yet; he  cancelled these appointments. He reports continued numbness, stiffness and weakness in index.  Buttons he reports are difficult  he was compliant with home exercise program given last session.   Response to previous treatment: none noted yet    Pain: 4-5/10  Location: left fingers      OBJECTIVE   RE-assess before 9/28 and update POC or discharge     Edema:  (Measured circumferential, in centimeters)  Hand/Wrist: Right  7/13/2023 Left  7/13/2023 Left  8/28/23   Wrist Crease 16.5 cm 17.7 cm 19.0   DPC 20.3 cm 21.0 cm 21.7         ELBOW, WRIST RANGE OF MOTION:   Measured in degrees of active motion with goniometer    Right  7/13/23 Left  7/13/23 Left  8/28/23   Pronation/Supination WNL WNL WNL   Wrist Extension/Flexion 60/40 55/15 55/15   Ulnar/Radial Deviation 20/20 15/20 20/25      Digit AROM:    Right  7/13/2023 Left  7/13/2023 Left  8/28/23            Index: MP    /65 65              PIP       /43 45              DIP   /18 25              GOULD   126             Long:  " MP   /62 65              PIP   /70 70              DIP   /63 60              GOULD   195             Ring:   MP   /70 70              PIP   /85 90              DIP   /35 50              GOULD   190             Small:  MP   /70 70               PIP   /52 60               DIP   /35 40              GOULD   157             Thumb: MP /65 /50 50                IP /40 /30 40       Rad ADD/ABD            Pal ADD/ABD            Opposition Base of SF Unable to oppose to SF Tip of RF          and Pinch STRENGTH:   (Measured in pounds using a Dynamometer and pinch meter)    Right  8/28/2023 Left  8/28/2023    Setting 2  50.4 7.2   Key  7 0   3 Pt  7 0       Treatment     Bashir received the following supervised modalities after being cleared for contradictions for 10 minutes:   -Paraffin to decrease pain and stiffness and increase tissue elasticity    Bashir received the following manual therapy techniques for 10 minutes:   -IASTM, STM and retrograde massage to decrease pain and stiffness in surrounding tissues and scared palmar region    Bashir received therapeutic activities for 25 minutes including:  -TGEs  - red putty for gross , key, 2 and 3  pt pinch   - added FDS glide to index with red putty   - putty press and pull with 1/2 " dowel for  strengthening   - -- 2 # clothespin with pom poms x 30 reps for pinch strength ; attempted 4# but unable to complete   -Wrist maze x 3 min  -Red flexbar smiles, frowns and rolls x 20 reps  -constant force red x 20 reps for digit extension     Home Exercises and Education Provided     Education provided:   - Continue HEP  - Progress towards goals     Written Home Exercises Provided: Patient instructed to cont prior HEP.  Exercises were reviewed and Bashir was able to demonstrate them prior to the end of the session.  Bashir demonstrated good  understanding of the HEP provided.   .   See EMR under Patient Instructions for exercises provided prior visit.        Assessment     Pt " would continue to benefit from skilled OT. He reports decreased pain today and was able to perform exercises with less difficulty today. Discussed continuing therapy but he needs to cancel pain blocks already scheduled.  Plan to progress as tolerated. Decreased numbness reported with desensitization activity. Primary complaints of index finger stiffness and weakness.     Bashir is progressing well towards his goals and there are no updates to goals at this time. Pt prognosis is Good.     Pt will continue to benefit from skilled outpatient occupational therapy to address the deficits listed in the problem list on initial evaluation provide pt/family education and to maximize pt's level of independence in the home and community environment.     Anticipated barriers to occupational therapy: none    Pt's spiritual, cultural and educational needs considered and pt agreeable to plan of care and goals.    Goals:  The following goals were discussed with the patient and patient is in agreement with them as to be addressed in the treatment plan.      Long Term Goals (to be met by discharge):  Date Goal Met:       2.) Bashir Richard Sr. will return to near to prior level of function for ADLs and household management reporting independence or modified independence.     Goal Status:   In progress     3. Bashir Ramos Sr. will report pain 2 out of 10 at worst to increase functional use of Left hand for ADL/IADL tasks.     Goal Status:   In progress      Short Term Goals (to be met by 8/10/23):  Date Goal Met:       Bashir Richard . will be independent with home exercise program with written instructions.     Goal Status:   In progress     Bashir Ramos Sr. will demonstrate 30 degrees of wrist flexion to improve functional performance in ADLs/work/leisure tasks.     Goal Status:   In progress     Bashir Ramos Sr. will demonstrate 210 degrees of GOULD of all digits to improve functional performance in ADLs/work/leisure tasks.        Goal Status:   In progress        Bashir Ramos Sr. will demonstrate within 0.5 cm of DPC edema measurement compared to other hand to improve functional grasp for ADLs/work/leisure tasks.     Goal Status:   In progress     Bashir Ramos Sr. will demonstrate the ability to complete ADL/IADL tasks with 4/10 pain.    met 9/19/23 Goal Status:   In progress       Plan   Continue with POC  Plan of Care Expiration: 10/5/23  Updates/Grading for next session: progress as tolerated      Aneta Cox, OT , CHT

## 2023-09-26 ENCOUNTER — CLINICAL SUPPORT (OUTPATIENT)
Dept: REHABILITATION | Facility: HOSPITAL | Age: 88
End: 2023-09-26
Payer: MEDICARE

## 2023-09-26 DIAGNOSIS — M79.642 LEFT HAND PAIN: ICD-10-CM

## 2023-09-26 DIAGNOSIS — R29.898 DECREASED GRIP STRENGTH OF LEFT HAND: Primary | ICD-10-CM

## 2023-09-26 PROCEDURE — 97530 THERAPEUTIC ACTIVITIES: CPT

## 2023-09-26 PROCEDURE — 97018 PARAFFIN BATH THERAPY: CPT

## 2023-09-26 PROCEDURE — 97140 MANUAL THERAPY 1/> REGIONS: CPT

## 2023-09-26 NOTE — PROGRESS NOTES
Occupational Therapy Treatment Note     Date: 9/26/2023  Name: Bashir Ramos Sr.  Clinic Number: [id number]    Therapy Diagnosis:   Encounter Diagnoses   Name Primary?    Decreased  strength of left hand Yes    Left hand pain          Physician: Veronica Valverde PA-C    Referring Physician: Veronica Valverde PA-C  Physician Orders: Eval and treat  Order comments: status post left carpal tunnel release by Dr. An on 6/19/23 2x/wk x 6 wk   Date of Return to MD: TBD     Date of Surgery: 6/19/23  Procedure: Left CTR  Evaluation Date: 7/13/2023  Authorization Period: 7/13/23 - 9/7/23  Plan of Care Expiration: 10/5/23  FOTO Completion: Initial eval (7/13/2023)  FOTO #2:  FOTO #3:    Visit #/ Visits Authorized: 13 / 23    Time In: 155 pm  Time Out:240  Total Billable Time: 45 minutes    Precautions:  Standard and Diabetes      Subjective     Pt reports: he has less pain today;He reports continued numbness, stiffness and weakness in index.  Buttons he reports are difficult  he was compliant with home exercise program given last session.   Response to previous treatment: none noted yet    Pain: 4-5/10  Location: left fingers      OBJECTIVE   RE-assess before 9/28 and update POC or discharge     Edema:  (Measured circumferential, in centimeters)  Hand/Wrist: Right  7/13/2023 Left  7/13/2023 Left  8/28/23   Wrist Crease 16.5 cm 17.7 cm 19.0   DPC 20.3 cm 21.0 cm 21.7         ELBOW, WRIST RANGE OF MOTION:   Measured in degrees of active motion with goniometer    Right  7/13/23 Left  7/13/23 Left  8/28/23   Pronation/Supination WNL WNL WNL   Wrist Extension/Flexion 60/40 55/15 55/15   Ulnar/Radial Deviation 20/20 15/20 20/25      Digit AROM:    Right  7/13/2023 Left  7/13/2023 Left  8/28/23            Index: MP    /65 65              PIP       /43 45              DIP   /18 25              GOULD   126             Long:  MP   /62 65              PIP   /70 70              DIP   /63 60              GOULD   195             Ring:    "MP   /70 70              PIP   /85 90              DIP   /35 50              GOULD   190             Small:  MP   /70 70               PIP   /52 60               DIP   /35 40              GOULD   157             Thumb: MP /65 /50 50                IP /40 /30 40       Rad ADD/ABD            Pal ADD/ABD            Opposition Base of SF Unable to oppose to SF Tip of RF          and Pinch STRENGTH:   (Measured in pounds using a Dynamometer and pinch meter)    Right  8/28/2023 Left  8/28/2023    Setting 2  50.4 7.2   Key  7 0   3 Pt  7 0       Treatment   RE-assess next session    Bashir received the following supervised modalities after being cleared for contradictions for 10 minutes:   -Paraffin to decrease pain and stiffness and increase tissue elasticity    Bashir received the following manual therapy techniques for 10 minutes:   -IASTM, STM and retrograde massage to decrease pain and stiffness in surrounding tissues and scared palmar region    Bashir received therapeutic activities for 25 minutes including:  -TGEs  - red putty for gross , key, 2 and 3  pt pinch   - added FDS glide to index with red putty   - putty press and pull with 1/2 " dowel for  strengthening   - -- 2 # clothespin with pom poms x 30 reps for pinch strength ; attempted 4# but unable to complete   -Wrist maze x 3 min  -Red flexbar smiles, frowns and rolls x 20 reps  -constant force red x 20 reps for digit extension   - 20 lbs. PHG for gross  x 15 reps     Home Exercises and Education Provided     Education provided:   - Continue HEP  - Progress towards goals     Written Home Exercises Provided: Patient instructed to cont prior HEP.  Exercises were reviewed and Bashir was able to demonstrate them prior to the end of the session.  Bashir demonstrated good  understanding of the HEP provided.   .   See EMR under Patient Instructions for exercises provided prior visit.        Assessment     Pt would continue to benefit from skilled OT. He " reports decreased pain today and was able to perform exercises with less difficulty today.  Plan to progress as tolerated. Decreased numbness reported with desensitization activity. Primary complaints of index finger stiffness and weakness.     Bashir is progressing well towards his goals and there are no updates to goals at this time. Pt prognosis is Good.     Pt will continue to benefit from skilled outpatient occupational therapy to address the deficits listed in the problem list on initial evaluation provide pt/family education and to maximize pt's level of independence in the home and community environment.     Anticipated barriers to occupational therapy: none    Pt's spiritual, cultural and educational needs considered and pt agreeable to plan of care and goals.    Goals:  The following goals were discussed with the patient and patient is in agreement with them as to be addressed in the treatment plan.      Long Term Goals (to be met by discharge):  Date Goal Met:       2.) Bashir Ramos Sr. will return to near to prior level of function for ADLs and household management reporting independence or modified independence.     Goal Status:   In progress     3. Bashir Ramos Sr. will report pain 2 out of 10 at worst to increase functional use of Left hand for ADL/IADL tasks.     Goal Status:   In progress      Short Term Goals (to be met by 8/10/23):  Date Goal Met:       Bashir Ramos Sr. will be independent with home exercise program with written instructions.     Goal Status:   In progress     Bashir Ramos Sr. will demonstrate 30 degrees of wrist flexion to improve functional performance in ADLs/work/leisure tasks.     Goal Status:   In progress     Bashir Ramos Sr. will demonstrate 210 degrees of GOULD of all digits to improve functional performance in ADLs/work/leisure tasks.       Goal Status:   In progress        Bashir Ramos Sr. will demonstrate within 0.5 cm of DPC edema measurement compared to  other hand to improve functional grasp for ADLs/work/leisure tasks.     Goal Status:   In progress     Bashir Ramos Sr. will demonstrate the ability to complete ADL/IADL tasks with 4/10 pain.    met 9/19/23 Goal Status:   In progress       Plan   Continue with POC  Plan of Care Expiration: 10/5/23  Updates/Grading for next session: progress as tolerated      Aneta Cox, OT , CHT

## 2023-09-28 ENCOUNTER — CLINICAL SUPPORT (OUTPATIENT)
Dept: REHABILITATION | Facility: HOSPITAL | Age: 88
End: 2023-09-28
Payer: MEDICARE

## 2023-09-28 DIAGNOSIS — M79.642 LEFT HAND PAIN: ICD-10-CM

## 2023-09-28 DIAGNOSIS — R29.898 DECREASED GRIP STRENGTH OF LEFT HAND: Primary | ICD-10-CM

## 2023-09-28 PROCEDURE — 97140 MANUAL THERAPY 1/> REGIONS: CPT

## 2023-09-28 PROCEDURE — 97018 PARAFFIN BATH THERAPY: CPT | Mod: 59

## 2023-09-28 PROCEDURE — 97530 THERAPEUTIC ACTIVITIES: CPT

## 2023-09-28 NOTE — PROGRESS NOTES
Occupational Therapy Treatment Note     Date: 9/28/2023  Name: Bashir Ramos Sr.  Clinic Number: [id number]    Therapy Diagnosis:   Encounter Diagnoses   Name Primary?    Decreased  strength of left hand Yes    Left hand pain            Physician: Veronica Valverde PA-C    Referring Physician: Veronica Valverde PA-C  Physician Orders: Eval and treat  Order comments: status post left carpal tunnel release by Dr. An on 6/19/23 2x/wk x 6 wk   Date of Return to MD: TBD     Date of Surgery: 6/19/23  Procedure: Left CTR  Evaluation Date: 7/13/2023  Authorization Period: 7/13/23 - 9/7/23; [pending extension   Plan of Care Expiration: 12/5/2023  FOTO Completion: Initial eval (7/13/2023)  FOTO #2:  FOTO #3:    Visit #/ Visits Authorized: 13 / 23    Time In: 155 pm  Time Out:240  Total Billable Time: 45 minutes    Precautions:  Standard and Diabetes      Subjective     Pt reports: he has less pain today;He reports continued numbness, stiffness and weakness in index.  Buttons he reports are difficult.  Son present this date.  Son would like him to continue therapy and possibly see MD for index prior to returning to [place].   he was compliant with home exercise program given last session.   Response to previous treatment: none noted yet    Pain: 4-5/10  Location: left fingers      OBJECTIVE   RE-assess before 9/28 and update POC or discharge     Edema:  (Measured circumferential, in centimeters)  Hand/Wrist: Right  7/13/2023 Left  7/13/2023 Left  8/28/23   Wrist Crease 16.5 cm 17.7 cm 19.0   DPC 20.3 cm 21.0 cm 21.7         ELBOW, WRIST RANGE OF MOTION:   Measured in degrees of active motion with goniometer    Right  7/13/23 Left  7/13/23 Left  8/28/23   Pronation/Supination WNL WNL WNL   Wrist Extension/Flexion 60/40 55/15 55/15   Ulnar/Radial Deviation 20/20 15/20 20/25      Digit AROM:    Right  7/13/2023 Left  7/13/2023 Left  8/28/23            Index: MP    /65 65              PIP       /43 45              DIP   /18  "25              GOULD   126             Long:  MP   /62 65              PIP   /70 70              DIP   /63 60              GOULD   195             Ring:   MP   /70 70              PIP   /85 90              DIP   /35 50              GOULD   190             Small:  MP   /70 70               PIP   /52 60               DIP   /35 40              GOULD   157             Thumb: MP /65 /50 50                IP /40 /30 40       Rad ADD/ABD            Pal ADD/ABD            Opposition Base of SF Unable to oppose to SF Tip of RF          and Pinch STRENGTH:   (Measured in pounds using a Dynamometer and pinch meter)    Right  8/28/2023 Left  8/28/2023    Setting 2  50.4 7.2   Key  7 0   3 Pt  7 0       Treatment   RE-assess next session    Bashir received the following supervised modalities after being cleared for contradictions for 10 minutes:   -Paraffin to decrease pain and stiffness and increase tissue elasticity    Bashir received the following manual therapy techniques for 10 minutes:   -IASTM, STM and retrograde massage to decrease pain and stiffness in surrounding tissues and scared palmar region    Bashir received therapeutic activities for 25 minutes including:  -TGEs  - red putty for gross , key, 2 and 3  pt pinch   - added FDS glide to index and raking  with red putty   -putty press and pull with 1/2 " dowel for  strengthening   - -- 2 # clothespin with pom poms x 30 reps for pinch strength ; attempted 4# but unable to complete   Wrist maze x 3 min  -Red flexbar smiles, frowns and rolls x 20 reps  -constant force red x 20 reps for digit extension   - 20 lbs. PHG for gross  x 15 reps     Home Exercises and Education Provided     Education provided:   - Continue HEP  - Progress towards goals     Written Home Exercises Provided: Patient instructed to cont prior HEP.  Exercises were reviewed and Bashir was able to demonstrate them prior to the end of the session.  Bashir demonstrated good  understanding of the " HEP provided.   .   See EMR under Patient Instructions for exercises provided prior visit.        Assessment     Pt would continue to benefit from skilled OT. He reports decreased pain today and was able to perform exercises with less difficulty today.  Plan to progress as tolerated. Decreased numbness reported with desensitization activity. Primary complaints of index finger stiffness and weakness. Patient to schedule followup with MD to assess index ; concerns of possible FDS impairment (?)    Bashir is progressing well towards his goals and there are no updates to goals at this time. Pt prognosis is Good.     Pt will continue to benefit from skilled outpatient occupational therapy to address the deficits listed in the problem list on initial evaluation provide pt/family education and to maximize pt's level of independence in the home and community environment.     Anticipated barriers to occupational therapy: none    Pt's spiritual, cultural and educational needs considered and pt agreeable to plan of care and goals.    Goals:  The following goals were discussed with the patient and patient is in agreement with them as to be addressed in the treatment plan.      Long Term Goals (to be met by discharge):  Date Goal Met:       2.) Bashir Ramos Sr. will return to near to prior level of function for ADLs and household management reporting independence or modified independence.     Goal Status:   In progress     3. Bashir Ramos Sr. will report pain 2 out of 10 at worst to increase functional use of Left hand for ADL/IADL tasks.     Goal Status:   In progress      Short Term Goals (to be met by 8/10/23):  Date Goal Met:       Bashir Ramos Sr. will be independent with home exercise program with written instructions.     Goal Status:   In progress     Bashir Ramos Sr. will demonstrate 30 degrees of wrist flexion to improve functional performance in ADLs/work/leisure tasks.     Goal Status:   In progress     Bashir  Richard Rojas. will demonstrate 210 degrees of GOULD of all digits to improve functional performance in ADLs/work/leisure tasks.       Goal Status:   In progress        Bashir Ramos Sr. will demonstrate within 0.5 cm of DPC edema measurement compared to other hand to improve functional grasp for ADLs/work/leisure tasks.     Goal Status:   In progress     Bashir Ramos Sr. will demonstrate the ability to complete ADL/IADL tasks with 4/10 pain.    met 9/19/23 Goal Status:   In progress       Plan   Continue with POC  Plan of Care Expiration: 10/5/23  Updates/Grading for next session: progress as tolerated      Aneta Cox, OT , CHT

## 2023-10-11 ENCOUNTER — TELEPHONE (OUTPATIENT)
Dept: ORTHOPEDICS | Facility: CLINIC | Age: 88
End: 2023-10-11
Payer: MEDICARE

## 2023-10-11 NOTE — TELEPHONE ENCOUNTER
LANGUAGE SERVICES USED:  = Leo (Male)  MR=[id number]    Spoke /c Pt.  Confirmed appt. location & time on 10/17/23  /c Dr. An.  Pt. expressed understanding & was thankful.

## 2023-10-17 ENCOUNTER — OFFICE VISIT (OUTPATIENT)
Dept: ORTHOPEDICS | Facility: CLINIC | Age: 88
End: 2023-10-17
Payer: MEDICARE

## 2023-10-17 VITALS — BODY MASS INDEX: 24.32 KG/M2 | WEIGHT: 146 LBS | HEIGHT: 65 IN

## 2023-10-17 DIAGNOSIS — R22.32 LOCALIZED SWELLING, MASS AND LUMP, LEFT UPPER LIMB: ICD-10-CM

## 2023-10-17 DIAGNOSIS — M25.642 FINGER STIFFNESS, LEFT: Primary | ICD-10-CM

## 2023-10-17 PROCEDURE — 99999 PR PBB SHADOW E&M-EST. PATIENT-LVL III: CPT | Mod: PBBFAC,,, | Performed by: ORTHOPAEDIC SURGERY

## 2023-10-17 PROCEDURE — 99214 PR OFFICE/OUTPT VISIT, EST, LEVL IV, 30-39 MIN: ICD-10-PCS | Mod: S$GLB,,, | Performed by: ORTHOPAEDIC SURGERY

## 2023-10-17 PROCEDURE — 1159F MED LIST DOCD IN RCRD: CPT | Mod: CPTII,S$GLB,, | Performed by: ORTHOPAEDIC SURGERY

## 2023-10-17 PROCEDURE — 99214 OFFICE O/P EST MOD 30 MIN: CPT | Mod: S$GLB,,, | Performed by: ORTHOPAEDIC SURGERY

## 2023-10-17 PROCEDURE — 1126F PR PAIN SEVERITY QUANTIFIED, NO PAIN PRESENT: ICD-10-PCS | Mod: CPTII,S$GLB,, | Performed by: ORTHOPAEDIC SURGERY

## 2023-10-17 PROCEDURE — 1126F AMNT PAIN NOTED NONE PRSNT: CPT | Mod: CPTII,S$GLB,, | Performed by: ORTHOPAEDIC SURGERY

## 2023-10-17 PROCEDURE — 1159F PR MEDICATION LIST DOCUMENTED IN MEDICAL RECORD: ICD-10-PCS | Mod: CPTII,S$GLB,, | Performed by: ORTHOPAEDIC SURGERY

## 2023-10-17 PROCEDURE — 99999 PR PBB SHADOW E&M-EST. PATIENT-LVL III: ICD-10-PCS | Mod: PBBFAC,,, | Performed by: ORTHOPAEDIC SURGERY

## 2023-10-17 NOTE — PROGRESS NOTES
"Mr. Ramos is here today for a post-operative visit. Pt seen while in therapy.  He is status post left carpal tunnel release by Dr. An on 6/19/23. He reports that he is doing okay. He continues to have notable edema and decreased finger motion. Also notes pain.  He continues to attend therapy. He continues to have pain and stiffness and numbness/tingling. He has been seen by pain management and dx with CRPS  he is scheduled for upcoming stellate ganglion blocks. He denies fever, chills, and sweats since the time of the surgery.     Of note : Pt had severe compression of the median nerve.     Physical exam:    Vitals:    10/17/23 1550 10/17/23 1551   Weight: 66.2 kg (146 lb)    Height: 5' 5" (1.651 m)    PainSc:  0-No pain     Patient is well dressed and well groomed, no acute distress.  Alert and oriented to person, place, and time.  Incision is well healed.  There is no erythema or exudate.  There is no sign of any infection. He is NVI. Moderate edema of the hand and fingers. Decreased finger flexion secondary to edema. He is most swollen and stiff at the index.    US L index   FINDINGS:  I see no discrete solid or soft tissue focus in the subcutaneous tissues.  If the palpable mass persists or enlarges, correlation with MRI may be considered in further evaluating.    Assessment: status post left carpal tunnel release by Dr. An on 6/19/23    Plan:  PO instruction reviewed and provided to patient  Discussed CRPS  Continue regular therapy  He has upcoming appt for stellate ganglion blocks with Dr. Holt   RTC following above         "

## 2023-10-24 NOTE — PROGRESS NOTES
I have personally taken the history and examined this patient. I agree with the resident's note as stated above.     Plan:  PO instruction reviewed and provided to patient  Discussed CRPS  Continue regular therapy  He has upcoming appt for stellate ganglion blocks with Dr. Holt   RTC following above

## 2023-11-29 ENCOUNTER — OFFICE VISIT (OUTPATIENT)
Dept: FAMILY MEDICINE | Facility: CLINIC | Age: 88
End: 2023-11-29
Payer: MEDICARE

## 2023-11-29 VITALS
HEIGHT: 65 IN | TEMPERATURE: 98 F | RESPIRATION RATE: 18 BRPM | HEART RATE: 106 BPM | DIASTOLIC BLOOD PRESSURE: 84 MMHG | WEIGHT: 143.31 LBS | BODY MASS INDEX: 23.88 KG/M2 | SYSTOLIC BLOOD PRESSURE: 134 MMHG | OXYGEN SATURATION: 98 %

## 2023-11-29 DIAGNOSIS — G47.00 INSOMNIA, UNSPECIFIED TYPE: Primary | ICD-10-CM

## 2023-11-29 DIAGNOSIS — B35.6 TINEA CRURIS: ICD-10-CM

## 2023-11-29 DIAGNOSIS — E78.5 HYPERLIPIDEMIA, UNSPECIFIED HYPERLIPIDEMIA TYPE: ICD-10-CM

## 2023-11-29 DIAGNOSIS — R73.03 PREDIABETES: ICD-10-CM

## 2023-11-29 DIAGNOSIS — I10 ESSENTIAL HYPERTENSION: ICD-10-CM

## 2023-11-29 PROCEDURE — 1101F PT FALLS ASSESS-DOCD LE1/YR: CPT | Mod: CPTII,S$GLB,,

## 2023-11-29 PROCEDURE — 3288F FALL RISK ASSESSMENT DOCD: CPT | Mod: CPTII,S$GLB,,

## 2023-11-29 PROCEDURE — 99999 PR PBB SHADOW E&M-EST. PATIENT-LVL V: ICD-10-PCS | Mod: PBBFAC,,,

## 2023-11-29 PROCEDURE — 1101F PR PT FALLS ASSESS DOC 0-1 FALLS W/OUT INJ PAST YR: ICD-10-PCS | Mod: CPTII,S$GLB,,

## 2023-11-29 PROCEDURE — 99999 PR PBB SHADOW E&M-EST. PATIENT-LVL V: CPT | Mod: PBBFAC,,,

## 2023-11-29 PROCEDURE — 1159F PR MEDICATION LIST DOCUMENTED IN MEDICAL RECORD: ICD-10-PCS | Mod: CPTII,S$GLB,,

## 2023-11-29 PROCEDURE — 1126F PR PAIN SEVERITY QUANTIFIED, NO PAIN PRESENT: ICD-10-PCS | Mod: CPTII,S$GLB,,

## 2023-11-29 PROCEDURE — 1160F PR REVIEW ALL MEDS BY PRESCRIBER/CLIN PHARMACIST DOCUMENTED: ICD-10-PCS | Mod: CPTII,S$GLB,,

## 2023-11-29 PROCEDURE — 99214 PR OFFICE/OUTPT VISIT, EST, LEVL IV, 30-39 MIN: ICD-10-PCS | Mod: S$GLB,,,

## 2023-11-29 PROCEDURE — 99214 OFFICE O/P EST MOD 30 MIN: CPT | Mod: S$GLB,,,

## 2023-11-29 PROCEDURE — 1160F RVW MEDS BY RX/DR IN RCRD: CPT | Mod: CPTII,S$GLB,,

## 2023-11-29 PROCEDURE — 3288F PR FALLS RISK ASSESSMENT DOCUMENTED: ICD-10-PCS | Mod: CPTII,S$GLB,,

## 2023-11-29 PROCEDURE — 1159F MED LIST DOCD IN RCRD: CPT | Mod: CPTII,S$GLB,,

## 2023-11-29 PROCEDURE — 1126F AMNT PAIN NOTED NONE PRSNT: CPT | Mod: CPTII,S$GLB,,

## 2023-11-29 RX ORDER — TRAZODONE HYDROCHLORIDE 50 MG/1
50 TABLET ORAL NIGHTLY PRN
Qty: 30 TABLET | Refills: 0 | Status: SHIPPED | OUTPATIENT
Start: 2023-11-29 | End: 2024-01-24 | Stop reason: SDUPTHER

## 2023-11-29 RX ORDER — CLOTRIMAZOLE 1 %
CREAM (GRAM) TOPICAL 2 TIMES DAILY
Qty: 24 G | Refills: 0 | Status: SHIPPED | OUTPATIENT
Start: 2023-11-29

## 2023-11-29 NOTE — PROGRESS NOTES
Subjective:       Patient ID: Bashir Ramos Sr. is a 88 y.o. male.    Chief Complaint: insomnia       Bashir Ramos Sr. is a 88 y.o. male with history of HTN, HLP, prediabetes who presents to clinic with son for evaluation of insomnia, worsening over the past month. Pt declines  and would like to translate with his son's help. Notes he sleeps for about 10 minutes and then is up all night. Per chart review, has tried Ambien 5 mg over the summer but patient unsure if it helped. Discussed caffeine intake. Pt reports he drinks coffee in the morning, and he occasionally has tea in the evening/ afternoons.     Also reports itchy rash in the groin area. Notes he tried something OTC but it did not help.         Review of Systems   Constitutional:  Negative for activity change, appetite change, fatigue and unexpected weight change.   Respiratory:  Negative for cough, chest tightness and shortness of breath.    Cardiovascular:  Negative for chest pain, palpitations and leg swelling.   Gastrointestinal:  Negative for abdominal pain, blood in stool, constipation, diarrhea and nausea.   Skin:  Positive for rash.   Neurological:  Negative for dizziness and headaches.   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    Prediabetes 10/3/2017    Unspecified hereditary and idiopathic peripheral neuropathy        Review of patient's allergies indicates:   Allergen Reactions    Shellfish containing products Anaphylaxis    Iodine and iodide containing products     Pcn [penicillins] Rash         Current Outpatient Medications:     acetaminophen (TYLENOL) 325 MG tablet, Take 2 tablets (650 mg total) by mouth every 8 (eight) hours as needed for Pain., Disp: , Rfl: 0    allopurinoL (ZYLOPRIM) 100 MG tablet, Take 1 tablet (100 mg total) by mouth once daily.,  Disp: 90 tablet, Rfl: 1    amitriptyline (ELAVIL) 50 MG tablet, Take 50 mg by mouth every evening., Disp: , Rfl:     amLODIPine (NORVASC) 2.5 MG tablet, Take 1 tablet (2.5 mg total) by mouth once daily., Disp: 90 tablet, Rfl: 3    aspirin (ECOTRIN) 81 MG EC tablet, Take 1 tablet (81 mg total) by mouth once daily., Disp: 90 tablet, Rfl: 0    benazepriL (LOTENSIN) 20 MG tablet, Take 1 tablet (20 mg total) by mouth once daily., Disp: 90 tablet, Rfl: 3    simvastatin (ZOCOR) 40 MG tablet, TAKE ONE TABLET BY MOUTH EVERY EVENING, Disp: 90 tablet, Rfl: 2    tadalafiL (CIALIS) 20 MG Tab, TAKE ONE (1) TABLET (20 MG TOTAL) BY MOUTH ONCE DAILY., Disp: 10 tablet, Rfl: 1    tamsulosin (FLOMAX) 0.4 mg Cap, Take 1 capsule (0.4 mg total) by mouth once daily., Disp: 60 capsule, Rfl: 11    traMADoL (ULTRAM) 50 mg tablet, Take 1 tablet (50 mg total) by mouth every 6 (six) hours as needed for Pain., Disp: 10 tablet, Rfl: 0    clotrimazole (LOTRIMIN) 1 % cream, Apply topically 2 (two) times daily., Disp: 24 g, Rfl: 0    traZODone (DESYREL) 50 MG tablet, Take 1 tablet (50 mg total) by mouth nightly as needed for Insomnia., Disp: 30 tablet, Rfl: 0    Objective:        Physical Exam  Vitals reviewed.   Constitutional:       General: He is not in acute distress.     Appearance: Normal appearance. He is normal weight.   HENT:      Head: Normocephalic and atraumatic.   Eyes:      Conjunctiva/sclera: Conjunctivae normal.   Cardiovascular:      Rate and Rhythm: Normal rate and regular rhythm.      Heart sounds: Normal heart sounds.   Pulmonary:      Effort: Pulmonary effort is normal.      Breath sounds: Normal breath sounds. No wheezing, rhonchi or rales.   Abdominal:      General: Bowel sounds are normal.      Palpations: Abdomen is soft.   Musculoskeletal:         General: Normal range of motion.      Cervical back: Normal range of motion and neck supple.   Skin:     General: Skin is warm and dry.   Neurological:      Mental Status: He is  "oriented to person, place, and time.   Psychiatric:         Mood and Affect: Mood normal.         Behavior: Behavior normal.         Thought Content: Thought content normal.         Judgment: Judgment normal.           Visit Vitals  /84 (BP Location: Right arm, Patient Position: Sitting, BP Method: Medium (Manual))   Pulse 106   Temp 98 °F (36.7 °C) (Oral)   Resp 18   Ht 5' 5" (1.651 m)   Wt 65 kg (143 lb 4.8 oz)   SpO2 98%   BMI 23.85 kg/m²      Assessment:         1. Insomnia, unspecified type    2. Essential hypertension    3. Hyperlipidemia, unspecified hyperlipidemia type    4. Tinea cruris    5. Prediabetes        Plan:     Bashir was seen today for follow-up, hypertension and insomnia.    Diagnoses and all orders for this visit:    Insomnia, unspecified type  -     traZODone (DESYREL) 50 MG tablet; Take 1 tablet (50 mg total) by mouth nightly as needed for Insomnia.  -     Discussed sleep hygiene and avoiding caffeine after 4 PM. Also discussed trying Melatonin nightly before trying the Trazodone.     Essential hypertension  -     CBC Auto Differential; Future  -     COMPREHENSIVE METABOLIC PANEL; Future  -     Stable, continue medications     Hyperlipidemia, unspecified hyperlipidemia type  -     Lipid Panel; Future    Tinea cruris  -     clotrimazole (LOTRIMIN) 1 % cream; Apply topically 2 (two) times daily.    Prediabetes  -     HEMOGLOBIN A1C; Future       Follow up in 6 months with Dr. Casey with labs prior.         Family Medicine Physician Assistant     Future Appointments       Date Provider Specialty Appt Notes    12/8/2023 Rachel Casarez NP Urology f/u    5/29/2024  [facility]    6/18/2024 Juan Casey MD Family Medicine 6 months f/u             All of your core healthy metrics are met.       I spent a total of 30 minutes on the day of the visit.This includes face to face time and non-face to face time preparing to see the patient (eg, review of tests), obtaining and/or reviewing " separately obtained history, documenting clinical information in the electronic or other health record, independently interpreting results and communicating results to the patient/family/caregiver, or care coordinator.    We have addressed [4] Moderate: 1 or more chronic illnesses with exacerbation, progression, or side effects of treatment / 2 or more stable chronic illnesses / 1 undiagnosed new problem with uncertain prognosis / 1 acute illness with systemic symptoms / 1 acute complicated injury  The complexity of the data reviewed and analyzed for this visit was [4] Moderate (Reviewed prior external note, ordered unique testing and reviewed the results of each unique test / Independently interpreted a test / Discussed management or interpretation of a test with another provider)  The risk of complications and/or morbidity or mortality are [4] Moderate risk (I.e. prescription drug management / decision regarding minor surgery with identified pt or procedure risk factors / decision regarding elective major surgery without identified pt or procedure risk factors / diagnosis or treatment significantly limited by social determinants of health)   The level of Medical Decision Making for this visit is [4] Moderate

## 2023-11-29 NOTE — PATIENT INSTRUCTIONS
Try Melatonin 10 mg nightly. If this does not help, try the Trazodone as needed.     Anton Camejo,     If you are due for any health screening(s) below please notify me so we can arrange them to be ordered and scheduled. Most healthy patients at your age complete them, but you are free to accept or refuse.     If you can't do it, I'll definitely understand. If you can, I'd certainly appreciate it!    All of your core healthy metrics are met.

## 2024-01-24 ENCOUNTER — OFFICE VISIT (OUTPATIENT)
Dept: FAMILY MEDICINE | Facility: CLINIC | Age: 89
End: 2024-01-24
Payer: MEDICARE

## 2024-01-24 VITALS
RESPIRATION RATE: 18 BRPM | DIASTOLIC BLOOD PRESSURE: 72 MMHG | BODY MASS INDEX: 23.55 KG/M2 | TEMPERATURE: 98 F | WEIGHT: 141.56 LBS | OXYGEN SATURATION: 100 % | SYSTOLIC BLOOD PRESSURE: 132 MMHG | HEART RATE: 87 BPM

## 2024-01-24 DIAGNOSIS — G44.209 TENSION HEADACHE: ICD-10-CM

## 2024-01-24 DIAGNOSIS — G47.00 INSOMNIA, UNSPECIFIED TYPE: Primary | ICD-10-CM

## 2024-01-24 PROCEDURE — 99999 PR PBB SHADOW E&M-EST. PATIENT-LVL IV: CPT | Mod: PBBFAC,,,

## 2024-01-24 PROCEDURE — 99213 OFFICE O/P EST LOW 20 MIN: CPT | Mod: S$GLB,,,

## 2024-01-24 RX ORDER — TRAZODONE HYDROCHLORIDE 100 MG/1
100 TABLET ORAL NIGHTLY PRN
Qty: 30 TABLET | Refills: 2 | Status: SHIPPED | OUTPATIENT
Start: 2024-01-24 | End: 2024-06-05

## 2024-01-24 NOTE — PROGRESS NOTES
Subjective:       Patient ID: Bashir Ramos Sr. is a 88 y.o. male.    Chief Complaint: headache      Bashir Ramos Sr. is a 88 y.o. male with history of  HTN, HLP, prediabetes who presents to clinic with son for evaluation of headache ongoing for 2-3 days. Pt with son today, who he would like to use as . Reports headache is constant and dull. Notes the headache is localized to the left side of his head and neck. His son reports the patient recently traveled back from [place]. Questionable which medications he was taking while he was away, as his son notes some medications were substituted in [place]. Unsure what medications he has been taking for his headache/ neck pain. No fever, chills, nausea, vomiting, changes in vision/ aura, photo/ phonophobia, dizziness or weakness. Denies snoring or daytime fatigue.     Additionally reports continued insomnia. Seen by me in November, which I prescribed Trazodone 50 mg. Pt states he took these but ran out. Unsure if they helped his insomnia or not. Reports he is no longer drinking caffeine in the afternoons.         Review of Systems   Constitutional:  Negative for appetite change, chills, fatigue and fever.   Eyes:  Negative for photophobia and visual disturbance.   Respiratory:  Negative for cough and shortness of breath.    Cardiovascular:  Negative for chest pain, palpitations and leg swelling.   Gastrointestinal:  Negative for abdominal pain, constipation, diarrhea and nausea.   Neurological:  Positive for headaches. Negative for dizziness and weakness.         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    Prediabetes 10/3/2017    Unspecified hereditary and idiopathic peripheral neuropathy        Review of patient's allergies indicates:   Allergen Reactions    Shellfish containing products Anaphylaxis    Iodine and iodide containing  products     Pcn [penicillins] Rash         Current Outpatient Medications:     acetaminophen (TYLENOL) 325 MG tablet, Take 2 tablets (650 mg total) by mouth every 8 (eight) hours as needed for Pain., Disp: , Rfl: 0    allopurinoL (ZYLOPRIM) 100 MG tablet, Take 1 tablet (100 mg total) by mouth once daily., Disp: 90 tablet, Rfl: 1    amitriptyline (ELAVIL) 50 MG tablet, Take 50 mg by mouth every evening., Disp: , Rfl:     amLODIPine (NORVASC) 2.5 MG tablet, Take 1 tablet (2.5 mg total) by mouth once daily., Disp: 90 tablet, Rfl: 3    aspirin (ECOTRIN) 81 MG EC tablet, Take 1 tablet (81 mg total) by mouth once daily., Disp: 90 tablet, Rfl: 0    benazepriL (LOTENSIN) 20 MG tablet, Take 1 tablet (20 mg total) by mouth once daily., Disp: 90 tablet, Rfl: 3    clotrimazole (LOTRIMIN) 1 % cream, Apply topically 2 (two) times daily., Disp: 24 g, Rfl: 0    simvastatin (ZOCOR) 40 MG tablet, TAKE ONE TABLET BY MOUTH EVERY EVENING, Disp: 90 tablet, Rfl: 2    tadalafiL (CIALIS) 20 MG Tab, TAKE ONE (1) TABLET (20 MG TOTAL) BY MOUTH ONCE DAILY., Disp: 10 tablet, Rfl: 1    tamsulosin (FLOMAX) 0.4 mg Cap, Take 1 capsule (0.4 mg total) by mouth once daily., Disp: 60 capsule, Rfl: 11    traMADoL (ULTRAM) 50 mg tablet, Take 1 tablet (50 mg total) by mouth every 6 (six) hours as needed for Pain., Disp: 10 tablet, Rfl: 0    traZODone (DESYREL) 100 MG tablet, Take 1 tablet (100 mg total) by mouth nightly as needed for Insomnia., Disp: 30 tablet, Rfl: 2    Objective:        Physical Exam  Vitals reviewed.   Constitutional:       General: He is not in acute distress.     Appearance: Normal appearance.   HENT:      Head: Normocephalic and atraumatic.   Eyes:      Extraocular Movements: Extraocular movements intact.      Conjunctiva/sclera: Conjunctivae normal.      Pupils: Pupils are equal, round, and reactive to light.   Cardiovascular:      Rate and Rhythm: Normal rate and regular rhythm.      Heart sounds: Normal heart sounds.   Pulmonary:       Effort: Pulmonary effort is normal.      Breath sounds: Normal breath sounds.   Musculoskeletal:         General: Normal range of motion.      Cervical back: Normal range of motion and neck supple.   Skin:     General: Skin is warm and dry.   Neurological:      General: No focal deficit present.      Mental Status: He is alert and oriented to person, place, and time.      Cranial Nerves: No cranial nerve deficit.      Coordination: Coordination normal. Finger-Nose-Finger Test normal.      Gait: Gait normal.           Visit Vitals  /72 (BP Location: Left arm, Patient Position: Sitting, BP Method: Medium (Manual))   Pulse 87   Temp 98.2 °F (36.8 °C) (Oral)   Resp 18   Wt 64.2 kg (141 lb 8.6 oz)   SpO2 100%   BMI 23.55 kg/m²      Assessment:         1. Insomnia, unspecified type    2. Tension headache        Plan:         Diagnoses and all orders for this visit:    Insomnia, unspecified type  -     traZODone (DESYREL) 100 MG tablet; Take 1 tablet (100 mg total) by mouth nightly as needed for Insomnia.  -     Discussed sleep hygiene/ caffeine restriction in the afternoons     Tension headache        -     Recommend heat/ ice as needed for neck pain, muscular pain.        -     Recommend he try Tylenol every 6-8 hours as needed for headache.       Discussed ER precautions with patient and son (to include acute, severe worsening of his headache, nausea/ vomiting, changes in vision or altered mental status). Patient and son demonstrate understanding.      Follow up if symptoms worsen or fail to improve.         Family Medicine Physician Assistant     Future Appointments       Date Provider Specialty Appt Notes    5/29/2024  [facility]    6/18/2024 Juan Casey MD Family Medicine 6 months f/u             All of your core healthy metrics are met.       I spent a total of 20 minutes on the day of the visit.This includes face to face time and non-face to face time preparing to see the patient (eg, review of  tests), obtaining and/or reviewing separately obtained history, documenting clinical information in the electronic or other health record, independently interpreting results and communicating results to the patient/family/caregiver, or care coordinator.    We have addressed [3] Low: 2 or more self-limited or minor problems / 1 stable chronic illness / 1 acute, uncomplicated illness or injury  The complexity of the data reviewed and analyzed for this visit was [3] Limited (Reviewed prior external note, ordered unique testing or reviewed the results of each unique test)   The risk of complications and/or morbidity or mortality are [3] Low risk   The level of Medical Decision Making for this visit is [3] Low

## 2024-03-28 ENCOUNTER — TELEPHONE (OUTPATIENT)
Dept: ADMINISTRATIVE | Facility: CLINIC | Age: 89
End: 2024-03-28
Payer: MEDICARE

## 2024-04-14 NOTE — MR AVS SNAPSHOT
[facility]  [address] [facility] [address]  [place] [address]  Phone: [phone]  Fax: [phone]                  Bashir Ramos   2/15/2017 2:40 PM   Office Visit    Descripción:  Male : 1935   Personal Médico:  Samir Rouse MD   Departamento:  [facility]           Razón de la micheal     Follow-up           Diagnósticos de Esta Visita        Comentarios    Carpal tunnel syndrome, unspecified laterality    -  Primario     Vitamin D deficiency         Iron deficiency anemia, unspecified iron deficiency anemia type         Carpal tunnel syndrome on both sides                Lista de tareas           Citas próximas        Personal Médico Departamento Tfno del dpto    2/15/2017 2:40 PM [facility] [phone]    3/7/2017 9:00 AM Dory Hubbard DO [place] [phone]    3/9/2017 9:00 AM Aric Edwards MD [place] [facility] Orthopedics [phone]    2017 8:40 AM Samir Rouse MD [facility] [phone]      Metas (5 Years of Data)     Ninguna      Recetas para recoger        Disp Refills Start End    ferrous sulfate 325 (65 FE) MG EC tablet 60 tablet 0 2/15/2017     Take 1 tablet (325 mg total) by mouth 2 (two) times daily. - Oral    [facility]: [facility] - [place], [place] - [address] [facility] No. de tlfo: #: [phone]       ergocalciferol (ERGOCALCIFEROL) 50,000 unit Cap 8 capsule 0 2/15/2017     Take 1 capsule (50,000 Units total) by mouth every 7 days. - Oral    [facility]: [facility] - [facility] [place], [place] - [address] [facility] MENTR [facility] No. de tlfo: #: [phone]       docusate sodium (COLACE) 100 MG capsule 60 capsule 0 2/15/2017     Take 1 capsule (100 mg total) by mouth 2 (two) times daily as needed for Constipation. - Oral    [facility] - [facility] [place] [facility] [address]  FRED garcía tlfo: #: [phone]         [facility]: [facility]    GI Progress Note    Patient ID: Paulina Millard     Subjective: The patient is more alert today and opens eyes to verbal stimuli.  Otherwise, no acute events overnight        Visit Vitals  BP (!) 83/55   Pulse 92   Temp 99 °F (37.2 °C)   Resp 16   Ht 6' (1.829 m)   Wt 86.5 kg (190 lb 11.2 oz)   SpO2 98%   BMI 25.86 kg/m²     Physical Exam  Constitutional:       General: He is not in acute distress.     Appearance: He is ill-appearing and toxic-appearing.   HENT:      Mouth/Throat:      Mouth: Mucous membranes are moist.   Cardiovascular:      Rate and Rhythm: Normal rate and regular rhythm.   Pulmonary:      Effort: Pulmonary effort is normal. No respiratory distress.   Abdominal:      General: Abdomen is flat. Bowel sounds are normal. There is no distension.      Palpations: Abdomen is soft.      Tenderness: There is no abdominal tenderness. There is no guarding or rebound.   Skin:     General: Skin is warm.      Coloration: Skin is jaundiced.   Neurological:      Mental Status: He is alert. He is disoriented.         Pertinent biochemical and radiographic findings:    Labs:  CBC  WBC (K/mcL)   Date Value   04/14/2024 14.9 (H)     RBC (mil/mcL)   Date Value   04/14/2024 2.14 (L)     HGB (g/dL)   Date Value   04/14/2024 7.3 (L)     HCT (%)   Date Value   04/14/2024 21.0 (L)     MCV (fl)   Date Value   04/14/2024 98.1     MCH (pg)   Date Value   04/14/2024 34.1 (H)     MCHC (g/dL)   Date Value   04/14/2024 34.8     RDW-CV (%)   Date Value   04/14/2024 19.9 (H)     PLT (K/mcL)   Date Value   04/14/2024 48 (L)     NRBC (/100 WBC)   Date Value   04/14/2024 3 (H)     DIFF TYPE (no units)   Date Value   01/07/2020 AUTOMATED DIFFERENTIAL     Neutrophil, Percent (%)   Date Value   04/11/2024 93     Lymphocytes, Percent (%)   Date Value   04/11/2024 4     Mono, Percent (%)   Date Value   04/11/2024 1     Eosinophils, Percent (%)   Date Value   04/11/2024 0     Basophils,  [facility] - Asistencia 24/7  Enfermeras registradas de [facility] pueden ayudarle a reservar arin micheal, proveer educación para la beulah, asesoría clínica, y otros servicios de asesoramiento.   Llame para ozzie servicio gratuito a [phone].             Medicamentos           EMPEZAR a benigno estos medicamentos NUEVOS        Refills    ferrous sulfate 325 (65 FE) MG EC tablet 0    Sig: Take 1 tablet (325 mg total) by mouth 2 (two) times daily.    Categoría: Normal    Vía: Oral    ergocalciferol (ERGOCALCIFEROL) 50,000 unit Cap 0    Sig: Take 1 capsule (50,000 Units total) by mouth every 7 days.    Categoría: Normal    Vía: Oral    docusate sodium (COLACE) 100 MG capsule 0    Sig: Take 1 capsule (100 mg total) by mouth 2 (two) times daily as needed for Constipation.    Categoría: Normal    Vía: Oral           Verifique que la siguiente lista de medicamentos es arin representación exacta de los medicamentos que está tomando actualmente. Si no hay ningunos reportados, la lista puede estar en brown. Si no es correcta, por favor póngase en contacto con lebron proveedor de atención médica. Lleve esta lista con usted en radha de emergencia.           Medicamentos Actuales     benazepril (LOTENSIN) 10 MG tablet Take 20 mg by mouth once daily.     gabapentin (NEURONTIN) 100 MG capsule Take 2 capsules (200 mg total) by mouth every evening.    simvastatin (ZOCOR) 40 MG tablet TAKE ONE TABLET BY MOUTH EVERY EVENING FOR CHOLESTEROL    sulindac (CLINORIL) 200 MG Tab     docusate sodium (COLACE) 100 MG capsule Take 1 capsule (100 mg total) by mouth 2 (two) times daily as needed for Constipation.    ergocalciferol (ERGOCALCIFEROL) 50,000 unit Cap Take 1 capsule (50,000 Units total) by mouth every 7 days.    ferrous sulfate 325 (65 FE) MG EC tablet Take 1 tablet (325 mg total) by mouth 2 (two) times daily.           Información de referencia clínica           Eda signos vitales kurt     PS Pulso Temperatura Resp    109/67  Percent (%)   Date Value   04/11/2024 0     Percent Immature Granuloctyes (%)   Date Value   01/07/2020 1     Absolute Neutrophils (K/mcL)   Date Value   04/11/2024 15.4 (H)     Absolute Lymphocytes (K/mcL)   Date Value   04/11/2024 0.6 (L)     Absolute Monocytes (K/mcL)   Date Value   04/11/2024 0.2 (L)     Absolute Eosinophils  (K/mcL)   Date Value   04/11/2024 0.0     Absolute Basophils (K/mcL)   Date Value   04/11/2024 0.0     Absolute Immature Granulocytes (K/mcl)   Date Value   01/07/2020 0.1       CMP  Sodium (mmol/L)   Date Value   04/14/2024 139     Potassium (mmol/L)   Date Value   04/14/2024 3.2 (L)     Chloride (mmol/L)   Date Value   04/14/2024 103     Carbon Dioxide (mmol/L)   Date Value   04/14/2024 27     Anion Gap (mmol/L)   Date Value   04/14/2024 12     Glucose (mg/dL)   Date Value   04/14/2024 146 (H)     BUN (mg/dL)   Date Value   04/14/2024 62 (H)     Creatinine (mg/dL)   Date Value   04/14/2024 2.75 (H)     GFR Estimate,  (no units)   Date Value   01/07/2020 54     GFR Estimate, Non  (no units)   Date Value   01/07/2020 47     BUN/Creatinine Ratio (no units)   Date Value   01/07/2020 13     Calcium (mg/dL)   Date Value   04/14/2024 7.8 (L)     Bilirubin, Total (mg/dL)   Date Value   04/14/2024 14.1 (H)     GOT/AST (Units/L)   Date Value   04/14/2024 233 (H)     GPT/ALT (Units/L)   Date Value   04/14/2024 92 (H)     Alkaline Phosphatase (Units/L)   Date Value   04/14/2024 113     Protein, Total (g/dL)   Date Value   04/14/2024 4.3 (L)     Albumin (g/dL)   Date Value   04/14/2024 1.8 (L)     Globulin (g/dL)   Date Value   04/14/2024 2.5     A/G Ratio (no units)   Date Value   04/14/2024 0.7 (L)         Imaging  XR CHEST AP OR PA  Narrative: EXAM: XR CHEST AP OR PA    CLINICAL INDICATION: Shortness of breath.    COMPARISON: Portable AP upright chest 4/11/2024 at 3:41 p.m.    FINDINGS: On this AP portable upright chest a nasogastric tube has been  placed in the  "(BP Location: Right arm, Patient Position: Sitting, BP Method: Automatic) 73 97.7 °F (36.5 °C) (Oral) 18    [place] BMI (IMC)       5' 5.5" (1.664 m) 62.1 kg (136 lb 14.5 oz) 22.44 kg/m2       Blood Pressure          Most Recent Value    BP  109/67      Alergias     A partir del:  2/15/2017        Pcn [Penicillins]      Vacunas     Administradas en la fecha de la visita:  2/15/2017        None      Orders Placed During Today's Visit      Órdenes normales de esta visita    Ambulatory referral to Orthopedics       Language Assistance Services     ATTENTION: Language assistance services are available, free of charge. Please call [phone].      ATENCIÓN: Si habla treva, tiene a lebron disposición servicios gratuitos de asistencia lingüística. Llame al [phone].     CHÚ Ý: N?u b?n nói Ti?ng Vi?t, có các d?ch v? h? tr? ngôn ng? mi?n phí dành cho b?n. G?i s? [phone].         [facility] cumple con las leyes federales aplicables de derechos civiles y no discrimina por motivos de johan, color, origen nacional, edad, discapacidad, o sexo.                 Bashir Richard   2/15/2017 2:40 PM   Office Visit    Description:  Male : 1935   Provider:  Samir Rouse MD   Department:  [facility]           Reason for Visit     Follow-up           Diagnoses this Visit        Comments    Carpal tunnel syndrome, unspecified laterality    -  Primary     Vitamin D deficiency         Iron deficiency anemia, unspecified iron deficiency anemia type         Carpal tunnel syndrome on both sides                To Do List           Future Appointments        Provider Department Dept Phone    2/15/2017 2:40 PM MD [facility] [phone]    3/7/2017 9:00 AM Dory Hubbard DO [place] [phone]    3/9/2017 9:00 AM Aric Edwards MD [place] - Orthopedics [phone]    2017 8:40 AM Samir Rouse MD [place] [facility] " interim extending below the hemidiaphragm. The right internal  jugular catheter is unchanged in position. Increasing atelectasis and trace  effusions are now seen at both lung bases on this exam limited due to a  poor degree of inspiratory effort. Cardiomegaly is noted with normal  pulmonary vascularity.  Impression: 1. Exam limited due to poor inspiratory effort with atelectasis and trace  effusions now seen at both lung bases.    Electronically Signed by: ROBERT ALEXANDER M.D.   Signed on: 4/14/2024 8:11 AM   Workstation ID: [id number]         Assessment   Problem List Items Addressed This Visit          Neuro    * (Principal) Altered mental status, unspecified altered mental status type - Primary     Other Visit Diagnoses       Jaundice        Hypothermia, initial encounter        Hypotension, unspecified hypotension type        Relevant Medications    heparin (porcine) injection 5,000 Units    sodium citrate anticoagulant 4 % flush 3 mL    midodrine (PROAMATINE) tablet 5 mg    Hypoglycemia        Metabolic acidosis        Hyperkalemia        Acute renal failure, unspecified acute renal failure type (CMD)        Elevated LFTs                Impression:  1.  Acute on chronic liver failure secondary #2 and 3: The patient's INR is improving along with decreasing direct bilirubin level.  Likewise, the patient's mental status is also improving.  These are favorable prognostic findings.  Therefore, I am not recommending transfer to a liver transplant center at this time  2.  Acute alcoholic hepatitis hepatic discriminant score greater than 60: Patient's bilirubin level is responding to corticosteroids  3.  Ischemic hepatitis      Plan:  1.  Continue with corticosteroid treatment at this time  2.  Continue with full supportive care  3.  Continue with lactulose  4.  Continue with enteral nutrition    Coordination of care: The discussion of management, treatment plan, and test interpretation with associated healthcare  Medicine [phone]      Goals     None       These Medications        Disp Refills Start End    ferrous sulfate 325 (65 FE) MG EC tablet 60 tablet 0 2/15/2017     Take 1 tablet (325 mg total) by mouth 2 (two) times daily. - Oral    Pharmacy: [address] #: [phone]       ergocalciferol (ERGOCALCIFEROL) 50,000 unit Cap 8 capsule 0 2/15/2017     Take 1 capsule (50,000 Units total) by mouth every 7 days. - Oral    Pharmacy: [address] Ph #: [phone]       docusate sodium (COLACE) 100 MG capsule 60 capsule 0 2/15/2017     Take 1 capsule (100 mg total) by mouth 2 (two) times daily as needed for Constipation. - Oral    Pharmacy: [address] #: [phone]         [facility] On Call     [facility] On [facility] Nurse Care Line - 24/7 Assistance  Registered nurses in the [facility] provide clinical advisement, health education, appointment booking, and other advisory services.  Call for this free service at [phone].             Medications           START taking these NEW medications        Refills    ferrous sulfate 325 (65 FE) MG EC tablet 0    Sig: Take 1 tablet (325 mg total) by mouth 2 (two) times daily.    Class: Normal    Route: Oral    ergocalciferol (ERGOCALCIFEROL) 50,000 unit Cap 0    Sig: Take 1 capsule (50,000 Units total) by mouth every 7 days.    Class: Normal    Route: Oral    docusate sodium (COLACE) 100 MG capsule 0    Sig: Take 1 capsule (100 mg total) by mouth 2 (two) times daily as needed for Constipation.    Class: Normal    Route: Oral           Verify that the below list of medications is an accurate representation of the medications you are currently taking.  If none reported, the list may be blank. If incorrect, please contact your healthcare provider. Carry this list with you in case of emergency.          providers was completed.    Shared decision making: The risk of complications, morbidity, mortality, and benefits regarding the stated management decisions along with alternative treatment strategies were discussed at the time of the visit.  Patient and/or patient's POA are in agreement with the stated treatment plan and diagnostic algorithm.    Documentation: The timing of documentation may not reflect the actual time of the patient encounter.  The patient and/or POA was given detailed information and instructions regarding the patient's diagnostic and therapeutic plan.  There was a clear understanding of the established recommended interventions.  The patient and/or POA's questions and concerns were addressed in detail.  Patient and/or POA are agreeable to the outlined recommendations.  This note was created using the [employer] voice recognition system.  Purged to review and correct the note have been made but irregularities potentially may still exist.    Dr. Satnam Sommers D.O., [facility] Gastroenterology: [facility]   "  Current Medications     benazepril (LOTENSIN) 10 MG tablet Take 20 mg by mouth once daily.     gabapentin (NEURONTIN) 100 MG capsule Take 2 capsules (200 mg total) by mouth every evening.    simvastatin (ZOCOR) 40 MG tablet TAKE ONE TABLET BY MOUTH EVERY EVENING FOR CHOLESTEROL    sulindac (CLINORIL) 200 MG Tab     docusate sodium (COLACE) 100 MG capsule Take 1 capsule (100 mg total) by mouth 2 (two) times daily as needed for Constipation.    ergocalciferol (ERGOCALCIFEROL) 50,000 unit Cap Take 1 capsule (50,000 Units total) by mouth every 7 days.    ferrous sulfate 325 (65 FE) MG EC tablet Take 1 tablet (325 mg total) by mouth 2 (two) times daily.           Clinical Reference Information           Your Vitals Were     BP Pulse Temp Resp    109/67 (BP Location: Right arm, Patient Position: Sitting, BP Method: Automatic) 73 97.7 °F (36.5 °C) (Oral) 18    Height Weight BMI       5' 5.5" (1.664 m) 62.1 kg (136 lb 14.5 oz) 22.44 kg/m2       Blood Pressure          Most Recent Value    BP  109/67      Allergies as of 2/15/2017     Pcn [Penicillins]      Immunizations Administered on Date of Encounter - 2/15/2017     None      Orders Placed During Today's Visit      Normal Orders This Visit    Ambulatory referral to Orthopedics       Language Assistance Services     ATTENTION: Language assistance services are available, free of charge. Please call [phone].      ATENCIÓN: Si habla español, tiene a lebron disposición servicios gratuitos de asistencia lingüística. Llame al [phone].     DORENE Ý: N?u b?n nói Ti?ng Vi?t, có các d?ch v? h? tr? ngôn ng? mi?n phí dành cho b?n. G?i s? [phone].         [facility] complies with applicable Federal civil rights laws and does not discriminate on the basis of race, color, national origin, age, disability, or sex.          "

## 2024-06-05 ENCOUNTER — OFFICE VISIT (OUTPATIENT)
Dept: FAMILY MEDICINE | Facility: CLINIC | Age: 89
End: 2024-06-05
Payer: MEDICARE

## 2024-06-05 ENCOUNTER — LAB VISIT (OUTPATIENT)
Dept: LAB | Facility: HOSPITAL | Age: 89
End: 2024-06-05
Attending: FAMILY MEDICINE
Payer: MEDICARE

## 2024-06-05 VITALS
WEIGHT: 140.88 LBS | HEIGHT: 65 IN | RESPIRATION RATE: 16 BRPM | SYSTOLIC BLOOD PRESSURE: 130 MMHG | HEART RATE: 85 BPM | DIASTOLIC BLOOD PRESSURE: 70 MMHG | BODY MASS INDEX: 23.47 KG/M2 | OXYGEN SATURATION: 99 % | TEMPERATURE: 98 F

## 2024-06-05 DIAGNOSIS — E78.5 HYPERLIPIDEMIA, UNSPECIFIED HYPERLIPIDEMIA TYPE: ICD-10-CM

## 2024-06-05 DIAGNOSIS — R79.9 ABNORMAL BLOOD FINDING: ICD-10-CM

## 2024-06-05 DIAGNOSIS — N52.9 ERECTILE DYSFUNCTION, UNSPECIFIED ERECTILE DYSFUNCTION TYPE: ICD-10-CM

## 2024-06-05 DIAGNOSIS — G47.00 INSOMNIA, UNSPECIFIED TYPE: ICD-10-CM

## 2024-06-05 DIAGNOSIS — I10 ESSENTIAL HYPERTENSION: ICD-10-CM

## 2024-06-05 DIAGNOSIS — R79.9 ABNORMAL BLOOD FINDING: Primary | ICD-10-CM

## 2024-06-05 DIAGNOSIS — M10.9 GOUT, ARTHRITIS: ICD-10-CM

## 2024-06-05 LAB
ALBUMIN SERPL BCP-MCNC: 3.6 G/DL (ref 3.5–5.2)
ALP SERPL-CCNC: 85 U/L (ref 55–135)
ALT SERPL W/O P-5'-P-CCNC: 11 U/L (ref 10–44)
ANION GAP SERPL CALC-SCNC: 8 MMOL/L (ref 8–16)
AST SERPL-CCNC: 19 U/L (ref 10–40)
BASOPHILS # BLD AUTO: 0 K/UL (ref 0–0.2)
BASOPHILS NFR BLD: 0 % (ref 0–1.9)
BILIRUB SERPL-MCNC: 0.3 MG/DL (ref 0.1–1)
BUN SERPL-MCNC: 9 MG/DL (ref 8–23)
CALCIUM SERPL-MCNC: 9.5 MG/DL (ref 8.7–10.5)
CHLORIDE SERPL-SCNC: 110 MMOL/L (ref 95–110)
CHOLEST SERPL-MCNC: 186 MG/DL (ref 120–199)
CHOLEST/HDLC SERPL: 3.2 {RATIO} (ref 2–5)
CO2 SERPL-SCNC: 23 MMOL/L (ref 23–29)
CREAT SERPL-MCNC: 1 MG/DL (ref 0.5–1.4)
DIFFERENTIAL METHOD BLD: ABNORMAL
EOSINOPHIL # BLD AUTO: 0.3 K/UL (ref 0–0.5)
EOSINOPHIL NFR BLD: 8.9 % (ref 0–8)
ERYTHROCYTE [DISTWIDTH] IN BLOOD BY AUTOMATED COUNT: 15.9 % (ref 11.5–14.5)
EST. GFR  (NO RACE VARIABLE): >60 ML/MIN/1.73 M^2
ESTIMATED AVG GLUCOSE: 114 MG/DL (ref 68–131)
GLUCOSE SERPL-MCNC: 79 MG/DL (ref 70–110)
HBA1C MFR BLD: 5.6 % (ref 4–5.6)
HCT VFR BLD AUTO: 39.7 % (ref 40–54)
HDLC SERPL-MCNC: 59 MG/DL (ref 40–75)
HDLC SERPL: 31.7 % (ref 20–50)
HGB BLD-MCNC: 12.2 G/DL (ref 14–18)
IMM GRANULOCYTES # BLD AUTO: 0.01 K/UL (ref 0–0.04)
IMM GRANULOCYTES NFR BLD AUTO: 0.3 % (ref 0–0.5)
LDLC SERPL CALC-MCNC: 108.6 MG/DL (ref 63–159)
LYMPHOCYTES # BLD AUTO: 0.9 K/UL (ref 1–4.8)
LYMPHOCYTES NFR BLD: 29.7 % (ref 18–48)
MCH RBC QN AUTO: 28.6 PG (ref 27–31)
MCHC RBC AUTO-ENTMCNC: 30.7 G/DL (ref 32–36)
MCV RBC AUTO: 93 FL (ref 82–98)
MONOCYTES # BLD AUTO: 0.4 K/UL (ref 0.3–1)
MONOCYTES NFR BLD: 11.4 % (ref 4–15)
NEUTROPHILS # BLD AUTO: 1.6 K/UL (ref 1.8–7.7)
NEUTROPHILS NFR BLD: 49.7 % (ref 38–73)
NONHDLC SERPL-MCNC: 127 MG/DL
NRBC BLD-RTO: 0 /100 WBC
PLATELET # BLD AUTO: 289 K/UL (ref 150–450)
PMV BLD AUTO: 9.9 FL (ref 9.2–12.9)
POTASSIUM SERPL-SCNC: 3.7 MMOL/L (ref 3.5–5.1)
PROT SERPL-MCNC: 7 G/DL (ref 6–8.4)
RBC # BLD AUTO: 4.26 M/UL (ref 4.6–6.2)
SODIUM SERPL-SCNC: 141 MMOL/L (ref 136–145)
TRIGL SERPL-MCNC: 92 MG/DL (ref 30–150)
WBC # BLD AUTO: 3.16 K/UL (ref 3.9–12.7)

## 2024-06-05 PROCEDURE — 1126F AMNT PAIN NOTED NONE PRSNT: CPT | Mod: CPTII,S$GLB,, | Performed by: FAMILY MEDICINE

## 2024-06-05 PROCEDURE — 36415 COLL VENOUS BLD VENIPUNCTURE: CPT | Mod: PO | Performed by: FAMILY MEDICINE

## 2024-06-05 PROCEDURE — 1101F PT FALLS ASSESS-DOCD LE1/YR: CPT | Mod: CPTII,S$GLB,, | Performed by: FAMILY MEDICINE

## 2024-06-05 PROCEDURE — 83036 HEMOGLOBIN GLYCOSYLATED A1C: CPT | Performed by: FAMILY MEDICINE

## 2024-06-05 PROCEDURE — 80053 COMPREHEN METABOLIC PANEL: CPT | Performed by: FAMILY MEDICINE

## 2024-06-05 PROCEDURE — 85025 COMPLETE CBC W/AUTO DIFF WBC: CPT | Performed by: FAMILY MEDICINE

## 2024-06-05 PROCEDURE — 80061 LIPID PANEL: CPT | Performed by: FAMILY MEDICINE

## 2024-06-05 PROCEDURE — G2211 COMPLEX E/M VISIT ADD ON: HCPCS | Mod: S$GLB,,, | Performed by: FAMILY MEDICINE

## 2024-06-05 PROCEDURE — 99214 OFFICE O/P EST MOD 30 MIN: CPT | Mod: S$GLB,,, | Performed by: FAMILY MEDICINE

## 2024-06-05 PROCEDURE — 3288F FALL RISK ASSESSMENT DOCD: CPT | Mod: CPTII,S$GLB,, | Performed by: FAMILY MEDICINE

## 2024-06-05 PROCEDURE — 1159F MED LIST DOCD IN RCRD: CPT | Mod: CPTII,S$GLB,, | Performed by: FAMILY MEDICINE

## 2024-06-05 PROCEDURE — 99999 PR PBB SHADOW E&M-EST. PATIENT-LVL IV: CPT | Mod: PBBFAC,,, | Performed by: FAMILY MEDICINE

## 2024-06-05 RX ORDER — ALLOPURINOL 100 MG/1
TABLET ORAL
Qty: 90 TABLET | Refills: 3 | Status: SHIPPED | OUTPATIENT
Start: 2024-06-05

## 2024-06-05 RX ORDER — ZOLPIDEM TARTRATE 5 MG/1
5 TABLET ORAL NIGHTLY PRN
COMMUNITY

## 2024-06-05 RX ORDER — TRAZODONE HYDROCHLORIDE 100 MG/1
TABLET ORAL
Qty: 30 TABLET | Refills: 11 | Status: SHIPPED | OUTPATIENT
Start: 2024-06-05

## 2024-06-05 RX ORDER — TADALAFIL 20 MG/1
20 TABLET ORAL DAILY PRN
Qty: 10 TABLET | Refills: 5 | Status: SHIPPED | OUTPATIENT
Start: 2024-06-05

## 2024-06-05 NOTE — TELEPHONE ENCOUNTER
Refill Routing Note   Medication(s) are not appropriate for processing by [facility] for the following reason(s):        Required labs outdated    [facility] action(s):  Defer             Appointments  past 12m or future 3m with PCP    Date Provider   Last Visit   6/5/2024 Juan Casey MD   Next Visit   Visit date not found Juan Casey MD   ED visits in past 90 days: 0        Note composed:9:40 AM 06/05/2024

## 2024-06-05 NOTE — TELEPHONE ENCOUNTER
No care due was identified.  [facility] Embedded Care Due Messages. Reference number: [id number].   6/05/2024 9:25:34 AM CDT

## 2024-06-05 NOTE — PATIENT INSTRUCTIONS
"Anton Camejo,     If you are due for any health screening(s) below please notify me so we can arrange them to be ordered and scheduled to maintain your health. Most healthy patients complete it. Don't lose out on improving your health.     All of your core healthy metrics are met.                          Patient Education       Checking Your Blood Pressure at Home   The Basics   Written by the doctors and editors at [facility]   How is blood pressure measured? -- Blood pressure is usually measured with a device that goes around your upper arm. This is often done in a doctor's office. But some people also check their blood pressure themselves, at home or at work.  Blood pressure is explained with 2 numbers. For instance, your blood pressure might be "140 over 90." The first (top) number is the pressure inside your arteries when your heart is salvador. The second (bottom) number is the pressure inside your arteries when your heart is relaxed. The table shows how doctors and nurses define high and normal blood pressure (table 1).  If your blood pressure gets too high, it puts you at risk for heart attack, stroke, and kidney disease. High blood pressure does not usually cause symptoms. But it can be serious.  What is a home blood pressure meter? -- A home blood pressure meter (or "monitor") is a device you can use to check your blood pressure yourself. It has a cuff that goes around your upper arm (figure 1). Some devices have a cuff that goes around your wrist instead. But doctors aren't sure if these work as well. The meter also has a small screen, or dial, that shows your blood pressure numbers.  There are also special meters you can wear for a day or 2. These are different because they automatically check your blood pressure throughout the day and night, even while you are sleeping. If your doctor thinks you should use one of these devices, they will talk to you about how to wear it.  Why do I need to check my blood " pressure at home? -- If your doctor knows or suspects that you have high blood pressure, they might want you to check it at home. There are a few reasons for this. Your doctor might want to look at:  Whether your blood pressure measures the same at home as it did in the doctor's office  How well your blood pressure medicines are working  Changes in your blood pressure, for example, if it goes up and down  People who check their own blood pressure at home usually do better at keeping it low.  How do I choose a home blood pressure meter? -- When choosing a home blood pressure meter, you will probably want to think about:  Cost - Some devices cost more than others. You should also check to see if your insurance will help pay for your device.  Size - It's important to make sure the cuff fits your arm comfortably. Your doctor or nurse can help you with this.  How easy it is to use - You should make sure you understand how to use the device. You also need to be able to read the numbers on the screen.  You do not need a prescription to buy a home blood pressure meter. You can buy them at most pharmacies or over the internet. Your doctor or nurse can help you choose the right device for you.  How do I check my blood pressure at home? -- Once you have a home blood pressure meter, your doctor or nurse should check it to make sure it fits you and works correctly.  When it's time to check your blood pressure:  Go to the bathroom and empty your bladder first. Having a full bladder can temporarily increase your blood pressure, making the results inaccurate.  Sit in a chair with your feet flat on the ground.  Try to breathe normally and stay calm.  Attach the cuff to your arm. Place the cuff directly on your skin, not over your clothing. The cuff should be tight enough to not slip down, but not uncomfortably tight.  Sit and relax for about 3 to 5 minutes with the cuff on.  Follow the directions that came with your device to start  measuring your blood pressure. This might involve squeezing the bulb at the end of the tube to inflate the cuff (fill it with air). With some monitors, you just need to press a button to inflate the cuff. When the cuff fills with air, it feels like someone is squeezing your arm, but it should not hurt. Then you will slowly deflate the cuff (let the air out of it), or it will deflate by itself. The screen or dial will show your blood pressure numbers.  Stay seated and relax for 1 minute, then measure your blood pressure again.  How often should I check my blood pressure? -- It depends. Different people need to follow different schedules. Your doctor or nurse will tell you how often to check your blood pressure, and when. Some people need to check their blood pressure twice a day, in the morning and evening.  Your doctor or nurse will probably tell you to keep track of your blood pressure for at least a few days (table 2). Then they will look at the numbers. The reason for this is that it's normal for your blood pressure to change a bit from day to day. For example, the numbers might change depending on whether you recently had caffeine, just exercised, or feel stressed. Checking your blood pressure over several days - or longer - will give your doctor or nurse a better idea of what is average for you.  How should I keep track of my blood pressure? -- Some blood pressure meters will record your numbers for you, or send them to your computer or smartphone. If yours does not do this, you will need to write them down. Your doctor or nurse can help you figure out the best way to keep track of the numbers.  What if my blood pressure is high? -- Your doctor or nurse will tell you what to do if your blood pressure is high when you check it at home. If you get a number that is higher than normal, measure it again to see if it is still high. If it is very high (above a certain number, which your doctor or nurse will tell you  "to watch out for), you should call your doctor right away.  If your blood pressure is only a little high, your doctor or nurse might tell you to keep checking it for a few more days or weeks, and then call if it does not go back down. Then they can help you decide what to do next.  All topics are updated as new evidence becomes available and our peer review process is complete.  This topic retrieved from [employer] on: Sep 21, 2021.  Topic 602411 Version 4.0  Release: 29.4.2 - C29.263  © 2021 UpToDate, Inc. and/or its affiliates. All rights reserved.  table 1: Definition of normal and high blood pressure  Level  Top number  Bottom number    High 130 or above 80 or above   Elevated 120 to 129 79 or below   Normal 119 or below 79 or below   These definitions are from the American College of Cardiology/American Heart Association. Other expert groups might use slightly different definitions.  "Elevated blood pressure" is a term doctor or nurses use as a warning. It means you do not yet have high blood pressure, but your blood pressure is not as low as it should be for good health.  Graphic 35918 Version 6.0  figure 1: Using a home blood pressure meter     This is an example of a person using a home blood pressure meter.  Graphic 451797 Version 1.0    table 2: 7-day diary for checking blood pressure at home  Day 1  Day 2  Day 3  Day 4  Day 5  Day 6  Day 7    Morning  1st read Morning  1st read Morning  1st read Morning  1st read Morning  1st read Morning  1st read Morning  1st read   Systolic: __________ Systolic: __________ Systolic: __________ Systolic: __________ Systolic: __________ Systolic: __________ Systolic: __________   Diastolic: __________ Diastolic: __________ Diastolic: __________ Diastolic: __________ Diastolic: __________ Diastolic: __________ Diastolic: __________   Pulse: __________ Pulse: __________ Pulse: __________ Pulse: __________ Pulse: __________ Pulse: __________ Pulse: __________   Morning  2nd " read Morning  2nd read Morning  2nd read Morning  2nd read Morning  2nd read Morning  2nd read Morning  2nd read   Systolic: __________ Systolic: __________ Systolic: __________ Systolic: __________ Systolic: __________ Systolic: __________ Systolic: __________   Diastolic: __________ Diastolic: __________ Diastolic: __________ Diastolic: __________ Diastolic: __________ Diastolic: __________ Diastolic: __________   Pulse: __________ Pulse: __________ Pulse: __________ Pulse: __________ Pulse: __________ Pulse: __________ Pulse: __________   Evening  1st read Evening  1st read Evening  1st read Evening  1st read Evening  1st read Evening  1st read Evening  1st read   Systolic: __________ Systolic: __________ Systolic: __________ Systolic: __________ Systolic: __________ Systolic: __________ Systolic: __________   Diastolic: __________ Diastolic: __________ Diastolic: __________ Diastolic: __________ Diastolic: __________ Diastolic: __________ Diastolic: __________   Pulse: __________ Pulse: __________ Pulse: __________ Pulse: __________ Pulse: __________ Pulse: __________ Pulse: __________   Evening  2nd read Evening  2nd read Evening  2nd read Evening  2nd read Evening  2nd read Evening  2nd read Evening  2nd read   Systolic: __________ Systolic: __________ Systolic: __________ Systolic: __________ Systolic: __________ Systolic: __________ Systolic: __________   Diastolic: __________ Diastolic: __________ Diastolic: __________ Diastolic: __________ Diastolic: __________ Diastolic: __________ Diastolic: __________   Pulse: __________ Pulse: __________ Pulse: __________ Pulse: __________ Pulse: __________ Pulse: __________ Pulse: __________   Notes    Notes    Notes    Notes    Notes    Notes    Notes      ____________________ ____________________ ____________________ ____________________ ____________________ ____________________ ____________________   ____________________ ____________________ ____________________  ____________________ ____________________ ____________________ ____________________   ____________________ ____________________ ____________________ ____________________ ____________________ ____________________ ____________________   Patient name: ______________________________     Patient ID: ________________________________    Primary care provider: _______________________    Average BP: _______________________________    [facility] 332341 Version 1.0  Consumer Information Use and Disclaimer   This information is not specific medical advice and does not replace information you receive from your health care provider. This is only a brief summary of general information. It does NOT include all information about conditions, illnesses, injuries, tests, procedures, treatments, therapies, discharge instructions or life-style choices that may apply to you. You must talk with your health care provider for complete information about your health and treatment options. This information should not be used to decide whether or not to accept your health care provider's advice, instructions or recommendations. Only your health care provider has the knowledge and training to provide advice that is right for you. The use of this information is governed by the [employer] End User License Agreement, available at [email or web address]://[email or web address]/solutions/lexicomp/about/tu.The use of [employer] content is governed by the [employer] Terms of Use. ©2021 [employer] All rights reserved.  Copyright   © 2021 UpToDate, Inc. and/or its affiliates. All rights reserved.

## 2024-06-05 NOTE — PROGRESS NOTES
Subjective:   Patient ID: Bashir Ramos Sr. is a 89 y.o. male     Chief Complaint:Follow-up      Here for checkup    Follow-up  Pertinent negatives include no abdominal pain or chest pain.     Review of Systems   Respiratory:  Negative for shortness of breath.    Cardiovascular:  Negative for chest pain.   Gastrointestinal:  Negative for abdominal pain.   Genitourinary:  Negative for dysuria.     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 grayson)    EYE SURGERY       Objective:     Vitals:    06/05/24 0748   BP: 130/70   Pulse:    Resp:    Temp:      Body mass index is 23.44 kg/m².  Physical Exam  Vitals and nursing note reviewed.   Constitutional:       Appearance: He is well-developed.   HENT:      Head: Normocephalic and atraumatic.   Eyes:      General: No scleral icterus.     Conjunctiva/sclera: Conjunctivae normal.   Cardiovascular:      Heart sounds: No murmur heard.  Pulmonary:      Effort: Pulmonary effort is normal. No respiratory distress.   Musculoskeletal:         General: No deformity. Normal range of motion.      Cervical back: Normal range of motion and neck supple.   Skin:     Coloration: Skin is not pale.      Findings: No rash.   Neurological:      Mental Status: He is alert and oriented to person, place, and time.   Psychiatric:         Behavior: Behavior normal.         Thought Content: Thought content normal.          Judgment: Judgment normal.       Assessment:     1. Abnormal blood finding    2. Erectile dysfunction, unspecified erectile dysfunction type    3. Essential hypertension    4. Hyperlipidemia, unspecified hyperlipidemia type      Plan:   Abnormal blood finding  -     Comprehensive Metabolic Panel; Future; Expected date: 06/05/2024  -     Hemoglobin A1C; Future; Expected date: 06/05/2024  -     Lipid Panel; Future; Expected date: 06/05/2024  -     CBC Auto Differential; Future; Expected date: 06/05/2024    Erectile dysfunction, unspecified erectile dysfunction type  -     tadalafiL (CIALIS) 20 MG Tab; Take 1 tablet (20 mg total) by mouth daily as needed.  Dispense: 10 tablet; Refill: 5    Essential hypertension  -     Comprehensive Metabolic Panel; Future; Expected date: 06/05/2024  -     Hemoglobin A1C; Future; Expected date: 06/05/2024  -     Lipid Panel; Future; Expected date: 06/05/2024  -     CBC Auto Differential; Future; Expected date: 06/05/2024    Hyperlipidemia, unspecified hyperlipidemia type  -     Lipid Panel; Future; Expected date: 06/05/2024            Total time spent of Less than 30 minutes minutes on the day of the visit.This includes face to face time and preparing to see the patient, obtaining and reviewing separately obtained history, documenting clinical information in the electronic or other health record, independently interpreting results and communicating results to the patient/family/caregiver, or care coordinator.    Established patient with me has been instructed that must see me at least 1 time yearly (every 365 days) for refills of medications. Seeing other providers in this clinic is fine but expectation is to see me yearly.    Juan Casey MD  06/05/2024    Portions of this note have been dictated with ADEN Monterroso

## 2024-06-06 ENCOUNTER — TELEPHONE (OUTPATIENT)
Dept: FAMILY MEDICINE | Facility: CLINIC | Age: 89
End: 2024-06-06
Payer: MEDICARE

## 2024-06-06 NOTE — TELEPHONE ENCOUNTER
Phoned patient to provide results of blood work. VM recording in Gambian (patient preferred language). Will mail results to patient home for review.

## 2024-06-06 NOTE — TELEPHONE ENCOUNTER
----- Message from Juan Casey MD sent at 6/5/2024  3:05 PM CDT -----  Blood sugar and cholesterol are well controlled.

## 2024-06-27 ENCOUNTER — OFFICE VISIT (OUTPATIENT)
Dept: FAMILY MEDICINE | Facility: CLINIC | Age: 89
End: 2024-06-27
Payer: MEDICARE

## 2024-06-27 VITALS
DIASTOLIC BLOOD PRESSURE: 66 MMHG | SYSTOLIC BLOOD PRESSURE: 120 MMHG | RESPIRATION RATE: 16 BRPM | HEIGHT: 65 IN | BODY MASS INDEX: 23.4 KG/M2 | WEIGHT: 140.44 LBS | OXYGEN SATURATION: 98 % | HEART RATE: 98 BPM

## 2024-06-27 DIAGNOSIS — M10.9 GOUT, ARTHRITIS: Primary | ICD-10-CM

## 2024-06-27 DIAGNOSIS — M79.672 LEFT FOOT PAIN: ICD-10-CM

## 2024-06-27 DIAGNOSIS — I10 ESSENTIAL HYPERTENSION: ICD-10-CM

## 2024-06-27 PROCEDURE — 1101F PT FALLS ASSESS-DOCD LE1/YR: CPT | Mod: CPTII,S$GLB,,

## 2024-06-27 PROCEDURE — 1126F AMNT PAIN NOTED NONE PRSNT: CPT | Mod: CPTII,S$GLB,,

## 2024-06-27 PROCEDURE — 1160F RVW MEDS BY RX/DR IN RCRD: CPT | Mod: CPTII,S$GLB,,

## 2024-06-27 PROCEDURE — 99999 PR PBB SHADOW E&M-EST. PATIENT-LVL IV: CPT | Mod: PBBFAC,,,

## 2024-06-27 PROCEDURE — 99214 OFFICE O/P EST MOD 30 MIN: CPT | Mod: S$GLB,,,

## 2024-06-27 PROCEDURE — 1159F MED LIST DOCD IN RCRD: CPT | Mod: CPTII,S$GLB,,

## 2024-06-27 PROCEDURE — 3288F FALL RISK ASSESSMENT DOCD: CPT | Mod: CPTII,S$GLB,,

## 2024-06-27 NOTE — PROGRESS NOTES
Subjective:       Patient ID: Bashir Ramos Sr. is a 89 y.o. male.    Chief Complaint: Pain (L foot)      Bashir Ramos Sr. is a 89 y.o. male with gout, HTN, HLD who presents to clinic for evaluation of left dorsal foot pain, which occurred 2 days ago but has now resolved. Has history of gout, for which he takes Allopurinol daily. Denies injury to the foot. Denies any events leading up to his foot pain. Reports associated swelling. States he placed ice on the foot and the pain resolved. States he does not eat salty foods or has not consumed seafood lately. Visit completed using translation services.          Review of Systems   Cardiovascular:  Negative for chest pain.   Musculoskeletal:         Left foot pain   Skin:  Negative for color change and rash.         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    Prediabetes 10/3/2017    Unspecified hereditary and idiopathic peripheral neuropathy        Review of patient's allergies indicates:   Allergen Reactions    Shellfish containing products Anaphylaxis    Iodine and iodide containing products     Pcn [penicillins] Rash         Current Outpatient Medications:     acetaminophen (TYLENOL) 325 MG tablet, Take 2 tablets (650 mg total) by mouth every 8 (eight) hours as needed for Pain., Disp: , Rfl: 0    allopurinoL (ZYLOPRIM) 100 MG tablet, TAKE ONE (1) TABLET (100 MG TOTAL) BY MOUTH ONCE DAILY., Disp: 90 tablet, Rfl: 3    amitriptyline (ELAVIL) 50 MG tablet, Take 50 mg by mouth every evening., Disp: , Rfl:     amLODIPine (NORVASC) 2.5 MG tablet, TAKE ONE (1) TABLET (2.5 MG TOTAL) BY MOUTH ONCE DAILY., Disp: 90 tablet, Rfl: 3    aspirin (ECOTRIN) 81 MG EC tablet, Take 1 tablet (81 mg total) by mouth once daily., Disp: 90 tablet, Rfl: 0    benazepriL (LOTENSIN) 20 MG tablet, TAKE ONE (1) TABLET (20 MG TOTAL) BY MOUTH ONCE DAILY., Disp: 90 tablet,  "Rfl: 3    clotrimazole (LOTRIMIN) 1 % cream, Apply topically 2 (two) times daily., Disp: 24 g, Rfl: 0    simvastatin (ZOCOR) 40 MG tablet, TAKE ONE TABLET BY MOUTH EVERY EVENING, Disp: 90 tablet, Rfl: 3    tadalafiL (CIALIS) 20 MG Tab, Take 1 tablet (20 mg total) by mouth daily as needed., Disp: 10 tablet, Rfl: 5    tamsulosin (FLOMAX) 0.4 mg Cap, Take 1 capsule (0.4 mg total) by mouth once daily., Disp: 60 capsule, Rfl: 11    traMADoL (ULTRAM) 50 mg tablet, Take 1 tablet (50 mg total) by mouth every 6 (six) hours as needed for Pain., Disp: 10 tablet, Rfl: 0    traZODone (DESYREL) 100 MG tablet, TAKE ONE (1) TABLET (100 MG TOTAL) BY MOUTH NIGHTLY AS NEEDED FOR INSOMNIA., Disp: 30 tablet, Rfl: 11    zolpidem (AMBIEN) 5 MG Tab, Take 5 mg by mouth nightly as needed., Disp: , Rfl:     Objective:        Physical Exam  Vitals reviewed.   Constitutional:       General: He is not in acute distress.     Appearance: Normal appearance.   HENT:      Head: Normocephalic and atraumatic.   Eyes:      Conjunctiva/sclera: Conjunctivae normal.   Cardiovascular:      Rate and Rhythm: Normal rate.      Pulses:           Posterior tibial pulses are 2+ on the left side.   Pulmonary:      Effort: Pulmonary effort is normal.   Musculoskeletal:         General: Normal range of motion.      Cervical back: Normal range of motion and neck supple.      Right lower leg: Edema present.      Left lower leg: Edema present.        Feet:    Skin:     General: Skin is warm and dry.   Neurological:      Mental Status: He is alert and oriented to person, place, and time.   Psychiatric:         Behavior: Behavior normal.           Visit Vitals  /66 (BP Location: Right arm, Patient Position: Sitting, BP Method: Small (Manual))   Pulse 98   Resp 16   Ht 5' 5" (1.651 m)   Wt 63.7 kg (140 lb 6.9 oz)   SpO2 98%   BMI 23.37 kg/m²      Assessment:         1. Gout, arthritis    2. Left foot pain        Plan:         Bashir was seen today for " pain.    Diagnoses and all orders for this visit:    Gout, arthritis    -    Continue Allopurinol daily.     Left foot pain     -   Resolved at this time. Recommend he follow up/ call if foot pain returns or persists.    Essential hypertension       -   Stable, continue to monitor.       Follow up if foot pain returns.         Family Medicine Physician Assistant     Future Appointments       Date Provider Specialty Appt Notes    12/11/2024 Juan Casey MD Family Medicine 6 mo f/u             All of your core healthy metrics are met.       I spent a total of 20 minutes on the day of the visit.This includes face to face time and non-face to face time preparing to see the patient (eg, review of tests), obtaining and/or reviewing separately obtained history, documenting clinical information in the electronic or other health record, independently interpreting results and communicating results to the patient/family/caregiver, or care coordinator.

## 2024-07-09 ENCOUNTER — HOSPITAL ENCOUNTER (EMERGENCY)
Facility: HOSPITAL | Age: 89
Discharge: HOME | End: 2024-07-09
Attending: STUDENT IN AN ORGANIZED HEALTH CARE EDUCATION/TRAINING PROGRAM
Payer: MEDICARE

## 2024-07-09 VITALS
RESPIRATION RATE: 18 BRPM | DIASTOLIC BLOOD PRESSURE: 85 MMHG | TEMPERATURE: 99 F | BODY MASS INDEX: 25.99 KG/M2 | WEIGHT: 156 LBS | OXYGEN SATURATION: 100 % | HEART RATE: 75 BPM | SYSTOLIC BLOOD PRESSURE: 153 MMHG | HEIGHT: 65 IN

## 2024-07-09 DIAGNOSIS — I10 HYPERTENSION, UNSPECIFIED TYPE: ICD-10-CM

## 2024-07-09 DIAGNOSIS — R51.9 NONINTRACTABLE HEADACHE, UNSPECIFIED CHRONICITY PATTERN, UNSPECIFIED HEADACHE TYPE: Primary | ICD-10-CM

## 2024-07-09 LAB
ALBUMIN SERPL BCP-MCNC: 3.6 G/DL (ref 3.5–5.2)
ALP SERPL-CCNC: 91 U/L (ref 55–135)
ALT SERPL W/O P-5'-P-CCNC: 9 U/L (ref 10–44)
ANION GAP SERPL CALC-SCNC: 10 MMOL/L (ref 8–16)
AST SERPL-CCNC: 14 U/L (ref 10–40)
BASOPHILS # BLD AUTO: 0.01 K/UL (ref 0–0.2)
BASOPHILS NFR BLD: 0.2 % (ref 0–1.9)
BILIRUB SERPL-MCNC: 0.5 MG/DL (ref 0.1–1)
BUN SERPL-MCNC: 10 MG/DL (ref 8–23)
CALCIUM SERPL-MCNC: 9.8 MG/DL (ref 8.7–10.5)
CHLORIDE SERPL-SCNC: 108 MMOL/L (ref 95–110)
CO2 SERPL-SCNC: 22 MMOL/L (ref 23–29)
CREAT SERPL-MCNC: 1.1 MG/DL (ref 0.5–1.4)
DIFFERENTIAL METHOD BLD: ABNORMAL
EOSINOPHIL # BLD AUTO: 0.3 K/UL (ref 0–0.5)
EOSINOPHIL NFR BLD: 5.3 % (ref 0–8)
ERYTHROCYTE [DISTWIDTH] IN BLOOD BY AUTOMATED COUNT: 15.4 % (ref 11.5–14.5)
EST. GFR  (NO RACE VARIABLE): >60 ML/MIN/1.73 M^2
GLUCOSE SERPL-MCNC: 106 MG/DL (ref 70–110)
HCT VFR BLD AUTO: 38.5 % (ref 40–54)
HGB BLD-MCNC: 12.3 G/DL (ref 14–18)
IMM GRANULOCYTES # BLD AUTO: 0.02 K/UL (ref 0–0.04)
IMM GRANULOCYTES NFR BLD AUTO: 0.4 % (ref 0–0.5)
LYMPHOCYTES # BLD AUTO: 0.6 K/UL (ref 1–4.8)
LYMPHOCYTES NFR BLD: 11.7 % (ref 18–48)
MCH RBC QN AUTO: 29.6 PG (ref 27–31)
MCHC RBC AUTO-ENTMCNC: 31.9 G/DL (ref 32–36)
MCV RBC AUTO: 93 FL (ref 82–98)
MONOCYTES # BLD AUTO: 0.5 K/UL (ref 0.3–1)
MONOCYTES NFR BLD: 8.8 % (ref 4–15)
NEUTROPHILS # BLD AUTO: 3.9 K/UL (ref 1.8–7.7)
NEUTROPHILS NFR BLD: 73.6 % (ref 38–73)
NRBC BLD-RTO: 0 /100 WBC
PLATELET # BLD AUTO: 299 K/UL (ref 150–450)
PMV BLD AUTO: 9.2 FL (ref 9.2–12.9)
POTASSIUM SERPL-SCNC: 3.5 MMOL/L (ref 3.5–5.1)
PROT SERPL-MCNC: 7.5 G/DL (ref 6–8.4)
RBC # BLD AUTO: 4.15 M/UL (ref 4.6–6.2)
SODIUM SERPL-SCNC: 140 MMOL/L (ref 136–145)
WBC # BLD AUTO: 5.32 K/UL (ref 3.9–12.7)

## 2024-07-09 PROCEDURE — 63600175 PHARM REV CODE 636 W HCPCS: Performed by: NURSE PRACTITIONER

## 2024-07-09 PROCEDURE — 99285 EMERGENCY DEPT VISIT HI MDM: CPT | Mod: 25

## 2024-07-09 PROCEDURE — 36415 COLL VENOUS BLD VENIPUNCTURE: CPT | Performed by: NURSE PRACTITIONER

## 2024-07-09 PROCEDURE — 85025 COMPLETE CBC W/AUTO DIFF WBC: CPT | Performed by: NURSE PRACTITIONER

## 2024-07-09 PROCEDURE — 96361 HYDRATE IV INFUSION ADD-ON: CPT

## 2024-07-09 PROCEDURE — 80053 COMPREHEN METABOLIC PANEL: CPT | Performed by: NURSE PRACTITIONER

## 2024-07-09 PROCEDURE — 96375 TX/PRO/DX INJ NEW DRUG ADDON: CPT

## 2024-07-09 PROCEDURE — 96374 THER/PROPH/DIAG INJ IV PUSH: CPT

## 2024-07-09 PROCEDURE — 25000003 PHARM REV CODE 250: Performed by: NURSE PRACTITIONER

## 2024-07-09 RX ORDER — DROPERIDOL 2.5 MG/ML
0.62 INJECTION, SOLUTION INTRAMUSCULAR; INTRAVENOUS
Status: COMPLETED | OUTPATIENT
Start: 2024-07-09 | End: 2024-07-09

## 2024-07-09 RX ORDER — DIPHENHYDRAMINE HYDROCHLORIDE 50 MG/ML
6.25 INJECTION INTRAMUSCULAR; INTRAVENOUS
Status: COMPLETED | OUTPATIENT
Start: 2024-07-09 | End: 2024-07-09

## 2024-07-09 RX ORDER — BUTALBITAL, ACETAMINOPHEN AND CAFFEINE 50; 325; 40 MG/1; MG/1; MG/1
1 TABLET ORAL EVERY 4 HOURS PRN
Qty: 18 TABLET | Refills: 0 | Status: SHIPPED | OUTPATIENT
Start: 2024-07-09 | End: 2024-07-12

## 2024-07-09 RX ORDER — BUTALBITAL, ACETAMINOPHEN AND CAFFEINE 50; 325; 40 MG/1; MG/1; MG/1
1 TABLET ORAL
Status: COMPLETED | OUTPATIENT
Start: 2024-07-09 | End: 2024-07-09

## 2024-07-09 RX ORDER — KETOROLAC TROMETHAMINE 30 MG/ML
15 INJECTION, SOLUTION INTRAMUSCULAR; INTRAVENOUS
Status: COMPLETED | OUTPATIENT
Start: 2024-07-09 | End: 2024-07-09

## 2024-07-09 RX ORDER — IBUPROFEN 600 MG/1
600 TABLET ORAL EVERY 6 HOURS PRN
Qty: 20 TABLET | Refills: 0 | Status: SHIPPED | OUTPATIENT
Start: 2024-07-09 | End: 2024-07-14

## 2024-07-09 RX ADMIN — DIPHENHYDRAMINE HYDROCHLORIDE 6.25 MG: 50 INJECTION INTRAMUSCULAR; INTRAVENOUS at 12:07

## 2024-07-09 RX ADMIN — BUTALBITAL, ACETAMINOPHEN, AND CAFFEINE 1 TABLET: 50; 325; 40 TABLET ORAL at 11:07

## 2024-07-09 RX ADMIN — SODIUM CHLORIDE 1000 ML: 9 INJECTION, SOLUTION INTRAVENOUS at 11:07

## 2024-07-09 RX ADMIN — DROPERIDOL 0.62 MG: 2.5 INJECTION, SOLUTION INTRAMUSCULAR; INTRAVENOUS at 12:07

## 2024-07-09 RX ADMIN — KETOROLAC TROMETHAMINE 15 MG: 30 INJECTION, SOLUTION INTRAMUSCULAR; INTRAVENOUS at 11:07

## 2024-07-09 NOTE — DISCHARGE INSTRUCTIONS
You were seen and evaluated in the ER today.  Your workup while you were here is likely due to your slightly elevated blood pressure.  We are going to treat your headache with medications.  Please increase hydration.  Please follow-up with your PCP as needed.  Please return to the ED for any worsening symptoms such as chest pain, shortness of breath, fever not controlled with Tylenol or ibuprofen or uncontrolled pain.      Our goal in the emergency department is to always give you outstanding care and exceptional service. You may receive a survey by mail or e-mail in the next week regarding your experience in our ED. We would greatly appreciate your completing and returning the survey. Your feedback provides us with a way to recognize our staff who give very good care and it helps us learn how to improve when your experience was below our aspiration of excellence.

## 2024-07-09 NOTE — ED PROVIDER NOTES
Source of History:  Patient, family, chart    Chief complaint:  Headache (Posterior and started 7/8)      HPI:  Bashir Ramos Sr. is a 89 y.o. male with medical history of hypertension, anemia presenting with left-sided headache since 07/08.  Family denies giving anything for headache.  Patient states posterior headache and radiates down to his neck.  Patient denies any chest pain, shortness of breath or blurred vision.    This is the extent to the patients complaints today here in the emergency department.    ROS: As per HPI and below:  Constitutional: No fever.  No chills.  Eyes: No visual changes.  ENT: No sore throat. No ear pain    Cardiovascular: No chest pain.  Respiratory: No shortness of breath.  GI: No abdominal pain.  No nausea or vomiting.  Genitourinary: No abnormal urination.  Neurologic:  Positive for headache  MSK: no back pain.  Integument: No rashes or lesions.  Hematologic: No easy bruising.  Endocrine: No excessive thirst or urination.    Review of patient's allergies indicates:   Allergen Reactions    Shellfish containing products Anaphylaxis    Iodine and iodide containing products     Pcn [penicillins] Rash       PMH:  As per HPI and below:  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    Prediabetes 10/3/2017    Unspecified hereditary and idiopathic peripheral neuropathy      Past Surgical History:   Procedure Laterality Date    BACK SURGERY      CARPAL TUNNEL RELEASE Left 6/19/2023    Procedure: RELEASE, CARPAL TUNNEL,LEFT;  Surgeon: Anh An MD;  Location: [facility] OR;  Service: Orthopedics;  Laterality: Left;    CATARACT EXTRACTION  10/29/13    left eye    CATARACT EXTRACTION W/  INTRAOCULAR LENS IMPLANT  10/15/13    OD (dr. grayson)    EYE SURGERY         Social History     Tobacco Use    Smoking status: Former     Current packs/day: 0.00     Types:  "Cigarettes     Quit date: 1982     Years since quittin.8    Smokeless tobacco: Never   Substance Use Topics    Alcohol use: No    Drug use: No       Physical Exam:    BP (!) 181/97   Pulse 98   Temp 98.8 °F (37.1 °C)   Resp 18   Ht 5' 5" (1.651 m)   Wt 70.8 kg (156 lb)   SpO2 99%   BMI 25.96 kg/m²   Nursing note and vital signs reviewed. Slightly hypertensive on initial exam  Constitutional: No acute distress.  Nontoxic  Eyes:  Pupils equal round reactive 3-2 mm.  No nystagmus.  No conjunctival injection.  Extraocular muscles are intact.  ENT: Oropharynx clear.    Cardiovascular: Regular rate and rhythm.  No murmurs. No gallops. No rubs.  Respiratory: Clear to auscultation bilaterally.  Good air movement.  No wheezes.  No rhonchi. No rales. No accessory muscle use.  Abdomen: Soft.  Not distended.  Nontender.  No guarding.  No rebound. Non-peritoneal.  Musculoskeletal:  Left-sided cervical paraspinal tenderness to palpation.  No midline cervical spine tenderness to palpation noted on exam.  Good range of motion all joints.  No deformities.  Neck supple.  No meningismus.  Skin: No rashes seen.  Good turgor.  No abrasions.  No ecchymoses.  Neurologic:  Cranial nerves 2-12 are intact.  All extremities are 5/5 strength.  Motor and sensory intact.  No ataxia.  Negative Romberg test.  No cerebellar findings.  Good finger-to-nose.  No focal neurological deficits.  Psych:  Appropriate, conversant    MDM    Emergent evaluation of a 89 yo male presenting for left-sided posterior head 8.  Patient states pain began yesterday.  Patient denies taking anything for his pain.  Patient states pain radiates down to the left side of his neck.  Patient denies any increased pain neck movement.  On exam pt is A&Ox3.  Slightly hypertensive on initial exam but all other vital signs stable. Nonfebrile and nontoxic appearing.  Mucous membranes pink and moist.  Pupils equal round reactive 3-2 mm.  No nystagmus noted.  No " midline C, T, L-spine tenderness to palpation.  Left-sided cervical paraspinal tenderness noted.  Breath sounds clear bilaterally.  Abdomen soft and nontender. No rebound or guarding appreciated on exam.   BS WNL.  Pt speaking in full sentences.  Steady gait appreciated. Cap refill < 3 seconds.      History Acquisition   Additional history was acquired from other historians.  Chart, family    The patient's list of active medical problems, social history, medications, and allergies as documented per RN staff has been reviewed.     Differential Diagnoses   Based on available information and the initial assessment, the working differential diagnoses considered during this evaluation include but are not limited to cluster headache, typical headache, migraine headache, muscle strain, dehydration, electrolyte abnormality, others.    I will get labs, imaging, hydrate, medicate and reassess.  I discussed this case with my supervising physician      LABS     Labs Reviewed   CBC W/ AUTO DIFFERENTIAL - Abnormal; Notable for the following components:       Result Value    RBC 4.15 (*)     Hemoglobin 12.3 (*)     Hematocrit 38.5 (*)     MCHC 31.9 (*)     RDW 15.4 (*)     Lymph # 0.6 (*)     Gran % 73.6 (*)     Lymph % 11.7 (*)     All other components within normal limits   COMPREHENSIVE METABOLIC PANEL - Abnormal; Notable for the following components:    CO2 22 (*)     ALT 9 (*)     All other components within normal limits         Imaging     Imaging Results              CT Head Without Contrast (Final result)  Result time 07/09/24 12:39:23      Final result by Gerry Burgess MD (07/09/24 12:39:23)                   Impression:      1. No acute intracranial CT findings.      Electronically signed by: Gerry Burgess  Date:    07/09/2024  Time:    12:39               Narrative:    EXAMINATION:  CT HEAD WITHOUT CONTRAST    CLINICAL HISTORY:  Headache, new or worsening (Age >= 50y);    TECHNIQUE:  Low dose axial CT images obtained  throughout the head without intravenous contrast. Sagittal and coronal reconstructions were performed.    COMPARISON:  None.    FINDINGS:  Brain: There is no evidence of a mass, edema, midline shift, or intracranial hemorrhage. No extra-axial fluid collection. Age related presumed moderate chronic small vessel ischemic and involutional changes.    No CT evidence of an acute major vascular territorial infarct.    Ventricles: The ventricles, sulci, and cisterns are within normal limits.    Skull: The osseous structures are unremarkable in appearance.    Extracranial soft tissues: Limited imaging is within normal limits.    Other: The visualized portions of the sinuses, orbits, and mastoid air cells are unremarkable.                                      A radiology report was available for my review at the time of the encounter.    EKG        Additional Consideration   All available testing was considered during the course of this workup.  Comorbidities taken into consideration during the patient's evaluation and treatment include weight, age.    Social determinants of health were taken into consideration during development of our treatment plan.    Medications   ketorolac injection 15 mg (15 mg Intravenous Given 7/9/24 1130)   sodium chloride 0.9% bolus 1,000 mL 1,000 mL (0 mLs Intravenous Stopped 7/9/24 1238)   butalbital-acetaminophen-caffeine -40 mg per tablet 1 tablet (1 tablet Oral Given 7/9/24 1138)   droPERidol injection 0.625 mg (0.625 mg Intravenous Given 7/9/24 1220)   diphenhydrAMINE injection 6.25 mg (6.25 mg Intravenous Given 7/9/24 1221)      ED Course as of 07/09/24 1325   Tue Jul 09, 2024   1200 CBC unremarkable.  No leukocytosis noted.  H&H stable at 12.3 and 38.5.  No other acute abnormalities noted.  CMP unremarkable.  Patient reassessed.  Patient states he has continued pain after medications.  Will order droperidol and Benadryl to help with migraine headache treatment.  Awaiting CT for  dispo. [RZ]   1242 CT head independently reviewed by myself and Radiology.  Negative for any acute abnormalities.  Will reassess after medication administration for pain relief. [RZ]   1310 Patient reassessed.  Patient states feeling better.  Patient and family updated on lab and imaging results.  Advised will prescribe medication to help with headache.  Advised to continue take his blood pressure medications as prescribed.  Follow up with PCP as needed.  Strict return to ED precautions discussed.  Patient verbalized understanding of this plan of care.  All questions and concerns addressed. [RZ]   1316 Patient is hemodynamically stable, vital signs are normal. Discharge instructions given. Return to ED precautions discussed. Follow up as directed. Pt verbalized understanding of this plan.  Pt is stable for discharge.  [RZ]      ED Course User Index  [RZ] Rocio Marin NP             CLINICAL IMPRESSION  1. Nonintractable headache, unspecified chronicity pattern, unspecified headache type    2. Hypertension, unspecified type         ED Disposition Condition    Discharge Stable            Instruction:  I see no indication of an emergent process beyond that addressed during our encounter but have duly counseled the patient/family regarding the need for prompt follow-up as well as the indications that should prompt immediate return to the emergency room should new or worrisome developments occur.  The patient/family has been provided with verbal and printed direction regarding our final diagnosis(es) as well as instructions regarding use of OTC and/or Rx medications intended to manage the patient's aforementioned conditions including:  ED Prescriptions       Medication Sig Dispense Start Date End Date Auth. Provider    butalbital-acetaminophen-caffeine -40 mg (FIORICET, ESGIC) -40 mg per tablet Take 1 tablet by mouth every 4 (four) hours as needed for Headaches. 18 tablet 7/9/2024 7/12/2024 Rocio Marin  TASHA, DON    ibuprofen (ADVIL,MOTRIN) 600 MG tablet Take 1 tablet (600 mg total) by mouth every 6 (six) hours as needed. 20 tablet 7/9/2024 7/14/2024 Rocio Marin NP          Patient has been advised of following recommended follow-up instructions:  Follow-up Information       Follow up With Specialties Details Why Contact Info    Juan Casey MD Family Medicine Schedule an appointment as soon as possible for a visit  As needed [address] [facility] [id number] [phone]            The patient/family communicates understanding of all this information and all remaining questions and concerns were addressed at this time.      The patient's condition did not warrant review of the  and prescription of controlled substances.      This note was created using dictation software.  This program may occasionally mistype words and phrases.         Rocio Marin NP  07/09/24 1238

## 2024-07-13 ENCOUNTER — HOSPITAL ENCOUNTER (EMERGENCY)
Facility: HOSPITAL | Age: 89
Discharge: HOME | End: 2024-07-13
Attending: STUDENT IN AN ORGANIZED HEALTH CARE EDUCATION/TRAINING PROGRAM
Payer: MEDICARE

## 2024-07-13 VITALS
HEART RATE: 73 BPM | TEMPERATURE: 97 F | RESPIRATION RATE: 18 BRPM | WEIGHT: 156 LBS | BODY MASS INDEX: 25.99 KG/M2 | DIASTOLIC BLOOD PRESSURE: 88 MMHG | OXYGEN SATURATION: 98 % | SYSTOLIC BLOOD PRESSURE: 159 MMHG | HEIGHT: 65 IN

## 2024-07-13 DIAGNOSIS — R51.9 ACUTE NONINTRACTABLE HEADACHE, UNSPECIFIED HEADACHE TYPE: Primary | ICD-10-CM

## 2024-07-13 LAB
ALBUMIN SERPL BCP-MCNC: 3.5 G/DL (ref 3.5–5.2)
ALP SERPL-CCNC: 85 U/L (ref 55–135)
ALT SERPL W/O P-5'-P-CCNC: 8 U/L (ref 10–44)
ANION GAP SERPL CALC-SCNC: 10 MMOL/L (ref 8–16)
AST SERPL-CCNC: 13 U/L (ref 10–40)
BASOPHILS # BLD AUTO: 0.01 K/UL (ref 0–0.2)
BASOPHILS NFR BLD: 0.3 % (ref 0–1.9)
BILIRUB SERPL-MCNC: 0.3 MG/DL (ref 0.1–1)
BUN SERPL-MCNC: 9 MG/DL (ref 8–23)
CALCIUM SERPL-MCNC: 9.4 MG/DL (ref 8.7–10.5)
CHLORIDE SERPL-SCNC: 110 MMOL/L (ref 95–110)
CO2 SERPL-SCNC: 21 MMOL/L (ref 23–29)
CREAT SERPL-MCNC: 1.1 MG/DL (ref 0.5–1.4)
DIFFERENTIAL METHOD BLD: ABNORMAL
EOSINOPHIL # BLD AUTO: 0.6 K/UL (ref 0–0.5)
EOSINOPHIL NFR BLD: 17.2 % (ref 0–8)
ERYTHROCYTE [DISTWIDTH] IN BLOOD BY AUTOMATED COUNT: 15.4 % (ref 11.5–14.5)
EST. GFR  (NO RACE VARIABLE): >60 ML/MIN/1.73 M^2
GLUCOSE SERPL-MCNC: 82 MG/DL (ref 70–110)
HCT VFR BLD AUTO: 35.8 % (ref 40–54)
HGB BLD-MCNC: 11.6 G/DL (ref 14–18)
IMM GRANULOCYTES # BLD AUTO: 0.01 K/UL (ref 0–0.04)
IMM GRANULOCYTES NFR BLD AUTO: 0.3 % (ref 0–0.5)
LYMPHOCYTES # BLD AUTO: 0.9 K/UL (ref 1–4.8)
LYMPHOCYTES NFR BLD: 24.6 % (ref 18–48)
MCH RBC QN AUTO: 29.5 PG (ref 27–31)
MCHC RBC AUTO-ENTMCNC: 32.4 G/DL (ref 32–36)
MCV RBC AUTO: 91 FL (ref 82–98)
MONOCYTES # BLD AUTO: 0.3 K/UL (ref 0.3–1)
MONOCYTES NFR BLD: 7.9 % (ref 4–15)
NEUTROPHILS # BLD AUTO: 1.8 K/UL (ref 1.8–7.7)
NEUTROPHILS NFR BLD: 49.7 % (ref 38–73)
NRBC BLD-RTO: 0 /100 WBC
PLATELET # BLD AUTO: 286 K/UL (ref 150–450)
PMV BLD AUTO: 9 FL (ref 9.2–12.9)
POTASSIUM SERPL-SCNC: 3.8 MMOL/L (ref 3.5–5.1)
PROT SERPL-MCNC: 6.8 G/DL (ref 6–8.4)
RBC # BLD AUTO: 3.93 M/UL (ref 4.6–6.2)
SODIUM SERPL-SCNC: 141 MMOL/L (ref 136–145)
WBC # BLD AUTO: 3.54 K/UL (ref 3.9–12.7)

## 2024-07-13 PROCEDURE — 36415 COLL VENOUS BLD VENIPUNCTURE: CPT | Performed by: STUDENT IN AN ORGANIZED HEALTH CARE EDUCATION/TRAINING PROGRAM

## 2024-07-13 PROCEDURE — 80053 COMPREHEN METABOLIC PANEL: CPT | Performed by: STUDENT IN AN ORGANIZED HEALTH CARE EDUCATION/TRAINING PROGRAM

## 2024-07-13 PROCEDURE — 85025 COMPLETE CBC W/AUTO DIFF WBC: CPT | Performed by: STUDENT IN AN ORGANIZED HEALTH CARE EDUCATION/TRAINING PROGRAM

## 2024-07-13 PROCEDURE — 99284 EMERGENCY DEPT VISIT MOD MDM: CPT | Mod: 25

## 2024-07-13 PROCEDURE — 25000003 PHARM REV CODE 250: Performed by: STUDENT IN AN ORGANIZED HEALTH CARE EDUCATION/TRAINING PROGRAM

## 2024-07-13 PROCEDURE — 63600175 PHARM REV CODE 636 W HCPCS: Performed by: STUDENT IN AN ORGANIZED HEALTH CARE EDUCATION/TRAINING PROGRAM

## 2024-07-13 PROCEDURE — 96372 THER/PROPH/DIAG INJ SC/IM: CPT | Performed by: STUDENT IN AN ORGANIZED HEALTH CARE EDUCATION/TRAINING PROGRAM

## 2024-07-13 RX ORDER — KETOROLAC TROMETHAMINE 30 MG/ML
15 INJECTION, SOLUTION INTRAMUSCULAR; INTRAVENOUS
Status: COMPLETED | OUTPATIENT
Start: 2024-07-13 | End: 2024-07-13

## 2024-07-13 RX ORDER — ACETAMINOPHEN 500 MG
1000 TABLET ORAL
Status: COMPLETED | OUTPATIENT
Start: 2024-07-13 | End: 2024-07-13

## 2024-07-13 RX ORDER — KETOROLAC TROMETHAMINE 30 MG/ML
10 INJECTION, SOLUTION INTRAMUSCULAR; INTRAVENOUS
Status: DISCONTINUED | OUTPATIENT
Start: 2024-07-13 | End: 2024-07-13

## 2024-07-13 RX ORDER — METHOCARBAMOL 500 MG/1
500 TABLET, FILM COATED ORAL 3 TIMES DAILY PRN
Qty: 30 TABLET | Refills: 0 | Status: SHIPPED | OUTPATIENT
Start: 2024-07-13

## 2024-07-13 RX ORDER — METHOCARBAMOL 500 MG/1
500 TABLET, FILM COATED ORAL
Status: COMPLETED | OUTPATIENT
Start: 2024-07-13 | End: 2024-07-13

## 2024-07-13 RX ADMIN — METHOCARBAMOL 500 MG: 500 TABLET ORAL at 03:07

## 2024-07-13 RX ADMIN — KETOROLAC TROMETHAMINE 15 MG: 30 INJECTION, SOLUTION INTRAMUSCULAR at 03:07

## 2024-07-13 RX ADMIN — ACETAMINOPHEN 1000 MG: 500 TABLET ORAL at 03:07

## 2024-07-13 NOTE — DISCHARGE INSTRUCTIONS
For pain take:  Ibuprofen every 6hrs as needed  Tylenol = Acetaminophen every 6hrs as needed  You can alternate the two medications every 3 hours    As needed, you can also take the prescribed Robaxin    You can apply hot or cold packs as needed to the painful area of the neck, use them for 15 minutes at a time with breaks in between    Do not take ibuprofen, naproxen, advil, or aleve every day for more than 2-3 weeks without following up with your primary care provider, as they can cause stomach pain and other complications if used every day over long periods of time

## 2024-07-13 NOTE — ED PROVIDER NOTES
Encounter Date: 7/13/2024       History     Chief Complaint   Patient presents with    Headache     X 1 week      89 y.o. male with HTN, gout, HLD, prediabetes, peripheral neuropathy presents for headache.  Patient reports approximately 1 week of posterior headache.  The headache is consistent and improved slightly with medications but has not resolved.  He was seen in the ED a few days ago and prescribed ibuprofen and Fioricet with minimal improvement.  He denies any weakness, numbness, vision changes, nausea/vomiting, fever/chills, trauma    The history is provided by the patient, medical records and a relative. A  was used.     Review of patient's allergies indicates:   Allergen Reactions    Shellfish containing products Anaphylaxis    Iodine and iodide containing products     Pcn [penicillins] Rash     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    Prediabetes 10/3/2017    Unspecified hereditary and idiopathic peripheral neuropathy      Past Surgical History:   Procedure Laterality Date    BACK SURGERY      CARPAL TUNNEL RELEASE Left 6/19/2023    Procedure: RELEASE, CARPAL TUNNEL,LEFT;  Surgeon: Ahn An MD;  Location: [facility];  Service: Orthopedics;  Laterality: Left;    CATARACT EXTRACTION  10/29/13    left eye    CATARACT EXTRACTION W/  INTRAOCULAR LENS IMPLANT  10/15/13    OD (dr. grayson)    EYE SURGERY       Family History   Problem Relation Name Age of Onset    No Known Problems Mother      No Known Problems Father      Amblyopia Neg Hx      Blindness Neg Hx      Cancer Neg Hx      Cataracts Neg Hx      Diabetes Neg Hx      Glaucoma Neg Hx      Hypertension Neg Hx      Macular degeneration Neg Hx      Retinal detachment Neg Hx      Strabismus Neg Hx      Stroke Neg Hx      Thyroid disease Neg Hx       Social History     Tobacco Use    Smoking  status: Former     Current packs/day: 0.00     Types: Cigarettes     Quit date: 1982     Years since quittin.9    Smokeless tobacco: Never   Substance Use Topics    Alcohol use: No    Drug use: No     Review of Systems   Reason unable to perform ROS: See HPI for relevant ROS.       Physical Exam     Initial Vitals [24 1452]   BP Pulse Resp Temp SpO2   (!) 159/88 73 18 97.2 °F (36.2 °C) 98 %      MAP       --         Physical Exam    Nursing note and vitals reviewed.  Constitutional:   Alert, speaking full sentences, no acute distress   Eyes: Conjunctivae are normal. No scleral icterus.   Cardiovascular:  Normal rate, regular rhythm and intact distal pulses.           Musculoskeletal:         General: No tenderness or edema.     Neurological: He is alert.   LOC: Alert  Attention Span: Normal   Language: No aphasia  Articulation: No dysarthria  Orientation: Person, Place, Time   EOM (CN III, IV, VI): Full/intact  Pupils (CN II, III): PERRL  Facial Sensation (CN V): Normal  Facial Movement (CN VII): Symmetric facial expression    Motor: Arm left  5/5  Leg left  5/5  Arm right  5/5  Leg right 5/5  Cerebellum:  No ataxia   Sensation: Intact to light touch     Skin: Skin is warm and dry.         ED Course   Procedures  Labs Reviewed   CBC W/ AUTO DIFFERENTIAL - Abnormal; Notable for the following components:       Result Value    WBC 3.54 (*)     RBC 3.93 (*)     Hemoglobin 11.6 (*)     Hematocrit 35.8 (*)     RDW 15.4 (*)     MPV 9.0 (*)     Lymph # 0.9 (*)     Eos # 0.6 (*)     Eosinophil % 17.2 (*)     All other components within normal limits   COMPREHENSIVE METABOLIC PANEL - Abnormal; Notable for the following components:    CO2 21 (*)     ALT 8 (*)     All other components within normal limits          Imaging Results    None          Medications   methocarbamoL tablet 500 mg (500 mg Oral Given 24 1541)   acetaminophen tablet 1,000 mg (1,000 mg Oral Given 24 1541)   ketorolac injection 15  mg (15 mg Intramuscular Given 7/13/24 1546)     Medical Decision Making  89 y.o. male with HTN, gout, HLD, prediabetes, peripheral neuropathy presents for headache  Differentials include tension headache, cluster headache, migraine, less likely SAH, doubt encephalitis  Patient presenting with persistent headache for 1 week in the occipital area.  No history of similar headaches.  Was seen in the ED earlier this week, had a normal CT head.  His headache has improved slightly medications but has not resolved.  And nausea/vomiting, neurologic exam within normal limits, no fever/chills  No neck stiffness, patient alert and well-appearing, no systemic infectious symptoms, doubt meningitis/encephalitis    Amount and/or Complexity of Data Reviewed  External Data Reviewed: labs, radiology and notes.  Labs: ordered. Decision-making details documented in ED Course.    Risk  OTC drugs.  Prescription drug management.               ED Course as of 07/13/24 2325   Sat Jul 13, 2024   1637 WBC(!): 3.54  Mild leukopenia, similar to baseline [OK]   1642 Workup overall unremarkable, patient's headache has resolved.  Will discharge with Robaxin prescription.  Counseled patient and family regarding rebound headaches if he continues using the Fioricet.  Neurology referral given, return precautions discussed [OK]      ED Course User Index  [OK] Gene Fajardo MD                           Clinical Impression:  Final diagnoses:  [R51.9] Acute nonintractable headache, unspecified headache type (Primary)          ED Disposition Condition    Discharge Stable          ED Prescriptions       Medication Sig Dispense Start Date End Date Auth. Provider    methocarbamoL (ROBAXIN) 500 MG Tab Take 1 tablet (500 mg total) by mouth 3 (three) times daily as needed (Pain). 30 tablet 7/13/2024 -- Gene Fajardo MD          Follow-up Information       Follow up With Specialties Details Why Contact Info    Juan Casey MD Family Medicine Go to    [address] [facility] [address]  [phone]      [place] - Neurology Neurology Schedule an appointment as soon as possible for a visit   [address] [place] [address] [address]  [facility] [id number] [phone]             Gene Fajardo MD  07/13/24 5083

## 2024-09-23 ENCOUNTER — OFFICE VISIT (OUTPATIENT)
Dept: FAMILY MEDICINE | Facility: CLINIC | Age: 89
End: 2024-09-23
Payer: MEDICARE

## 2024-09-23 VITALS
RESPIRATION RATE: 16 BRPM | HEIGHT: 65 IN | WEIGHT: 138 LBS | DIASTOLIC BLOOD PRESSURE: 80 MMHG | BODY MASS INDEX: 22.99 KG/M2 | HEART RATE: 60 BPM | TEMPERATURE: 98 F | SYSTOLIC BLOOD PRESSURE: 150 MMHG | OXYGEN SATURATION: 98 %

## 2024-09-23 DIAGNOSIS — I10 ESSENTIAL HYPERTENSION: ICD-10-CM

## 2024-09-23 DIAGNOSIS — M10.9 GOUT, ARTHRITIS: Primary | ICD-10-CM

## 2024-09-23 PROCEDURE — 1126F AMNT PAIN NOTED NONE PRSNT: CPT | Mod: CPTII,S$GLB,,

## 2024-09-23 PROCEDURE — 1160F RVW MEDS BY RX/DR IN RCRD: CPT | Mod: CPTII,S$GLB,,

## 2024-09-23 PROCEDURE — 3288F FALL RISK ASSESSMENT DOCD: CPT | Mod: CPTII,S$GLB,,

## 2024-09-23 PROCEDURE — 1159F MED LIST DOCD IN RCRD: CPT | Mod: CPTII,S$GLB,,

## 2024-09-23 PROCEDURE — 99214 OFFICE O/P EST MOD 30 MIN: CPT | Mod: S$GLB,,,

## 2024-09-23 PROCEDURE — 1101F PT FALLS ASSESS-DOCD LE1/YR: CPT | Mod: CPTII,S$GLB,,

## 2024-09-23 PROCEDURE — 99999 PR PBB SHADOW E&M-EST. PATIENT-LVL V: CPT | Mod: PBBFAC,,,

## 2024-09-23 RX ORDER — DICLOFENAC SODIUM 10 MG/G
2 GEL TOPICAL DAILY
Qty: 50 G | Refills: 0 | Status: SHIPPED | OUTPATIENT
Start: 2024-09-23 | End: 2024-12-22

## 2024-09-23 RX ORDER — COLCHICINE 0.6 MG/1
0.6 TABLET ORAL DAILY
Qty: 7 TABLET | Refills: 0 | Status: SHIPPED | OUTPATIENT
Start: 2024-09-23 | End: 2024-09-30

## 2024-09-23 NOTE — PROGRESS NOTES
Subjective:       Patient ID:  [id number]     Chief Complaint: thumb pain and ankle pain      Bashir Ramos Sr.is a 89 y.o. male who presents to the clinic for thumb and ankle pain.  Patient is Welsh speaking.  Video translation was provided to patient.    Patient reports that he has been experiencing joint pain on his right thumb for the last week.  Patient rates pain a 9/10.    Patient also reports that he has been experiencing left ankle pain for the last couple of weeks rated 9/10.  Patient reports that he does have a history of gout.  However, he reports that he has been trying to follow a low purine diet.  He denies any associated trauma to the area.    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    Prediabetes 10/3/2017    Unspecified hereditary and idiopathic peripheral neuropathy       Active Problem List with Overview Notes    Diagnosis Date Noted    Decreased  strength of left hand 07/13/2023    Left hand pain 07/13/2023    Vitamin D insufficiency 04/16/2018    Right carpal tunnel syndrome 03/31/2017    Underweight 03/22/2017    Debility 02/23/2017    Elevated transaminase level 02/23/2017    Right ear impacted cerumen 01/11/2017    HLD (hyperlipidemia) 12/30/2015    Anemia 04/08/2015    Bilateral knee pain 04/08/2015    Carpal tunnel syndrome on left 10/23/2014    Rotator cuff tear arthropathy of right shoulder 09/29/2014    Vitreous detachment 06/23/2014    Pseudophakia 06/23/2014    Refractive error - Both Eyes 12/16/2013    Post-operative state - Right Eye 10/16/2013    Hyperopia - Both Eyes 09/16/2013    Astigmatism - Both Eyes 09/16/2013     Dx updated per 2019 IMO Load      Essential hypertension 08/27/2012    Gout, arthritis 08/27/2012      Review of patient's allergies indicates:   Allergen Reactions    Shellfish containing products Anaphylaxis    Iodine and iodide  containing products     Pcn [penicillins] Rash         Current Outpatient Medications:     allopurinoL (ZYLOPRIM) 100 MG tablet, TAKE ONE (1) TABLET (100 MG TOTAL) BY MOUTH ONCE DAILY., Disp: 90 tablet, Rfl: 3    amitriptyline (ELAVIL) 50 MG tablet, Take 50 mg by mouth every evening., Disp: , Rfl:     amLODIPine (NORVASC) 2.5 MG tablet, TAKE ONE (1) TABLET (2.5 MG TOTAL) BY MOUTH ONCE DAILY., Disp: 90 tablet, Rfl: 3    aspirin (ECOTRIN) 81 MG EC tablet, Take 1 tablet (81 mg total) by mouth once daily., Disp: 90 tablet, Rfl: 0    benazepriL (LOTENSIN) 20 MG tablet, TAKE ONE (1) TABLET (20 MG TOTAL) BY MOUTH ONCE DAILY., Disp: 90 tablet, Rfl: 3    butalbital-acetaminophen-caffeine -40 mg (FIORICET, ESGIC) -40 mg per tablet, TAKE ONE (1) TABLET BY MOUTH EVERY FOUR (4) (FOUR) HOURS AS NEEDED FOR HEADACHES., Disp: 18 tablet, Rfl: 0    clotrimazole (LOTRIMIN) 1 % cream, Apply topically 2 (two) times daily., Disp: 24 g, Rfl: 0    methocarbamoL (ROBAXIN) 500 MG Tab, Take 1 tablet (500 mg total) by mouth 3 (three) times daily as needed (Pain)., Disp: 30 tablet, Rfl: 0    simvastatin (ZOCOR) 40 MG tablet, TAKE ONE TABLET BY MOUTH EVERY EVENING, Disp: 90 tablet, Rfl: 3    tadalafiL (CIALIS) 20 MG Tab, Take 1 tablet (20 mg total) by mouth daily as needed., Disp: 10 tablet, Rfl: 5    tamsulosin (FLOMAX) 0.4 mg Cap, Take 1 capsule (0.4 mg total) by mouth once daily., Disp: 60 capsule, Rfl: 11    traMADoL (ULTRAM) 50 mg tablet, Take 1 tablet (50 mg total) by mouth every 6 (six) hours as needed for Pain., Disp: 10 tablet, Rfl: 0    traZODone (DESYREL) 100 MG tablet, TAKE ONE (1) TABLET (100 MG TOTAL) BY MOUTH NIGHTLY AS NEEDED FOR INSOMNIA., Disp: 30 tablet, Rfl: 11    zolpidem (AMBIEN) 5 MG Tab, Take 5 mg by mouth nightly as needed., Disp: , Rfl:     acetaminophen (TYLENOL) 325 MG tablet, Take 2 tablets (650 mg total) by mouth every 8 (eight) hours as needed for Pain. (Patient not taking: Reported on 9/23/2024), Disp:  , Rfl: 0    colchicine (COLCRYS) 0.6 mg tablet, Take 1 tablet (0.6 mg total) by mouth once daily. for 7 days, Disp: 7 tablet, Rfl: 0    diclofenac sodium (VOLTAREN ARTHRITIS PAIN) 1 % Gel, Apply 2 g topically once daily., Disp: 50 g, Rfl: 0    Lab Results   Component Value Date    WBC 3.54 (L) 07/13/2024    HGB 11.6 (L) 07/13/2024    HCT 35.8 (L) 07/13/2024     07/13/2024    CHOL 186 06/05/2024    TRIG 92 06/05/2024    HDL 59 06/05/2024    ALT 8 (L) 07/13/2024    AST 13 07/13/2024     07/13/2024    K 3.8 07/13/2024     07/13/2024    CREATININE 1.1 07/13/2024    BUN 9 07/13/2024    CO2 21 (L) 07/13/2024    TSH 1.912 12/13/2018    PSA 1.1 05/05/2023    INR 1.1 02/23/2017    HGBA1C 5.6 06/05/2024       Review of Systems   Constitutional:  Negative for fatigue and fever.   HENT: Negative.  Negative for congestion, sneezing and sore throat.    Eyes: Negative.    Respiratory:  Negative for cough, shortness of breath and wheezing.    Cardiovascular:  Negative for chest pain, palpitations and leg swelling.   Gastrointestinal:  Negative for abdominal pain, nausea and vomiting.   Genitourinary: Negative.    Musculoskeletal:  Positive for arthralgias.   Skin: Negative.  Negative for rash.   Neurological:  Negative for dizziness, weakness, light-headedness, numbness and headaches.   Hematological: Negative.    Psychiatric/Behavioral: Negative.     All other systems reviewed and are negative.      Objective:      Physical Exam  Constitutional:       Appearance: Normal appearance.   HENT:      Head: Normocephalic and atraumatic.      Nose: Nose normal.   Eyes:      Extraocular Movements: Extraocular movements intact.   Cardiovascular:      Rate and Rhythm: Normal rate and regular rhythm.   Pulmonary:      Effort: Pulmonary effort is normal.      Breath sounds: Normal breath sounds.   Musculoskeletal:         General: Normal range of motion.      Right hand: Tenderness (MCP joint) present.      Cervical back:  Normal range of motion.      Left ankle: Tenderness present.   Skin:     General: Skin is warm and dry.   Neurological:      General: No focal deficit present.      Mental Status: He is alert and oriented to person, place, and time.   Psychiatric:         Mood and Affect: Mood normal.         Assessment:       1. Gout, arthritis    2. Essential hypertension        Plan:       Bashir was seen today for thumb pain and ankle pain.    Diagnoses and all orders for this visit:    Gout, arthritis  -     colchicine (COLCRYS) 0.6 mg tablet; Take 1 tablet (0.6 mg total) by mouth once daily. for 7 days. CrCl 40  -     diclofenac sodium (VOLTAREN ARTHRITIS PAIN) 1 % Gel; Apply 2 g topically once daily.  - continue to monitor     Essential hypertension  - mildly elevated in clinic , previously well controlled  - continue to monitor          Continue to follow up with PCP.    I spent a total of 36 minutes on the day of the visit.This includes face to face time and non-face to face time preparing to see the patient (eg, review of tests), obtaining and/or reviewing separately obtained history, documenting clinical information in the electronic or other health record, independently interpreting results and communicating results to the patient/family/caregiver, or care coordinator.     Portions of this note were dictated using voice recognition software and may contain dictation related errors in spelling / grammar / syntax not discovered on text review.     Sheryl Tena PA-C

## 2024-11-04 ENCOUNTER — TELEPHONE (OUTPATIENT)
Dept: FAMILY MEDICINE | Facility: CLINIC | Age: 89
End: 2024-11-04
Payer: MEDICARE

## 2024-11-04 NOTE — TELEPHONE ENCOUNTER
Francine Roldan Staff     ----- Message from Francine sent at 11/4/2024  9:32 AM CST -----  Regarding: call back  Type: Patient Call Back    Who called: Bashir son    What is the request in detail: requesting call to schedule appt this week for right foot pain    Can the clinic reply by [facility]?no    Would the patient rather a call back or a response via [facility]? call    Best call back number: [phone]      Additional Information:

## 2024-11-04 NOTE — TELEPHONE ENCOUNTER
Call placed to number indicated in the attached message below.  Spoke to patient's son (Bashir Aranda). Mr. Bashir Graf advised patient prefers afternoon appointment with Dr. Casey only. Appointment scheduled; Mr. Bashir Graf. agreed to appointment date, time, and location.

## 2024-12-10 RX ORDER — BUTALBITAL, ACETAMINOPHEN AND CAFFEINE 50; 325; 40 MG/1; MG/1; MG/1
TABLET ORAL
Qty: 18 TABLET | Refills: 0 | Status: SHIPPED | OUTPATIENT
Start: 2024-12-10

## 2024-12-11 ENCOUNTER — OFFICE VISIT (OUTPATIENT)
Dept: FAMILY MEDICINE | Facility: CLINIC | Age: 89
End: 2024-12-11
Payer: MEDICARE

## 2024-12-11 ENCOUNTER — LAB VISIT (OUTPATIENT)
Dept: LAB | Facility: HOSPITAL | Age: 89
End: 2024-12-11
Attending: FAMILY MEDICINE
Payer: MEDICARE

## 2024-12-11 VITALS
RESPIRATION RATE: 16 BRPM | HEART RATE: 95 BPM | HEIGHT: 65 IN | DIASTOLIC BLOOD PRESSURE: 84 MMHG | SYSTOLIC BLOOD PRESSURE: 124 MMHG | OXYGEN SATURATION: 99 % | BODY MASS INDEX: 22.26 KG/M2 | WEIGHT: 133.63 LBS | TEMPERATURE: 99 F

## 2024-12-11 DIAGNOSIS — M10.9 GOUT, ARTHRITIS: ICD-10-CM

## 2024-12-11 DIAGNOSIS — R79.9 ABNORMAL BLOOD FINDING: ICD-10-CM

## 2024-12-11 DIAGNOSIS — R30.0 DYSURIA: Primary | ICD-10-CM

## 2024-12-11 DIAGNOSIS — E78.2 MIXED HYPERLIPIDEMIA: ICD-10-CM

## 2024-12-11 DIAGNOSIS — I10 ESSENTIAL HYPERTENSION: ICD-10-CM

## 2024-12-11 DIAGNOSIS — R30.0 DYSURIA: ICD-10-CM

## 2024-12-11 LAB
BILIRUB UR QL STRIP: NEGATIVE
CLARITY UR REFRACT.AUTO: CLEAR
COLOR UR AUTO: YELLOW
GLUCOSE UR QL STRIP: NEGATIVE
HGB UR QL STRIP: NEGATIVE
KETONES UR QL STRIP: NEGATIVE
LEUKOCYTE ESTERASE UR QL STRIP: NEGATIVE
NITRITE UR QL STRIP: NEGATIVE
PH UR STRIP: 6 [PH] (ref 5–8)
PROT UR QL STRIP: ABNORMAL
SP GR UR STRIP: 1.02 (ref 1–1.03)
URN SPEC COLLECT METH UR: ABNORMAL

## 2024-12-11 PROCEDURE — 3288F FALL RISK ASSESSMENT DOCD: CPT | Mod: CPTII,S$GLB,, | Performed by: FAMILY MEDICINE

## 2024-12-11 PROCEDURE — 1159F MED LIST DOCD IN RCRD: CPT | Mod: CPTII,S$GLB,, | Performed by: FAMILY MEDICINE

## 2024-12-11 PROCEDURE — 99214 OFFICE O/P EST MOD 30 MIN: CPT | Mod: S$GLB,,, | Performed by: FAMILY MEDICINE

## 2024-12-11 PROCEDURE — 1126F AMNT PAIN NOTED NONE PRSNT: CPT | Mod: CPTII,S$GLB,, | Performed by: FAMILY MEDICINE

## 2024-12-11 PROCEDURE — 81003 URINALYSIS AUTO W/O SCOPE: CPT | Performed by: FAMILY MEDICINE

## 2024-12-11 PROCEDURE — 99999 PR PBB SHADOW E&M-EST. PATIENT-LVL IV: CPT | Mod: PBBFAC,,, | Performed by: FAMILY MEDICINE

## 2024-12-11 PROCEDURE — 1101F PT FALLS ASSESS-DOCD LE1/YR: CPT | Mod: CPTII,S$GLB,, | Performed by: FAMILY MEDICINE

## 2024-12-11 PROCEDURE — G2211 COMPLEX E/M VISIT ADD ON: HCPCS | Mod: S$GLB,,, | Performed by: FAMILY MEDICINE

## 2024-12-11 RX ORDER — AMLODIPINE BESYLATE 2.5 MG/1
2.5 TABLET ORAL DAILY
Qty: 90 TABLET | Refills: 3 | Status: SHIPPED | OUTPATIENT
Start: 2024-12-11

## 2024-12-11 RX ORDER — SIMVASTATIN 40 MG/1
40 TABLET, FILM COATED ORAL NIGHTLY
Qty: 90 TABLET | Refills: 3 | Status: SHIPPED | OUTPATIENT
Start: 2024-12-11

## 2024-12-11 RX ORDER — ALLOPURINOL 100 MG/1
100 TABLET ORAL DAILY
Qty: 90 TABLET | Refills: 3 | Status: SHIPPED | OUTPATIENT
Start: 2024-12-11

## 2024-12-11 RX ORDER — BENAZEPRIL HYDROCHLORIDE 20 MG/1
20 TABLET ORAL DAILY
Qty: 90 TABLET | Refills: 3 | Status: SHIPPED | OUTPATIENT
Start: 2024-12-11

## 2024-12-11 RX ORDER — CIPROFLOXACIN 500 MG/1
500 TABLET ORAL EVERY 12 HOURS
Qty: 14 TABLET | Refills: 0 | Status: SHIPPED | OUTPATIENT
Start: 2024-12-11 | End: 2024-12-18

## 2024-12-11 NOTE — PROGRESS NOTES
Subjective:   Patient ID: Bashir Ramos Sr. is a 89 y.o. male     Chief Complaint:Follow-up      Here for checkup    Follow-up  Pertinent negatives include no abdominal pain or chest pain.     Review of Systems   Respiratory:  Negative for shortness of breath.    Cardiovascular:  Negative for chest pain.   Gastrointestinal:  Negative for abdominal pain.   Genitourinary:  Negative for dysuria.     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    Prediabetes 10/3/2017    Unspecified hereditary and idiopathic peripheral neuropathy      Past Surgical History:   Procedure Laterality Date    BACK SURGERY      CARPAL TUNNEL RELEASE Left 6/19/2023    Procedure: RELEASE, CARPAL TUNNEL,LEFT;  Surgeon: Anh An MD;  Location: [facility];  Service: Orthopedics;  Laterality: Left;    CATARACT EXTRACTION  10/29/13    left eye    CATARACT EXTRACTION W/  INTRAOCULAR LENS IMPLANT  10/15/13    OD (dr. grayson)    EYE SURGERY       Objective:     Vitals:    12/11/24 1128   BP: 124/84   Pulse: 95   Resp: 16   Temp: 98.8 °F (37.1 °C)     Body mass index is 22.23 kg/m².  Physical Exam  Vitals and nursing note reviewed.   Constitutional:       Appearance: He is well-developed.   HENT:      Head: Normocephalic and atraumatic.   Eyes:      General: No scleral icterus.     Conjunctiva/sclera: Conjunctivae normal.   Cardiovascular:      Heart sounds: No murmur heard.  Pulmonary:      Effort: Pulmonary effort is normal. No respiratory distress.   Musculoskeletal:         General: No deformity. Normal range of motion.      Cervical back: Normal range of motion and neck supple.   Skin:     Coloration: Skin is not pale.      Findings: No rash.   Neurological:      Mental Status: He is alert and oriented to person, place, and time.   Psychiatric:         Behavior: Behavior normal.         Thought Content:  Thought content normal.         Judgment: Judgment normal.       Assessment:     1. Dysuria    2. Gout, arthritis    3. Essential hypertension    4. Mixed hyperlipidemia    5. Abnormal blood finding      Plan:   Dysuria  -     Urinalysis; Future; Expected date: 12/11/2024  -     ciprofloxacin HCl (CIPRO) 500 MG tablet; Take 1 tablet (500 mg total) by mouth every 12 (twelve) hours. for 7 days  Dispense: 14 tablet; Refill: 0    Gout, arthritis  -     allopurinoL (ZYLOPRIM) 100 MG tablet; Take 1 tablet (100 mg total) by mouth once daily.  Dispense: 90 tablet; Refill: 3    Essential hypertension  -     amLODIPine (NORVASC) 2.5 MG tablet; Take 1 tablet (2.5 mg total) by mouth once daily.  Dispense: 90 tablet; Refill: 3  -     benazepriL (LOTENSIN) 20 MG tablet; Take 1 tablet (20 mg total) by mouth once daily.  Dispense: 90 tablet; Refill: 3  -     Comprehensive Metabolic Panel; Future; Expected date: 12/11/2024  -     Hemoglobin A1C; Future; Expected date: 12/11/2024  -     Lipid Panel; Future; Expected date: 12/11/2024    Mixed hyperlipidemia  -     simvastatin (ZOCOR) 40 MG tablet; Take 1 tablet (40 mg total) by mouth every evening.  Dispense: 90 tablet; Refill: 3  -     Comprehensive Metabolic Panel; Future; Expected date: 12/11/2024  -     Hemoglobin A1C; Future; Expected date: 12/11/2024  -     Lipid Panel; Future; Expected date: 12/11/2024    Abnormal blood finding  -     Hemoglobin A1C; Future; Expected date: 12/11/2024            Total time spent of Greater than 30 minutes minutes on the day of the visit.This includes face to face time and preparing to see the patient, obtaining and reviewing separately obtained history, documenting clinical information in the electronic or other health record, independently interpreting results and communicating results to the patient/family/caregiver, or care coordinator.    Established patient with me has been instructed that must see me at least 1 time yearly (every 365 days)  for refills of medications. Seeing other providers in this clinic is fine but expectation is to see me yearly.    Juan Casey MD  12/11/2024    Portions of this note have been dictated with ADEN Cuevas.

## 2025-03-24 DIAGNOSIS — Z00.00 ENCOUNTER FOR MEDICARE ANNUAL WELLNESS EXAM: ICD-10-CM

## 2025-05-20 ENCOUNTER — LAB VISIT (OUTPATIENT)
Dept: LAB | Facility: HOSPITAL | Age: OVER 89
End: 2025-05-20
Attending: FAMILY MEDICINE
Payer: MEDICARE

## 2025-05-20 ENCOUNTER — OFFICE VISIT (OUTPATIENT)
Dept: FAMILY MEDICINE | Facility: CLINIC | Age: OVER 89
End: 2025-05-20
Payer: MEDICARE

## 2025-05-20 VITALS
WEIGHT: 135.56 LBS | OXYGEN SATURATION: 98 % | BODY MASS INDEX: 22.59 KG/M2 | HEIGHT: 65 IN | SYSTOLIC BLOOD PRESSURE: 126 MMHG | DIASTOLIC BLOOD PRESSURE: 72 MMHG | HEART RATE: 86 BPM | RESPIRATION RATE: 18 BRPM | TEMPERATURE: 98 F

## 2025-05-20 DIAGNOSIS — I10 ESSENTIAL HYPERTENSION: ICD-10-CM

## 2025-05-20 DIAGNOSIS — E78.2 MIXED HYPERLIPIDEMIA: ICD-10-CM

## 2025-05-20 DIAGNOSIS — L30.9 DERMATITIS: Primary | ICD-10-CM

## 2025-05-20 DIAGNOSIS — R79.9 ABNORMAL FINDING OF BLOOD CHEMISTRY, UNSPECIFIED: ICD-10-CM

## 2025-05-20 DIAGNOSIS — G47.00 INSOMNIA, UNSPECIFIED TYPE: ICD-10-CM

## 2025-05-20 LAB
ABSOLUTE EOSINOPHIL (OHS): 0.61 K/UL
ABSOLUTE MONOCYTE (OHS): 0.45 K/UL (ref 0.3–1)
ABSOLUTE NEUTROPHIL COUNT (OHS): 1.74 K/UL (ref 1.8–7.7)
ALBUMIN SERPL BCP-MCNC: 3.7 G/DL (ref 3.5–5.2)
ALP SERPL-CCNC: 89 UNIT/L (ref 40–150)
ALT SERPL W/O P-5'-P-CCNC: 21 UNIT/L (ref 10–44)
ANION GAP (OHS): 10 MMOL/L (ref 8–16)
AST SERPL-CCNC: 25 UNIT/L (ref 11–45)
BASOPHILS # BLD AUTO: 0.02 K/UL
BASOPHILS NFR BLD AUTO: 0.5 %
BILIRUB SERPL-MCNC: 0.4 MG/DL (ref 0.1–1)
BUN SERPL-MCNC: 15 MG/DL (ref 8–23)
CALCIUM SERPL-MCNC: 9.5 MG/DL (ref 8.7–10.5)
CHLORIDE SERPL-SCNC: 106 MMOL/L (ref 95–110)
CHOLEST SERPL-MCNC: 141 MG/DL (ref 120–199)
CHOLEST/HDLC SERPL: 2.1 {RATIO} (ref 2–5)
CO2 SERPL-SCNC: 23 MMOL/L (ref 23–29)
CREAT SERPL-MCNC: 1.2 MG/DL (ref 0.5–1.4)
EAG (OHS): 117 MG/DL (ref 68–131)
ERYTHROCYTE [DISTWIDTH] IN BLOOD BY AUTOMATED COUNT: 14.9 % (ref 11.5–14.5)
GFR SERPLBLD CREATININE-BSD FMLA CKD-EPI: 57 ML/MIN/1.73/M2
GLUCOSE SERPL-MCNC: 81 MG/DL (ref 70–110)
HBA1C MFR BLD: 5.7 % (ref 4–5.6)
HCT VFR BLD AUTO: 40.4 % (ref 40–54)
HDLC SERPL-MCNC: 66 MG/DL (ref 40–75)
HDLC SERPL: 46.8 % (ref 20–50)
HGB BLD-MCNC: 12.5 GM/DL (ref 14–18)
IMM GRANULOCYTES # BLD AUTO: 0.01 K/UL (ref 0–0.04)
IMM GRANULOCYTES NFR BLD AUTO: 0.3 % (ref 0–0.5)
LDLC SERPL CALC-MCNC: 66.2 MG/DL (ref 63–159)
LYMPHOCYTES # BLD AUTO: 1.1 K/UL (ref 1–4.8)
MCH RBC QN AUTO: 28.5 PG (ref 27–31)
MCHC RBC AUTO-ENTMCNC: 30.9 G/DL (ref 32–36)
MCV RBC AUTO: 92 FL (ref 82–98)
NONHDLC SERPL-MCNC: 75 MG/DL
NUCLEATED RBC (/100WBC) (OHS): 0 /100 WBC
PLATELET # BLD AUTO: 322 K/UL (ref 150–450)
PMV BLD AUTO: 9.6 FL (ref 9.2–12.9)
POTASSIUM SERPL-SCNC: 4.2 MMOL/L (ref 3.5–5.1)
PROT SERPL-MCNC: 7.2 GM/DL (ref 6–8.4)
RBC # BLD AUTO: 4.38 M/UL (ref 4.6–6.2)
RELATIVE EOSINOPHIL (OHS): 15.5 %
RELATIVE LYMPHOCYTE (OHS): 28 % (ref 18–48)
RELATIVE MONOCYTE (OHS): 11.5 % (ref 4–15)
RELATIVE NEUTROPHIL (OHS): 44.2 % (ref 38–73)
SODIUM SERPL-SCNC: 139 MMOL/L (ref 136–145)
TRIGL SERPL-MCNC: 44 MG/DL (ref 30–150)
WBC # BLD AUTO: 3.93 K/UL (ref 3.9–12.7)

## 2025-05-20 PROCEDURE — 99999 PR PBB SHADOW E&M-EST. PATIENT-LVL III: CPT | Mod: PBBFAC,,, | Performed by: FAMILY MEDICINE

## 2025-05-20 PROCEDURE — 99214 OFFICE O/P EST MOD 30 MIN: CPT | Mod: S$GLB,,, | Performed by: FAMILY MEDICINE

## 2025-05-20 PROCEDURE — 1101F PT FALLS ASSESS-DOCD LE1/YR: CPT | Mod: CPTII,S$GLB,, | Performed by: FAMILY MEDICINE

## 2025-05-20 PROCEDURE — 1125F AMNT PAIN NOTED PAIN PRSNT: CPT | Mod: CPTII,S$GLB,, | Performed by: FAMILY MEDICINE

## 2025-05-20 PROCEDURE — 85025 COMPLETE CBC W/AUTO DIFF WBC: CPT

## 2025-05-20 PROCEDURE — 3288F FALL RISK ASSESSMENT DOCD: CPT | Mod: CPTII,S$GLB,, | Performed by: FAMILY MEDICINE

## 2025-05-20 PROCEDURE — 83036 HEMOGLOBIN GLYCOSYLATED A1C: CPT

## 2025-05-20 PROCEDURE — 80053 COMPREHEN METABOLIC PANEL: CPT

## 2025-05-20 PROCEDURE — 1159F MED LIST DOCD IN RCRD: CPT | Mod: CPTII,S$GLB,, | Performed by: FAMILY MEDICINE

## 2025-05-20 PROCEDURE — G2211 COMPLEX E/M VISIT ADD ON: HCPCS | Mod: S$GLB,,, | Performed by: FAMILY MEDICINE

## 2025-05-20 PROCEDURE — 80061 LIPID PANEL: CPT

## 2025-05-20 PROCEDURE — 36415 COLL VENOUS BLD VENIPUNCTURE: CPT | Mod: PO

## 2025-05-20 RX ORDER — TRIAMCINOLONE ACETONIDE 1 MG/G
CREAM TOPICAL 2 TIMES DAILY
Qty: 28.4 G | Refills: 2 | Status: SHIPPED | OUTPATIENT
Start: 2025-05-20 | End: 2025-06-03

## 2025-05-20 RX ORDER — TRAZODONE HYDROCHLORIDE 100 MG/1
100 TABLET ORAL NIGHTLY
Qty: 90 TABLET | Refills: 3 | Status: SHIPPED | OUTPATIENT
Start: 2025-05-20 | End: 2026-05-20

## 2025-05-20 NOTE — PROGRESS NOTES
Subjective:   Patient ID: Bashir Ramso Sr. is a 89 y.o. male     Chief Complaint:Follow-up (B/P issues)      Here for checkup    Follow-up  Pertinent negatives include no abdominal pain or chest pain.     Review of Systems   Respiratory:  Negative for shortness of breath.    Cardiovascular:  Negative for chest pain.   Gastrointestinal:  Negative for abdominal pain.   Genitourinary:  Negative for dysuria.     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    Prediabetes 10/3/2017    Unspecified hereditary and idiopathic peripheral neuropathy      Past Surgical History:   Procedure Laterality Date    BACK SURGERY      CARPAL TUNNEL RELEASE Left 6/19/2023    Procedure: RELEASE, CARPAL TUNNEL,LEFT;  Surgeon: Anh An MD;  Location: [facility];  Service: Orthopedics;  Laterality: Left;    CATARACT EXTRACTION  10/29/13    left eye    CATARACT EXTRACTION W/  INTRAOCULAR LENS IMPLANT  10/15/13    OD (dr. grayson)    EYE SURGERY       Objective:     Vitals:    05/20/25 0920   BP: 126/72   Pulse: 86   Resp: 18   Temp: 97.7 °F (36.5 °C)     Body mass index is 22.56 kg/m².  Physical Exam  Vitals and nursing note reviewed.   Constitutional:       Appearance: He is well-developed.   HENT:      Head: Normocephalic and atraumatic.   Eyes:      General: No scleral icterus.     Conjunctiva/sclera: Conjunctivae normal.   Cardiovascular:      Heart sounds: No murmur heard.  Pulmonary:      Effort: Pulmonary effort is normal. No respiratory distress.   Musculoskeletal:         General: No deformity. Normal range of motion.      Cervical back: Normal range of motion and neck supple.   Skin:     Coloration: Skin is not pale.      Findings: No rash.   Neurological:      Mental Status: He is alert and oriented to person, place, and time.   Psychiatric:         Behavior: Behavior normal.         Thought  Content: Thought content normal.         Judgment: Judgment normal.       Assessment:     1. Dermatitis    2. Insomnia, unspecified type    3. Mixed hyperlipidemia    4. Abnormal finding of blood chemistry, unspecified      Plan:   Dermatitis  -     triamcinolone acetonide 0.1% (KENALOG) 0.1 % cream; Apply topically 2 (two) times daily. for 14 days  Dispense: 28.4 g; Refill: 2    Insomnia, unspecified type  -     traZODone (DESYREL) 100 MG tablet; Take 1 tablet (100 mg total) by mouth every evening.  Dispense: 90 tablet; Refill: 3    Mixed hyperlipidemia  -     CBC Auto Differential; Future; Expected date: 05/20/2025  -     Comprehensive Metabolic Panel; Future; Expected date: 05/20/2025  -     Hemoglobin A1C; Future; Expected date: 05/20/2025  -     Lipid Panel; Future; Expected date: 05/20/2025    Abnormal finding of blood chemistry, unspecified  -     Hemoglobin A1C; Future; Expected date: 05/20/2025    Essential hypertension  -     Comprehensive Metabolic Panel; Future; Expected date: 05/20/2025      Chronic condition not otherwise mentioned is stable      Total time spent of Greater than 30 minutes minutes on the day of the visit.This includes face to face time and preparing to see the patient, obtaining and reviewing separately obtained history, documenting clinical information in the electronic or other health record, independently interpreting results and communicating results to the patient/family/caregiver, or care coordinator.    Established patient with me has been instructed that must see me at least 1 time yearly (every 365 days) for refills of medications. Seeing other providers in this clinic is fine but expectation is to see me yearly.    Juan Casey MD  05/20/2025    Portions of this note have been dictated with ADEN Monterroso

## 2025-05-21 ENCOUNTER — RESULTS FOLLOW-UP (OUTPATIENT)
Dept: FAMILY MEDICINE | Facility: CLINIC | Age: OVER 89
End: 2025-05-21

## 2025-06-12 ENCOUNTER — TELEPHONE (OUTPATIENT)
Dept: FAMILY MEDICINE | Facility: CLINIC | Age: OVER 89
End: 2025-06-12
Payer: MEDICARE

## 2025-06-22 ENCOUNTER — HOSPITAL ENCOUNTER (EMERGENCY)
Facility: HOSPITAL | Age: OVER 89
Discharge: HOME | End: 2025-06-22
Attending: EMERGENCY MEDICINE
Payer: MEDICARE

## 2025-06-22 VITALS
DIASTOLIC BLOOD PRESSURE: 69 MMHG | RESPIRATION RATE: 18 BRPM | SYSTOLIC BLOOD PRESSURE: 146 MMHG | HEART RATE: 83 BPM | BODY MASS INDEX: 22.49 KG/M2 | HEIGHT: 65 IN | TEMPERATURE: 98 F | WEIGHT: 135 LBS | OXYGEN SATURATION: 98 %

## 2025-06-22 DIAGNOSIS — L98.9 SKIN LESION: Primary | ICD-10-CM

## 2025-06-22 PROCEDURE — 99282 EMERGENCY DEPT VISIT SF MDM: CPT

## 2025-06-22 RX ORDER — DIAPER,BRIEF,INFANT-TODD,DISP
EACH MISCELLANEOUS 2 TIMES DAILY
Qty: 56 G | Refills: 0 | Status: SHIPPED | OUTPATIENT
Start: 2025-06-22

## 2025-06-22 NOTE — ED NOTES
Pt reports having cyst removed from upper back in April. Pt states that he has been back to doctor to did procedure and received ointment for area. Pt complaining of pain and itching to area.

## 2025-07-12 ENCOUNTER — HOSPITAL ENCOUNTER (EMERGENCY)
Facility: HOSPITAL | Age: OVER 89
Discharge: HOME | End: 2025-07-12
Attending: EMERGENCY MEDICINE
Payer: MEDICARE

## 2025-07-12 VITALS
HEART RATE: 79 BPM | RESPIRATION RATE: 18 BRPM | OXYGEN SATURATION: 100 % | BODY MASS INDEX: 26.66 KG/M2 | HEIGHT: 65 IN | WEIGHT: 160 LBS | TEMPERATURE: 98 F | SYSTOLIC BLOOD PRESSURE: 173 MMHG | DIASTOLIC BLOOD PRESSURE: 94 MMHG

## 2025-07-12 DIAGNOSIS — R35.0 URINARY FREQUENCY: Primary | ICD-10-CM

## 2025-07-12 LAB
ABSOLUTE EOSINOPHIL (OHS): 0.25 K/UL
ABSOLUTE MONOCYTE (OHS): 0.34 K/UL (ref 0.3–1)
ABSOLUTE NEUTROPHIL COUNT (OHS): 2.28 K/UL (ref 1.8–7.7)
ALBUMIN SERPL BCP-MCNC: 3.7 G/DL (ref 3.5–5.2)
ALP SERPL-CCNC: 70 UNIT/L (ref 40–150)
ALT SERPL W/O P-5'-P-CCNC: 12 UNIT/L (ref 10–44)
ANION GAP (OHS): 8 MMOL/L (ref 8–16)
AST SERPL-CCNC: 19 UNIT/L (ref 11–45)
BASOPHILS # BLD AUTO: 0.02 K/UL
BASOPHILS NFR BLD AUTO: 0.6 %
BILIRUB SERPL-MCNC: 0.4 MG/DL (ref 0.1–1)
BILIRUB UR QL STRIP.AUTO: NEGATIVE
BUN SERPL-MCNC: 12 MG/DL (ref 8–23)
CALCIUM SERPL-MCNC: 9.6 MG/DL (ref 8.7–10.5)
CHLORIDE SERPL-SCNC: 110 MMOL/L (ref 95–110)
CLARITY UR: CLEAR
CO2 SERPL-SCNC: 20 MMOL/L (ref 23–29)
COLOR UR AUTO: COLORLESS
CREAT SERPL-MCNC: 1.1 MG/DL (ref 0.5–1.4)
ERYTHROCYTE [DISTWIDTH] IN BLOOD BY AUTOMATED COUNT: 15.2 % (ref 11.5–14.5)
GFR SERPLBLD CREATININE-BSD FMLA CKD-EPI: >60 ML/MIN/1.73/M2
GLUCOSE SERPL-MCNC: 110 MG/DL (ref 70–110)
GLUCOSE UR QL STRIP: NEGATIVE
HCT VFR BLD AUTO: 36.3 % (ref 40–54)
HGB BLD-MCNC: 11.9 GM/DL (ref 14–18)
HGB UR QL STRIP: NEGATIVE
IMM GRANULOCYTES # BLD AUTO: 0.01 K/UL (ref 0–0.04)
IMM GRANULOCYTES NFR BLD AUTO: 0.3 % (ref 0–0.5)
KETONES UR QL STRIP: NEGATIVE
LEUKOCYTE ESTERASE UR QL STRIP: NEGATIVE
LYMPHOCYTES # BLD AUTO: 0.63 K/UL (ref 1–4.8)
MCH RBC QN AUTO: 29.1 PG (ref 27–31)
MCHC RBC AUTO-ENTMCNC: 32.8 G/DL (ref 32–36)
MCV RBC AUTO: 89 FL (ref 82–98)
NITRITE UR QL STRIP: NEGATIVE
NUCLEATED RBC (/100WBC) (OHS): 0 /100 WBC
PH UR STRIP: 6 [PH]
PLATELET # BLD AUTO: 256 K/UL (ref 150–450)
PMV BLD AUTO: 9.2 FL (ref 9.2–12.9)
POTASSIUM SERPL-SCNC: 3.6 MMOL/L (ref 3.5–5.1)
PROT SERPL-MCNC: 7 GM/DL (ref 6–8.4)
PROT UR QL STRIP: NEGATIVE
RBC # BLD AUTO: 4.09 M/UL (ref 4.6–6.2)
RELATIVE EOSINOPHIL (OHS): 7.1 %
RELATIVE LYMPHOCYTE (OHS): 17.8 % (ref 18–48)
RELATIVE MONOCYTE (OHS): 9.6 % (ref 4–15)
RELATIVE NEUTROPHIL (OHS): 64.6 % (ref 38–73)
SODIUM SERPL-SCNC: 138 MMOL/L (ref 136–145)
SP GR UR STRIP: <1.005
UROBILINOGEN UR STRIP-ACNC: NEGATIVE EU/DL
WBC # BLD AUTO: 3.53 K/UL (ref 3.9–12.7)

## 2025-07-12 PROCEDURE — 51798 US URINE CAPACITY MEASURE: CPT

## 2025-07-12 PROCEDURE — 99283 EMERGENCY DEPT VISIT LOW MDM: CPT | Mod: 25

## 2025-07-12 PROCEDURE — 81003 URINALYSIS AUTO W/O SCOPE: CPT | Performed by: EMERGENCY MEDICINE

## 2025-07-12 PROCEDURE — 85025 COMPLETE CBC W/AUTO DIFF WBC: CPT | Performed by: EMERGENCY MEDICINE

## 2025-07-12 PROCEDURE — 80053 COMPREHEN METABOLIC PANEL: CPT | Performed by: EMERGENCY MEDICINE

## 2025-07-12 NOTE — ED PROVIDER NOTES
Encounter Date: 7/12/2025       History     Chief Complaint   Patient presents with    Abdominal Pain     C/o lower ABD pain w/ increased urinary frequency x3 days. Denies other sx or complaints.      Presents for evaluation or urinary frequency today. No dysuria. No hematuria. No fever. No nausea or emesis. Feels as though he is emptying bladder fully. Bladder scan does not show retention.    The history is provided by the patient and a relative.     Review of patient's allergies indicates:   Allergen Reactions    Shellfish containing products Anaphylaxis    Iodine and iodide containing products     Pcn [penicillins] Rash     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    Prediabetes 10/3/2017    Unspecified hereditary and idiopathic peripheral neuropathy      Past Surgical History:   Procedure Laterality Date    BACK SURGERY      CARPAL TUNNEL RELEASE Left 6/19/2023    Procedure: RELEASE, CARPAL TUNNEL,LEFT;  Surgeon: Anh An MD;  Location: [facility] OR;  Service: Orthopedics;  Laterality: Left;    CATARACT EXTRACTION  10/29/13    left eye    CATARACT EXTRACTION W/  INTRAOCULAR LENS IMPLANT  10/15/13    OD (dr. grayson)    EYE SURGERY       Family History   Problem Relation Name Age of Onset    No Known Problems Mother      No Known Problems Father      Amblyopia Neg Hx      Blindness Neg Hx      Cancer Neg Hx      Cataracts Neg Hx      Diabetes Neg Hx      Glaucoma Neg Hx      Hypertension Neg Hx      Macular degeneration Neg Hx      Retinal detachment Neg Hx      Strabismus Neg Hx      Stroke Neg Hx      Thyroid disease Neg Hx       Social History[1]  Review of Systems   Constitutional:  Negative for chills and fever.   Respiratory:  Negative for cough and shortness of breath.    Gastrointestinal:  Negative for abdominal pain, nausea and vomiting.   Genitourinary:  Positive for  frequency. Negative for dysuria, hematuria, penile discharge, penile swelling and scrotal swelling.   Skin:  Negative for rash and wound.   Neurological:  Negative for weakness, numbness and headaches.   All other systems reviewed and are negative.      Physical Exam     Initial Vitals [07/12/25 1243]   BP Pulse Resp Temp SpO2   (!) 181/97 96 20 98.6 °F (37 °C) 100 %      MAP       --         Physical Exam    Nursing note and vitals reviewed.  Constitutional: He appears well-developed and well-nourished. He is not diaphoretic. No distress.   HENT:   Head: Normocephalic and atraumatic.   Eyes: Conjunctivae and EOM are normal.   Neck: Neck supple.   Normal range of motion.  Cardiovascular:  Normal rate and regular rhythm.           Pulmonary/Chest: Breath sounds normal. No respiratory distress.   Abdominal: Abdomen is soft. There is no abdominal tenderness. There is no rebound and no guarding.   Musculoskeletal:         General: No tenderness. Normal range of motion.      Cervical back: Normal range of motion and neck supple.     Neurological: He is alert. GCS score is 15. GCS eye subscore is 4. GCS verbal subscore is 5. GCS motor subscore is 6.   Skin:   Area of excision of cyst well healed. No induration. No cellulitis. No fluctuance   Psychiatric: He has a normal mood and affect. His behavior is normal.         ED Course   Procedures  Labs Reviewed   URINALYSIS, REFLEX TO URINE CULTURE - Abnormal       Result Value    Color, UA Colorless (*)     Appearance, UA Clear      pH, UA 6.0      Spec Grav UA <1.005 (*)     Protein, UA Negative      Glucose, UA Negative      Ketones, UA Negative      Bilirubin, UA Negative      Blood, UA Negative      Nitrites, UA Negative      Urobilinogen, UA Negative      Leukocyte Esterase, UA Negative     COMPREHENSIVE METABOLIC PANEL - Abnormal    Sodium 138      Potassium 3.6      Chloride 110      CO2 20 (*)     Glucose 110      BUN 12      Creatinine 1.1      Calcium 9.6       Protein Total 7.0      Albumin 3.7      Bilirubin Total 0.4      ALP 70      AST 19      ALT 12      Anion Gap 8      eGFR >60     CBC WITH DIFFERENTIAL - Abnormal    WBC 3.53 (*)     RBC 4.09 (*)     HGB 11.9 (*)     HCT 36.3 (*)     MCV 89      MCH 29.1      MCHC 32.8      RDW 15.2 (*)     Platelet Count 256      MPV 9.2      Nucleated RBC 0      Neut % 64.6      Lymph % 17.8 (*)     Mono % 9.6      Eos % 7.1      Basophil % 0.6      Imm Grans % 0.3      Neut # 2.28      Lymph # 0.63 (*)     Mono # 0.34      Eos # 0.25      Baso # 0.02      Imm Grans # 0.01     CBC W/ AUTO DIFFERENTIAL    Narrative:     The following orders were created for panel order CBC auto differential.  Procedure                               Abnormality         Status                     ---------                               -----------         ------                     CBC with Differential[[id number]]       Abnormal            Final result                 Please view results for these tests on the individual orders.          Imaging Results    None          Medications - No data to display  Medical Decision Making  CBC mild anemia stable  CMP bicarb 20  UA without UTI, no hematuria  No infectious or emergent process identified    Amount and/or Complexity of Data Reviewed  Labs: ordered. Decision-making details documented in ED Course.                                          Clinical Impression:  Final diagnoses:  [R35.0] Urinary frequency (Primary)          ED Disposition Condition    Discharge Stable          ED Prescriptions    None       Follow-up Information       Follow up With Specialties Details Why Contact Info    Juan Casey MD Family Medicine   [address] [place] [id number] [phone]                     [1]   Social History  Tobacco Use    Smoking status: Former     Current packs/day: 0.00     Types: Cigarettes     Quit date: 1982     Years since quittin.9    Smokeless tobacco: Never    Substance Use Topics    Alcohol use: No    Drug use: No        Flo Montez MD  07/12/25 [id number]

## 2025-07-12 NOTE — ED NOTES
Pt came to ED with CC of urinary frequency. PT states he has been having urinary frequency for the past two days. Pt denies any abdominal pain, N/V/D. Pt also complains of chronis back pain that he has been experiencing since his surgery. Pt's niece at bedside states that pt lives alone and that he has also been weak

## (undated) DEVICE — DRAPE STERI-DRAPE 1000 17X11IN

## (undated) DEVICE — GAUZE SPONGE 4X4 12PLY

## (undated) DEVICE — GLOVE BIOGEL PI MICRO INDIC 7

## (undated) DEVICE — NDL 18GA X1 1/2 REG BEVEL

## (undated) DEVICE — SYR B-D DISP CONTROL 10CC100/C

## (undated) DEVICE — Device

## (undated) DEVICE — DRESSING N ADH OIL EMUL 3X3

## (undated) DEVICE — SOL PVP-I SCRUB 7.5% 4OZ

## (undated) DEVICE — NDL 22GA X1 1/2 REG BEVEL

## (undated) DEVICE — TOURNIQUET SB QC DP 18X4IN

## (undated) DEVICE — NDL HYPO A BEVEL 22X1 1/2

## (undated) DEVICE — TOURNIQUET SB QC SP 18X4IN

## (undated) DEVICE — SEE MEDLINE ITEM 157131

## (undated) DEVICE — BANDAGE KERLIX P/P 2.25IN STER

## (undated) DEVICE — BANDAGE MATRIX HK LOOP 2IN 5YD

## (undated) DEVICE — BANDAGE ELASTIC ACE 2IN 10/CA

## (undated) DEVICE — DRUG BACITRACIN UNGT OINTMENT

## (undated) DEVICE — BANDAGE ACE NON LATEX 2IN

## (undated) DEVICE — SOL SALINE STER BOTTLE 500ML

## (undated) DEVICE — COVER CAMERA OPERATING ROOM

## (undated) DEVICE — SUT 4/0 18IN ETHILON BL P3

## (undated) DEVICE — GLOVE BIOGEL ECLIPSE SZ 7

## (undated) DEVICE — SYR 10CC LUER LOCK